# Patient Record
Sex: FEMALE | Race: WHITE | Employment: OTHER | URBAN - METROPOLITAN AREA
[De-identification: names, ages, dates, MRNs, and addresses within clinical notes are randomized per-mention and may not be internally consistent; named-entity substitution may affect disease eponyms.]

---

## 2019-07-12 ENCOUNTER — TELEPHONE (OUTPATIENT)
Dept: FAMILY MEDICINE CLINIC | Age: 64
End: 2019-07-12

## 2019-07-12 ENCOUNTER — OFFICE VISIT (OUTPATIENT)
Dept: FAMILY MEDICINE CLINIC | Age: 64
End: 2019-07-12
Payer: MEDICARE

## 2019-07-12 VITALS
TEMPERATURE: 97.6 F | HEIGHT: 63 IN | RESPIRATION RATE: 15 BRPM | SYSTOLIC BLOOD PRESSURE: 126 MMHG | WEIGHT: 140 LBS | BODY MASS INDEX: 24.8 KG/M2 | HEART RATE: 80 BPM | DIASTOLIC BLOOD PRESSURE: 72 MMHG

## 2019-07-12 DIAGNOSIS — F90.0 ATTENTION DEFICIT HYPERACTIVITY DISORDER (ADHD), PREDOMINANTLY INATTENTIVE TYPE: Primary | ICD-10-CM

## 2019-07-12 DIAGNOSIS — G89.4 CHRONIC PAIN SYNDROME: ICD-10-CM

## 2019-07-12 DIAGNOSIS — M79.7 FIBROMYALGIA: ICD-10-CM

## 2019-07-12 DIAGNOSIS — I10 ESSENTIAL HYPERTENSION: ICD-10-CM

## 2019-07-12 DIAGNOSIS — J44.9 CHRONIC OBSTRUCTIVE PULMONARY DISEASE, UNSPECIFIED COPD TYPE (HCC): ICD-10-CM

## 2019-07-12 DIAGNOSIS — F33.1 MODERATE EPISODE OF RECURRENT MAJOR DEPRESSIVE DISORDER (HCC): ICD-10-CM

## 2019-07-12 DIAGNOSIS — F41.9 ANXIETY: ICD-10-CM

## 2019-07-12 PROCEDURE — 99204 OFFICE O/P NEW MOD 45 MIN: CPT | Performed by: NURSE PRACTITIONER

## 2019-07-12 RX ORDER — TRAMADOL HYDROCHLORIDE 50 MG/1
50 TABLET ORAL EVERY 6 HOURS PRN
COMMUNITY
End: 2019-07-12

## 2019-07-12 RX ORDER — CYCLOBENZAPRINE HCL 10 MG
10 TABLET ORAL 2 TIMES DAILY PRN
COMMUNITY
End: 2019-07-12 | Stop reason: SDUPTHER

## 2019-07-12 RX ORDER — FUROSEMIDE 20 MG/1
20 TABLET ORAL DAILY
COMMUNITY
End: 2022-02-03 | Stop reason: SDUPTHER

## 2019-07-12 RX ORDER — BUPRENORPHINE 10 UG/H
1 PATCH TRANSDERMAL WEEKLY
Qty: 4 PATCH | Refills: 2 | Status: SHIPPED | OUTPATIENT
Start: 2019-07-12 | End: 2019-08-08 | Stop reason: ALTCHOICE

## 2019-07-12 RX ORDER — GABAPENTIN 400 MG/1
400 CAPSULE ORAL 2 TIMES DAILY
COMMUNITY
End: 2019-08-08 | Stop reason: ALTCHOICE

## 2019-07-12 RX ORDER — ALPRAZOLAM 1 MG/1
1 TABLET ORAL NIGHTLY PRN
COMMUNITY
End: 2019-07-12

## 2019-07-12 RX ORDER — ATENOLOL 25 MG/1
25 TABLET ORAL DAILY
COMMUNITY
End: 2019-08-08 | Stop reason: CLARIF

## 2019-07-12 RX ORDER — DOCUSATE SODIUM 100 MG/1
100 CAPSULE, LIQUID FILLED ORAL 2 TIMES DAILY
COMMUNITY
End: 2019-11-18 | Stop reason: CLARIF

## 2019-07-12 RX ORDER — MIRTAZAPINE 15 MG/1
15 TABLET, FILM COATED ORAL NIGHTLY
COMMUNITY

## 2019-07-12 RX ORDER — CYCLOBENZAPRINE HCL 10 MG
10 TABLET ORAL 3 TIMES DAILY PRN
Qty: 90 TABLET | Refills: 3 | Status: SHIPPED | OUTPATIENT
Start: 2019-07-12 | End: 2020-02-03 | Stop reason: SDUPTHER

## 2019-07-12 RX ORDER — DEXTROAMPHETAMINE SACCHARATE, AMPHETAMINE ASPARTATE, DEXTROAMPHETAMINE SULFATE AND AMPHETAMINE SULFATE 7.5; 7.5; 7.5; 7.5 MG/1; MG/1; MG/1; MG/1
30 TABLET ORAL 2 TIMES DAILY
Qty: 60 TABLET | Refills: 0 | Status: SHIPPED | OUTPATIENT
Start: 2019-07-12 | End: 2019-08-08

## 2019-07-12 RX ORDER — DEXTROAMPHETAMINE SACCHARATE, AMPHETAMINE ASPARTATE, DEXTROAMPHETAMINE SULFATE AND AMPHETAMINE SULFATE 7.5; 7.5; 7.5; 7.5 MG/1; MG/1; MG/1; MG/1
30 TABLET ORAL DAILY
COMMUNITY
End: 2019-07-12

## 2019-07-12 RX ORDER — PROCHLORPERAZINE MALEATE 10 MG
10 TABLET ORAL EVERY 6 HOURS PRN
COMMUNITY
End: 2019-11-18

## 2019-07-12 RX ORDER — BUPROPION HYDROCHLORIDE 150 MG/1
150 TABLET ORAL EVERY MORNING
COMMUNITY
End: 2019-11-18 | Stop reason: CLARIF

## 2019-07-12 RX ORDER — ALPRAZOLAM 1 MG/1
1 TABLET, EXTENDED RELEASE ORAL EVERY MORNING
Qty: 30 TABLET | Refills: 0 | Status: SHIPPED | OUTPATIENT
Start: 2019-07-12 | End: 2019-08-08

## 2019-07-12 SDOH — HEALTH STABILITY: MENTAL HEALTH: HOW OFTEN DO YOU HAVE A DRINK CONTAINING ALCOHOL?: NEVER

## 2019-07-12 ASSESSMENT — PATIENT HEALTH QUESTIONNAIRE - PHQ9
SUM OF ALL RESPONSES TO PHQ9 QUESTIONS 1 & 2: 0
2. FEELING DOWN, DEPRESSED OR HOPELESS: 0
SUM OF ALL RESPONSES TO PHQ QUESTIONS 1-9: 0
SUM OF ALL RESPONSES TO PHQ QUESTIONS 1-9: 0
1. LITTLE INTEREST OR PLEASURE IN DOING THINGS: 0

## 2019-07-15 ENCOUNTER — TELEPHONE (OUTPATIENT)
Dept: FAMILY MEDICINE CLINIC | Age: 64
End: 2019-07-15

## 2019-07-22 ENCOUNTER — TELEPHONE (OUTPATIENT)
Dept: FAMILY MEDICINE CLINIC | Age: 64
End: 2019-07-22

## 2019-07-23 ENCOUNTER — TELEPHONE (OUTPATIENT)
Dept: FAMILY MEDICINE CLINIC | Age: 64
End: 2019-07-23

## 2019-07-29 NOTE — PROGRESS NOTES
family history on file. No Known Allergies  Current Outpatient Medications   Medication Sig Dispense Refill    docusate sodium (COLACE) 100 MG capsule Take 100 mg by mouth 2 times daily      atenolol (TENORMIN) 25 MG tablet Take 25 mg by mouth daily      prednisoLONE 5 MG TABS Take 5 mg by mouth Take 1 tab every other day      furosemide (LASIX) 20 MG tablet Take 20 mg by mouth daily      mirtazapine (REMERON) 15 MG tablet Take 15 mg by mouth nightly      buPROPion (WELLBUTRIN XL) 150 MG extended release tablet Take 150 mg by mouth every morning      Cholecalciferol (VITAMIN D3) 58561 units TABS Take by mouth      prochlorperazine (COMPAZINE) 10 MG tablet Take 10 mg by mouth every 6 hours as needed      gabapentin (NEURONTIN) 400 MG capsule Take 400 mg by mouth 2 times daily.  buprenorphine (BUPRENEX) 10 MCG/HR PTWK Place 1 patch onto the skin once a week for 30 days. 4 patch 2    ALPRAZolam (XANAX XR) 1 MG extended release tablet Take 1 tablet by mouth every morning for 30 days. 30 tablet 0    amphetamine-dextroamphetamine (ADDERALL, 30MG,) 30 MG tablet Take 1 tablet by mouth 2 times daily for 30 days. 60 tablet 0    cyclobenzaprine (FLEXERIL) 10 MG tablet Take 1 tablet by mouth 3 times daily as needed (myalgias anf fibro) 90 tablet 3    mometasone-formoterol (DULERA) 200-5 MCG/ACT inhaler Inhale 1 puff into the lungs every 12 hours 1 Inhaler 5     No current facility-administered medications for this visit. PMH, Surgical Hx, Family Hx, and Social Hx reviewed and updated. Health Maintenance reviewed. Objective    Vitals:    07/12/19 1037   BP: 126/72   Pulse: 80   Resp: 15   Temp: 97.6 °F (36.4 °C)   TempSrc: Oral   Weight: 140 lb (63.5 kg)   Height: 5' 2.5\" (1.588 m)       Physical Exam   Constitutional: She is oriented to person, place, and time. She appears well-developed and well-nourished. No distress. HENT:   Head: Normocephalic and atraumatic.    Right Ear: External ear normal.

## 2019-08-08 ENCOUNTER — OFFICE VISIT (OUTPATIENT)
Dept: FAMILY MEDICINE CLINIC | Age: 64
End: 2019-08-08
Payer: MEDICARE

## 2019-08-08 VITALS
HEIGHT: 63 IN | HEART RATE: 60 BPM | SYSTOLIC BLOOD PRESSURE: 120 MMHG | BODY MASS INDEX: 23.92 KG/M2 | RESPIRATION RATE: 15 BRPM | DIASTOLIC BLOOD PRESSURE: 70 MMHG | WEIGHT: 135 LBS

## 2019-08-08 DIAGNOSIS — F90.0 ATTENTION DEFICIT HYPERACTIVITY DISORDER (ADHD), PREDOMINANTLY INATTENTIVE TYPE: ICD-10-CM

## 2019-08-08 DIAGNOSIS — M79.7 FIBROMYALGIA: ICD-10-CM

## 2019-08-08 DIAGNOSIS — F41.9 ANXIETY: ICD-10-CM

## 2019-08-08 DIAGNOSIS — I10 ESSENTIAL HYPERTENSION: Primary | ICD-10-CM

## 2019-08-08 DIAGNOSIS — G89.4 CHRONIC PAIN SYNDROME: ICD-10-CM

## 2019-08-08 PROCEDURE — 99214 OFFICE O/P EST MOD 30 MIN: CPT | Performed by: NURSE PRACTITIONER

## 2019-08-08 RX ORDER — BUPRENORPHINE 5 UG/H
1 PATCH TRANSDERMAL WEEKLY
Qty: 4 PATCH | Refills: 0 | Status: SHIPPED | OUTPATIENT
Start: 2019-08-08 | End: 2019-09-07

## 2019-08-08 RX ORDER — DEXTROAMPHETAMINE SACCHARATE, AMPHETAMINE ASPARTATE, DEXTROAMPHETAMINE SULFATE AND AMPHETAMINE SULFATE 5; 5; 5; 5 MG/1; MG/1; MG/1; MG/1
20 TABLET ORAL 2 TIMES DAILY
Qty: 60 TABLET | Refills: 0 | Status: SHIPPED | OUTPATIENT
Start: 2019-08-08 | End: 2019-09-19

## 2019-08-08 RX ORDER — GABAPENTIN 600 MG/1
600 TABLET ORAL NIGHTLY
Qty: 30 TABLET | Refills: 1 | Status: SHIPPED | OUTPATIENT
Start: 2019-08-08 | End: 2019-11-18 | Stop reason: CLARIF

## 2019-08-08 RX ORDER — ALPRAZOLAM 1 MG/1
1 TABLET ORAL 2 TIMES DAILY
Qty: 60 TABLET | Refills: 1 | Status: SHIPPED | OUTPATIENT
Start: 2019-08-08 | End: 2019-09-07

## 2019-08-08 RX ORDER — ASPIRIN 81 MG/1
TABLET, CHEWABLE ORAL
COMMUNITY

## 2019-08-08 ASSESSMENT — ENCOUNTER SYMPTOMS
CONSTIPATION: 0
ABDOMINAL PAIN: 0
RESPIRATORY NEGATIVE: 1
VOICE CHANGE: 0
TROUBLE SWALLOWING: 0
EYES NEGATIVE: 1
ANAL BLEEDING: 0
COLOR CHANGE: 0
DIARRHEA: 0
RECTAL PAIN: 0
ALLERGIC/IMMUNOLOGIC NEGATIVE: 1
GASTROINTESTINAL NEGATIVE: 1
BLOOD IN STOOL: 0
SHORTNESS OF BREATH: 0

## 2019-08-08 NOTE — PROGRESS NOTES
Subjective  Priya Filter, 58 y.o. female presents today with:  Chief Complaint   Patient presents with    Follow-Up from Hospital        Here for hospital follow up from [facility]- went to [facility] 07/26/2019-07/27/2019. Only med change a baby ASA was added and lipitor. Working on weaning medications-  Stopped tramadol and started [employer]-  patient tolerating-  States first time she has eran been pain free-  Is willing to try a decreased dosage. Will also decrease Gabapentin is currenty taking 800mg qhs will decrease to 600mg qhs. ADHD-  Was on 30 mg TiD went to 30 mg BID will go to 20mg BID this month. Anxiety-  Tried to 1 MG TID PRN-  She does not like the ER,  Did not feel it helped her anxiety. -  She is willing transition her to back to her XANAX 1mg that is not extended release but BID PRN not TID PRN. Review of Systems   Constitutional: Negative. Negative for activity change, appetite change, fatigue and unexpected weight change. HENT: Negative. Negative for dental problem, nosebleeds, trouble swallowing and voice change. Eyes: Negative. Negative for visual disturbance. Respiratory: Negative. Negative for shortness of breath. Cardiovascular: Negative. Negative for chest pain, palpitations and leg swelling. Gastrointestinal: Negative. Negative for abdominal pain, anal bleeding, blood in stool, constipation, diarrhea and rectal pain. Endocrine: Negative. Negative for cold intolerance, heat intolerance, polydipsia, polyphagia and polyuria. Genitourinary: Negative. Musculoskeletal: Negative. Skin: Negative. Negative for color change and rash. Allergic/Immunologic: Negative. Neurological: Negative. Negative for dizziness, syncope, weakness and headaches. Hematological: Negative. Negative for adenopathy. Does not bruise/bleed easily. Psychiatric/Behavioral: Negative. Negative for dysphoric mood and sleep disturbance. The patient is not nervous/anxious.

## 2019-08-13 ENCOUNTER — PATIENT MESSAGE (OUTPATIENT)
Dept: FAMILY MEDICINE CLINIC | Age: 64
End: 2019-08-13

## 2019-08-13 DIAGNOSIS — F90.0 ATTENTION DEFICIT HYPERACTIVITY DISORDER (ADHD), PREDOMINANTLY INATTENTIVE TYPE: ICD-10-CM

## 2019-08-13 DIAGNOSIS — G89.4 CHRONIC PAIN SYNDROME: Primary | ICD-10-CM

## 2019-08-15 RX ORDER — DEXTROAMPHETAMINE SACCHARATE, AMPHETAMINE ASPARTATE, DEXTROAMPHETAMINE SULFATE AND AMPHETAMINE SULFATE 5; 5; 5; 5 MG/1; MG/1; MG/1; MG/1
20 TABLET ORAL 2 TIMES DAILY
Qty: 60 TABLET | Refills: 0 | OUTPATIENT
Start: 2019-08-15 | End: 2019-09-14

## 2019-08-15 RX ORDER — BUPRENORPHINE 10 UG/H
1 PATCH TRANSDERMAL WEEKLY
Qty: 4 PATCH | Refills: 2 | Status: SHIPPED | OUTPATIENT
Start: 2019-08-15 | End: 2019-09-14

## 2019-09-19 ENCOUNTER — OFFICE VISIT (OUTPATIENT)
Dept: FAMILY MEDICINE CLINIC | Age: 64
End: 2019-09-19
Payer: MEDICARE

## 2019-09-19 ENCOUNTER — TELEPHONE (OUTPATIENT)
Dept: FAMILY MEDICINE CLINIC | Age: 64
End: 2019-09-19

## 2019-09-19 VITALS
BODY MASS INDEX: 23.74 KG/M2 | RESPIRATION RATE: 16 BRPM | HEART RATE: 82 BPM | SYSTOLIC BLOOD PRESSURE: 144 MMHG | WEIGHT: 134 LBS | DIASTOLIC BLOOD PRESSURE: 72 MMHG | HEIGHT: 63 IN

## 2019-09-19 DIAGNOSIS — F90.0 ATTENTION DEFICIT HYPERACTIVITY DISORDER (ADHD), PREDOMINANTLY INATTENTIVE TYPE: Primary | ICD-10-CM

## 2019-09-19 DIAGNOSIS — M79.7 FIBROMYALGIA: ICD-10-CM

## 2019-09-19 DIAGNOSIS — F33.1 MODERATE EPISODE OF RECURRENT MAJOR DEPRESSIVE DISORDER (HCC): ICD-10-CM

## 2019-09-19 DIAGNOSIS — Z12.31 ENCOUNTER FOR SCREENING MAMMOGRAM FOR MALIGNANT NEOPLASM OF BREAST: ICD-10-CM

## 2019-09-19 DIAGNOSIS — F90.0 ATTENTION DEFICIT HYPERACTIVITY DISORDER (ADHD), PREDOMINANTLY INATTENTIVE TYPE: ICD-10-CM

## 2019-09-19 DIAGNOSIS — G89.4 CHRONIC PAIN SYNDROME: ICD-10-CM

## 2019-09-19 PROCEDURE — 99214 OFFICE O/P EST MOD 30 MIN: CPT | Performed by: NURSE PRACTITIONER

## 2019-09-19 RX ORDER — ALPRAZOLAM 1 MG/1
1 TABLET ORAL NIGHTLY PRN
Qty: 30 TABLET | Refills: 2 | Status: SHIPPED | OUTPATIENT
Start: 2019-09-19 | End: 2019-09-26 | Stop reason: SDUPTHER

## 2019-09-19 RX ORDER — DEXTROAMPHETAMINE SACCHARATE, AMPHETAMINE ASPARTATE, DEXTROAMPHETAMINE SULFATE AND AMPHETAMINE SULFATE 7.5; 7.5; 7.5; 7.5 MG/1; MG/1; MG/1; MG/1
30 TABLET ORAL DAILY
Qty: 60 TABLET | Refills: 0 | Status: SHIPPED | OUTPATIENT
Start: 2019-09-19 | End: 2019-09-26 | Stop reason: SDUPTHER

## 2019-09-19 RX ORDER — GABAPENTIN 400 MG/1
400 CAPSULE ORAL NIGHTLY
Qty: 30 CAPSULE | Refills: 2 | Status: SHIPPED | OUTPATIENT
Start: 2019-09-19 | End: 2019-11-18 | Stop reason: SDUPTHER

## 2019-09-19 RX ORDER — BUPRENORPHINE 10 UG/H
1 PATCH TRANSDERMAL WEEKLY
Qty: 4 PATCH | Refills: 2 | Status: SHIPPED | OUTPATIENT
Start: 2019-09-19 | End: 2019-10-17

## 2019-09-19 NOTE — TELEPHONE ENCOUNTER
The patient has normally taken the Adderall twice a day, they want to make sure whether or not the new script should stay once a day or be changed to twice a day. Please advise, thank you.

## 2019-09-26 RX ORDER — DEXTROAMPHETAMINE SACCHARATE, AMPHETAMINE ASPARTATE, DEXTROAMPHETAMINE SULFATE AND AMPHETAMINE SULFATE 7.5; 7.5; 7.5; 7.5 MG/1; MG/1; MG/1; MG/1
30 TABLET ORAL 2 TIMES DAILY
Qty: 60 TABLET | Refills: 0 | Status: SHIPPED | OUTPATIENT
Start: 2019-09-26 | End: 2020-04-03

## 2019-09-26 RX ORDER — ALPRAZOLAM 1 MG/1
1 TABLET ORAL 2 TIMES DAILY PRN
Qty: 60 TABLET | Refills: 2 | Status: SHIPPED | OUTPATIENT
Start: 2019-09-26 | End: 2019-10-26

## 2019-09-30 ASSESSMENT — ENCOUNTER SYMPTOMS
ABDOMINAL PAIN: 0
BLOOD IN STOOL: 0
CONSTIPATION: 0
SHORTNESS OF BREATH: 0
EYES NEGATIVE: 1
ANAL BLEEDING: 0
ALLERGIC/IMMUNOLOGIC NEGATIVE: 1
RECTAL PAIN: 0
TROUBLE SWALLOWING: 0
RESPIRATORY NEGATIVE: 1
DIARRHEA: 0
VOICE CHANGE: 0
COLOR CHANGE: 0
GASTROINTESTINAL NEGATIVE: 1

## 2019-09-30 NOTE — PROGRESS NOTES
Diagnosis Date    ADHD (attention deficit hyperactivity disorder)     Anxiety     Depression     Fibromyalgia     GERD (gastroesophageal reflux disease)     Hypertension     Irritable bowel syndrome      Past Surgical History:   Procedure Laterality Date     SECTION      COLONOSCOPY      [facility]  Gladis Ty MD     Social History     Socioeconomic History    Marital status:      Spouse name: Not on file    Number of children: Not on file    Years of education: Not on file    Highest education level: Not on file   Occupational History    Not on file   Social Needs    Financial resource strain: Not on file    Food insecurity:     Worry: Not on file     Inability: Not on file    Transportation needs:     Medical: Not on file     Non-medical: Not on file   Tobacco Use    Smoking status: Former Smoker     Types: Cigarettes     Last attempt to quit: 2019     Years since quittin.1    Smokeless tobacco: Never Used   Substance and Sexual Activity    Alcohol use: Never     Frequency: Never    Drug use: Not on file    Sexual activity: Not on file   Lifestyle    Physical activity:     Days per week: Not on file     Minutes per session: Not on file    Stress: Not on file   Relationships    Social connections:     Talks on phone: Not on file     Gets together: Not on file     Attends Religion service: Not on file     Active member of club or organization: Not on file     Attends meetings of clubs or organizations: Not on file     Relationship status: Not on file    Intimate partner violence:     Fear of current or ex partner: Not on file     Emotionally abused: Not on file     Physically abused: Not on file     Forced sexual activity: Not on file   Other Topics Concern    Not on file   Social History Narrative    Not on file     No family history on file.   No Known Allergies  Current Outpatient Medications   Medication Sig Dispense Refill    buprenorphine (BUTRANS) 10

## 2019-11-18 ENCOUNTER — OFFICE VISIT (OUTPATIENT)
Dept: FAMILY MEDICINE CLINIC | Age: 64
End: 2019-11-18
Payer: MEDICARE

## 2019-11-18 VITALS
DIASTOLIC BLOOD PRESSURE: 60 MMHG | HEART RATE: 70 BPM | BODY MASS INDEX: 23.74 KG/M2 | WEIGHT: 134 LBS | TEMPERATURE: 98.3 F | SYSTOLIC BLOOD PRESSURE: 130 MMHG | HEIGHT: 63 IN | RESPIRATION RATE: 12 BRPM

## 2019-11-18 VITALS
HEIGHT: 63 IN | DIASTOLIC BLOOD PRESSURE: 60 MMHG | BODY MASS INDEX: 24.1 KG/M2 | WEIGHT: 136 LBS | TEMPERATURE: 98.3 F | SYSTOLIC BLOOD PRESSURE: 130 MMHG | HEART RATE: 70 BPM | RESPIRATION RATE: 12 BRPM

## 2019-11-18 DIAGNOSIS — Z00.00 ROUTINE GENERAL MEDICAL EXAMINATION AT A HEALTH CARE FACILITY: ICD-10-CM

## 2019-11-18 DIAGNOSIS — I10 ESSENTIAL HYPERTENSION: ICD-10-CM

## 2019-11-18 DIAGNOSIS — F33.1 MODERATE EPISODE OF RECURRENT MAJOR DEPRESSIVE DISORDER (HCC): ICD-10-CM

## 2019-11-18 DIAGNOSIS — F41.9 ANXIETY: ICD-10-CM

## 2019-11-18 DIAGNOSIS — F90.0 ATTENTION DEFICIT HYPERACTIVITY DISORDER (ADHD), PREDOMINANTLY INATTENTIVE TYPE: Primary | ICD-10-CM

## 2019-11-18 DIAGNOSIS — M79.7 FIBROMYALGIA: ICD-10-CM

## 2019-11-18 DIAGNOSIS — Z12.31 ENCOUNTER FOR SCREENING MAMMOGRAM FOR BREAST CANCER: ICD-10-CM

## 2019-11-18 DIAGNOSIS — G89.4 CHRONIC PAIN SYNDROME: ICD-10-CM

## 2019-11-18 DIAGNOSIS — J44.9 CHRONIC OBSTRUCTIVE PULMONARY DISEASE, UNSPECIFIED COPD TYPE (HCC): ICD-10-CM

## 2019-11-18 PROCEDURE — 99214 OFFICE O/P EST MOD 30 MIN: CPT | Performed by: NURSE PRACTITIONER

## 2019-11-18 PROCEDURE — G0439 PPPS, SUBSEQ VISIT: HCPCS | Performed by: NURSE PRACTITIONER

## 2019-11-18 RX ORDER — ALPRAZOLAM 1 MG/1
TABLET ORAL
Refills: 2 | COMMUNITY
Start: 2019-11-06 | End: 2020-07-31 | Stop reason: SDUPTHER

## 2019-11-18 RX ORDER — GABAPENTIN 300 MG/1
300 CAPSULE ORAL NIGHTLY
Qty: 30 CAPSULE | Refills: 2 | Status: SHIPPED | OUTPATIENT
Start: 2019-11-18 | End: 2020-04-03

## 2019-11-18 RX ORDER — ATENOLOL 50 MG/1
50 TABLET ORAL DAILY
Qty: 30 TABLET | Refills: 3 | Status: SHIPPED | OUTPATIENT
Start: 2019-11-18 | End: 2020-02-03 | Stop reason: SDUPTHER

## 2019-11-18 RX ORDER — BUPRENORPHINE 10 UG/H
1 PATCH TRANSDERMAL WEEKLY
Qty: 4 PATCH | Refills: 2 | Status: SHIPPED | OUTPATIENT
Start: 2019-11-18 | End: 2020-02-03 | Stop reason: SDUPTHER

## 2019-11-18 RX ORDER — VENLAFAXINE HYDROCHLORIDE 75 MG/1
CAPSULE, EXTENDED RELEASE ORAL
Refills: 1 | COMMUNITY
Start: 2019-11-01

## 2019-11-18 RX ORDER — ATORVASTATIN CALCIUM 80 MG/1
TABLET, FILM COATED ORAL
Refills: 1 | COMMUNITY
Start: 2019-10-02 | End: 2020-05-08

## 2019-11-18 ASSESSMENT — ENCOUNTER SYMPTOMS
HEARTBURN: 0
BLOOD IN STOOL: 0
DIARRHEA: 0
RHINORRHEA: 0
CONSTIPATION: 0
ABDOMINAL PAIN: 0
TROUBLE SWALLOWING: 0
COLOR CHANGE: 0
RESPIRATORY NEGATIVE: 1
EYES NEGATIVE: 1
ANAL BLEEDING: 0
SHORTNESS OF BREATH: 0
SORE THROAT: 0
VOICE CHANGE: 0
RECTAL PAIN: 0
GASTROINTESTINAL NEGATIVE: 1
ALLERGIC/IMMUNOLOGIC NEGATIVE: 1

## 2019-11-18 ASSESSMENT — PATIENT HEALTH QUESTIONNAIRE - PHQ9: SUM OF ALL RESPONSES TO PHQ QUESTIONS 1-9: 4

## 2019-11-18 ASSESSMENT — COPD QUESTIONNAIRES: COPD: 1

## 2019-12-18 ENCOUNTER — TELEPHONE (OUTPATIENT)
Dept: WOMENS IMAGING | Age: 64
End: 2019-12-18

## 2020-02-03 ENCOUNTER — OFFICE VISIT (OUTPATIENT)
Dept: FAMILY MEDICINE CLINIC | Age: 65
End: 2020-02-03
Payer: MEDICARE

## 2020-02-03 VITALS
WEIGHT: 141 LBS | RESPIRATION RATE: 15 BRPM | DIASTOLIC BLOOD PRESSURE: 78 MMHG | HEART RATE: 88 BPM | SYSTOLIC BLOOD PRESSURE: 132 MMHG | HEIGHT: 63 IN | BODY MASS INDEX: 24.98 KG/M2

## 2020-02-03 DIAGNOSIS — I10 ESSENTIAL HYPERTENSION: ICD-10-CM

## 2020-02-03 DIAGNOSIS — J44.9 CHRONIC OBSTRUCTIVE PULMONARY DISEASE, UNSPECIFIED COPD TYPE (HCC): ICD-10-CM

## 2020-02-03 DIAGNOSIS — D50.8 IRON DEFICIENCY ANEMIA SECONDARY TO INADEQUATE DIETARY IRON INTAKE: ICD-10-CM

## 2020-02-03 LAB
ALBUMIN SERPL-MCNC: 4.1 G/DL (ref 3.5–4.6)
ALP BLD-CCNC: 99 U/L (ref 40–130)
ALT SERPL-CCNC: 9 U/L (ref 0–33)
ANION GAP SERPL CALCULATED.3IONS-SCNC: 14 MEQ/L (ref 9–15)
AST SERPL-CCNC: 15 U/L (ref 0–35)
BASOPHILS ABSOLUTE: 0 K/UL (ref 0–0.2)
BASOPHILS RELATIVE PERCENT: 0.6 %
BILIRUB SERPL-MCNC: <0.2 MG/DL (ref 0.2–0.7)
BUN BLDV-MCNC: 13 MG/DL (ref 8–23)
CALCIUM SERPL-MCNC: 9.3 MG/DL (ref 8.5–9.9)
CHLORIDE BLD-SCNC: 102 MEQ/L (ref 95–107)
CHOLESTEROL, TOTAL: 96 MG/DL (ref 0–199)
CO2: 21 MEQ/L (ref 20–31)
CREAT SERPL-MCNC: 0.76 MG/DL (ref 0.5–0.9)
EOSINOPHILS ABSOLUTE: 0 K/UL (ref 0–0.7)
EOSINOPHILS RELATIVE PERCENT: 0.1 %
FERRITIN: 90.2 NG/ML (ref 13–150)
GFR AFRICAN AMERICAN: >60
GFR NON-AFRICAN AMERICAN: >60
GLOBULIN: 3.2 G/DL (ref 2.3–3.5)
GLUCOSE BLD-MCNC: 99 MG/DL (ref 70–99)
HCT VFR BLD CALC: 41.9 % (ref 37–47)
HDLC SERPL-MCNC: 62 MG/DL (ref 40–59)
HEMOGLOBIN: 14.2 G/DL (ref 12–16)
IRON SATURATION: 19 % (ref 11–46)
IRON: 57 UG/DL (ref 37–145)
LDL CHOLESTEROL CALCULATED: 22 MG/DL (ref 0–129)
LYMPHOCYTES ABSOLUTE: 1.4 K/UL (ref 1–4.8)
LYMPHOCYTES RELATIVE PERCENT: 25.4 %
MCH RBC QN AUTO: 32.7 PG (ref 27–31.3)
MCHC RBC AUTO-ENTMCNC: 34 % (ref 33–37)
MCV RBC AUTO: 96.4 FL (ref 82–100)
MONOCYTES ABSOLUTE: 0.1 K/UL (ref 0.2–0.8)
MONOCYTES RELATIVE PERCENT: 2.5 %
NEUTROPHILS ABSOLUTE: 3.8 K/UL (ref 1.4–6.5)
NEUTROPHILS RELATIVE PERCENT: 71.4 %
PDW BLD-RTO: 15.2 % (ref 11.5–14.5)
PLATELET # BLD: 347 K/UL (ref 130–400)
POTASSIUM SERPL-SCNC: 4.4 MEQ/L (ref 3.4–4.9)
RBC # BLD: 4.35 M/UL (ref 4.2–5.4)
SODIUM BLD-SCNC: 137 MEQ/L (ref 135–144)
TOTAL IRON BINDING CAPACITY: 298 UG/DL (ref 178–450)
TOTAL PROTEIN: 7.3 G/DL (ref 6.3–8)
TRIGL SERPL-MCNC: 62 MG/DL (ref 0–150)
WBC # BLD: 5.4 K/UL (ref 4.8–10.8)

## 2020-02-03 PROCEDURE — 99214 OFFICE O/P EST MOD 30 MIN: CPT | Performed by: NURSE PRACTITIONER

## 2020-02-03 PROCEDURE — 96372 THER/PROPH/DIAG INJ SC/IM: CPT | Performed by: NURSE PRACTITIONER

## 2020-02-03 RX ORDER — ATENOLOL 50 MG/1
50 TABLET ORAL DAILY
Qty: 30 TABLET | Refills: 11 | Status: ON HOLD | OUTPATIENT
Start: 2020-02-03 | End: 2020-05-28

## 2020-02-03 RX ORDER — CYANOCOBALAMIN 1000 UG/ML
1000 INJECTION INTRAMUSCULAR; SUBCUTANEOUS ONCE
Status: COMPLETED | OUTPATIENT
Start: 2020-02-03 | End: 2020-02-03

## 2020-02-03 RX ORDER — BUPRENORPHINE 10 UG/H
1 PATCH TRANSDERMAL WEEKLY
Qty: 12 PATCH | Refills: 0 | Status: SHIPPED | OUTPATIENT
Start: 2020-02-03 | End: 2020-03-06 | Stop reason: SDUPTHER

## 2020-02-03 RX ORDER — GABAPENTIN 400 MG/1
CAPSULE ORAL
COMMUNITY
Start: 2020-01-16 | End: 2020-07-28 | Stop reason: CLARIF

## 2020-02-03 RX ORDER — CYCLOBENZAPRINE HCL 10 MG
10 TABLET ORAL 2 TIMES DAILY PRN
Qty: 60 TABLET | Refills: 11 | Status: SHIPPED | OUTPATIENT
Start: 2020-02-03 | End: 2021-02-15

## 2020-02-03 RX ADMIN — CYANOCOBALAMIN 1000 MCG: 1000 INJECTION INTRAMUSCULAR; SUBCUTANEOUS at 15:25

## 2020-02-03 ASSESSMENT — ENCOUNTER SYMPTOMS
ABDOMINAL PAIN: 0
HEARTBURN: 0
VOICE CHANGE: 0
DIARRHEA: 0
SHORTNESS OF BREATH: 0
GASTROINTESTINAL NEGATIVE: 1
RESPIRATORY NEGATIVE: 1
CONSTIPATION: 0
EYES NEGATIVE: 1
COLOR CHANGE: 0
RHINORRHEA: 0
RECTAL PAIN: 0
ANAL BLEEDING: 0
TROUBLE SWALLOWING: 0
ALLERGIC/IMMUNOLOGIC NEGATIVE: 1
SORE THROAT: 0
BLOOD IN STOOL: 0

## 2020-02-03 ASSESSMENT — COPD QUESTIONNAIRES: COPD: 1

## 2020-02-03 NOTE — PROGRESS NOTES
Subjective  Quang [place], 71 y.o. female presents today with:  Chief Complaint   Patient presents with    Anxiety    ADHD    Check-Up    Discuss Medications     rx for iron pills    Mole     3 on inner left thigh pt would like looked at has grown in size sx x 2 yrs              Working on weaning medications-  Stopped tramadol and started [employer]-  patient tolerating-  States first time she has eran been pain free-  Is willing to try a decreased dosage. Will also decrease Gabapentin is currenty taking 600mg qhs will decrease to 400mg qhs. ADHD-  Was on 30 mg TiD went to 30 mg BID went to go to 20mg BID last month patient did not feel this was effective enough for her. She does she [facility] for psych. Anxiety-  Tried to 1 MG TID PRN-  She does not like the ER,  Did not feel it helped her anxiety. - She does she [facility] for psych. COPD   There is no shortness of breath. This is a chronic problem. Pertinent negatives include no appetite change, chest pain, dyspnea on exertion, ear congestion, ear pain, fever, headaches, heartburn, malaise/fatigue, myalgias, nasal congestion, orthopnea, PND, postnasal drip, rhinorrhea, sneezing, sore throat, sweats, trouble swallowing or weight loss. Muscle Pain   This is a chronic problem. The problem occurs daily. Pertinent negatives include no abdominal pain, chest pain, constipation, diarrhea, fatigue, fever, headaches, rash, shortness of breath or weakness. Treatments tried: BUTRANS patches. There is no swelling present. She has been behaving normally. Her past medical history is significant for chronic back pain. There is no history of rheumatic disease or sickle cell disease. Review of Systems   Constitutional: Negative. Negative for activity change, appetite change, fatigue, fever, malaise/fatigue, unexpected weight change and weight loss. HENT: Negative.   Negative for dental problem, ear pain, nosebleeds, postnasal drip, rhinorrhea, sneezing, sore throat, trouble swallowing and voice change. Eyes: Negative. Negative for visual disturbance. Respiratory: Negative. Negative for shortness of breath. Cardiovascular: Negative. Negative for chest pain, dyspnea on exertion, palpitations, leg swelling and PND. Gastrointestinal: Negative. Negative for abdominal pain, anal bleeding, blood in stool, constipation, diarrhea, heartburn and rectal pain. Endocrine: Negative. Negative for cold intolerance, heat intolerance, polydipsia, polyphagia and polyuria. Genitourinary: Negative. Musculoskeletal: Negative. Negative for myalgias. Skin: Negative. Negative for color change and rash. Allergic/Immunologic: Negative. Neurological: Negative. Negative for dizziness, syncope, weakness and headaches. Hematological: Negative. Negative for adenopathy. Does not bruise/bleed easily. Psychiatric/Behavioral: Negative. Negative for dysphoric mood and sleep disturbance. The patient is not nervous/anxious. Past Medical History:   Diagnosis Date    ADHD (attention deficit hyperactivity disorder)     Anxiety     Depression     Fibromyalgia     GERD (gastroesophageal reflux disease)     Hypertension     Irritable bowel syndrome      Past Surgical History:   Procedure Laterality Date     SECTION      COLONOSCOPY      [facility]  Ijeoma Sorto MD     Social History     Socioeconomic History    Marital status:       Spouse name: Not on file    Number of children: Not on file    Years of education: Not on file    Highest education level: Not on file   Occupational History    Not on file   Social Needs    Financial resource strain: Not on file    Food insecurity:     Worry: Not on file     Inability: Not on file    Transportation needs:     Medical: Not on file     Non-medical: Not on file   Tobacco Use    Smoking status: Former Smoker     Types: Cigarettes     Last attempt to quit: 2019 Years since quittin.5    Smokeless tobacco: Never Used   Substance and Sexual Activity    Alcohol use: Never     Frequency: Never    Drug use: Not on file    Sexual activity: Not on file   Lifestyle    Physical activity:     Days per week: Not on file     Minutes per session: Not on file    Stress: Not on file   Relationships    Social connections:     Talks on phone: Not on file     Gets together: Not on file     Attends [employer] service: Not on file     Active member of club or organization: Not on file     Attends meetings of clubs or organizations: Not on file     Relationship status: Not on file    Intimate partner violence:     Fear of current or ex partner: Not on file     Emotionally abused: Not on file     Physically abused: Not on file     Forced sexual activity: Not on file   Other Topics Concern    Not on file   Social History Narrative    Not on file     No family history on file. No Known Allergies  Current Outpatient Medications   Medication Sig Dispense Refill    buprenorphine (BUPRENEX) 10 MCG/HR PTWK Place 1 patch onto the skin once a week for 84 days. 12 patch 0    cyclobenzaprine (FLEXERIL) 10 MG tablet Take 1 tablet by mouth 2 times daily as needed (myalgias anf fibro) 60 tablet 11    mometasone-formoterol (DULERA) 200-5 MCG/ACT inhaler Inhale 1 puff into the lungs every 12 hours 1 Inhaler 11    atenolol (TENORMIN) 50 MG tablet Take 1 tablet by mouth daily 30 tablet 11    gabapentin (NEURONTIN) 400 MG capsule take 2 capsules at bedtime      ALPRAZolam (XANAX) 1 MG tablet TAKE 1 TABLET TWICE DAILY AS NEEDED  2    atorvastatin (LIPITOR) 80 MG tablet TAKE 1 TABLET BY MOUTH DAILY at night  1    venlafaxine (EFFEXOR XR) 75 MG extended release capsule TAKE 1 CAPSULE BY MOUTH TWICE DAILY  1    gabapentin (NEURONTIN) 300 MG capsule Take 1 capsule by mouth nightly for 30 days.  30 capsule 2    amphetamine-dextroamphetamine (ADDERALL) 30 MG tablet Take 1 tablet by mouth 2 times daily for 30 days. 60 tablet 0    aspirin (ASPIRIN 81) 81 MG chewable tablet       prednisoLONE 5 MG TABS Take 5 mg by mouth Take 1 tab every other day      furosemide (LASIX) 20 MG tablet Take 20 mg by mouth daily      mirtazapine (REMERON) 15 MG tablet Take 15 mg by mouth nightly      Cholecalciferol (VITAMIN D3) 80298 units TABS Take by mouth       No current facility-administered medications for this visit. PMH, Surgical Hx, Family Hx, and Social Hx reviewed and updated. Health Maintenance reviewed. Objective    Vitals:    02/03/20 1433   BP: 132/78   Pulse: 88   Resp: 15   Weight: 141 lb (64 kg)   Height: 5' 2.5\" (1.588 m)       Physical Exam  Constitutional:       General: She is not in acute distress. Appearance: She is well-developed. HENT:      Head: Normocephalic and atraumatic. Right Ear: External ear normal.      Left Ear: External ear normal.      Nose: Nose normal.   Eyes:      Conjunctiva/sclera: Conjunctivae normal.      Pupils: Pupils are equal, round, and reactive to light. Neck:      Musculoskeletal: Neck supple. Vascular: No JVD. Cardiovascular:      Rate and Rhythm: Normal rate and regular rhythm. Heart sounds: Normal heart sounds. No murmur. Pulmonary:      Effort: Pulmonary effort is normal. No respiratory distress. Breath sounds: Normal breath sounds. No wheezing or rales. Abdominal:      General: Bowel sounds are normal. There is no distension. Palpations: Abdomen is soft. There is no mass. Tenderness: There is no abdominal tenderness. Skin:     General: Skin is warm and dry. Neurological:      Mental Status: She is alert and oriented to person, place, and time. Assessment & Plan   Nunu Taveras was seen today for anxiety, adhd, check-up, discuss medications and mole.     Diagnoses and all orders for this visit:    Encounter for screening mammogram for malignant neoplasm of breast  -     CARMELITA DIGITAL SCREEN W CAD BILATERAL; Future    Chronic pain syndrome  -     buprenorphine (BUPRENEX) 10 MCG/HR PTWK; Place 1 patch onto the skin once a week for 84 days. Fibromyalgia  -     buprenorphine (BUPRENEX) 10 MCG/HR PTWK; Place 1 patch onto the skin once a week for 84 days. -     cyclobenzaprine (FLEXERIL) 10 MG tablet; Take 1 tablet by mouth 2 times daily as needed (myalgias anf fibro)    Essential hypertension  -     atenolol (TENORMIN) 50 MG tablet; Take 1 tablet by mouth daily  -     CBC Auto Differential; Future  -     Lipid Panel; Future  -     Comprehensive Metabolic Panel; Future    Anxiety  -     atenolol (TENORMIN) 50 MG tablet; Take 1 tablet by mouth daily    Chronic obstructive pulmonary disease, unspecified COPD type ([facility] [address]  -     mometasone-formoterol (DULERA) 200-5 MCG/ACT inhaler; Inhale 1 puff into the lungs every 12 hours  -     CBC Auto Differential; Future  -     Lipid Panel; Future  -     Comprehensive Metabolic Panel; Future    B12 deficiency  -     cyanocobalamin injection 1,000 mcg    Iron deficiency anemia secondary to inadequate dietary iron intake  -     Iron And Tibc; Future  -     Ferritin;  Future    Atypical nevus of groin  Comments:  will need excised and pathology-  9mmg irregular shaped      Orders Placed This Encounter   Procedures    CARMELITA DIGITAL SCREEN W CAD BILATERAL     Standing Status:   Future     Standing Expiration Date:   2/2/2021     Order Specific Question:   Reason for exam:     Answer:   v76.12    CBC Auto Differential     Standing Status:   Future     Number of Occurrences:   1     Standing Expiration Date:   8/3/2020    Lipid Panel     Standing Status:   Future     Number of Occurrences:   1     Standing Expiration Date:   2/3/2021     Order Specific Question:   Is Patient Fasting?/# of Hours     Answer:   9    Comprehensive Metabolic Panel     Standing Status:   Future     Number of Occurrences:   1     Standing Expiration Date:   2/3/2021    Iron And Tibc     Standing Status:   Future Number of Occurrences:   1     Standing Expiration Date:   2/3/2021     Order Specific Question:   Is Patient Fasting? Answer:   y     Order Specific Question:   No of Hours? Answer:   5    Ferritin     Standing Status:   Future     Number of Occurrences:   1     Standing Expiration Date:   2/3/2021     Orders Placed This Encounter   Medications    buprenorphine (BUPRENEX) 10 MCG/HR PTWK     Sig: Place 1 patch onto the skin once a week for 84 days. Dispense:  12 patch     Refill:  0    cyclobenzaprine (FLEXERIL) 10 MG tablet     Sig: Take 1 tablet by mouth 2 times daily as needed (myalgias anf fibro)     Dispense:  60 tablet     Refill:  11    cyanocobalamin injection 1,000 mcg    mometasone-formoterol (DULERA) 200-5 MCG/ACT inhaler     Sig: Inhale 1 puff into the lungs every 12 hours     Dispense:  1 Inhaler     Refill:  11    atenolol (TENORMIN) 50 MG tablet     Sig: Take 1 tablet by mouth daily     Dispense:  30 tablet     Refill:  11     Medications Discontinued During This Encounter   Medication Reason    buprenorphine (BUPRENEX) 10 MCG/HR PTWK REORDER    cyclobenzaprine (FLEXERIL) 10 MG tablet REORDER    mometasone-formoterol (DULERA) 200-5 MCG/ACT inhaler REORDER    atenolol (TENORMIN) 50 MG tablet REORDER     Return for appt for Lesion removal.      Reviewed with the patient: current clinical status, medications, activities and diet. Side effects, adverse effects of the medication prescribed today, as well as treatment plan/ rationale and result expectations have been discussed with the patient who expresses understanding and desires to proceed. Close follow up to evaluate treatment results and for coordination of care. I have reviewed the patient's medical history in detail and updated the computerized patient record.     Carol Saldivar, APRN - CNP

## 2020-02-17 ENCOUNTER — PROCEDURE VISIT (OUTPATIENT)
Dept: FAMILY MEDICINE CLINIC | Age: 65
End: 2020-02-17
Payer: MEDICARE

## 2020-02-17 VITALS
HEART RATE: 84 BPM | HEIGHT: 63 IN | SYSTOLIC BLOOD PRESSURE: 122 MMHG | BODY MASS INDEX: 25.69 KG/M2 | RESPIRATION RATE: 15 BRPM | WEIGHT: 145 LBS | DIASTOLIC BLOOD PRESSURE: 80 MMHG

## 2020-02-17 DIAGNOSIS — D22.5: ICD-10-CM

## 2020-02-17 DIAGNOSIS — L30.9 DERMATITIS: ICD-10-CM

## 2020-02-17 PROCEDURE — 11401 EXC TR-EXT B9+MARG 0.6-1 CM: CPT | Performed by: NURSE PRACTITIONER

## 2020-02-17 RX ORDER — LIDOCAINE HYDROCHLORIDE AND EPINEPHRINE 10; 10 MG/ML; UG/ML
20 INJECTION, SOLUTION INFILTRATION; PERINEURAL ONCE
Status: COMPLETED | OUTPATIENT
Start: 2020-02-17 | End: 2020-02-17

## 2020-02-17 RX ADMIN — LIDOCAINE HYDROCHLORIDE AND EPINEPHRINE 20 ML: 10; 10 INJECTION, SOLUTION INFILTRATION; PERINEURAL at 16:35

## 2020-02-17 ASSESSMENT — PATIENT HEALTH QUESTIONNAIRE - PHQ9
1. LITTLE INTEREST OR PLEASURE IN DOING THINGS: 0
SUM OF ALL RESPONSES TO PHQ9 QUESTIONS 1 & 2: 0
SUM OF ALL RESPONSES TO PHQ QUESTIONS 1-9: 0
SUM OF ALL RESPONSES TO PHQ QUESTIONS 1-9: 0
2. FEELING DOWN, DEPRESSED OR HOPELESS: 0

## 2020-02-19 ENCOUNTER — TELEPHONE (OUTPATIENT)
Dept: FAMILY MEDICINE CLINIC | Age: 65
End: 2020-02-19

## 2020-02-21 NOTE — PROGRESS NOTES
Shave Biopsy Procedure Note    Pre-operative Diagnosis: Suspicious lesion    Post-operative Diagnosis: same    Locations:left groin/upper thigh  Anesthesia: Lidocaine 1% with epinephrine without added sodium bicarbonate    Procedure Details   History of allergy to iodine: no    Patient informed of the risks (including bleeding and infection) and benefits of the   procedure and Written informed consent obtained. The lesion and surrounding area were given a sterile prep using betadyne and draped in the usual sterile fashion. A scalpel was used to shave an area of skin approximately 8mm by 5mm. Hemostasis achieved with silver nitrate. Antibiotic ointment and a sterile dressing applied. The specimen was sent for pathologic examination. The patient tolerated the procedure well. Condition:  Stable    Complications:  none. Plan:  1. Instructed to keep the wound dry and covered for 24-48h and clean thereafter. 2. Warning signs of infection were reviewed. 3. Recommended that the patient use OTC acetaminophen, OTC ibuprofen as needed for pain. 4. Return in 1 week.     Electronically signed by:  CANDICE Colbert, FNP-C 2/21/20 2:23 PM

## 2020-03-06 RX ORDER — BUPRENORPHINE 10 UG/H
1 PATCH TRANSDERMAL WEEKLY
Qty: 12 PATCH | Refills: 0 | Status: SHIPPED | OUTPATIENT
Start: 2020-03-06 | End: 2020-04-03 | Stop reason: SDUPTHER

## 2020-03-06 NOTE — TELEPHONE ENCOUNTER
Amrit Abreu calls. Pharmacy will only dispense 4 patches at a time. She has used those and went to get a refill and the price went from $ 100 then next month the price is   $ 300. Her son called around and found the price to be more affordable [facility].  Can you send a new order there for her?

## 2020-04-03 ENCOUNTER — VIRTUAL VISIT (OUTPATIENT)
Dept: FAMILY MEDICINE CLINIC | Age: 65
End: 2020-04-03
Payer: MEDICARE

## 2020-04-03 PROCEDURE — 99215 OFFICE O/P EST HI 40 MIN: CPT | Performed by: NURSE PRACTITIONER

## 2020-04-03 RX ORDER — ERGOCALCIFEROL 1.25 MG/1
CAPSULE ORAL
COMMUNITY
Start: 2020-03-15 | End: 2020-09-22 | Stop reason: SDUPTHER

## 2020-04-03 RX ORDER — DEXTROAMPHETAMINE SACCHARATE, AMPHETAMINE ASPARTATE MONOHYDRATE, DEXTROAMPHETAMINE SULFATE AND AMPHETAMINE SULFATE 7.5; 7.5; 7.5; 7.5 MG/1; MG/1; MG/1; MG/1
30 CAPSULE, EXTENDED RELEASE ORAL EVERY MORNING
Qty: 30 CAPSULE | Refills: 0 | Status: ON HOLD | OUTPATIENT
Start: 2020-06-16 | End: 2020-05-28

## 2020-04-03 RX ORDER — BUPRENORPHINE 10 UG/H
1 PATCH TRANSDERMAL WEEKLY
Qty: 4 PATCH | Refills: 2 | Status: SHIPPED | OUTPATIENT
Start: 2020-04-03 | End: 2020-05-03

## 2020-04-03 RX ORDER — DEXTROAMPHETAMINE SACCHARATE, AMPHETAMINE ASPARTATE MONOHYDRATE, DEXTROAMPHETAMINE SULFATE AND AMPHETAMINE SULFATE 7.5; 7.5; 7.5; 7.5 MG/1; MG/1; MG/1; MG/1
CAPSULE, EXTENDED RELEASE ORAL
COMMUNITY
Start: 2020-03-16 | End: 2020-04-03 | Stop reason: SDUPTHER

## 2020-04-03 RX ORDER — SUCRALFATE 1 G/1
TABLET ORAL
Status: ON HOLD | COMMUNITY
End: 2020-05-28

## 2020-04-03 RX ORDER — DOXYCYCLINE HYCLATE 100 MG
100 TABLET ORAL 2 TIMES DAILY
Qty: 28 TABLET | Refills: 0 | Status: SHIPPED | OUTPATIENT
Start: 2020-04-03 | End: 2020-04-17

## 2020-04-03 RX ORDER — PREDNISONE 1 MG/1
TABLET ORAL
COMMUNITY
Start: 2020-03-15 | End: 2020-05-08

## 2020-04-03 RX ORDER — ONDANSETRON HYDROCHLORIDE 8 MG/1
8 TABLET, FILM COATED ORAL EVERY 12 HOURS PRN
Qty: 20 TABLET | Refills: 1 | Status: SHIPPED | OUTPATIENT
Start: 2020-04-03 | End: 2020-05-08

## 2020-04-03 RX ORDER — DEXTROAMPHETAMINE SACCHARATE, AMPHETAMINE ASPARTATE MONOHYDRATE, DEXTROAMPHETAMINE SULFATE AND AMPHETAMINE SULFATE 7.5; 7.5; 7.5; 7.5 MG/1; MG/1; MG/1; MG/1
30 CAPSULE, EXTENDED RELEASE ORAL EVERY MORNING
Qty: 30 CAPSULE | Refills: 0 | Status: SHIPPED | OUTPATIENT
Start: 2020-05-17 | End: 2020-07-28 | Stop reason: CLARIF

## 2020-04-03 RX ORDER — DEXTROAMPHETAMINE SACCHARATE, AMPHETAMINE ASPARTATE MONOHYDRATE, DEXTROAMPHETAMINE SULFATE AND AMPHETAMINE SULFATE 7.5; 7.5; 7.5; 7.5 MG/1; MG/1; MG/1; MG/1
CAPSULE, EXTENDED RELEASE ORAL
Qty: 30 CAPSULE | Refills: 0 | Status: SHIPPED | OUTPATIENT
Start: 2020-04-16 | End: 2020-07-28 | Stop reason: CLARIF

## 2020-04-03 ASSESSMENT — ENCOUNTER SYMPTOMS
ABDOMINAL PAIN: 0
HEARTBURN: 0
SHORTNESS OF BREATH: 0
RHINORRHEA: 0
CONSTIPATION: 0
DIARRHEA: 0
SORE THROAT: 0
TROUBLE SWALLOWING: 0

## 2020-04-03 ASSESSMENT — COPD QUESTIONNAIRES: COPD: 1

## 2020-04-03 NOTE — PROGRESS NOTES
CAPSULE BY MOUTH EVERY DAY  -     amphetamine-dextroamphetamine (ADDERALL XR) 30 MG extended release capsule; Take 1 capsule by mouth every morning for 30 days. -     amphetamine-dextroamphetamine (ADDERALL XR) 30 MG extended release capsule; Take 1 capsule by mouth every morning for 30 days. Moderate episode of recurrent major depressive disorder (HCC)    COPD with acute exacerbation (HCC)    Essential hypertension    Iron deficiency anemia secondary to inadequate dietary iron intake    Anxiety    B12 deficiency    Chronic pain syndrome  -     buprenorphine (BUPRENEX) 10 MCG/HR PTWK; Place 1 patch onto the skin once a week for 30 days. Acute cystitis without hematuria    Fibromyalgia  -     buprenorphine (BUPRENEX) 10 MCG/HR PTWK; Place 1 patch onto the skin once a week for 30 days. Other orders  -     doxycycline hyclate (VIBRA-TABS) 100 MG tablet; Take 1 tablet by mouth 2 times daily for 14 days  -     ondansetron (ZOFRAN) 8 MG tablet; Take 1 tablet by mouth every 12 hours as needed for Nausea or Vomiting      No orders of the defined types were placed in this encounter. Orders Placed This Encounter   Medications    buprenorphine (BUPRENEX) 10 MCG/HR PTWK     Sig: Place 1 patch onto the skin once a week for 30 days. Dispense:  4 patch     Refill:  2    doxycycline hyclate (VIBRA-TABS) 100 MG tablet     Sig: Take 1 tablet by mouth 2 times daily for 14 days     Dispense:  28 tablet     Refill:  0    ondansetron (ZOFRAN) 8 MG tablet     Sig: Take 1 tablet by mouth every 12 hours as needed for Nausea or Vomiting     Dispense:  20 tablet     Refill:  1    amphetamine-dextroamphetamine (ADDERALL XR) 30 MG extended release capsule     Sig: TAKE ONE CAPSULE BY MOUTH EVERY DAY     Dispense:  30 capsule     Refill:  0    amphetamine-dextroamphetamine (ADDERALL XR) 30 MG extended release capsule     Sig: Take 1 capsule by mouth every morning for 30 days.      Dispense:  30 capsule     Refill:  0    amphetamine-dextroamphetamine (ADDERALL XR) 30 MG extended release capsule     Sig: Take 1 capsule by mouth every morning for 30 days. Dispense:  30 capsule     Refill:  0     Medications Discontinued During This Encounter   Medication Reason    Cholecalciferol (VITAMIN D3) 04652 units TABS LIST CLEANUP    gabapentin (NEURONTIN) 300 MG capsule LIST CLEANUP    buprenorphine (BUPRENEX) 10 MCG/HR PTWK REORDER    amphetamine-dextroamphetamine (ADDERALL) 30 MG tablet LIST CLEANUP    amphetamine-dextroamphetamine (ADDERALL XR) 30 MG extended release capsule REORDER     Return in about 3 months (around 7/3/2020). Reviewed with the patient: current clinical status, medications, activities and diet. Side effects, adverse effects of the medication prescribed today, as well as treatment plan/ rationale and result expectations have been discussed with the patient who expresses understanding and desires to proceed. Close follow up to evaluate treatment results and for coordination of care. I have reviewed the patient's medical history in detail and updated the computerized patient record.     Marisa Brown, APRN - CNP

## 2020-05-01 ENCOUNTER — VIRTUAL VISIT (OUTPATIENT)
Dept: FAMILY MEDICINE CLINIC | Age: 65
End: 2020-05-01
Payer: MEDICARE

## 2020-05-01 VITALS
HEIGHT: 63 IN | SYSTOLIC BLOOD PRESSURE: 132 MMHG | DIASTOLIC BLOOD PRESSURE: 70 MMHG | BODY MASS INDEX: 26.1 KG/M2 | TEMPERATURE: 97.3 F | HEART RATE: 91 BPM

## 2020-05-01 PROCEDURE — 99443 PR PHYS/QHP TELEPHONE EVALUATION 21-30 MIN: CPT | Performed by: NURSE PRACTITIONER

## 2020-05-01 RX ORDER — AZITHROMYCIN 250 MG/1
250 TABLET, FILM COATED ORAL SEE ADMIN INSTRUCTIONS
Qty: 6 TABLET | Refills: 0 | Status: SHIPPED | OUTPATIENT
Start: 2020-05-01 | End: 2020-12-14 | Stop reason: SDUPTHER

## 2020-05-01 RX ORDER — PREDNISONE 10 MG/1
TABLET ORAL
Qty: 30 TABLET | Refills: 0 | Status: SHIPPED | OUTPATIENT
Start: 2020-05-01 | End: 2020-05-08

## 2020-05-01 RX ORDER — GUAIFENESIN 600 MG/1
600 TABLET, EXTENDED RELEASE ORAL 2 TIMES DAILY
Qty: 30 TABLET | Refills: 0 | Status: SHIPPED | OUTPATIENT
Start: 2020-05-01 | End: 2020-05-16

## 2020-05-01 RX ORDER — IPRATROPIUM BROMIDE AND ALBUTEROL SULFATE 2.5; .5 MG/3ML; MG/3ML
1 SOLUTION RESPIRATORY (INHALATION) EVERY 4 HOURS PRN
Qty: 360 ML | Refills: 0 | Status: SHIPPED | OUTPATIENT
Start: 2020-05-01

## 2020-05-01 ASSESSMENT — ENCOUNTER SYMPTOMS
COUGH: 1
SORE THROAT: 1
WHEEZING: 1
SHORTNESS OF BREATH: 1
RHINORRHEA: 1
SINUS PRESSURE: 1

## 2020-05-01 NOTE — PROGRESS NOTES
mouth 2 times daily as needed (myalgias anf fibro) 60 tablet 11    mometasone-formoterol (DULERA) 200-5 MCG/ACT inhaler Inhale 1 puff into the lungs every 12 hours 1 Inhaler 11    atenolol (TENORMIN) 50 MG tablet Take 1 tablet by mouth daily 30 tablet 11    ALPRAZolam (XANAX) 1 MG tablet TAKE 1 TABLET TWICE DAILY AS NEEDED  2    atorvastatin (LIPITOR) 80 MG tablet TAKE 1 TABLET BY MOUTH DAILY at night  1    venlafaxine (EFFEXOR XR) 75 MG extended release capsule TAKE 1 CAPSULE BY MOUTH TWICE DAILY  1    aspirin (ASPIRIN 81) 81 MG chewable tablet       prednisoLONE 5 MG TABS Take 5 mg by mouth Take 1 tab every other day      furosemide (LASIX) 20 MG tablet Take 20 mg by mouth daily      mirtazapine (REMERON) 15 MG tablet Take 15 mg by mouth nightly       No current facility-administered medications on file prior to visit. Allergies:  Patient has no known allergies. Review of Systems   Constitutional: Positive for chills and fatigue. Negative for fever. HENT: Positive for congestion, ear pain (intermittent left), postnasal drip, rhinorrhea, sinus pressure and sore throat. Respiratory: Positive for cough, shortness of breath and wheezing. Neurological: Positive for headaches. Objective:   /70   Pulse 91   Temp 97.3 °F (36.3 °C)   Ht 5' 2.5\" (1.588 m)   BMI 26.10 kg/m²     Physical Exam  Constitutional:       General: She is awake. She is not in acute distress. Neurological:      Mental Status: She is alert and oriented to person, place, and time. Psychiatric:         Mood and Affect: Mood normal.         Speech: Speech normal.         Behavior: Behavior normal. Behavior is cooperative. Pt's breathing appeared unlabored during conversation. 2 coughing episodes while on phone, but were not uncontrollable or prolonged     Assessment:          Diagnosis Orders   1.  COPD exacerbation (HCC)  guaiFENesin (MUCINEX) 600 MG extended release tablet    predniSONE (DELTASONE) 10

## 2020-05-02 ENCOUNTER — TELEPHONE (OUTPATIENT)
Dept: FAMILY MEDICINE CLINIC | Age: 65
End: 2020-05-02

## 2020-05-04 ENCOUNTER — HOSPITAL ENCOUNTER (OUTPATIENT)
Dept: GENERAL RADIOLOGY | Age: 65
Discharge: HOME | End: 2020-05-06
Payer: MEDICARE

## 2020-05-04 PROCEDURE — 71046 X-RAY EXAM CHEST 2 VIEWS: CPT

## 2020-05-04 RX ORDER — ALPRAZOLAM 1 MG/1
TABLET ORAL
Refills: 2 | Status: CANCELLED | OUTPATIENT
Start: 2020-05-04

## 2020-05-08 ENCOUNTER — VIRTUAL VISIT (OUTPATIENT)
Dept: FAMILY MEDICINE CLINIC | Age: 65
End: 2020-05-08
Payer: MEDICARE

## 2020-05-08 PROCEDURE — 99214 OFFICE O/P EST MOD 30 MIN: CPT | Performed by: NURSE PRACTITIONER

## 2020-05-08 RX ORDER — ATORVASTATIN CALCIUM 80 MG/1
80 TABLET, FILM COATED ORAL DAILY
Qty: 90 TABLET | Refills: 1 | Status: SHIPPED | OUTPATIENT
Start: 2020-05-08 | End: 2020-08-17

## 2020-05-08 RX ORDER — PROCHLORPERAZINE MALEATE 10 MG
10 TABLET ORAL EVERY 8 HOURS PRN
Qty: 120 TABLET | Refills: 3 | Status: SHIPPED | OUTPATIENT
Start: 2020-05-08 | End: 2021-08-13

## 2020-05-14 ASSESSMENT — COPD QUESTIONNAIRES: COPD: 1

## 2020-05-14 ASSESSMENT — ENCOUNTER SYMPTOMS
WHEEZING: 1
SHORTNESS OF BREATH: 1
RHINORRHEA: 1
SORE THROAT: 1
COUGH: 1

## 2020-05-22 ENCOUNTER — VIRTUAL VISIT (OUTPATIENT)
Dept: FAMILY MEDICINE CLINIC | Age: 65
End: 2020-05-22
Payer: MEDICARE

## 2020-05-22 PROCEDURE — 99214 OFFICE O/P EST MOD 30 MIN: CPT | Performed by: NURSE PRACTITIONER

## 2020-05-22 RX ORDER — LEVOFLOXACIN 500 MG/1
500 TABLET, FILM COATED ORAL DAILY
Qty: 10 TABLET | Refills: 0 | Status: SHIPPED | OUTPATIENT
Start: 2020-05-22 | End: 2020-06-01

## 2020-05-27 ENCOUNTER — OFFICE VISIT (OUTPATIENT)
Dept: OBGYN CLINIC | Age: 65
End: 2020-05-27
Payer: MEDICARE

## 2020-05-27 VITALS
BODY MASS INDEX: 25.69 KG/M2 | DIASTOLIC BLOOD PRESSURE: 64 MMHG | WEIGHT: 145 LBS | HEIGHT: 63 IN | SYSTOLIC BLOOD PRESSURE: 108 MMHG | HEART RATE: 84 BPM

## 2020-05-27 PROCEDURE — 99203 OFFICE O/P NEW LOW 30 MIN: CPT | Performed by: OBSTETRICS & GYNECOLOGY

## 2020-05-27 NOTE — PROGRESS NOTES
Smokeless tobacco: Never Used   Substance and Sexual Activity    Alcohol use: Never     Frequency: Never    Drug use: Not on file    Sexual activity: Not on file   Lifestyle    Physical activity     Days per week: Not on file     Minutes per session: Not on file    Stress: Not on file   Relationships    Social connections     Talks on phone: Not on file     Gets together: Not on file     Attends Yazidism service: Not on file     Active member of club or organization: Not on file     Attends meetings of clubs or organizations: Not on file     Relationship status: Not on file    Intimate partner violence     Fear of current or ex partner: Not on file     Emotionally abused: Not on file     Physically abused: Not on file     Forced sexual activity: Not on file   Other Topics Concern    Not on file   Social History Narrative    Not on file       Contraceptive method:  none    Patient's medications, allergies, past medical, surgical, social and family histories were reviewed and updated as appropriate. Review of Systems  As per chief complaint   All other systems reviewed and are negative. Pelvic pain, abnormal US . Physical Exam:  Vitals:  /64 (Site: Right Upper Arm, Position: Sitting, Cuff Size: Medium Adult)   Pulse 84   Ht 5' 2.5\" (1.588 m)   Wt 145 lb (65.8 kg)   BMI 26.10 kg/m²   Lungs: CTAB   Heart : Regular S1/S2, no M/R/G  Abdomen: Soft , NT, ND , + BS   Pelvic exam : deferred    Assessment:      Diagnosis Orders   1. Abnormal pelvic ultrasound     2. Pelvic pain in female         Plan:     Schedule for diagnostic laparoscopy, hysteroscopy with myosure, cystoscopy. Counseling: The patient was counseled on all options both medical and surgical, conservative as well as definitive. She has elected to proceed with the procedure as stated above. The patient was counseled on the procedure. Risks and complications were reviewed in detail.  The patients orders, labs, consents have been completed. The history and physical as well as all supporting surgical documentation will be forwarded to the pre-operative holding area. The patient is aware that this procedure may not alleviate her symptoms. That there may be a necessity for a second surgery and that there may be an incomplete removal of abnormal tissue. Discussed all risks and benefits of procedure in detail including risks of anesthesia, blood loss, need for transfusion, infection;  also potential for complication, injury, need for repair and/or removal of uterus, tubes, ovaries, bowel, bladder, ureters, blood vessels and nerves. Also discussed pre-operative and post-operative expectations. Patient verbalizes understanding. No orders of the defined types were placed in this encounter. No orders of the defined types were placed in this encounter. Follow Up:  Return for scheduled for surgery. The patient was counseled about the risks of alex Covid-19 during their perioperative period and any recovery window from their procedure. The patient was made aware that alex Covid-19 may worsen their prognosis for recovering from their procedure and lend to a higher morbidity and/or mortality risk. All material risks, benefits, and reasonable alternatives including postponing the procedure were discussed. The patient DOES wish to proceed with their procedure at this time.       Melanie Roberts MD

## 2020-05-28 ENCOUNTER — ANESTHESIA EVENT (OUTPATIENT)
Dept: OPERATING ROOM | Age: 65
End: 2020-05-28
Payer: MEDICARE

## 2020-05-28 ENCOUNTER — ANESTHESIA (OUTPATIENT)
Dept: OPERATING ROOM | Age: 65
End: 2020-05-28
Payer: MEDICARE

## 2020-05-28 ENCOUNTER — HOSPITAL ENCOUNTER (OUTPATIENT)
Age: 65
Setting detail: OUTPATIENT SURGERY
Discharge: HOME | End: 2020-05-28
Attending: OBSTETRICS & GYNECOLOGY | Admitting: OBSTETRICS & GYNECOLOGY
Payer: MEDICARE

## 2020-05-28 VITALS
HEIGHT: 63 IN | DIASTOLIC BLOOD PRESSURE: 62 MMHG | TEMPERATURE: 98.4 F | SYSTOLIC BLOOD PRESSURE: 122 MMHG | OXYGEN SATURATION: 97 % | BODY MASS INDEX: 25.69 KG/M2 | RESPIRATION RATE: 20 BRPM | WEIGHT: 145 LBS | HEART RATE: 72 BPM

## 2020-05-28 VITALS — OXYGEN SATURATION: 81 % | DIASTOLIC BLOOD PRESSURE: 161 MMHG | SYSTOLIC BLOOD PRESSURE: 213 MMHG | TEMPERATURE: 96.8 F

## 2020-05-28 LAB
ANION GAP SERPL CALCULATED.3IONS-SCNC: 9 MEQ/L (ref 9–15)
BASOPHILS ABSOLUTE: 0.1 K/UL (ref 0–0.2)
BASOPHILS RELATIVE PERCENT: 0.9 %
BUN BLDV-MCNC: 13 MG/DL (ref 8–23)
CALCIUM SERPL-MCNC: 9.7 MG/DL (ref 8.5–9.9)
CHLORIDE BLD-SCNC: 106 MEQ/L (ref 95–107)
CO2: 26 MEQ/L (ref 20–31)
CREAT SERPL-MCNC: 0.63 MG/DL (ref 0.5–0.9)
EKG ATRIAL RATE: 77 BPM
EKG P AXIS: 60 DEGREES
EKG P-R INTERVAL: 140 MS
EKG Q-T INTERVAL: 398 MS
EKG QRS DURATION: 72 MS
EKG QTC CALCULATION (BAZETT): 450 MS
EKG R AXIS: 43 DEGREES
EKG T AXIS: 74 DEGREES
EKG VENTRICULAR RATE: 77 BPM
EOSINOPHILS ABSOLUTE: 0.1 K/UL (ref 0–0.7)
EOSINOPHILS RELATIVE PERCENT: 2.1 %
GFR AFRICAN AMERICAN: >60
GFR NON-AFRICAN AMERICAN: >60
GLUCOSE BLD-MCNC: 94 MG/DL (ref 70–99)
HCT VFR BLD CALC: 40.5 % (ref 37–47)
HEMOGLOBIN: 13.6 G/DL (ref 12–16)
LYMPHOCYTES ABSOLUTE: 2.6 K/UL (ref 1–4.8)
LYMPHOCYTES RELATIVE PERCENT: 44.1 %
MCH RBC QN AUTO: 31.2 PG (ref 27–31.3)
MCHC RBC AUTO-ENTMCNC: 33.7 % (ref 33–37)
MCV RBC AUTO: 92.7 FL (ref 82–100)
MONOCYTES ABSOLUTE: 0.4 K/UL (ref 0.2–0.8)
MONOCYTES RELATIVE PERCENT: 7.2 %
NEUTROPHILS ABSOLUTE: 2.7 K/UL (ref 1.4–6.5)
NEUTROPHILS RELATIVE PERCENT: 45.7 %
PDW BLD-RTO: 14.4 % (ref 11.5–14.5)
PLATELET # BLD: 335 K/UL (ref 130–400)
POTASSIUM SERPL-SCNC: 4.3 MEQ/L (ref 3.4–4.9)
RBC # BLD: 4.37 M/UL (ref 4.2–5.4)
SODIUM BLD-SCNC: 141 MEQ/L (ref 135–144)
WBC # BLD: 5.8 K/UL (ref 4.8–10.8)

## 2020-05-28 PROCEDURE — 3600000004 HC SURGERY LEVEL 4 BASE: Performed by: OBSTETRICS & GYNECOLOGY

## 2020-05-28 PROCEDURE — 6370000000 HC RX 637 (ALT 250 FOR IP): Performed by: OBSTETRICS & GYNECOLOGY

## 2020-05-28 PROCEDURE — 7100000000 HC PACU RECOVERY - FIRST 15 MIN: Performed by: OBSTETRICS & GYNECOLOGY

## 2020-05-28 PROCEDURE — 7100000011 HC PHASE II RECOVERY - ADDTL 15 MIN: Performed by: OBSTETRICS & GYNECOLOGY

## 2020-05-28 PROCEDURE — 58660 LAPAROSCOPY LYSIS: CPT | Performed by: OBSTETRICS & GYNECOLOGY

## 2020-05-28 PROCEDURE — 3600000014 HC SURGERY LEVEL 4 ADDTL 15MIN: Performed by: OBSTETRICS & GYNECOLOGY

## 2020-05-28 PROCEDURE — 80048 BASIC METABOLIC PNL TOTAL CA: CPT

## 2020-05-28 PROCEDURE — 58558 HYSTEROSCOPY BIOPSY: CPT | Performed by: OBSTETRICS & GYNECOLOGY

## 2020-05-28 PROCEDURE — 6360000002 HC RX W HCPCS: Performed by: OBSTETRICS & GYNECOLOGY

## 2020-05-28 PROCEDURE — 2580000003 HC RX 258: Performed by: STUDENT IN AN ORGANIZED HEALTH CARE EDUCATION/TRAINING PROGRAM

## 2020-05-28 PROCEDURE — 2720000010 HC SURG SUPPLY STERILE: Performed by: OBSTETRICS & GYNECOLOGY

## 2020-05-28 PROCEDURE — C1760 CLOSURE DEV, VASC: HCPCS | Performed by: OBSTETRICS & GYNECOLOGY

## 2020-05-28 PROCEDURE — 52000 CYSTOURETHROSCOPY: CPT | Performed by: OBSTETRICS & GYNECOLOGY

## 2020-05-28 PROCEDURE — 2500000003 HC RX 250 WO HCPCS

## 2020-05-28 PROCEDURE — 6360000002 HC RX W HCPCS

## 2020-05-28 PROCEDURE — 85025 COMPLETE CBC W/AUTO DIFF WBC: CPT

## 2020-05-28 PROCEDURE — 93005 ELECTROCARDIOGRAM TRACING: CPT | Performed by: OBSTETRICS & GYNECOLOGY

## 2020-05-28 PROCEDURE — 2500000003 HC RX 250 WO HCPCS: Performed by: OBSTETRICS & GYNECOLOGY

## 2020-05-28 PROCEDURE — 2580000003 HC RX 258: Performed by: OBSTETRICS & GYNECOLOGY

## 2020-05-28 PROCEDURE — 6370000000 HC RX 637 (ALT 250 FOR IP): Performed by: STUDENT IN AN ORGANIZED HEALTH CARE EDUCATION/TRAINING PROGRAM

## 2020-05-28 PROCEDURE — 7100000010 HC PHASE II RECOVERY - FIRST 15 MIN: Performed by: OBSTETRICS & GYNECOLOGY

## 2020-05-28 PROCEDURE — 3700000000 HC ANESTHESIA ATTENDED CARE: Performed by: OBSTETRICS & GYNECOLOGY

## 2020-05-28 PROCEDURE — 3700000001 HC ADD 15 MINUTES (ANESTHESIA): Performed by: OBSTETRICS & GYNECOLOGY

## 2020-05-28 PROCEDURE — 7100000001 HC PACU RECOVERY - ADDTL 15 MIN: Performed by: OBSTETRICS & GYNECOLOGY

## 2020-05-28 PROCEDURE — 2709999900 HC NON-CHARGEABLE SUPPLY: Performed by: OBSTETRICS & GYNECOLOGY

## 2020-05-28 RX ORDER — OXYCODONE HYDROCHLORIDE AND ACETAMINOPHEN 5; 325 MG/1; MG/1
1 TABLET ORAL EVERY 4 HOURS PRN
Status: DISCONTINUED | OUTPATIENT
Start: 2020-05-28 | End: 2020-05-28 | Stop reason: HOSPADM

## 2020-05-28 RX ORDER — OXYCODONE HYDROCHLORIDE AND ACETAMINOPHEN 5; 325 MG/1; MG/1
2 TABLET ORAL NIGHTLY PRN
Qty: 10 TABLET | Refills: 0 | Status: SHIPPED | OUTPATIENT
Start: 2020-05-28 | End: 2020-06-02

## 2020-05-28 RX ORDER — ROCURONIUM BROMIDE 10 MG/ML
INJECTION, SOLUTION INTRAVENOUS PRN
Status: DISCONTINUED | OUTPATIENT
Start: 2020-05-28 | End: 2020-05-28 | Stop reason: SDUPTHER

## 2020-05-28 RX ORDER — PROPOFOL 10 MG/ML
INJECTION, EMULSION INTRAVENOUS PRN
Status: DISCONTINUED | OUTPATIENT
Start: 2020-05-28 | End: 2020-05-28 | Stop reason: SDUPTHER

## 2020-05-28 RX ORDER — SODIUM CHLORIDE, SODIUM LACTATE, POTASSIUM CHLORIDE, CALCIUM CHLORIDE 600; 310; 30; 20 MG/100ML; MG/100ML; MG/100ML; MG/100ML
INJECTION, SOLUTION INTRAVENOUS CONTINUOUS
Status: DISCONTINUED | OUTPATIENT
Start: 2020-05-28 | End: 2020-05-28 | Stop reason: HOSPADM

## 2020-05-28 RX ORDER — LIDOCAINE HYDROCHLORIDE 20 MG/ML
JELLY TOPICAL PRN
Status: DISCONTINUED | OUTPATIENT
Start: 2020-05-28 | End: 2020-05-28 | Stop reason: ALTCHOICE

## 2020-05-28 RX ORDER — MAGNESIUM HYDROXIDE 1200 MG/15ML
LIQUID ORAL CONTINUOUS PRN
Status: COMPLETED | OUTPATIENT
Start: 2020-05-28 | End: 2020-05-28

## 2020-05-28 RX ORDER — HYDROCODONE BITARTRATE AND ACETAMINOPHEN 5; 325 MG/1; MG/1
1 TABLET ORAL PRN
Status: COMPLETED | OUTPATIENT
Start: 2020-05-28 | End: 2020-05-28

## 2020-05-28 RX ORDER — DIPHENHYDRAMINE HYDROCHLORIDE 50 MG/ML
12.5 INJECTION INTRAMUSCULAR; INTRAVENOUS
Status: DISCONTINUED | OUTPATIENT
Start: 2020-05-28 | End: 2020-05-28 | Stop reason: HOSPADM

## 2020-05-28 RX ORDER — PROMETHAZINE HYDROCHLORIDE 12.5 MG/1
12.5 TABLET ORAL EVERY 6 HOURS PRN
Status: DISCONTINUED | OUTPATIENT
Start: 2020-05-28 | End: 2020-05-28 | Stop reason: HOSPADM

## 2020-05-28 RX ORDER — DEXAMETHASONE SODIUM PHOSPHATE 4 MG/ML
INJECTION, SOLUTION INTRA-ARTICULAR; INTRALESIONAL; INTRAMUSCULAR; INTRAVENOUS; SOFT TISSUE PRN
Status: DISCONTINUED | OUTPATIENT
Start: 2020-05-28 | End: 2020-05-28 | Stop reason: SDUPTHER

## 2020-05-28 RX ORDER — LIDOCAINE HYDROCHLORIDE 10 MG/ML
1 INJECTION, SOLUTION EPIDURAL; INFILTRATION; INTRACAUDAL; PERINEURAL
Status: DISCONTINUED | OUTPATIENT
Start: 2020-05-28 | End: 2020-05-28 | Stop reason: HOSPADM

## 2020-05-28 RX ORDER — SODIUM CHLORIDE 0.9 % (FLUSH) 0.9 %
10 SYRINGE (ML) INJECTION EVERY 12 HOURS SCHEDULED
Status: DISCONTINUED | OUTPATIENT
Start: 2020-05-28 | End: 2020-05-28 | Stop reason: HOSPADM

## 2020-05-28 RX ORDER — SODIUM CHLORIDE 0.9 % (FLUSH) 0.9 %
10 SYRINGE (ML) INJECTION PRN
Status: DISCONTINUED | OUTPATIENT
Start: 2020-05-28 | End: 2020-05-28 | Stop reason: HOSPADM

## 2020-05-28 RX ORDER — FENTANYL CITRATE 50 UG/ML
INJECTION, SOLUTION INTRAMUSCULAR; INTRAVENOUS PRN
Status: DISCONTINUED | OUTPATIENT
Start: 2020-05-28 | End: 2020-05-28 | Stop reason: SDUPTHER

## 2020-05-28 RX ORDER — MIDAZOLAM HYDROCHLORIDE 1 MG/ML
INJECTION INTRAMUSCULAR; INTRAVENOUS PRN
Status: DISCONTINUED | OUTPATIENT
Start: 2020-05-28 | End: 2020-05-28 | Stop reason: SDUPTHER

## 2020-05-28 RX ORDER — POLYETHYLENE GLYCOL 3350 17 G/17G
17 POWDER, FOR SOLUTION ORAL PRN
COMMUNITY

## 2020-05-28 RX ORDER — HYDROCODONE BITARTRATE AND ACETAMINOPHEN 5; 325 MG/1; MG/1
2 TABLET ORAL PRN
Status: COMPLETED | OUTPATIENT
Start: 2020-05-28 | End: 2020-05-28

## 2020-05-28 RX ORDER — DOCUSATE SODIUM 100 MG/1
100 CAPSULE, LIQUID FILLED ORAL 2 TIMES DAILY PRN
Qty: 60 CAPSULE | Refills: 2 | Status: SHIPPED | OUTPATIENT
Start: 2020-05-28 | End: 2021-03-10

## 2020-05-28 RX ORDER — ONDANSETRON 2 MG/ML
4 INJECTION INTRAMUSCULAR; INTRAVENOUS EVERY 6 HOURS PRN
Status: DISCONTINUED | OUTPATIENT
Start: 2020-05-28 | End: 2020-05-28 | Stop reason: HOSPADM

## 2020-05-28 RX ORDER — BUPIVACAINE HYDROCHLORIDE AND EPINEPHRINE 5; 5 MG/ML; UG/ML
INJECTION, SOLUTION EPIDURAL; INTRACAUDAL; PERINEURAL PRN
Status: DISCONTINUED | OUTPATIENT
Start: 2020-05-28 | End: 2020-05-28 | Stop reason: ALTCHOICE

## 2020-05-28 RX ORDER — ACETAMINOPHEN 325 MG/1
650 TABLET ORAL EVERY 4 HOURS PRN
Status: DISCONTINUED | OUTPATIENT
Start: 2020-05-28 | End: 2020-05-28 | Stop reason: HOSPADM

## 2020-05-28 RX ORDER — OMEPRAZOLE 40 MG/1
40 CAPSULE, DELAYED RELEASE ORAL
COMMUNITY
Start: 2020-04-01

## 2020-05-28 RX ORDER — ACETAMINOPHEN 500 MG
1000 TABLET ORAL EVERY 6 HOURS PRN
Qty: 60 TABLET | Refills: 1 | Status: SHIPPED | OUTPATIENT
Start: 2020-05-28 | End: 2022-09-13 | Stop reason: ALTCHOICE

## 2020-05-28 RX ORDER — METOCLOPRAMIDE HYDROCHLORIDE 5 MG/ML
10 INJECTION INTRAMUSCULAR; INTRAVENOUS
Status: DISCONTINUED | OUTPATIENT
Start: 2020-05-28 | End: 2020-05-28 | Stop reason: HOSPADM

## 2020-05-28 RX ORDER — CEFAZOLIN SODIUM 2 G/50ML
2 SOLUTION INTRAVENOUS
Status: COMPLETED | OUTPATIENT
Start: 2020-05-28 | End: 2020-05-28

## 2020-05-28 RX ORDER — MEPERIDINE HYDROCHLORIDE 25 MG/ML
12.5 INJECTION INTRAMUSCULAR; INTRAVENOUS; SUBCUTANEOUS EVERY 5 MIN PRN
Status: DISCONTINUED | OUTPATIENT
Start: 2020-05-28 | End: 2020-05-28 | Stop reason: HOSPADM

## 2020-05-28 RX ORDER — ONDANSETRON 2 MG/ML
INJECTION INTRAMUSCULAR; INTRAVENOUS PRN
Status: DISCONTINUED | OUTPATIENT
Start: 2020-05-28 | End: 2020-05-28 | Stop reason: SDUPTHER

## 2020-05-28 RX ORDER — KETOROLAC TROMETHAMINE 30 MG/ML
INJECTION, SOLUTION INTRAMUSCULAR; INTRAVENOUS PRN
Status: DISCONTINUED | OUTPATIENT
Start: 2020-05-28 | End: 2020-05-28 | Stop reason: SDUPTHER

## 2020-05-28 RX ORDER — FENTANYL CITRATE 50 UG/ML
50 INJECTION, SOLUTION INTRAMUSCULAR; INTRAVENOUS EVERY 10 MIN PRN
Status: DISCONTINUED | OUTPATIENT
Start: 2020-05-28 | End: 2020-05-28 | Stop reason: HOSPADM

## 2020-05-28 RX ORDER — ONDANSETRON 2 MG/ML
4 INJECTION INTRAMUSCULAR; INTRAVENOUS
Status: DISCONTINUED | OUTPATIENT
Start: 2020-05-28 | End: 2020-05-28 | Stop reason: HOSPADM

## 2020-05-28 RX ORDER — OXYCODONE HYDROCHLORIDE AND ACETAMINOPHEN 5; 325 MG/1; MG/1
2 TABLET ORAL EVERY 4 HOURS PRN
Status: DISCONTINUED | OUTPATIENT
Start: 2020-05-28 | End: 2020-05-28 | Stop reason: HOSPADM

## 2020-05-28 RX ORDER — NITROFURANTOIN 25; 75 MG/1; MG/1
100 CAPSULE ORAL 2 TIMES DAILY
Qty: 20 CAPSULE | Refills: 0 | Status: SHIPPED | OUTPATIENT
Start: 2020-05-28 | End: 2020-06-07

## 2020-05-28 RX ADMIN — HYDROCODONE BITARTRATE AND ACETAMINOPHEN 2 TABLET: 5; 325 TABLET ORAL at 14:31

## 2020-05-28 RX ADMIN — SODIUM CHLORIDE, POTASSIUM CHLORIDE, SODIUM LACTATE AND CALCIUM CHLORIDE: 600; 310; 30; 20 INJECTION, SOLUTION INTRAVENOUS at 12:03

## 2020-05-28 RX ADMIN — PROPOFOL 200 MG: 10 INJECTION, EMULSION INTRAVENOUS at 12:07

## 2020-05-28 RX ADMIN — ONDANSETRON 4 MG: 2 INJECTION INTRAMUSCULAR; INTRAVENOUS at 13:09

## 2020-05-28 RX ADMIN — MIDAZOLAM HYDROCHLORIDE 2 MG: 2 INJECTION, SOLUTION INTRAMUSCULAR; INTRAVENOUS at 12:03

## 2020-05-28 RX ADMIN — FENTANYL CITRATE 25 MCG: 50 INJECTION, SOLUTION INTRAMUSCULAR; INTRAVENOUS at 13:35

## 2020-05-28 RX ADMIN — SUGAMMADEX 200 MG: 100 INJECTION, SOLUTION INTRAVENOUS at 13:25

## 2020-05-28 RX ADMIN — SODIUM CHLORIDE, POTASSIUM CHLORIDE, SODIUM LACTATE AND CALCIUM CHLORIDE: 600; 310; 30; 20 INJECTION, SOLUTION INTRAVENOUS at 10:35

## 2020-05-28 RX ADMIN — CEFAZOLIN SODIUM 2 G: 2 SOLUTION INTRAVENOUS at 12:12

## 2020-05-28 RX ADMIN — FENTANYL CITRATE 50 MCG: 50 INJECTION, SOLUTION INTRAMUSCULAR; INTRAVENOUS at 12:07

## 2020-05-28 RX ADMIN — ROCURONIUM BROMIDE 50 MG: 10 INJECTION INTRAVENOUS at 12:07

## 2020-05-28 RX ADMIN — KETOROLAC TROMETHAMINE 15 MG: 30 INJECTION, SOLUTION INTRAMUSCULAR; INTRAVENOUS at 13:18

## 2020-05-28 RX ADMIN — FENTANYL CITRATE 25 MCG: 50 INJECTION, SOLUTION INTRAMUSCULAR; INTRAVENOUS at 13:41

## 2020-05-28 RX ADMIN — FENTANYL CITRATE 25 MCG: 50 INJECTION, SOLUTION INTRAMUSCULAR; INTRAVENOUS at 13:38

## 2020-05-28 RX ADMIN — DEXAMETHASONE SODIUM PHOSPHATE 4 MG: 4 INJECTION, SOLUTION INTRA-ARTICULAR; INTRALESIONAL; INTRAMUSCULAR; INTRAVENOUS; SOFT TISSUE at 12:26

## 2020-05-28 RX ADMIN — FENTANYL CITRATE 25 MCG: 50 INJECTION, SOLUTION INTRAMUSCULAR; INTRAVENOUS at 13:31

## 2020-05-28 RX ADMIN — FENTANYL CITRATE 50 MCG: 50 INJECTION, SOLUTION INTRAMUSCULAR; INTRAVENOUS at 12:41

## 2020-05-28 ASSESSMENT — PULMONARY FUNCTION TESTS
PIF_VALUE: 29
PIF_VALUE: 2
PIF_VALUE: 16
PIF_VALUE: 19
PIF_VALUE: 29
PIF_VALUE: 16
PIF_VALUE: 24
PIF_VALUE: 2
PIF_VALUE: 4
PIF_VALUE: 17
PIF_VALUE: 24
PIF_VALUE: 15
PIF_VALUE: 24
PIF_VALUE: 17
PIF_VALUE: 23
PIF_VALUE: 3
PIF_VALUE: 18
PIF_VALUE: 16
PIF_VALUE: 24
PIF_VALUE: 24
PIF_VALUE: 28
PIF_VALUE: 1
PIF_VALUE: 2
PIF_VALUE: 19
PIF_VALUE: 16
PIF_VALUE: 19
PIF_VALUE: 17
PIF_VALUE: 16
PIF_VALUE: 17
PIF_VALUE: 16
PIF_VALUE: 20
PIF_VALUE: 29
PIF_VALUE: 7
PIF_VALUE: 17
PIF_VALUE: 17
PIF_VALUE: 16
PIF_VALUE: 29
PIF_VALUE: 17
PIF_VALUE: 17
PIF_VALUE: 21
PIF_VALUE: 18
PIF_VALUE: 17
PIF_VALUE: 16
PIF_VALUE: 17
PIF_VALUE: 18
PIF_VALUE: 19
PIF_VALUE: 1
PIF_VALUE: 16
PIF_VALUE: 17
PIF_VALUE: 18
PIF_VALUE: 18
PIF_VALUE: 20
PIF_VALUE: 17
PIF_VALUE: 18
PIF_VALUE: 16
PIF_VALUE: 18
PIF_VALUE: 16
PIF_VALUE: 20
PIF_VALUE: 24
PIF_VALUE: 16
PIF_VALUE: 28
PIF_VALUE: 17
PIF_VALUE: 16
PIF_VALUE: 6
PIF_VALUE: 1
PIF_VALUE: 27
PIF_VALUE: 18
PIF_VALUE: 5
PIF_VALUE: 22
PIF_VALUE: 16
PIF_VALUE: 18
PIF_VALUE: 0
PIF_VALUE: 7
PIF_VALUE: 17
PIF_VALUE: 3
PIF_VALUE: 17
PIF_VALUE: 16
PIF_VALUE: 20
PIF_VALUE: 18
PIF_VALUE: 20
PIF_VALUE: 16
PIF_VALUE: 21
PIF_VALUE: 22
PIF_VALUE: 18
PIF_VALUE: 19
PIF_VALUE: 17

## 2020-05-28 ASSESSMENT — PAIN SCALES - GENERAL
PAINLEVEL_OUTOF10: 6
PAINLEVEL_OUTOF10: 4
PAINLEVEL_OUTOF10: 6
PAINLEVEL_OUTOF10: 6
PAINLEVEL_OUTOF10: 4
PAINLEVEL_OUTOF10: 5

## 2020-05-28 ASSESSMENT — PAIN DESCRIPTION - PROGRESSION: CLINICAL_PROGRESSION: GRADUALLY IMPROVING

## 2020-05-28 ASSESSMENT — PAIN - FUNCTIONAL ASSESSMENT: PAIN_FUNCTIONAL_ASSESSMENT: 0-10

## 2020-05-28 NOTE — H&P
Smoking status: Former Smoker       Types: Cigarettes       Last attempt to quit: 2019       Years since quittin.8    Smokeless tobacco: Never Used   Substance and Sexual Activity    Alcohol use: Never       Frequency: Never    Drug use: Not on file    Sexual activity: Not on file   Lifestyle    Physical activity       Days per week: Not on file       Minutes per session: Not on file    Stress: Not on file   Relationships    Social connections       Talks on phone: Not on file       Gets together: Not on file       Attends Buddhism service: Not on file       Active member of club or organization: Not on file       Attends meetings of clubs or organizations: Not on file       Relationship status: Not on file    Intimate partner violence       Fear of current or ex partner: Not on file       Emotionally abused: Not on file       Physically abused: Not on file       Forced sexual activity: Not on file   Other Topics Concern    Not on file   Social History Narrative    Not on file            Contraceptive method:  none     Patient's medications, allergies, past medical, surgical, social and family histories were reviewed and updated as appropriate.     Review of Systems  As per chief complaint   All other systems reviewed and are negative. Pelvic pain, abnormal US .      Physical Exam:  Vitals:  /64 (Site: Right Upper Arm, Position: Sitting, Cuff Size: Medium Adult)   Pulse 84   Ht 5' 2.5\" (1.588 m)   Wt 145 lb (65.8 kg)   BMI 26.10 kg/m²   Lungs: CTAB   Heart : Regular S1/S2, no M/R/G  Abdomen: Soft , NT, ND , + BS   Pelvic exam : deferred     Assessment:        Diagnosis Orders   1. Abnormal pelvic ultrasound      2. Pelvic pain in female            Plan:      Schedule for diagnostic laparoscopy, hysteroscopy with myosure, cystoscopy. Counseling: The patient was counseled on all options both medical and surgical, conservative as well as definitive.  She has elected to proceed with the

## 2020-05-28 NOTE — ANESTHESIA PRE PROCEDURE
Department of Anesthesiology  Preprocedure Note       Name:  Michelle Hebert   Age:  71 y.o.  :  1955                                          MRN:  [id number]         Date:  2020      Surgeon: Goyo Farris):  Melanie Roberts MD    Procedure: Procedure(s):  DIAGNOSTIC LAPAROSCOPY, HYSTEROSCOPY WITH MYOSURE, CYSTOSCOPY, PAT ON ADMIT    Medications prior to admission:   Prior to Admission medications    Medication Sig Start Date End Date Taking? Authorizing Provider   polyethylene glycol (GLYCOLAX) 17 GM/SCOOP powder Take 17 g by mouth as needed   Yes Historical Provider, MD   levoFLOXacin (LEVAQUIN) 500 MG tablet Take 1 tablet by mouth daily for 10 days 20 Yes AMANDA Nogueira CNP   prochlorperazine (COMPAZINE) 10 MG tablet Take 1 tablet by mouth every 8 hours as needed (NAUSEA) 20  Yes AMANDA Nogueira CNP   atorvastatin (LIPITOR) 80 MG tablet Take 1 tablet by mouth daily 20  Yes AMANDA Trivedi CNP   ipratropium-albuterol (DUONEB) 0.5-2.5 (3) MG/3ML SOLN nebulizer solution Inhale 3 mLs into the lungs every 4 hours as needed for Shortness of Breath 20  Yes AMANDA Guajardo CNP   vitamin D (ERGOCALCIFEROL) 1.25 MG (81155 UT) CAPS capsule TAKE 1 CAPSULE BY MOUTH once weekly 3/15/20  Yes Historical Provider, MD   amphetamine-dextroamphetamine (ADDERALL XR) 30 MG extended release capsule Take 1 capsule by mouth every morning for 30 days.  20 Yes AMANDA Trivedi CNP   gabapentin (NEURONTIN) 400 MG capsule take 2 capsules at bedtime 20  Yes Historical Provider, MD   cyclobenzaprine (FLEXERIL) 10 MG tablet Take 1 tablet by mouth 2 times daily as needed (myalgias anf fibro) 2/3/20  Yes AMANDA Nogueira CNP   mometasone-formoterol (DULERA) 200-5 MCG/ACT inhaler Inhale 1 puff into the lungs every 12 hours 2/3/20  Yes Yamini Stewart-Castaneda, APRN - CNP   ALPRAZolam (XANAX) 1 MG tablet TAKE 1 TABLET TWICE DAILY AS NEEDED 19  Yes  Smoking status: Former Smoker     Types: Cigarettes     Last attempt to quit: 2019     Years since quittin.8    Smokeless tobacco: Never Used   Substance Use Topics    Alcohol use: Never     Frequency: Never                                Counseling given: Not Answered      Vital Signs (Current):   Vitals:    20 1018   BP: 126/60   Pulse: 82   Resp: 18   Temp: 97.6 °F (36.4 °C)   TempSrc: Temporal   SpO2: 96%   Weight: 145 lb (65.8 kg)   Height: 5' 2.5\" (1.588 m)                                              BP Readings from Last 3 Encounters:   20 126/60   20 108/64   20 132/70       NPO Status: Time of last liquid consumption: 1800                        Time of last solid consumption: 1800                        Date of last liquid consumption: 20                        Date of last solid food consumption: 20    BMI:   Wt Readings from Last 3 Encounters:   20 145 lb (65.8 kg)   20 145 lb (65.8 kg)   20 145 lb (65.8 kg)     Body mass index is 26.1 kg/m². CBC:   Lab Results   Component Value Date    WBC 5.4 2020    RBC 4.35 2020    HGB 14.2 2020    HCT 41.9 2020    MCV 96.4 2020    RDW 15.2 2020     2020       CMP:   Lab Results   Component Value Date     2020    K 4.4 2020     2020    CO2 21 2020    BUN 13 2020    CREATININE 0.76 2020    GFRAA >60.0 2020    LABGLOM >60.0 2020    GLUCOSE 99 2020    PROT 7.3 2020    CALCIUM 9.3 2020    BILITOT <0.2 2020    ALKPHOS 99 2020    AST 15 2020    ALT 9 2020       POC Tests: No results for input(s): POCGLU, POCNA, POCK, POCCL, POCBUN, POCHEMO, POCHCT in the last 72 hours.     Coags: No results found for: PROTIME, INR, APTT    HCG (If Applicable): No results found for: PREGTESTUR, PREGSERUM, HCG, HCGQUANT     ABGs: No results found for: PHART, PO2ART, IOJ0FID, JNI1TXT, BEART, M3AANAGC     Type & Screen (If Applicable):  No results found for: LABABO, LABRH    Drug/Infectious Status (If Applicable):  No results found for: HIV, HEPCAB    COVID-19 Screening (If Applicable): No results found for: COVID19      Anesthesia Evaluation  Patient summary reviewed and Nursing notes reviewed no history of anesthetic complications:   Airway: Mallampati: II  TM distance: >3 FB   Neck ROM: full  Mouth opening: > = 3 FB Dental: normal exam         Pulmonary:Negative Pulmonary ROS and normal exam  breath sounds clear to auscultation                             Cardiovascular:  Exercise tolerance: good (>4 METS),   (+) hypertension:, hyperlipidemia      ECG reviewed  Rhythm: regular  Rate: normal           Beta Blocker:  Not on Beta Blocker         Neuro/Psych:   (+) neuromuscular disease:, psychiatric history:            GI/Hepatic/Renal:   (+) GERD:,           Endo/Other: Negative Endo/Other ROS             Pt had PAT visit. Abdominal:           Vascular: negative vascular ROS. Anesthesia Plan      general     ASA 2     (ETT  )  Induction: intravenous. MIPS: Postoperative opioids intended and Prophylactic antiemetics administered. Anesthetic plan and risks discussed with patient. Plan discussed with CRNA.     Attending anesthesiologist reviewed and agrees with Kath S Steven Rowland DO   5/28/2020

## 2020-05-28 NOTE — ANESTHESIA POSTPROCEDURE EVALUATION
Department of Anesthesiology  Postprocedure Note    Patient: Benjie Myers  MRN: [id number]  YOB: 1955  Date of evaluation: 5/28/2020  Time:  1:44 PM     Procedure Summary     Date:  05/28/20 Room / Location:  [facility] OR [address] [place] [address]    Anesthesia Start:  3521 Anesthesia Stop:  5477    Procedure:  DIAGNOSTIC LAPAROSCOPY, LYSIS OF ADHESIONS, HYSTEROSCOPY WITH MYOSURE, CYSTOSCOPY (N/A Abdomen) Diagnosis:  (CHRONIC PELVIC PAIN, ABNORMAL UTERINE US)    Surgeon:  Catina Espinosa MD Responsible Provider:  AMANAD Brambila CRNA    Anesthesia Type:  general ASA Status:  2          Anesthesia Type: general    Quinton Phase I: Quinton Score: 9    Quinton Phase II:      Last vitals: Reviewed and per EMR flowsheets.        Anesthesia Post Evaluation    Patient location during evaluation: bedside  Patient participation: complete - patient participated  Level of consciousness: awake and awake and alert  Pain score: 0  Airway patency: patent  Nausea & Vomiting: no nausea and no vomiting  Complications: no  Cardiovascular status: blood pressure returned to baseline and hemodynamically stable  Respiratory status: acceptable  Hydration status: euvolemic

## 2020-05-29 PROCEDURE — 93010 ELECTROCARDIOGRAM REPORT: CPT | Performed by: INTERNAL MEDICINE

## 2020-05-29 NOTE — OP NOTE
[address]                      [address], [address] [facility]                                OPERATIVE REPORT    PATIENT NAME: Lisbeth Bullock                         :        1955  MED REC NO:   [id number]                            ROOM:  ACCOUNT NO:   [de-identified]                           ADMIT DATE: 2020  PROVIDER:     Wilmer Flaherty MD    DATE OF PROCEDURE:  2020    PREOPERATIVE DIAGNOSES:  Chronic pelvic pain, chronic abdominal pain,  and abnormal findings on ultrasound with intrauterine fluid detected. POSTOPERATIVE DIAGNOSES:  Chronic pelvic pain, chronic abdominal pain,  abnormal findings on ultrasound with intra uterine fluid detected,  normal uterus and endometrial cavity, normal ovaries, findings of dense  intra-abdominal adhesions, omental to periumbilical and anterior  abdominal wall. PROCEDURE:  1. Laparoscopic adhesiolysis. 2.  Hysteroscopy with MyoSure. 3.  Cystoscopy. SURGEON:  Doni Flood MD    ASSISTANT:  Surgical staff. ANESTHESIA:  General.    ESTIMATED BLOOD LOSS:  None. COMPLICATIONS:  None. FINDINGS:  Intra-abdominal adhesions released, normal endometrial  cavity, normal uterus, normal ovaries, normal urinary bladder on  cystoscopy. OPERATIVE TECHNIQUE:  The patient was taken to the operating room and  after adequate general anesthesia has been established, she was scrubbed  and draped in usual manner for laparoscopic and vaginal procedures. Attention first was towards the vagina. A weighted vaginal retractor  was placed. Anterior lip of the cervix was grasped with a single-tooth  tenaculum and a ZUMI manipulator was inserted and secured in place for  manipulation of the uterus during the laparoscopy procedure. Attention was then towards the abdomen. A 5 mm supraumbilical incision  was made through which a Veress needle was inserted.   Abdomen was  inflated to 50 mmHg of CO2 gas after which a 5-mm

## 2020-06-08 NOTE — PROGRESS NOTES
Date    ADHD (attention deficit hyperactivity disorder)     Anxiety     Depression     Fibromyalgia     GERD (gastroesophageal reflux disease)     Hypertension     Irritable bowel syndrome      Past Surgical History:   Procedure Laterality Date     SECTION      COLONOSCOPY      [facility]  Prudence Dima PELLETIER    DILATION AND CURETTAGE OF UTERUS N/A 2020    DIAGNOSTIC LAPAROSCOPY, LYSIS OF ADHESIONS, HYSTEROSCOPY WITH Deshawn Hamming performed by Manpreet Hall MD at [address] [facility] History     Socioeconomic History    Marital status:       Spouse name: Not on file    Number of children: Not on file    Years of education: Not on file    Highest education level: Not on file   Occupational History    Not on file   Social Needs    Financial resource strain: Not on file    Food insecurity     Worry: Not on file     Inability: Not on file    Transportation needs     Medical: Not on file     Non-medical: Not on file   Tobacco Use    Smoking status: Former Smoker     Types: Cigarettes     Last attempt to quit: 2019     Years since quittin.8    Smokeless tobacco: Never Used   Substance and Sexual Activity    Alcohol use: Never     Frequency: Never    Drug use: Not on file    Sexual activity: Not on file   Lifestyle    Physical activity     Days per week: Not on file     Minutes per session: Not on file    Stress: Not on file   Relationships    Social connections     Talks on phone: Not on file     Gets together: Not on file     Attends Sikh service: Not on file     Active member of club or organization: Not on file     Attends meetings of clubs or organizations: Not on file     Relationship status: Not on file    Intimate partner violence     Fear of current or ex partner: Not on file     Emotionally abused: Not on file     Physically abused: Not on file     Forced sexual activity: Not on file   Other Topics Concern    Not on file   Social History Narrative    Not on file     No family history on file. No Known Allergies    PMH, Surgical Hx, Family Hx, and Social Hx reviewed and updated. Health Maintenance reviewed. PHYSICAL EXAMINATION:  \"[x]\" Indicates a positive item  \"[]\" Indicates a negative item    Vital Signs: (As obtained by patient/caregiver or practitioner observation)    Blood pressure-  Heart rate-    Respiratory rate-    Temperature-  Pulse oximetry-     Constitutional: [x] Appears well-developed and well-nourished [x] No apparent distress      [] Abnormal-   Mental status  [x] Alert and awake  [x] Oriented to person/place/time [x]Able to follow commands      Eyes:  EOM    []  Normal  [] Abnormal-  Sclera  [x]  Normal  [] Abnormal -         Discharge [x]  None visible  [] Abnormal -    HENT:   [x] Normocephalic, atraumatic.   [] Abnormal   [x] Mouth/Throat: Mucous membranes are moist.     External Ears [x] Normal  [] Abnormal-     Neck: [x] No visualized mass     Pulmonary/Chest: [x] Respiratory effort normal.  [x] No visualized signs of difficulty breathing or respiratory distress        [] Abnormal-      Musculoskeletal:   [x] Normal gait with no signs of ataxia         [x] Normal range of motion of neck        [] Abnormal-       Neurological:       [x] No Facial Asymmetry (Cranial nerve 7 motor function) (limited exam to video visit)          [x] No gaze palsy        [] Abnormal-         Skin:        [x] No significant exanthematous lesions or discoloration noted on facial skin         [] Abnormal-            Psychiatric:       [x] Normal Affect [x] No Hallucinations        [] Abnormal-     Other pertinent observable physical exam findings-   Results for orders placed or performed in visit on 02/03/20   CBC Auto Differential   Result Value Ref Range    WBC 5.4 4.8 - 10.8 K/uL    RBC 4.35 4.20 - 5.40 M/uL    Hemoglobin 14.2 12.0 - 16.0 g/dL    Hematocrit 41.9 37.0 - 47.0 %    MCV 96.4 82.0 - 100.0 fL    MCH 32.7 (H) 27.0 - 31.3 pg    MCHC 34.0 33.0 - 37.0 %    RDW 15.2 (H) 11.5 - 14.5 %    Platelets 790 477 - 084 K/uL    Neutrophils % 71.4 %    Lymphocytes % 25.4 %    Monocytes % 2.5 %    Eosinophils % 0.1 %    Basophils % 0.6 %    Neutrophils Absolute 3.8 1.4 - 6.5 K/uL    Lymphocytes Absolute 1.4 1.0 - 4.8 K/uL    Monocytes Absolute 0.1 (L) 0.2 - 0.8 K/uL    Eosinophils Absolute 0.0 0.0 - 0.7 K/uL    Basophils Absolute 0.0 0.0 - 0.2 K/uL   Lipid Panel   Result Value Ref Range    Cholesterol, Total 96 0 - 199 mg/dL    Triglycerides 62 0 - 150 mg/dL    HDL 62 (H) 40 - 59 mg/dL    LDL Calculated 22 0 - 129 mg/dL   Comprehensive Metabolic Panel   Result Value Ref Range    Sodium 137 135 - 144 mEq/L    Potassium 4.4 3.4 - 4.9 mEq/L    Chloride 102 95 - 107 mEq/L    CO2 21 20 - 31 mEq/L    Anion Gap 14 9 - 15 mEq/L    Glucose 99 70 - 99 mg/dL    BUN 13 8 - 23 mg/dL    CREATININE 0.76 0.50 - 0.90 mg/dL    GFR Non-African American >60.0 >60    GFR  >60.0 >60    Calcium 9.3 8.5 - 9.9 mg/dL    Total Protein 7.3 6.3 - 8.0 g/dL    Alb 4.1 3.5 - 4.6 g/dL    Total Bilirubin <0.2 0.2 - 0.7 mg/dL    Alkaline Phosphatase 99 40 - 130 U/L    ALT 9 0 - 33 U/L    AST 15 0 - 35 U/L    Globulin 3.2 2.3 - 3.5 g/dL   Iron And Tibc   Result Value Ref Range    Iron 57 37 - 145 ug/dL    TIBC 298 178 - 450 ug/dL    Iron Saturation 19 11 - 46 %   Ferritin   Result Value Ref Range    Ferritin 90.2 13.0 - 150.0 ng/mL       ASSESSMENT/PLAN:  Assessment & Plan   [facility] was seen today for hyperlipidemia, hypertension, anxiety and depression. Diagnoses and all orders for this visit:    COPD with acute exacerbation ([facility].)  -     levoFLOXacin (LEVAQUIN) 500 MG tablet; Take 1 tablet by mouth daily for 10 days      No orders of the defined types were placed in this encounter.     Orders Placed This Encounter   Medications    levoFLOXacin (LEVAQUIN) 500 MG tablet     Sig: Take 1 tablet by mouth daily for 10 days     Dispense:  10 tablet     Refill:  0     There are no discontinued medications. Return in about 8 weeks (around 7/17/2020) for CSM. Reviewed with the patient: current clinical status, medications, activities and diet. Side effects, adverse effects of the medication prescribed today, as well as treatment plan/ rationale and result expectations have been discussed with the patient who expresses understanding and desires to proceed. Close follow up to evaluate treatment results and for coordination of care. I have reviewed the patient's medical history in detail and updated the computerized patient record. Patient identification was verified at the start of the visit: Yes    Total time spent on this encounter: Not billed by time      --AMANDA Ferraro - CNP on 6/8/2020 at 4:38 PM    An electronic signature was used to authenticate this note.

## 2020-06-09 ENCOUNTER — OFFICE VISIT (OUTPATIENT)
Dept: FAMILY MEDICINE CLINIC | Age: 65
End: 2020-06-09
Payer: MEDICARE

## 2020-06-09 VITALS — BODY MASS INDEX: 25.52 KG/M2 | WEIGHT: 144 LBS | HEIGHT: 63 IN | RESPIRATION RATE: 15 BRPM

## 2020-06-09 DIAGNOSIS — J44.1 COPD WITH ACUTE EXACERBATION (HCC): ICD-10-CM

## 2020-06-09 DIAGNOSIS — Z20.822 EXPOSURE TO COVID-19 VIRUS: ICD-10-CM

## 2020-06-09 DIAGNOSIS — I10 ESSENTIAL HYPERTENSION: ICD-10-CM

## 2020-06-09 LAB
ALBUMIN SERPL-MCNC: 3.8 G/DL (ref 3.5–4.6)
ALP BLD-CCNC: 111 U/L (ref 40–130)
ALT SERPL-CCNC: 9 U/L (ref 0–33)
ANION GAP SERPL CALCULATED.3IONS-SCNC: 13 MEQ/L (ref 9–15)
AST SERPL-CCNC: 17 U/L (ref 0–35)
BASOPHILS ABSOLUTE: 0.1 K/UL (ref 0–0.2)
BASOPHILS RELATIVE PERCENT: 0.7 %
BILIRUB SERPL-MCNC: <0.2 MG/DL (ref 0.2–0.7)
BUN BLDV-MCNC: 10 MG/DL (ref 8–23)
CALCIUM SERPL-MCNC: 9.2 MG/DL (ref 8.5–9.9)
CHLORIDE BLD-SCNC: 101 MEQ/L (ref 95–107)
CHOLESTEROL, TOTAL: 88 MG/DL (ref 0–199)
CO2: 22 MEQ/L (ref 20–31)
CREAT SERPL-MCNC: 0.66 MG/DL (ref 0.5–0.9)
EOSINOPHILS ABSOLUTE: 0.1 K/UL (ref 0–0.7)
EOSINOPHILS RELATIVE PERCENT: 1 %
GFR AFRICAN AMERICAN: >60
GFR NON-AFRICAN AMERICAN: >60
GLOBULIN: 3.1 G/DL (ref 2.3–3.5)
GLUCOSE BLD-MCNC: 92 MG/DL (ref 70–99)
HCT VFR BLD CALC: 40.3 % (ref 37–47)
HDLC SERPL-MCNC: 47 MG/DL (ref 40–59)
HEMOGLOBIN: 13.4 G/DL (ref 12–16)
LDL CHOLESTEROL CALCULATED: 11 MG/DL (ref 0–129)
LYMPHOCYTES ABSOLUTE: 1.7 K/UL (ref 1–4.8)
LYMPHOCYTES RELATIVE PERCENT: 19.6 %
MCH RBC QN AUTO: 31.3 PG (ref 27–31.3)
MCHC RBC AUTO-ENTMCNC: 33.1 % (ref 33–37)
MCV RBC AUTO: 94.4 FL (ref 82–100)
MONOCYTES ABSOLUTE: 0.5 K/UL (ref 0.2–0.8)
MONOCYTES RELATIVE PERCENT: 5.2 %
NEUTROPHILS ABSOLUTE: 6.5 K/UL (ref 1.4–6.5)
NEUTROPHILS RELATIVE PERCENT: 73.5 %
PDW BLD-RTO: 15 % (ref 11.5–14.5)
PLATELET # BLD: 410 K/UL (ref 130–400)
POTASSIUM SERPL-SCNC: 4.1 MEQ/L (ref 3.4–4.9)
RBC # BLD: 4.27 M/UL (ref 4.2–5.4)
SODIUM BLD-SCNC: 136 MEQ/L (ref 135–144)
TOTAL PROTEIN: 6.9 G/DL (ref 6.3–8)
TRIGL SERPL-MCNC: 152 MG/DL (ref 0–150)
TSH REFLEX: 0.47 UIU/ML (ref 0.44–3.86)
WBC # BLD: 8.9 K/UL (ref 4.8–10.8)

## 2020-06-09 PROCEDURE — 99215 OFFICE O/P EST HI 40 MIN: CPT | Performed by: NURSE PRACTITIONER

## 2020-06-09 RX ORDER — BUPRENORPHINE 15 UG/H
1 PATCH TRANSDERMAL WEEKLY
Qty: 4 PATCH | Refills: 2 | Status: SHIPPED | OUTPATIENT
Start: 2020-06-09 | End: 2020-07-09

## 2020-06-09 RX ORDER — DEXTROAMPHETAMINE SACCHARATE, AMPHETAMINE ASPARTATE MONOHYDRATE, DEXTROAMPHETAMINE SULFATE AND AMPHETAMINE SULFATE 7.5; 7.5; 7.5; 7.5 MG/1; MG/1; MG/1; MG/1
30 CAPSULE, EXTENDED RELEASE ORAL EVERY MORNING
Qty: 30 CAPSULE | Refills: 0 | Status: SHIPPED | OUTPATIENT
Start: 2020-08-12 | End: 2020-07-28 | Stop reason: CLARIF

## 2020-06-09 RX ORDER — DEXTROAMPHETAMINE SACCHARATE, AMPHETAMINE ASPARTATE MONOHYDRATE, DEXTROAMPHETAMINE SULFATE AND AMPHETAMINE SULFATE 7.5; 7.5; 7.5; 7.5 MG/1; MG/1; MG/1; MG/1
30 CAPSULE, EXTENDED RELEASE ORAL EVERY MORNING
Qty: 30 CAPSULE | Refills: 0 | Status: SHIPPED | OUTPATIENT
Start: 2020-06-14 | End: 2020-07-28 | Stop reason: CLARIF

## 2020-06-09 RX ORDER — DEXTROAMPHETAMINE SACCHARATE, AMPHETAMINE ASPARTATE MONOHYDRATE, DEXTROAMPHETAMINE SULFATE AND AMPHETAMINE SULFATE 7.5; 7.5; 7.5; 7.5 MG/1; MG/1; MG/1; MG/1
30 CAPSULE, EXTENDED RELEASE ORAL EVERY MORNING
Qty: 30 CAPSULE | Refills: 0 | Status: SHIPPED | OUTPATIENT
Start: 2020-07-12 | End: 2020-10-13 | Stop reason: SDUPTHER

## 2020-06-10 ENCOUNTER — OFFICE VISIT (OUTPATIENT)
Dept: OBGYN CLINIC | Age: 65
End: 2020-06-10

## 2020-06-10 VITALS
SYSTOLIC BLOOD PRESSURE: 112 MMHG | HEART RATE: 76 BPM | WEIGHT: 144 LBS | DIASTOLIC BLOOD PRESSURE: 60 MMHG | HEIGHT: 63 IN | BODY MASS INDEX: 25.52 KG/M2

## 2020-06-10 PROCEDURE — 99024 POSTOP FOLLOW-UP VISIT: CPT | Performed by: OBSTETRICS & GYNECOLOGY

## 2020-06-10 NOTE — PROGRESS NOTES
Jose E postoperatively. Operative findings again reviewed. Diagnosis Orders   1. Postop check          Pathology reportdiscussed. None,      Plan:     Discussed with patient normal findings and the adhesiolysis performed. Gynecologic workup negative as explained from the operative findings compared to abnormal US findings mentioned preop . Patient id following with GI for possible GI etiology. No orders of the defined types were placed in this encounter. No orders of the defined types were placed in this encounter.       Maci Galindo MD

## 2020-06-11 LAB — SARS-COV-2, IGG: NEGATIVE

## 2020-06-22 NOTE — PROGRESS NOTES
Subjective  Mark Montiel, 71 y.o. female presents today with:  Chief Complaint   Patient presents with    3 Month Follow-Up    ADHD             Working on weaning medications-  Stopped tramadol and started [employer]-  patient tolerating-  States first time she has eran been pain free-  Is willing to try a decreased dosage. Will also decrease Gabapentin is currenty taking 600mg qhs will decrease to 400mg qhs. ADHD-  Was on 30 mg TiD went to 30 mg BID went to go to 20mg BID last month patient did not feel this was effective enough for her. She does she [facility] for psych. Anxiety-  Tried to 1 MG TID PRN-  She does not like the ER,  Did not feel it helped her anxiety. - She does she [facility] for psych. COPD   There is no shortness of breath. This is a chronic problem. Pertinent negatives include no appetite change, chest pain, dyspnea on exertion, ear congestion, ear pain, fever, headaches, heartburn, malaise/fatigue, myalgias, nasal congestion, orthopnea, PND, postnasal drip, rhinorrhea, sneezing, sore throat, sweats, trouble swallowing or weight loss. Muscle Pain   This is a chronic problem. The problem occurs daily. Pertinent negatives include no abdominal pain, chest pain, constipation, diarrhea, fatigue, fever, headaches, rash, shortness of breath or weakness. Treatments tried: BUTRANS patches. There is no swelling present. She has been behaving normally. Her past medical history is significant for chronic back pain. There is no history of rheumatic disease or sickle cell disease. Review of Systems   Constitutional: Negative. Negative for activity change, appetite change, fatigue, fever, malaise/fatigue, unexpected weight change and weight loss. HENT: Negative. Negative for dental problem, ear pain, nosebleeds, postnasal drip, rhinorrhea, sneezing, sore throat, trouble swallowing and voice change. Eyes: Negative. Negative for visual disturbance.    Respiratory: Negative. Negative for shortness of breath. Cardiovascular: Negative. Negative for chest pain, dyspnea on exertion, palpitations, leg swelling and PND. Gastrointestinal: Negative. Negative for abdominal pain, anal bleeding, blood in stool, constipation, diarrhea, heartburn and rectal pain. Endocrine: Negative. Negative for cold intolerance, heat intolerance, polydipsia, polyphagia and polyuria. Genitourinary: Negative. Musculoskeletal: Negative. Negative for myalgias. Skin: Negative. Negative for color change and rash. Allergic/Immunologic: Negative. Neurological: Negative. Negative for dizziness, syncope, weakness and headaches. Hematological: Negative. Negative for adenopathy. Does not bruise/bleed easily. Psychiatric/Behavioral: Negative. Negative for dysphoric mood and sleep disturbance. The patient is not nervous/anxious. Past Medical History:   Diagnosis Date    ADHD (attention deficit hyperactivity disorder)     Anxiety     Depression     Fibromyalgia     GERD (gastroesophageal reflux disease)     Hypertension     Irritable bowel syndrome      Past Surgical History:   Procedure Laterality Date     SECTION      COLONOSCOPY      [facility]  Peggy Peter MD    DILATION AND CURETTAGE OF UTERUS N/A 2020    DIAGNOSTIC LAPAROSCOPY, LYSIS OF ADHESIONS, HYSTEROSCOPY WITH Abiola Brock performed by Harshad Smith MD at [address] [place] [address] History     Socioeconomic History    Marital status:       Spouse name: Not on file    Number of children: Not on file    Years of education: Not on file    Highest education level: Not on file   Occupational History    Not on file   Social Needs    Financial resource strain: Not on file    Food insecurity     Worry: Not on file     Inability: Not on file    Transportation needs     Medical: Not on file     Non-medical: Not on file   Tobacco Use    Smoking status: Former Smoker     Types: Cigarettes Last attempt to quit: 2019     Years since quittin.9    Smokeless tobacco: Never Used   Substance and Sexual Activity    Alcohol use: Never     Frequency: Never    Drug use: Not on file    Sexual activity: Not on file   Lifestyle    Physical activity     Days per week: Not on file     Minutes per session: Not on file    Stress: Not on file   Relationships    Social connections     Talks on phone: Not on file     Gets together: Not on file     Attends Jain service: Not on file     Active member of club or organization: Not on file     Attends meetings of clubs or organizations: Not on file     Relationship status: Not on file    Intimate partner violence     Fear of current or ex partner: Not on file     Emotionally abused: Not on file     Physically abused: Not on file     Forced sexual activity: Not on file   Other Topics Concern    Not on file   Social History Narrative    Not on file     No family history on file. No Known Allergies  Current Outpatient Medications   Medication Sig Dispense Refill    [START ON 2020] amphetamine-dextroamphetamine (ADDERALL XR) 30 MG extended release capsule Take 1 capsule by mouth every morning for 30 days. 30 capsule 0    [START ON 2020] amphetamine-dextroamphetamine (ADDERALL XR) 30 MG extended release capsule Take 1 capsule by mouth every morning for 30 days. 30 capsule 0    amphetamine-dextroamphetamine (ADDERALL XR) 30 MG extended release capsule Take 1 capsule by mouth every morning for 30 days. 30 capsule 0    buprenorphine (BUPRENEX) 15 MCG/HR PTWK Place 1 patch onto the skin once a week for 30 days.  4 patch 2    omeprazole (PRILOSEC) 40 MG delayed release capsule Take 40 mg by mouth      polyethylene glycol (GLYCOLAX) 17 GM/SCOOP powder Take 17 g by mouth as needed      acetaminophen (APAP EXTRA STRENGTH) 500 MG tablet Take 2 tablets by mouth every 6 hours as needed for Pain 60 tablet 1    docusate sodium (COLACE) 100 MG capsule

## 2020-07-28 ENCOUNTER — OFFICE VISIT (OUTPATIENT)
Dept: FAMILY MEDICINE CLINIC | Age: 65
End: 2020-07-28
Payer: MEDICARE

## 2020-07-28 VITALS
HEART RATE: 78 BPM | WEIGHT: 136 LBS | BODY MASS INDEX: 24.1 KG/M2 | DIASTOLIC BLOOD PRESSURE: 70 MMHG | SYSTOLIC BLOOD PRESSURE: 120 MMHG | HEIGHT: 63 IN | RESPIRATION RATE: 15 BRPM

## 2020-07-28 PROCEDURE — 96372 THER/PROPH/DIAG INJ SC/IM: CPT | Performed by: NURSE PRACTITIONER

## 2020-07-28 PROCEDURE — 99214 OFFICE O/P EST MOD 30 MIN: CPT | Performed by: NURSE PRACTITIONER

## 2020-07-28 RX ORDER — CEFTRIAXONE 1 G/1
1 INJECTION, POWDER, FOR SOLUTION INTRAMUSCULAR; INTRAVENOUS ONCE
Status: COMPLETED | OUTPATIENT
Start: 2020-07-28 | End: 2020-07-28

## 2020-07-28 RX ORDER — CIPROFLOXACIN 500 MG/1
500 TABLET, FILM COATED ORAL 2 TIMES DAILY
Qty: 20 TABLET | Refills: 0 | Status: SHIPPED | OUTPATIENT
Start: 2020-07-28 | End: 2020-08-07

## 2020-07-28 RX ORDER — GABAPENTIN 300 MG/1
300 CAPSULE ORAL NIGHTLY
COMMUNITY
End: 2020-08-28

## 2020-07-28 RX ORDER — LUBIPROSTONE 24 UG/1
24 CAPSULE, GELATIN COATED ORAL 2 TIMES DAILY WITH MEALS
Qty: 60 CAPSULE | Refills: 3 | Status: SHIPPED | OUTPATIENT
Start: 2020-07-28 | End: 2020-08-13

## 2020-07-28 RX ADMIN — CEFTRIAXONE 1 G: 1 INJECTION, POWDER, FOR SOLUTION INTRAMUSCULAR; INTRAVENOUS at 11:04

## 2020-07-28 NOTE — PROGRESS NOTES
Subjective  Rogelio Stark, 71 y.o. female presents today with:  Chief Complaint   Patient presents with   [facility] ED Follow-up     bloated and abdominal pain [facility] pt had CT scan        HPI  Er follow up flank pain was seen at [facility]    Review of Systems   Constitutional: Positive for fatigue. Negative for fever. Genitourinary: Positive for dysuria, frequency and urgency. Negative for enuresis and hematuria. Musculoskeletal: Positive for back pain. Skin: Negative for color change and pallor. Past Medical History:   Diagnosis Date    ADHD (attention deficit hyperactivity disorder)     Anxiety     Depression     Fibromyalgia     GERD (gastroesophageal reflux disease)     Hypertension     Irritable bowel syndrome      Past Surgical History:   Procedure Laterality Date     SECTION      COLONOSCOPY      [facility]  Marek Craven MD    DILATION AND CURETTAGE OF UTERUS N/A 2020    DIAGNOSTIC LAPAROSCOPY, LYSIS OF ADHESIONS, HYSTEROSCOPY WITH Rosemarie Sarkar performed by Diogo Roy MD at [address] [place] [address] History     Socioeconomic History    Marital status:       Spouse name: Not on file    Number of children: Not on file    Years of education: Not on file    Highest education level: Not on file   Occupational History    Not on file   Social Needs    Financial resource strain: Not on file    Food insecurity     Worry: Not on file     Inability: Not on file    Transportation needs     Medical: Not on file     Non-medical: Not on file   Tobacco Use    Smoking status: Former Smoker     Types: Cigarettes     Last attempt to quit: 2019     Years since quittin.0    Smokeless tobacco: Never Used   Substance and Sexual Activity    Alcohol use: Never     Frequency: Never    Drug use: Not on file    Sexual activity: Not on file   Lifestyle    Physical activity     Days per week: Not on file     Minutes per session: Not on file    Stress: Not on file   Relationships    Social connections     Talks on phone: Not on file     Gets together: Not on file     Attends Congregation service: Not on file     Active member of club or organization: Not on file     Attends meetings of clubs or organizations: Not on file     Relationship status: Not on file    Intimate partner violence     Fear of current or ex partner: Not on file     Emotionally abused: Not on file     Physically abused: Not on file     Forced sexual activity: Not on file   Other Topics Concern    Not on file   Social History Narrative    Not on file     No family history on file. No Known Allergies  Current Outpatient Medications   Medication Sig Dispense Refill    gabapentin (NEURONTIN) 300 MG capsule Take 300 mg by mouth nightly.  ciprofloxacin (CIPRO) 500 MG tablet Take 1 tablet by mouth 2 times daily for 10 days 20 tablet 0    lubiprostone (AMITIZA) 24 MCG capsule Take 1 capsule by mouth 2 times daily (with meals) 60 capsule 3    amphetamine-dextroamphetamine (ADDERALL XR) 30 MG extended release capsule Take 1 capsule by mouth every morning for 30 days.  30 capsule 0    omeprazole (PRILOSEC) 40 MG delayed release capsule Take 40 mg by mouth      acetaminophen (APAP EXTRA STRENGTH) 500 MG tablet Take 2 tablets by mouth every 6 hours as needed for Pain 60 tablet 1    docusate sodium (COLACE) 100 MG capsule Take 1 capsule by mouth 2 times daily as needed for Constipation 60 capsule 2    prochlorperazine (COMPAZINE) 10 MG tablet Take 1 tablet by mouth every 8 hours as needed (NAUSEA) 120 tablet 3    atorvastatin (LIPITOR) 80 MG tablet Take 1 tablet by mouth daily 90 tablet 1    ipratropium-albuterol (DUONEB) 0.5-2.5 (3) MG/3ML SOLN nebulizer solution Inhale 3 mLs into the lungs every 4 hours as needed for Shortness of Breath 360 mL 0    vitamin D (ERGOCALCIFEROL) 1.25 MG (78490 UT) CAPS capsule TAKE 1 CAPSULE BY MOUTH once weekly      cyclobenzaprine (FLEXERIL) 10 MG tablet Take 1 tablet by mouth 2 times daily as needed (myalgias anf fibro) 60 tablet 11    mometasone-formoterol (DULERA) 200-5 MCG/ACT inhaler Inhale 1 puff into the lungs every 12 hours 1 Inhaler 11    ALPRAZolam (XANAX) 1 MG tablet TAKE 1 TABLET TWICE DAILY AS NEEDED  2    venlafaxine (EFFEXOR XR) 75 MG extended release capsule TAKE 1 CAPSULE BY MOUTH TWICE DAILY  1    aspirin (ASPIRIN 81) 81 MG chewable tablet       prednisoLONE 5 MG TABS Take 5 mg by mouth Take 1 tab every other day      furosemide (LASIX) 20 MG tablet Take 20 mg by mouth daily      mirtazapine (REMERON) 15 MG tablet Take 15 mg by mouth nightly      polyethylene glycol (GLYCOLAX) 17 GM/SCOOP powder Take 17 g by mouth as needed       No current facility-administered medications for this visit. PMH, Surgical Hx, Family Hx, and Social Hx reviewed and updated. Health Maintenance reviewed. Objective    Vitals:    07/28/20 0953   BP: 120/70   Pulse: 78   Resp: 15   Weight: 136 lb (61.7 kg)   Height: 5' 2.5\" (1.588 m)       Physical Exam  Constitutional:       General: She is not in acute distress. Appearance: She is well-developed. HENT:      Head: Normocephalic and atraumatic. Right Ear: External ear normal.      Left Ear: External ear normal.      Nose: Nose normal.   Eyes:      Conjunctiva/sclera: Conjunctivae normal.      Pupils: Pupils are equal, round, and reactive to light. Neck:      Musculoskeletal: Neck supple. Vascular: No JVD. Cardiovascular:      Rate and Rhythm: Normal rate and regular rhythm. Heart sounds: Normal heart sounds. No murmur. Pulmonary:      Effort: Pulmonary effort is normal. No respiratory distress. Breath sounds: Normal breath sounds. No wheezing or rales. Abdominal:      General: Bowel sounds are normal. There is distension. Palpations: Abdomen is soft. There is no mass. Tenderness: There is no abdominal tenderness. Comments: Right flank pain-  CVA tenderness   Skin:     General: Skin is warm and dry. Capillary Refill: Capillary refill takes less than 2 seconds. Neurological:      Mental Status: She is alert and oriented to person, place, and time. *Final Report* * *    DATE OF EXAM: Jul 18 2020 10:58PM      [facility]   0530  -  CT ABD/PEL Sabrina Torres  / ACCESSION #  [id number]    PROCEDURE REASON: SBO intermittent or low-grade suspected         * * * * Physician Interpretation * * * *     EXAMINATION:  CT ABDOMEN AND PELVIS WITH IV CONTRAST    CLINICAL HISTORY: SBO intermittent or low-grade suspected  Abdomen pain radiating to back    TECHNIQUE: CT of the abdomen and pelvis was performed using standard   technique, scanning from just above the dome of the diaphragm to the   symphysis pubis.  Coronal and sagittal reconstructed images generated. MQ:  CTAP_3    Contrast:  IV:  130 ml of Omnipaque 300  Oral:  None    CT Radiation dose: Integrated Dose-length product (DLP) for this visit =    429 mGy*cm. CT Dose Reduction Employed: Automated exposure control(AEC) and iterative   recon    COMPARISON: None. RESULT:    Liver: No mass. Biliary: No bile duct dilation.  Gallbladder is unremarkable. Spleen: No mass. No splenomegaly. Pancreas: No mass or duct dilation. Adrenals: No mass. Kidneys: Enhance symmetrically.  Mild right hydronephrosis and mild   hydroureter to the mid pelvis at the level of the right external iliac   artery (image 71 and 72) with some mild wall thickening proximally.  No   no calcified ureteral stone.  No left hydronephrosis. GI tract: No dilation or wall thickening. The appendix is not visualized. Moderate stool in the ascending and transverse colon.  A few sigmoid   colon diverticula with no findings of acute diverticulitis. Lymph nodes: No lymphadenopathy identified. Mesentery/Peritoneum: No free air.  No ascites. Vasculature:  The celiac axis and SMA are patent.  The portal vein and   branches, splenic vein, SMV, and hepatic veins are patent.  The aorta is normal caliber with mild atherosclerotic calcification. Pelvis: There is some mild anterior bladder wall thickening, correlate   for cystitis. Bones: No acute abnormality. Lower thorax: Mild atelectasis lung bases right greater than left.  No   pleural effusion.  No focal consolidation. Other Result Information   Radiology, [facility] - 07/18/2020 11:35 PM EDT  * * *Final Report* * *    DATE OF EXAM: Jul 18 2020 10:58PM      [facility]   0530  -  CT ABD/PEL Dario Bermudez  / ACCESSION #  [id number]    PROCEDURE REASON: SBO intermittent or low-grade suspected    * * * * Physician Interpretation * * * *     EXAMINATION:  CT ABDOMEN AND PELVIS WITH IV CONTRAST    CLINICAL HISTORY: SBO intermittent or low-grade suspected  Abdomen pain radiating to back    TECHNIQUE: CT of the abdomen and pelvis was performed using standard   technique, scanning from just above the dome of the diaphragm to the   symphysis pubis. Coronal and sagittal reconstructed images generated. MQ:  CTAP_3    Contrast:  IV:  130 ml of Omnipaque 300  Oral:  None    CT Radiation dose: Integrated Dose-length product (DLP) for this visit =    429 mGy*cm. CT Dose Reduction Employed: Automated exposure control(AEC) and iterative   recon    COMPARISON: None. RESULT:    Liver: No mass. Biliary: No bile duct dilation. Gallbladder is unremarkable. Spleen: No mass. No splenomegaly. Pancreas: No mass or duct dilation. Adrenals: No mass. Kidneys: Enhance symmetrically. Mild right hydronephrosis and mild   hydroureter to the mid pelvis at the level of the right external iliac   artery (image 71 and 72) with some mild wall thickening proximally. No   no calcified ureteral stone. No left hydronephrosis. GI tract: No dilation or wall thickening. The appendix is not visualized. Moderate stool in the ascending and transverse colon. A few sigmoid   colon diverticula with no findings of acute diverticulitis.     Lymph nodes: No lymphadenopathy identified. Mesentery/Peritoneum: No free air. No ascites. Vasculature: The celiac axis and SMA are patent. The portal vein and   branches, splenic vein, SMV, and hepatic veins are patent. The aorta is   normal caliber with mild atherosclerotic calcification. Pelvis: There is some mild anterior bladder wall thickening, correlate   for cystitis. Bones: No acute abnormality. Lower thorax: Mild atelectasis lung bases right greater than left. No   pleural effusion. No focal consolidation. IMPRESSION  IMPRESSION:    Mild right hydronephrosis and mild hydroureter to the mid pelvis at the   level of the right external iliac artery (image 71 and 72) with some mild   wall thickening proximally could be secondary to infectious or   inflammatory etiology. No calcified ureteral stone. Mild anterior bladder wall thickening, correlate for cystitis. Moderate stool ascending and transverse colon with no bowel obstruction. Details and incidental findings as above. Follow-up as indicated. : LYNNETTE    Transcribe Date/Time: Jul 18 2020 11:21P    Dictated by : Josue Cloud MD    This examination was interpreted and the report reviewed and   electronically signed by:   Josue Cloud MD on Jul 18 2020 11:33PM  EST       Assessment & Plan   Nikolas Islas was seen today for ed follow-up. Diagnoses and all orders for this visit:    Slow transit constipation  -     lubiprostone (AMITIZA) 24 MCG capsule; Take 1 capsule by mouth 2 times daily (with meals)    Hydronephrosis, unspecified hydronephrosis type  -     ciprofloxacin (CIPRO) 500 MG tablet; Take 1 tablet by mouth 2 times daily for 10 days    Cystitis  -     ciprofloxacin (CIPRO) 500 MG tablet; Take 1 tablet by mouth 2 times daily for 10 days    [facility] 08/04/2020 Dr. Sam Hu  No orders of the defined types were placed in this encounter.     Orders Placed This Encounter   Medications    ciprofloxacin (CIPRO) 500 MG tablet     Sig: Take 1 tablet by mouth 2 times daily for 10 days     Dispense:  20 tablet     Refill:  0    lubiprostone (AMITIZA) 24 MCG capsule     Sig: Take 1 capsule by mouth 2 times daily (with meals)     Dispense:  60 capsule     Refill:  3     Medications Discontinued During This Encounter   Medication Reason    amphetamine-dextroamphetamine (ADDERALL XR) 30 MG extended release capsule ERROR    amphetamine-dextroamphetamine (ADDERALL XR) 30 MG extended release capsule ERROR    amphetamine-dextroamphetamine (ADDERALL XR) 30 MG extended release capsule ERROR    amphetamine-dextroamphetamine (ADDERALL XR) 30 MG extended release capsule ERROR    gabapentin (NEURONTIN) 400 MG capsule ERROR     No follow-ups on file. Reviewed with the patient: current clinical status, medications, activities and diet. Side effects, adverse effects of the medication prescribed today, as well as treatment plan/ rationale and result expectations have been discussed with the patient who expresses understanding and desires to proceed. Close follow up to evaluate treatment results and for coordination of care. I have reviewed the patient's medical history in detail and updated the computerized patient record.     Marek Beauchamp, AMANDA - CNP

## 2020-07-31 ENCOUNTER — TELEPHONE (OUTPATIENT)
Dept: FAMILY MEDICINE CLINIC | Age: 65
End: 2020-07-31

## 2020-07-31 NOTE — TELEPHONE ENCOUNTER
Pt called post 7/28/2020 ov stating the cost of lubiprostone [facility]) is too high. Pt requesting an alternative med.

## 2020-07-31 NOTE — TELEPHONE ENCOUNTER
Pt called requesting medication refill. Due to provider out of the office and pt states she is running out of meds are you willing to approve refill request?    Rx requested:  Requested Prescriptions     Pending Prescriptions Disp Refills    ALPRAZolam (XANAX) 1 MG tablet 60 tablet      Sig: Take 1 tablet by mouth 2 times daily for 30 days.     prednisoLONE 5 MG TABS 90 tablet      Sig: Take 1 tablet by mouth every other day Take 1 tab every other day       Last Office Visit:   7/28/2020    Last Tox screen:      Last Medication contract:        Next Visit Date:  Future Appointments   Date Time Provider Shelley Gorman   8/13/2020  1:30 PM AMANDA Allen CNP [facility]   9/10/2020  2:00 PM AMANDA Allen CNP [facility]   6/11/2021 10:30 AM Ariana Tobias MD [address]

## 2020-08-03 RX ORDER — LACTULOSE 10 G/15ML
20 SOLUTION ORAL NIGHTLY
Qty: 900 ML | Refills: 0 | Status: SHIPPED | OUTPATIENT
Start: 2020-08-03 | End: 2020-09-02

## 2020-08-03 RX ORDER — ALPRAZOLAM 1 MG/1
1 TABLET ORAL 2 TIMES DAILY
Qty: 60 TABLET | Refills: 0 | Status: SHIPPED | OUTPATIENT
Start: 2020-08-03 | End: 2020-09-03

## 2020-08-03 ASSESSMENT — ENCOUNTER SYMPTOMS
BACK PAIN: 1
COLOR CHANGE: 0

## 2020-08-13 ENCOUNTER — VIRTUAL VISIT (OUTPATIENT)
Dept: FAMILY MEDICINE CLINIC | Age: 65
End: 2020-08-13
Payer: MEDICARE

## 2020-08-13 PROCEDURE — 99214 OFFICE O/P EST MOD 30 MIN: CPT | Performed by: NURSE PRACTITIONER

## 2020-08-13 RX ORDER — BUPRENORPHINE 15 UG/H
PATCH TRANSDERMAL
COMMUNITY
Start: 2020-08-08 | End: 2020-08-13 | Stop reason: SDUPTHER

## 2020-08-13 RX ORDER — BUPRENORPHINE 15 UG/H
1 PATCH TRANSDERMAL WEEKLY
Qty: 4 PATCH | Refills: 2 | Status: SHIPPED | OUTPATIENT
Start: 2020-08-13 | End: 2020-09-10

## 2020-08-14 ENCOUNTER — TELEPHONE (OUTPATIENT)
Dept: FAMILY MEDICINE CLINIC | Age: 65
End: 2020-08-14

## 2020-08-14 NOTE — TELEPHONE ENCOUNTER
I don't follow patient on a regular basis. That is a controlled medication; I wouldn't advise starting opioid pain medication without evaluation/assessment. I would advise a follow up with PCP to discuss or with an [facility] Provider next week for evaluation. Thank you.

## 2020-08-17 RX ORDER — ATORVASTATIN CALCIUM 80 MG/1
80 TABLET, FILM COATED ORAL DAILY
Qty: 90 TABLET | Refills: 1 | Status: SHIPPED | OUTPATIENT
Start: 2020-08-17 | End: 2021-05-21 | Stop reason: SDUPTHER

## 2020-08-24 NOTE — PROGRESS NOTES
TELEHEALTH EVALUATION -- Audio/Visual (During JBOSF-09 public health emergency)    -   Bryna Epley is a 71 y.o. female being evaluated by a Virtual Visit (video visit) encounter to address concerns as mentioned above. A caregiver was present when appropriate. Due to this being a TeleHealth encounter (During GPMEL-63 public health emergency), evaluation of the following organ systems was limited: Vitals/Constitutional/EENT/Resp/CV/GI//MS/Neuro/Skin/Heme-Lymph-Imm. Pursuant to the emergency declaration under the [facility], [address] [place] [address] authority and the [employer] Act, this Virtual Visit was conducted with patient's (and/or legal guardian's) consent, to reduce the patient's risk of exposure to COVID-19 and provide necessary medical care. The patient (and/or legal guardian) has also been advised to contact this office for worsening conditions or problems, and seek emergency medical treatment and/or call 911 if deemed necessary. Patient was contacted and agreed to proceed with a virtual visit via Doxy. me  The risks and benefits of converting to a virtual visit were discussed in light of the current infectious disease epidemic. Patient also understood that insurance coverage and co-pays are up to their individual insurance plans. Patient was located at their home. Provider was located at their office. 2020  Bryna Epley (:  1955) has requested an audio/video evaluation for the following concern(s):    HPI      Subjective  Bryna Epley, 71 y.o. female presents today with:  Chief Complaint   Patient presents with    Other             Working on weaning medications-  Stopped tramadol and started [employer]-  patient tolerating-  States first time she has eran been pain free-  Is willing to try a decreased dosage.   Will also decrease Gabapentin is currenty taking 600mg qhs will decrease to 400mg qhs.    ADHD-  Was on 30 mg TiD went to 30 mg BID went to go to 20mg BID last month patient did not feel this was effective enough for her. She does she [facility] for psych. Anxiety-  Tried to 1 MG TID PRN-  She does not like the ER,  Did not feel it helped her anxiety. - She does she [facility] for psych. COPD   There is no shortness of breath. This is a chronic problem. Pertinent negatives include no appetite change, chest pain, dyspnea on exertion, ear congestion, ear pain, fever, headaches, heartburn, malaise/fatigue, myalgias, nasal congestion, orthopnea, PND, postnasal drip, rhinorrhea, sneezing, sore throat, sweats, trouble swallowing or weight loss. Muscle Pain   This is a chronic problem. The problem occurs daily. Pertinent negatives include no abdominal pain, chest pain, constipation, diarrhea, fatigue, fever, headaches, rash, shortness of breath or weakness. Treatments tried: BUTRANS patches. There is no swelling present. She has been behaving normally. Her past medical history is significant for chronic back pain. There is no history of rheumatic disease or sickle cell disease. Review of Systems   Constitutional: Negative. Negative for activity change, appetite change, fatigue, fever, malaise/fatigue, unexpected weight change and weight loss. HENT: Negative. Negative for dental problem, ear pain, nosebleeds, postnasal drip, rhinorrhea, sneezing, sore throat, trouble swallowing and voice change. Eyes: Negative. Negative for visual disturbance. Respiratory: Negative. Negative for shortness of breath. Cardiovascular: Negative. Negative for chest pain, dyspnea on exertion, palpitations, leg swelling and PND. Gastrointestinal: Negative. Negative for abdominal pain, anal bleeding, blood in stool, constipation, diarrhea, heartburn and rectal pain. Endocrine: Negative. Negative for cold intolerance, heat intolerance, polydipsia, polyphagia and polyuria. Genitourinary: Negative. Musculoskeletal: Negative. Negative for myalgias. Skin: Negative. Negative for color change and rash. Allergic/Immunologic: Negative. Neurological: Negative. Negative for dizziness, syncope, weakness and headaches. Hematological: Negative. Negative for adenopathy. Does not bruise/bleed easily. Psychiatric/Behavioral: Negative. Negative for dysphoric mood and sleep disturbance. The patient is not nervous/anxious. Past Medical History:   Diagnosis Date    ADHD (attention deficit hyperactivity disorder)     Anxiety     Depression     Fibromyalgia     GERD (gastroesophageal reflux disease)     Hypertension     Irritable bowel syndrome      Past Surgical History:   Procedure Laterality Date     SECTION      COLONOSCOPY      [facility] [place]    DILATION AND CURETTAGE OF UTERUS N/A 2020    DIAGNOSTIC LAPAROSCOPY, LYSIS OF ADHESIONS, HYSTEROSCOPY WITH Janann Mount performed by Ramesh Rubalcava MD at [address] [place] [address] History     Socioeconomic History    Marital status:       Spouse name: Not on file    Number of children: Not on file    Years of education: Not on file    Highest education level: Not on file   Occupational History    Not on file   Social Needs    Financial resource strain: Not on file    Food insecurity     Worry: Not on file     Inability: Not on file    Transportation needs     Medical: Not on file     Non-medical: Not on file   Tobacco Use    Smoking status: Former Smoker     Types: Cigarettes     Last attempt to quit: 2019     Years since quittin.0    Smokeless tobacco: Never Used   Substance and Sexual Activity    Alcohol use: Never     Frequency: Never    Drug use: Not on file    Sexual activity: Not on file   Lifestyle    Physical activity     Days per week: Not on file     Minutes per session: Not on file    Stress: Not on file   Relationships    Social connections     Talks on phone: Not on file     Gets together: Not on file     Attends Jehovah's witness service: Not on file     Active member of club or organization: Not on file     Attends meetings of clubs or organizations: Not on file     Relationship status: Not on file    Intimate partner violence     Fear of current or ex partner: Not on file     Emotionally abused: Not on file     Physically abused: Not on file     Forced sexual activity: Not on file   Other Topics Concern    Not on file   Social History Narrative    Not on file     No family history on file. No Known Allergies  Current Outpatient Medications   Medication Sig Dispense Refill    buprenorphine (BUPRENEX) 15 MCG/HR PTWK Place 1 patch onto the skin once a week for 28 days. 4 patch 2    atorvastatin (LIPITOR) 80 MG tablet Take 1 tablet by mouth daily 90 tablet 1    ALPRAZolam (XANAX) 1 MG tablet Take 1 tablet by mouth 2 times daily for 30 days. 60 tablet 0    prednisoLONE 5 MG TABS Take 1 tablet by mouth every other day Take 1 tab every other day 30 tablet 0    lactulose (CHRONULAC) 10 GM/15ML solution Take 30 mLs by mouth nightly 900 mL 0    gabapentin (NEURONTIN) 300 MG capsule Take 300 mg by mouth nightly.  amphetamine-dextroamphetamine (ADDERALL XR) 30 MG extended release capsule Take 1 capsule by mouth every morning for 30 days.  30 capsule 0    omeprazole (PRILOSEC) 40 MG delayed release capsule Take 40 mg by mouth      polyethylene glycol (GLYCOLAX) 17 GM/SCOOP powder Take 17 g by mouth as needed      acetaminophen (APAP EXTRA STRENGTH) 500 MG tablet Take 2 tablets by mouth every 6 hours as needed for Pain 60 tablet 1    docusate sodium (COLACE) 100 MG capsule Take 1 capsule by mouth 2 times daily as needed for Constipation 60 capsule 2    prochlorperazine (COMPAZINE) 10 MG tablet Take 1 tablet by mouth every 8 hours as needed (NAUSEA) 120 tablet 3    ipratropium-albuterol (DUONEB) 0.5-2.5 (3) MG/3ML SOLN nebulizer solution Inhale 3 mLs into the lungs every 4 hours as needed for Shortness of Breath 360 mL 0    vitamin D (ERGOCALCIFEROL) 1.25 MG (79804 UT) CAPS capsule TAKE 1 CAPSULE BY MOUTH once weekly      cyclobenzaprine (FLEXERIL) 10 MG tablet Take 1 tablet by mouth 2 times daily as needed (myalgias anf fibro) 60 tablet 11    mometasone-formoterol (DULERA) 200-5 MCG/ACT inhaler Inhale 1 puff into the lungs every 12 hours 1 Inhaler 11    venlafaxine (EFFEXOR XR) 75 MG extended release capsule TAKE 1 CAPSULE BY MOUTH TWICE DAILY  1    aspirin (ASPIRIN 81) 81 MG chewable tablet       furosemide (LASIX) 20 MG tablet Take 20 mg by mouth daily      mirtazapine (REMERON) 15 MG tablet Take 15 mg by mouth nightly       No current facility-administered medications for this visit. PMH, Surgical Hx, Family Hx, and Social Hx reviewed and updated. Health Maintenance reviewed. Objective    There were no vitals filed for this visit. Physical Exam  Constitutional:       General: She is not in acute distress. Appearance: She is well-developed. HENT:      Head: Normocephalic and atraumatic. Right Ear: External ear normal.      Left Ear: External ear normal.      Nose: Nose normal.   Eyes:      Conjunctiva/sclera: Conjunctivae normal.      Pupils: Pupils are equal, round, and reactive to light. Neck:      Musculoskeletal: Neck supple. Vascular: No JVD. Cardiovascular:      Rate and Rhythm: Normal rate and regular rhythm. Heart sounds: Normal heart sounds. No murmur. Pulmonary:      Effort: Pulmonary effort is normal. No respiratory distress. Breath sounds: Normal breath sounds. No wheezing or rales. Abdominal:      General: Bowel sounds are normal. There is no distension. Palpations: Abdomen is soft. There is no mass. Tenderness: There is no abdominal tenderness. Skin:     General: Skin is warm and dry. Neurological:      Mental Status: She is alert and oriented to person, place, and time.        Assessment & Plan   Vitor Aden was seen today for other. Diagnoses and all orders for this visit:    Fibromyalgia  -     buprenorphine (BUPRENEX) 15 MCG/HR PTWK; Place 1 patch onto the skin once a week for 28 days. Chronic pain syndrome  -     buprenorphine (BUPRENEX) 15 MCG/HR PTWK; Place 1 patch onto the skin once a week for 28 days. No orders of the defined types were placed in this encounter. Orders Placed This Encounter   Medications    buprenorphine (BUPRENEX) 15 MCG/HR PTWK     Sig: Place 1 patch onto the skin once a week for 28 days. Dispense:  4 patch     Refill:  2     Medications Discontinued During This Encounter   Medication Reason    buprenorphine (BUPRENEX) 15 MCG/HR PTWK REORDER    lubiprostone (AMITIZA) 24 MCG capsule LIST CLEANUP     No follow-ups on file. Reviewed with the patient: current clinical status, medications, activities and diet. Side effects, adverse effects of the medication prescribed today, as well as treatment plan/ rationale and result expectations have been discussed with the patient who expresses understanding and desires to proceed. Close follow up to evaluate treatment results and for coordination of care. I have reviewed the patient's medical history in detail and updated the computerized patient record. AMANDA Nuñez CNP    Review of Systems    Prior to Visit Medications    Medication Sig Taking? Authorizing Provider   buprenorphine (BUPRENEX) 15 MCG/HR PTWK Place 1 patch onto the skin once a week for 28 days. Yes AMANDA Nogueira CNP   atorvastatin (LIPITOR) 80 MG tablet Take 1 tablet by mouth daily  AMANDA Nogueira CNP   ALPRAZolam (XANAX) 1 MG tablet Take 1 tablet by mouth 2 times daily for 30 days.   AMANDA Nogueira CNP   prednisoLONE 5 MG TABS Take 1 tablet by mouth every other day Take 1 tab every other day  AMANDA Nogueira CNP   lactulose (CHRONULAC) 10 GM/15ML solution Take 30 mLs by mouth nightly  AMANDA Nogueira CNP   gabapentin (NEURONTIN) 300 MG capsule Take 300 mg by mouth nightly. Historical Provider, MD   amphetamine-dextroamphetamine (ADDERALL XR) 30 MG extended release capsule Take 1 capsule by mouth every morning for 30 days.   AMANDA Fagan CNP   omeprazole (PRILOSEC) 40 MG delayed release capsule Take 40 mg by mouth  Historical Provider, MD   polyethylene glycol (GLYCOLAX) 17 GM/SCOOP powder Take 17 g by mouth as needed  Historical Provider, MD   acetaminophen (APAP EXTRA STRENGTH) 500 MG tablet Take 2 tablets by mouth every 6 hours as needed for Pain  Dev Mcdermott MD   docusate sodium (COLACE) 100 MG capsule Take 1 capsule by mouth 2 times daily as needed for Constipation  Dev Mcdermott MD   prochlorperazine (COMPAZINE) 10 MG tablet Take 1 tablet by mouth every 8 hours as needed (NAUSEA)  AMANDA Nogueira CNP   ipratropium-albuterol (DUONEB) 0.5-2.5 (3) MG/3ML SOLN nebulizer solution Inhale 3 mLs into the lungs every 4 hours as needed for Shortness of Breath  AMANDA Costa CNP   vitamin D (ERGOCALCIFEROL) 1.25 MG (87241 UT) CAPS capsule TAKE 1 CAPSULE BY MOUTH once weekly  Historical Provider, MD   cyclobenzaprine (FLEXERIL) 10 MG tablet Take 1 tablet by mouth 2 times daily as needed (myalgias anf fibro)  AMANDA Nogueira CNP   mometasone-formoterol (DULERA) 200-5 MCG/ACT inhaler Inhale 1 puff into the lungs every 12 hours  AMANDA Nogueira CNP   venlafaxine (EFFEXOR XR) 75 MG extended release capsule TAKE 1 CAPSULE BY MOUTH TWICE DAILY  Historical Provider, MD   aspirin (ASPIRIN 81) 81 MG chewable tablet   Historical Provider, MD   furosemide (LASIX) 20 MG tablet Take 20 mg by mouth daily  Historical Provider, MD   mirtazapine (REMERON) 15 MG tablet Take 15 mg by mouth nightly  Historical Provider, MD       Past Medical History:   Diagnosis Date    ADHD (attention deficit hyperactivity disorder)     Anxiety     Depression  Fibromyalgia     GERD (gastroesophageal reflux disease)     Hypertension     Irritable bowel syndrome      Past Surgical History:   Procedure Laterality Date     SECTION      COLONOSCOPY      [facility]  Nubia Price MD    DILATION AND CURETTAGE OF UTERUS N/A 2020    DIAGNOSTIC LAPAROSCOPY, LYSIS OF ADHESIONS, HYSTEROSCOPY WITH Luke Carolina performed by Charlee Lion MD at [address] [facility] History     Socioeconomic History    Marital status:      Spouse name: Not on file    Number of children: Not on file    Years of education: Not on file    Highest education level: Not on file   Occupational History    Not on file   Social Needs    Financial resource strain: Not on file    Food insecurity     Worry: Not on file     Inability: Not on file    Transportation needs     Medical: Not on file     Non-medical: Not on file   Tobacco Use    Smoking status: Former Smoker     Types: Cigarettes     Last attempt to quit: 2019     Years since quittin.0    Smokeless tobacco: Never Used   Substance and Sexual Activity    Alcohol use: Never     Frequency: Never    Drug use: Not on file    Sexual activity: Not on file   Lifestyle    Physical activity     Days per week: Not on file     Minutes per session: Not on file    Stress: Not on file   Relationships    Social connections     Talks on phone: Not on file     Gets together: Not on file     Attends [employer] service: Not on file     Active member of club or organization: Not on file     Attends meetings of clubs or organizations: Not on file     Relationship status: Not on file    Intimate partner violence     Fear of current or ex partner: Not on file     Emotionally abused: Not on file     Physically abused: Not on file     Forced sexual activity: Not on file   Other Topics Concern    Not on file   Social History Narrative    Not on file     No family history on file.   No Known Allergies    PMH, Surgical Hx, Family Hx, and Social Hx reviewed and updated. Health Maintenance reviewed. PHYSICAL EXAMINATION:  \"[x]\" Indicates a positive item  \"[]\" Indicates a negative item    Vital Signs: (As obtained by patient/caregiver or practitioner observation)    Blood pressure-  Heart rate-    Respiratory rate-    Temperature-  Pulse oximetry-     Constitutional: [x] Appears well-developed and well-nourished [x] No apparent distress      [] Abnormal-   Mental status  [x] Alert and awake  [x] Oriented to person/place/time [x]Able to follow commands      Eyes:  EOM    []  Normal  [] Abnormal-  Sclera  [x]  Normal  [] Abnormal -         Discharge [x]  None visible  [] Abnormal -    HENT:   [x] Normocephalic, atraumatic.   [] Abnormal   [x] Mouth/Throat: Mucous membranes are moist.     External Ears [x] Normal  [] Abnormal-     Neck: [x] No visualized mass     Pulmonary/Chest: [x] Respiratory effort normal.  [x] No visualized signs of difficulty breathing or respiratory distress        [] Abnormal-      Musculoskeletal:   [x] Normal gait with no signs of ataxia         [x] Normal range of motion of neck        [] Abnormal-       Neurological:       [x] No Facial Asymmetry (Cranial nerve 7 motor function) (limited exam to video visit)          [x] No gaze palsy        [] Abnormal-         Skin:        [x] No significant exanthematous lesions or discoloration noted on facial skin         [] Abnormal-            Psychiatric:       [x] Normal Affect [x] No Hallucinations        [] Abnormal-     Other pertinent observable physical exam findings-   Results for orders placed or performed in visit on 06/09/20   Covid-19, Antibody   Result Value Ref Range    SARS-CoV-2, IgG Negative Negative   TSH with Reflex   Result Value Ref Range    TSH 0.475 0.440 - 3.860 uIU/mL   Lipid Panel   Result Value Ref Range    Cholesterol, Total 88 0 - 199 mg/dL    Triglycerides 152 (H) 0 - 150 mg/dL    HDL 47 40 - 59 mg/dL    LDL Calculated 11 0 - 129 mg/dL CBC Auto Differential   Result Value Ref Range    WBC 8.9 4.8 - 10.8 K/uL    RBC 4.27 4.20 - 5.40 M/uL    Hemoglobin 13.4 12.0 - 16.0 g/dL    Hematocrit 40.3 37.0 - 47.0 %    MCV 94.4 82.0 - 100.0 fL    MCH 31.3 27.0 - 31.3 pg    MCHC 33.1 33.0 - 37.0 %    RDW 15.0 (H) 11.5 - 14.5 %    Platelets 785 (H) 971 - 400 K/uL    Neutrophils % 73.5 %    Lymphocytes % 19.6 %    Monocytes % 5.2 %    Eosinophils % 1.0 %    Basophils % 0.7 %    Neutrophils Absolute 6.5 1.4 - 6.5 K/uL    Lymphocytes Absolute 1.7 1.0 - 4.8 K/uL    Monocytes Absolute 0.5 0.2 - 0.8 K/uL    Eosinophils Absolute 0.1 0.0 - 0.7 K/uL    Basophils Absolute 0.1 0.0 - 0.2 K/uL   Comprehensive Metabolic Panel   Result Value Ref Range    Sodium 136 135 - 144 mEq/L    Potassium 4.1 3.4 - 4.9 mEq/L    Chloride 101 95 - 107 mEq/L    CO2 22 20 - 31 mEq/L    Anion Gap 13 9 - 15 mEq/L    Glucose 92 70 - 99 mg/dL    BUN 10 8 - 23 mg/dL    CREATININE 0.66 0.50 - 0.90 mg/dL    GFR Non-African American >60.0 >60    GFR  >60.0 >60    Calcium 9.2 8.5 - 9.9 mg/dL    Total Protein 6.9 6.3 - 8.0 g/dL    Alb 3.8 3.5 - 4.6 g/dL    Total Bilirubin <0.2 0.2 - 0.7 mg/dL    Alkaline Phosphatase 111 40 - 130 U/L    ALT 9 0 - 33 U/L    AST 17 0 - 35 U/L    Globulin 3.1 2.3 - 3.5 g/dL       ASSESSMENT/PLAN:  Assessment & Plan   Delvin Fox was seen today for other. Diagnoses and all orders for this visit:    Fibromyalgia  -     buprenorphine (BUPRENEX) 15 MCG/HR PTWK; Place 1 patch onto the skin once a week for 28 days. Chronic pain syndrome  -     buprenorphine (BUPRENEX) 15 MCG/HR PTWK; Place 1 patch onto the skin once a week for 28 days. No orders of the defined types were placed in this encounter. Orders Placed This Encounter   Medications    buprenorphine (BUPRENEX) 15 MCG/HR PTWK     Sig: Place 1 patch onto the skin once a week for 28 days.      Dispense:  4 patch     Refill:  2     Medications Discontinued During This Encounter   Medication Reason  buprenorphine (BUPRENEX) 15 MCG/HR PTWK REORDER    lubiprostone (AMITIZA) 24 MCG capsule LIST CLEANUP     No follow-ups on file. Reviewed with the patient: current clinical status, medications, activities and diet. Side effects, adverse effects of the medication prescribed today, as well as treatment plan/ rationale and result expectations have been discussed with the patient who expresses understanding and desires to proceed. Close follow up to evaluate treatment results and for coordination of care. I have reviewed the patient's medical history in detail and updated the computerized patient record. Patient identification was verified at the start of the visit: Yes    Total time spent on this encounter: Not billed by time      --AMANDA Cordova CNP on 8/23/2020 at 9:55 PM    An electronic signature was used to authenticate this note.

## 2020-08-28 RX ORDER — GABAPENTIN 300 MG/1
CAPSULE ORAL
Qty: 30 CAPSULE | Refills: 0 | Status: SHIPPED | OUTPATIENT
Start: 2020-08-28 | End: 2020-10-13

## 2020-09-16 ENCOUNTER — TELEPHONE (OUTPATIENT)
Dept: FAMILY MEDICINE CLINIC | Age: 65
End: 2020-09-16

## 2020-09-16 ENCOUNTER — NURSE ONLY (OUTPATIENT)
Dept: FAMILY MEDICINE CLINIC | Age: 65
End: 2020-09-16

## 2020-09-16 DIAGNOSIS — Z20.822 SUSPECTED COVID-19 VIRUS INFECTION: ICD-10-CM

## 2020-09-16 NOTE — TELEPHONE ENCOUNTER
Patient's best friend tested positive for Covid, she got the results today. Patient has been spending a lot of time with her. Patient has a sore throat, a cough and feels dehydrated. She is very concerned. Please advise, thank you.

## 2020-09-16 NOTE — TELEPHONE ENCOUNTER
Orders put in for covid testing. Please give patient the information for drive up testing. Advise ER for extreme SOB or unable to keep fluids down.   Increase oral hydration

## 2020-09-18 LAB
SARS-COV-2: NOT DETECTED
SOURCE: NORMAL

## 2020-09-22 ENCOUNTER — VIRTUAL VISIT (OUTPATIENT)
Dept: FAMILY MEDICINE CLINIC | Age: 65
End: 2020-09-22
Payer: MEDICARE

## 2020-09-22 PROCEDURE — 99214 OFFICE O/P EST MOD 30 MIN: CPT | Performed by: NURSE PRACTITIONER

## 2020-09-22 RX ORDER — ERGOCALCIFEROL 1.25 MG/1
CAPSULE ORAL
Qty: 4 CAPSULE | Refills: 11 | Status: SHIPPED | OUTPATIENT
Start: 2020-09-22 | End: 2021-09-28

## 2020-09-22 RX ORDER — PROMETHAZINE HYDROCHLORIDE AND CODEINE PHOSPHATE 6.25; 1 MG/5ML; MG/5ML
5 SYRUP ORAL 4 TIMES DAILY PRN
Qty: 180 ML | Refills: 0 | Status: SHIPPED | OUTPATIENT
Start: 2020-09-22 | End: 2020-12-14 | Stop reason: SDUPTHER

## 2020-10-02 NOTE — TELEPHONE ENCOUNTER
Patient thought this was sent in when she did the vv on 9/22. She only has a few left. Please approve or deny this request.    Rx requested:  Requested Prescriptions     Pending Prescriptions Disp Refills    amphetamine-dextroamphetamine (ADDERALL XR) 30 MG extended release capsule 30 capsule 0     Sig: Take 1 capsule by mouth every morning for 30 days.          Last Office Visit:   9/22/2020      Next Visit Date:  Future Appointments   Date Time Provider Shelley Gorman   6/11/2021 10:30 AM Suresh Batista MD [address]

## 2020-10-05 ASSESSMENT — ENCOUNTER SYMPTOMS
SINUS PAIN: 0
ABDOMINAL PAIN: 0
VOMITING: 0
SWOLLEN GLANDS: 0
NAUSEA: 0
DIARRHEA: 0
WHEEZING: 0
SORE THROAT: 0
COUGH: 1
RHINORRHEA: 0

## 2020-10-05 NOTE — PROGRESS NOTES
TELEHEALTH EVALUATION -- Audio/Visual (During DMOSP-84 public health emergency)    -   Jaime Strickland is a 71 y.o. female being evaluated by a Virtual Visit (video visit) encounter to address concerns as mentioned above. A caregiver was present when appropriate. Due to this being a TeleHealth encounter (During FMJIE- public health emergency), evaluation of the following organ systems was limited: Vitals/Constitutional/EENT/Resp/CV/GI//MS/Neuro/Skin/Heme-Lymph-Imm. Pursuant to the emergency declaration under the [facility], [address] [place] [address] authority and the [employer] Act, this Virtual Visit was conducted with patient's (and/or legal guardian's) consent, to reduce the patient's risk of exposure to COVID-19 and provide necessary medical care. The patient (and/or legal guardian) has also been advised to contact this office for worsening conditions or problems, and seek emergency medical treatment and/or call 911 if deemed necessary. Patient was contacted and agreed to proceed with a virtual visit via Doxy. me  The risks and benefits of converting to a virtual visit were discussed in light of the current infectious disease epidemic. Patient also understood that insurance coverage and co-pays are up to their individual insurance plans. Patient was located at their home. Provider was located at their office. 2020  Jaime Strickland (:  1955) has requested an audio/video evaluation for the following concern(s):    URI    This is a new problem. The current episode started in the past 7 days. The problem has been unchanged. The maximum temperature recorded prior to her arrival was 100.4 - 100.9 F. Associated symptoms include congestion, coughing and headaches.  Pertinent negatives include no abdominal pain, chest pain, diarrhea, dysuria, ear pain, joint pain, joint swelling, nausea, neck pain, plugged ear sensation, rash, rhinorrhea, sinus pain, sneezing, sore throat, swollen glands, vomiting or wheezing. She has tried nothing for the symptoms. The treatment provided no relief. Review of Systems   HENT: Positive for congestion. Negative for ear pain, rhinorrhea, sinus pain, sneezing and sore throat. Respiratory: Positive for cough. Negative for wheezing. Cardiovascular: Negative for chest pain. Gastrointestinal: Negative for abdominal pain, diarrhea, nausea and vomiting. Genitourinary: Negative for dysuria. Musculoskeletal: Negative for joint pain and neck pain. Skin: Negative for rash. Neurological: Positive for headaches. Prior to Visit Medications    Medication Sig Taking? Authorizing Provider   prednisoLONE 5 MG TABS Take 1 tablet by mouth every other day Take 1 tab every other day Yes AMANDA Nogueira CNP   vitamin D (ERGOCALCIFEROL) 1.25 MG (61901 UT) CAPS capsule TAKE 1 CAPSULE BY MOUTH once weekly Yes AMANDA Nogueira CNP   gabapentin (NEURONTIN) 300 MG capsule Take 1 capsule by mouth nightly  AMANDA Nogueira CNP   atorvastatin (LIPITOR) 80 MG tablet Take 1 tablet by mouth daily  AMANDA Nogueira CNP   amphetamine-dextroamphetamine (ADDERALL XR) 30 MG extended release capsule Take 1 capsule by mouth every morning for 30 days.   AMANDA Puga CNP   omeprazole (PRILOSEC) 40 MG delayed release capsule Take 40 mg by mouth  Historical Provider, MD   polyethylene glycol (GLYCOLAX) 17 GM/SCOOP powder Take 17 g by mouth as needed  Historical Provider, MD   acetaminophen (APAP EXTRA STRENGTH) 500 MG tablet Take 2 tablets by mouth every 6 hours as needed for Pain  Dev Mcdermott MD   docusate sodium (COLACE) 100 MG capsule Take 1 capsule by mouth 2 times daily as needed for Constipation  Dev Mcdermott MD   prochlorperazine (COMPAZINE) 10 MG tablet Take 1 tablet by mouth every 8 hours as needed (NAUSEA)  AMANDA Puga CNP ipratropium-albuterol (DUONEB) 0.5-2.5 (3) MG/3ML SOLN nebulizer solution Inhale 3 mLs into the lungs every 4 hours as needed for Shortness of Breath  AMANDA Jimenez - CNP   cyclobenzaprine (FLEXERIL) 10 MG tablet Take 1 tablet by mouth 2 times daily as needed (myalgias anf fibro)  AMANDA Nogueira - CNP   mometasone-formoterol (DULERA) 200-5 MCG/ACT inhaler Inhale 1 puff into the lungs every 12 hours  AMANDA Nogueira - CNP   venlafaxine (EFFEXOR XR) 75 MG extended release capsule TAKE 1 CAPSULE BY MOUTH TWICE DAILY  Historical Provider, MD   aspirin (ASPIRIN 81) 81 MG chewable tablet   Historical Provider, MD   furosemide (LASIX) 20 MG tablet Take 20 mg by mouth daily  Historical Provider, MD   mirtazapine (REMERON) 15 MG tablet Take 15 mg by mouth nightly  Historical Provider, MD       Past Medical History:   Diagnosis Date    ADHD (attention deficit hyperactivity disorder)     Anxiety     Depression     Fibromyalgia     GERD (gastroesophageal reflux disease)     Hypertension     Irritable bowel syndrome      Past Surgical History:   Procedure Laterality Date     SECTION      COLONOSCOPY      MARGARET Young MD    DILATION AND CURETTAGE OF UTERUS N/A 2020    DIAGNOSTIC LAPAROSCOPY, LYSIS OF ADHESIONS, HYSTEROSCOPY WITH Thana Peeling performed by Benitez Nevarez MD at [address] [place] [address] History     Socioeconomic History    Marital status:       Spouse name: Not on file    Number of children: Not on file    Years of education: Not on file    Highest education level: Not on file   Occupational History    Not on file   Social Needs    Financial resource strain: Not on file    Food insecurity     Worry: Not on file     Inability: Not on file    Transportation needs     Medical: Not on file     Non-medical: Not on file   Tobacco Use    Smoking status: Former Smoker     Types: Cigarettes     Last attempt to quit: 2019     Years since Musculoskeletal:   [x] Normal gait with no signs of ataxia         [x] Normal range of motion of neck        [] Abnormal-       Neurological:       [x] No Facial Asymmetry (Cranial nerve 7 motor function) (limited exam to video visit)          [x] No gaze palsy        [] Abnormal-         Skin:        [x] No significant exanthematous lesions or discoloration noted on facial skin         [] Abnormal-            Psychiatric:       [x] Normal Affect [x] No Hallucinations        [] Abnormal-     Other pertinent observable physical exam findings-   Results for orders placed or performed in visit on 09/16/20   COVID-19 Ambulatory    Specimen: Nasopharyngeal Swab   Result Value Ref Range    SARS-CoV-2 Not Detected Not Detected    Source Anterior nares        ASSESSMENT/PLAN:  Assessment & Plan   Aydee Hopedmar was seen today for other. Diagnoses and all orders for this visit:    Viral URI  -     promethazine-codeine (PHENERGAN WITH CODEINE) 6.25-10 MG/5ML syrup; Take 5 mLs by mouth 4 times daily as needed for Cough for up to 7 days. Cough  -     promethazine-codeine (PHENERGAN WITH CODEINE) 6.25-10 MG/5ML syrup; Take 5 mLs by mouth 4 times daily as needed for Cough for up to 7 days. Other orders  -     prednisoLONE 5 MG TABS; Take 1 tablet by mouth every other day Take 1 tab every other day  -     vitamin D (ERGOCALCIFEROL) 1.25 MG (45258 UT) CAPS capsule; TAKE 1 CAPSULE BY MOUTH once weekly      No orders of the defined types were placed in this encounter.     Orders Placed This Encounter   Medications    prednisoLONE 5 MG TABS     Sig: Take 1 tablet by mouth every other day Take 1 tab every other day     Dispense:  30 tablet     Refill:  11    vitamin D (ERGOCALCIFEROL) 1.25 MG (90364 UT) CAPS capsule     Sig: TAKE 1 CAPSULE BY MOUTH once weekly     Dispense:  4 capsule     Refill:  11    promethazine-codeine (PHENERGAN WITH CODEINE) 6.25-10 MG/5ML syrup     Sig: Take 5 mLs by mouth 4 times daily as needed for Cough

## 2020-10-08 RX ORDER — DEXTROAMPHETAMINE SACCHARATE, AMPHETAMINE ASPARTATE MONOHYDRATE, DEXTROAMPHETAMINE SULFATE AND AMPHETAMINE SULFATE 7.5; 7.5; 7.5; 7.5 MG/1; MG/1; MG/1; MG/1
30 CAPSULE, EXTENDED RELEASE ORAL EVERY MORNING
Qty: 30 CAPSULE | Refills: 0 | OUTPATIENT
Start: 2020-10-08 | End: 2020-11-07

## 2020-10-08 NOTE — TELEPHONE ENCOUNTER
Elvin Velazquez called office requesting medication refill. Rx requested:  Requested Prescriptions     Pending Prescriptions Disp Refills    amphetamine-dextroamphetamine (ADDERALL XR) 30 MG extended release capsule 30 capsule 0     Sig: Take 1 capsule by mouth every morning for 30 days. Last Office Visit:   9/22/2020    Last Tox screen:  ?   Last Medication contract:  ?  Next Visit Date:  Future Appointments   Date Time Provider Shelley Gorman   6/11/2021 10:30 AM Ortiz Macedo MD [address] [place] [address]

## 2020-10-13 ENCOUNTER — TELEPHONE (OUTPATIENT)
Dept: FAMILY MEDICINE CLINIC | Age: 65
End: 2020-10-13

## 2020-10-13 RX ORDER — DEXTROAMPHETAMINE SACCHARATE, AMPHETAMINE ASPARTATE MONOHYDRATE, DEXTROAMPHETAMINE SULFATE AND AMPHETAMINE SULFATE 7.5; 7.5; 7.5; 7.5 MG/1; MG/1; MG/1; MG/1
30 CAPSULE, EXTENDED RELEASE ORAL EVERY MORNING
Qty: 30 CAPSULE | Refills: 0 | Status: SHIPPED | OUTPATIENT
Start: 2020-10-13 | End: 2020-11-10 | Stop reason: SDUPTHER

## 2020-10-13 RX ORDER — GABAPENTIN 300 MG/1
CAPSULE ORAL
Qty: 30 CAPSULE | Refills: 0 | Status: SHIPPED | OUTPATIENT
Start: 2020-10-13 | End: 2021-03-01 | Stop reason: SDUPTHER

## 2020-10-16 ENCOUNTER — VIRTUAL VISIT (OUTPATIENT)
Dept: FAMILY MEDICINE CLINIC | Age: 65
End: 2020-10-16
Payer: MEDICARE

## 2020-10-16 PROCEDURE — 99215 OFFICE O/P EST HI 40 MIN: CPT | Performed by: NURSE PRACTITIONER

## 2020-10-16 RX ORDER — BUPRENORPHINE 15 UG/H
1 PATCH TRANSDERMAL WEEKLY
Qty: 4 PATCH | Refills: 3 | Status: SHIPPED | OUTPATIENT
Start: 2020-10-16 | End: 2020-11-15

## 2020-10-16 RX ORDER — DEXTROAMPHETAMINE SACCHARATE, AMPHETAMINE ASPARTATE MONOHYDRATE, DEXTROAMPHETAMINE SULFATE AND AMPHETAMINE SULFATE 7.5; 7.5; 7.5; 7.5 MG/1; MG/1; MG/1; MG/1
30 CAPSULE, EXTENDED RELEASE ORAL EVERY MORNING
Qty: 30 CAPSULE | Refills: 0 | Status: SHIPPED | OUTPATIENT
Start: 2021-01-16 | End: 2021-03-01 | Stop reason: CLARIF

## 2020-10-16 RX ORDER — DEXTROAMPHETAMINE SACCHARATE, AMPHETAMINE ASPARTATE MONOHYDRATE, DEXTROAMPHETAMINE SULFATE AND AMPHETAMINE SULFATE 7.5; 7.5; 7.5; 7.5 MG/1; MG/1; MG/1; MG/1
30 CAPSULE, EXTENDED RELEASE ORAL EVERY MORNING
Qty: 30 CAPSULE | Refills: 0 | Status: SHIPPED | OUTPATIENT
Start: 2020-12-16 | End: 2021-03-01 | Stop reason: CLARIF

## 2020-10-16 RX ORDER — DEXTROAMPHETAMINE SACCHARATE, AMPHETAMINE ASPARTATE MONOHYDRATE, DEXTROAMPHETAMINE SULFATE AND AMPHETAMINE SULFATE 7.5; 7.5; 7.5; 7.5 MG/1; MG/1; MG/1; MG/1
30 CAPSULE, EXTENDED RELEASE ORAL EVERY MORNING
Qty: 30 CAPSULE | Refills: 0 | Status: SHIPPED | OUTPATIENT
Start: 2020-11-16 | End: 2020-11-10 | Stop reason: SDUPTHER

## 2020-10-16 RX ORDER — ALPRAZOLAM 1 MG/1
1 TABLET ORAL 3 TIMES DAILY PRN
Qty: 60 TABLET | Refills: 3 | Status: SHIPPED | OUTPATIENT
Start: 2020-10-16 | End: 2020-11-15

## 2020-10-16 RX ORDER — PREDNISONE 1 MG/1
5 TABLET ORAL DAILY
Qty: 30 TABLET | Refills: 11 | Status: SHIPPED | OUTPATIENT
Start: 2020-10-16 | End: 2020-11-15

## 2020-10-26 NOTE — PROGRESS NOTES
TELEHEALTH EVALUATION -- Audio/Visual (During N-50 public health emergency)    -   Saintclair Darner is a 71 y.o. female being evaluated by a Virtual Visit (video visit) encounter to address concerns as mentioned above. A caregiver was present when appropriate. Due to this being a TeleHealth encounter (During - public health emergency), evaluation of the following organ systems was limited: Vitals/Constitutional/EENT/Resp/CV/GI//MS/Neuro/Skin/Heme-Lymph-Imm. Pursuant to the emergency declaration under the [facility], [address] [place] [address] authority and the [employer] Act, this Virtual Visit was conducted with patient's (and/or legal guardian's) consent, to reduce the patient's risk of exposure to COVID-19 and provide necessary medical care. The patient (and/or legal guardian) has also been advised to contact this office for worsening conditions or problems, and seek emergency medical treatment and/or call 911 if deemed necessary. Patient was contacted and agreed to proceed with a virtual visit via Doxy. me  The risks and benefits of converting to a virtual visit were discussed in light of the current infectious disease epidemic. Patient also understood that insurance coverage and co-pays are up to their individual insurance plans. Patient was located at their home. Provider was located at their office. 10/16/2020  Saintclair Darner (:  1955) has requested an audio/video evaluation for the following concern(s):    HPI      Subjective  Saintclair Darner, 71 y.o. female presents today with:  No chief complaint on file. Working on weaning medications-  Stopped tramadol and started [employer]-  patient tolerating-  States first time she has eran been pain free-  Is willing to try a decreased dosage. Will also decrease Gabapentin is currenty taking 600mg qhs will decrease to 400mg qhs.     ADHD-  Was on 30 mg together: Not on file     Attends Rastafarian service: Not on file     Active member of club or organization: Not on file     Attends meetings of clubs or organizations: Not on file     Relationship status: Not on file    Intimate partner violence     Fear of current or ex partner: Not on file     Emotionally abused: Not on file     Physically abused: Not on file     Forced sexual activity: Not on file   Other Topics Concern    Not on file   Social History Narrative    Not on file     No family history on file. No Known Allergies  Current Outpatient Medications   Medication Sig Dispense Refill    [START ON 11/16/2020] amphetamine-dextroamphetamine (ADDERALL XR) 30 MG extended release capsule Take 1 capsule by mouth every morning for 30 days. 30 capsule 0    [START ON 12/16/2020] amphetamine-dextroamphetamine (ADDERALL XR) 30 MG extended release capsule Take 1 capsule by mouth every morning for 30 days. 30 capsule 0    [START ON 1/16/2021] amphetamine-dextroamphetamine (ADDERALL XR) 30 MG extended release capsule Take 1 capsule by mouth every morning for 30 days. 30 capsule 0    ALPRAZolam (XANAX) 1 MG tablet Take 1 tablet by mouth 3 times daily as needed for Sleep or Anxiety for up to 30 days. 60 tablet 3    buprenorphine (BUPRENEX) 15 MCG/HR PTWK Place 1 patch onto the skin once a week for 30 days. 4 patch 3    predniSONE (DELTASONE) 5 MG tablet Take 1 tablet by mouth daily 30 tablet 11    gabapentin (NEURONTIN) 300 MG capsule Take 1 capsule by mouth nightly 30 capsule 0    amphetamine-dextroamphetamine (ADDERALL XR) 30 MG extended release capsule Take 1 capsule by mouth every morning for 30 days.  30 capsule 0    vitamin D (ERGOCALCIFEROL) 1.25 MG (66825 UT) CAPS capsule TAKE 1 CAPSULE BY MOUTH once weekly 4 capsule 11    atorvastatin (LIPITOR) 80 MG tablet Take 1 tablet by mouth daily 90 tablet 1    omeprazole (PRILOSEC) 40 MG delayed release capsule Take 40 mg by mouth      polyethylene glycol (GLYCOLAX) 17 GM/SCOOP powder Take 17 g by mouth as needed      acetaminophen (APAP EXTRA STRENGTH) 500 MG tablet Take 2 tablets by mouth every 6 hours as needed for Pain 60 tablet 1    docusate sodium (COLACE) 100 MG capsule Take 1 capsule by mouth 2 times daily as needed for Constipation 60 capsule 2    prochlorperazine (COMPAZINE) 10 MG tablet Take 1 tablet by mouth every 8 hours as needed (NAUSEA) 120 tablet 3    ipratropium-albuterol (DUONEB) 0.5-2.5 (3) MG/3ML SOLN nebulizer solution Inhale 3 mLs into the lungs every 4 hours as needed for Shortness of Breath 360 mL 0    cyclobenzaprine (FLEXERIL) 10 MG tablet Take 1 tablet by mouth 2 times daily as needed (myalgias anf fibro) 60 tablet 11    mometasone-formoterol (DULERA) 200-5 MCG/ACT inhaler Inhale 1 puff into the lungs every 12 hours 1 Inhaler 11    venlafaxine (EFFEXOR XR) 75 MG extended release capsule TAKE 1 CAPSULE BY MOUTH TWICE DAILY  1    aspirin (ASPIRIN 81) 81 MG chewable tablet       furosemide (LASIX) 20 MG tablet Take 20 mg by mouth daily      mirtazapine (REMERON) 15 MG tablet Take 15 mg by mouth nightly       No current facility-administered medications for this visit. PMH, Surgical Hx, Family Hx, and Social Hx reviewed and updated. Health Maintenance reviewed. Objective    There were no vitals filed for this visit. Physical Exam  Constitutional:       General: She is not in acute distress. Appearance: She is well-developed. HENT:      Head: Normocephalic and atraumatic. Right Ear: External ear normal.      Left Ear: External ear normal.      Nose: Nose normal.   Eyes:      Conjunctiva/sclera: Conjunctivae normal.      Pupils: Pupils are equal, round, and reactive to light. Neck:      Musculoskeletal: Neck supple. Vascular: No JVD. Cardiovascular:      Rate and Rhythm: Normal rate and regular rhythm. Heart sounds: Normal heart sounds. No murmur.    Pulmonary:      Effort: Pulmonary effort is Refill:  0    ALPRAZolam (XANAX) 1 MG tablet     Sig: Take 1 tablet by mouth 3 times daily as needed for Sleep or Anxiety for up to 30 days. Dispense:  60 tablet     Refill:  3    buprenorphine (BUPRENEX) 15 MCG/HR PTWK     Sig: Place 1 patch onto the skin once a week for 30 days. Dispense:  4 patch     Refill:  3    predniSONE (DELTASONE) 5 MG tablet     Sig: Take 1 tablet by mouth daily     Dispense:  30 tablet     Refill:  11     Medications Discontinued During This Encounter   Medication Reason    prednisoLONE 5 MG TABS LIST CLEANUP     Return in about 3 months (around 1/16/2021) for In person visit. Reviewed with the patient: current clinical status, medications, activities and diet. Side effects, adverse effects of the medication prescribed today, as well as treatment plan/ rationale and result expectations have been discussed with the patient who expresses understanding and desires to proceed. Close follow up to evaluate treatment results and for coordination of care. I have reviewed the patient's medical history in detail and updated the computerized patient record.     Timothy Chaves, APRN - CNP

## 2020-10-29 NOTE — TELEPHONE ENCOUNTER
Patient has the eczema back on her knees and elbows. Please approve or deny this request.    Rx requested:  Requested Prescriptions     Pending Prescriptions Disp Refills    mupirocin (BACTROBAN) 2 % ointment 1 Tube 0     Sig: Apply topically 3 times daily.          Last Office Visit:   10/16/2020      Next Visit Date:  Future Appointments   Date Time Provider Shelley Gorman   6/11/2021 10:30 AM Qamar Gtz MD [address] [place] [address]

## 2020-11-10 ENCOUNTER — TELEPHONE (OUTPATIENT)
Dept: FAMILY MEDICINE CLINIC | Age: 65
End: 2020-11-10

## 2020-11-10 RX ORDER — DEXTROAMPHETAMINE SACCHARATE, AMPHETAMINE ASPARTATE MONOHYDRATE, DEXTROAMPHETAMINE SULFATE AND AMPHETAMINE SULFATE 7.5; 7.5; 7.5; 7.5 MG/1; MG/1; MG/1; MG/1
30 CAPSULE, EXTENDED RELEASE ORAL EVERY MORNING
Qty: 30 CAPSULE | Refills: 0 | Status: SHIPPED | OUTPATIENT
Start: 2020-12-11 | End: 2021-03-01 | Stop reason: CLARIF

## 2020-11-10 RX ORDER — DEXTROAMPHETAMINE SACCHARATE, AMPHETAMINE ASPARTATE MONOHYDRATE, DEXTROAMPHETAMINE SULFATE AND AMPHETAMINE SULFATE 7.5; 7.5; 7.5; 7.5 MG/1; MG/1; MG/1; MG/1
30 CAPSULE, EXTENDED RELEASE ORAL EVERY MORNING
Qty: 30 CAPSULE | Refills: 0 | Status: SHIPPED | OUTPATIENT
Start: 2020-11-10 | End: 2021-03-01 | Stop reason: CLARIF

## 2020-11-10 NOTE — TELEPHONE ENCOUNTER
Michelle Franco called office regarding Adderall Rx dated 11/16/2020    Pt states she has one to take tomorrow 11/11 and this will be her last pill.    (pt also has future Rx dated 12/16/2020)

## 2020-12-04 ENCOUNTER — TELEPHONE (OUTPATIENT)
Dept: FAMILY MEDICINE CLINIC | Age: 65
End: 2020-12-04

## 2020-12-14 ENCOUNTER — VIRTUAL VISIT (OUTPATIENT)
Dept: FAMILY MEDICINE CLINIC | Age: 65
End: 2020-12-14
Payer: MEDICARE

## 2020-12-14 PROCEDURE — 99214 OFFICE O/P EST MOD 30 MIN: CPT | Performed by: NURSE PRACTITIONER

## 2020-12-14 RX ORDER — AZITHROMYCIN 250 MG/1
250 TABLET, FILM COATED ORAL SEE ADMIN INSTRUCTIONS
Qty: 6 TABLET | Refills: 0 | Status: SHIPPED | OUTPATIENT
Start: 2020-12-14 | End: 2020-12-24 | Stop reason: SDUPTHER

## 2020-12-14 RX ORDER — PREDNISONE 10 MG/1
TABLET ORAL
Qty: 30 TABLET | Refills: 0 | Status: SHIPPED | OUTPATIENT
Start: 2020-12-14 | End: 2021-03-10

## 2020-12-14 RX ORDER — PROMETHAZINE HYDROCHLORIDE AND CODEINE PHOSPHATE 6.25; 1 MG/5ML; MG/5ML
5 SYRUP ORAL 4 TIMES DAILY PRN
Qty: 140 ML | Refills: 0 | Status: SHIPPED | OUTPATIENT
Start: 2020-12-14 | End: 2020-12-21

## 2020-12-14 ASSESSMENT — ENCOUNTER SYMPTOMS
HOARSE VOICE: 1
SORE THROAT: 0
HEARTBURN: 0
DIFFICULTY BREATHING: 0
SPUTUM PRODUCTION: 1
WHEEZING: 1
RHINORRHEA: 1
HEMOPTYSIS: 0
FREQUENT THROAT CLEARING: 0
GASTROINTESTINAL NEGATIVE: 1
TROUBLE SWALLOWING: 0
COUGH: 1
SINUS PRESSURE: 0
SHORTNESS OF BREATH: 1
EYES NEGATIVE: 1

## 2020-12-14 ASSESSMENT — COPD QUESTIONNAIRES: COPD: 1

## 2020-12-14 NOTE — PROGRESS NOTES
Subjective:     Patient ID: Rich Wade is a 76 y.o. female who presentstoday for:  Chief Complaint   Patient presents with    Cough         TELEHEALTH EVALUATION -- Audio/Visual (During ZAIZO-07 public health emergency)    -   Rich Wade is a 76 y.o. female being evaluated by a Virtual Visit (video visit) encounter to address concerns as mentioned above. A caregiver was present when appropriate. Due to this being a TeleHealth encounter (During TVZRP-19 public health emergency), evaluation of the following organ systems was limited: Vitals/Constitutional/EENT/Resp/CV/GI//MS/Neuro/Skin/Heme-Lymph-Imm. Pursuant to the emergency declaration under the [address] [place] [address] authority and the [employer] Act, this Virtual Visit was conducted with patient's (and/or legal guardian's) consent, to reduce the patient's risk of exposure to COVID-19 and provide necessary medical care. The patient (and/or legal guardian) has also been advised to contact this office for worsening conditions or problems, and seek emergency medical treatment and/or call 911 if deemed necessary. Services were provided through a video synchronous discussion virtually to substitute for in-person clinic visit. Type of encounter was x__ Doxy __ MyChart ___Facetime    Patient was located at their home. Provider was located at their _x__ home or        ____ office. --AMANDA Mtz - CNP on 12/14/2020 at 2:02 PM    An electronic signature was used to authenticate this note.       COPD She complains of cough, hoarse voice, shortness of breath, sputum production and wheezing. There is no difficulty breathing, frequent throat clearing or hemoptysis. This is a chronic problem. The current episode started in the past 7 days. The problem occurs daily. The problem has been gradually worsening. The cough is productive of sputum. Associated symptoms include dyspnea on exertion, myalgias, nasal congestion, postnasal drip and rhinorrhea. Pertinent negatives include no appetite change, chest pain, ear congestion, ear pain, fever, headaches, heartburn, orthopnea, PND, sneezing, sore throat, sweats, trouble swallowing or weight loss. Her symptoms are aggravated by URI. Her symptoms are alleviated by beta-agonist. She reports moderate improvement on treatment. Risk factors for lung disease include smoking/tobacco exposure. Her past medical history is significant for COPD. Past Medical History:   Diagnosis Date    ADHD (attention deficit hyperactivity disorder)     Anxiety     Depression     Fibromyalgia     GERD (gastroesophageal reflux disease)     Hypertension     Irritable bowel syndrome      Current Outpatient Medications on File Prior to Visit   Medication Sig Dispense Refill    amphetamine-dextroamphetamine (ADDERALL XR) 30 MG extended release capsule Take 1 capsule by mouth every morning for 30 days. 30 capsule 0    amphetamine-dextroamphetamine (ADDERALL XR) 30 MG extended release capsule Take 1 capsule by mouth every morning for 30 days. 30 capsule 0    [START ON 12/16/2020] amphetamine-dextroamphetamine (ADDERALL XR) 30 MG extended release capsule Take 1 capsule by mouth every morning for 30 days. 30 capsule 0    [START ON 1/16/2021] amphetamine-dextroamphetamine (ADDERALL XR) 30 MG extended release capsule Take 1 capsule by mouth every morning for 30 days.  30 capsule 0    gabapentin (NEURONTIN) 300 MG capsule Take 1 capsule by mouth nightly 30 capsule 0  vitamin D (ERGOCALCIFEROL) 1.25 MG (77257 UT) CAPS capsule TAKE 1 CAPSULE BY MOUTH once weekly 4 capsule 11    atorvastatin (LIPITOR) 80 MG tablet Take 1 tablet by mouth daily 90 tablet 1    omeprazole (PRILOSEC) 40 MG delayed release capsule Take 40 mg by mouth      polyethylene glycol (GLYCOLAX) 17 GM/SCOOP powder Take 17 g by mouth as needed      acetaminophen (APAP EXTRA STRENGTH) 500 MG tablet Take 2 tablets by mouth every 6 hours as needed for Pain 60 tablet 1    docusate sodium (COLACE) 100 MG capsule Take 1 capsule by mouth 2 times daily as needed for Constipation 60 capsule 2    prochlorperazine (COMPAZINE) 10 MG tablet Take 1 tablet by mouth every 8 hours as needed (NAUSEA) 120 tablet 3    ipratropium-albuterol (DUONEB) 0.5-2.5 (3) MG/3ML SOLN nebulizer solution Inhale 3 mLs into the lungs every 4 hours as needed for Shortness of Breath 360 mL 0    cyclobenzaprine (FLEXERIL) 10 MG tablet Take 1 tablet by mouth 2 times daily as needed (myalgias anf fibro) 60 tablet 11    mometasone-formoterol (DULERA) 200-5 MCG/ACT inhaler Inhale 1 puff into the lungs every 12 hours 1 Inhaler 11    venlafaxine (EFFEXOR XR) 75 MG extended release capsule TAKE 1 CAPSULE BY MOUTH TWICE DAILY  1    aspirin (ASPIRIN 81) 81 MG chewable tablet       furosemide (LASIX) 20 MG tablet Take 20 mg by mouth daily      mirtazapine (REMERON) 15 MG tablet Take 15 mg by mouth nightly       No current facility-administered medications on file prior to visit. Past Surgical History:   Procedure Laterality Date     SECTION      COLONOSCOPY      N GAUDENCIO Morales MD    DILATION AND CURETTAGE OF UTERUS N/A 2020    DIAGNOSTIC LAPAROSCOPY, LYSIS OF ADHESIONS, HYSTEROSCOPY WITH Laverne Bowers performed by Kristen Mayberry MD at [facility]      No family history on file. Social History     Socioeconomic History    Marital status:       Spouse name: Not on file    Number of children: Not on file Genitourinary: Negative. Musculoskeletal: Positive for myalgias. Neurological: Negative for dizziness, syncope, weakness, light-headedness and headaches. Psychiatric/Behavioral: Negative. Objective: There were no vitals taken for this visit. Physical Exam  Pulmonary:      Effort: No respiratory distress. Neurological:      Mental Status: She is alert and oriented to person, place, and time. Assessment & Plan:       Diagnosis Orders   1. COPD (chronic obstructive pulmonary disease) with acute bronchitis (HCC)  COVID-19 Ambulatory    azithromycin (ZITHROMAX) 250 MG tablet    predniSONE (DELTASONE) 10 MG tablet    promethazine-codeine (PHENERGAN WITH CODEINE) 6.25-10 MG/5ML syrup     Orders Placed This Encounter   Procedures    COVID-19 Ambulatory     Standing Status:   Future     Standing Expiration Date:   12/14/2021     Scheduling Instructions:      Saline media preferred given current shortage of viral transport media but both acceptable     Order Specific Question:   Is this test for diagnosis or screening? Answer:   Diagnosis of ill patient     Order Specific Question:   Symptomatic for COVID-19 as defined by CDC? Answer:   Yes     Order Specific Question:   Date of Symptom Onset     Answer:   12/7/2020     Order Specific Question:   Hospitalized for COVID-19? Answer:   No     Order Specific Question:   Admitted to ICU for COVID-19? Answer:   No     Order Specific Question:   Employed in healthcare setting? Answer:   No     Order Specific Question:   Resident in a congregate (group) care setting? Answer:   No     Order Specific Question:   Pregnant? Answer:   No     Order Specific Question:   Previously tested for COVID-19?      Answer:   Yes     Orders Placed This Encounter   Medications    azithromycin (ZITHROMAX) 250 MG tablet     Sig: Take 1 tablet by mouth See Admin Instructions for 5 days 500mg on day 1 followed by 250mg on days 2 - 5 Dispense:  6 tablet     Refill:  0    predniSONE (DELTASONE) 10 MG tablet     Sig: Take 4 tab po daily x3 days, take 3 tab po daily x3 days, take 2 tab po daily x3 days, Take 1 tab po daily x3 days     Dispense:  30 tablet     Refill:  0    promethazine-codeine (PHENERGAN WITH CODEINE) 6.25-10 MG/5ML syrup     Sig: Take 5 mLs by mouth 4 times daily as needed for Cough for up to 7 days. Dispense:  140 mL     Refill:  0     Reduce doses taken as pain becomes manageable     Medications Discontinued During This Encounter   Medication Reason    azithromycin (ZITHROMAX) 250 MG tablet REORDER    promethazine-codeine (PHENERGAN WITH CODEINE) 6.25-10 MG/5ML syrup REORDER     Return if symptoms worsen or fail to improve, for PCP follow-up. Reviewed with the patient: currentclinical status, medications, activities and diet. Side effects, adverse effects of the medicationprescribed today, as well as treatment plan/ rationale and result expectations havebeen discussed with the patient who expresses understanding and desires to proceed. Pt instructions reviewed and given to patient.     Close follow up to evaluate treatment resultsand for coordination of care. I have reviewed the patient's medical historyin detail and updated the computerized patient record.     AMANDA Cordova - CNP

## 2020-12-18 ENCOUNTER — TELEPHONE (OUTPATIENT)
Dept: FAMILY MEDICINE CLINIC | Age: 65
End: 2020-12-18

## 2020-12-18 DIAGNOSIS — J20.9 COPD (CHRONIC OBSTRUCTIVE PULMONARY DISEASE) WITH ACUTE BRONCHITIS (HCC): ICD-10-CM

## 2020-12-18 DIAGNOSIS — J44.0 COPD (CHRONIC OBSTRUCTIVE PULMONARY DISEASE) WITH ACUTE BRONCHITIS (HCC): ICD-10-CM

## 2020-12-24 RX ORDER — AZITHROMYCIN 250 MG/1
250 TABLET, FILM COATED ORAL SEE ADMIN INSTRUCTIONS
Qty: 6 TABLET | Refills: 0 | Status: SHIPPED | OUTPATIENT
Start: 2020-12-24 | End: 2020-12-29

## 2021-01-27 ENCOUNTER — TELEPHONE (OUTPATIENT)
Dept: FAMILY MEDICINE CLINIC | Age: 66
End: 2021-01-27

## 2021-01-27 DIAGNOSIS — G89.29 CHRONIC PELVIC PAIN IN FEMALE: ICD-10-CM

## 2021-01-27 DIAGNOSIS — R10.2 CHRONIC PELVIC PAIN IN FEMALE: ICD-10-CM

## 2021-01-27 DIAGNOSIS — M79.7 FIBROMYALGIA: Primary | ICD-10-CM

## 2021-01-27 NOTE — TELEPHONE ENCOUNTER
Patient calling states the buprenorphine patch is too expensive, $177-300 now, good rx no longer gives discount, back pain is awful, wants to know if there is anything cheaper for a patch or if perhaps you would be willing to prescribe as a pill/tablet? Said percocet worked okay in past. Dario Louis is supposed to be removed today but she is going to leave it on because she would be in too much pain without. Please advise.

## 2021-01-28 RX ORDER — TRAMADOL HYDROCHLORIDE 50 MG/1
50 TABLET ORAL 2 TIMES DAILY PRN
Qty: 60 TABLET | Refills: 1 | Status: SHIPPED | OUTPATIENT
Start: 2021-01-28 | End: 2021-02-27

## 2021-01-28 NOTE — TELEPHONE ENCOUNTER
Called and left a message letting Hua Archibald know that Madeline Dorsey will prescribe either Tylenol 3 or Tramadol, to call back with which she'd like to try.

## 2021-02-12 DIAGNOSIS — M79.7 FIBROMYALGIA: ICD-10-CM

## 2021-02-15 RX ORDER — CYCLOBENZAPRINE HCL 10 MG
10 TABLET ORAL 2 TIMES DAILY PRN
Qty: 60 TABLET | Refills: 11 | Status: SHIPPED | OUTPATIENT
Start: 2021-02-15 | End: 2022-02-18

## 2021-03-01 ENCOUNTER — OFFICE VISIT (OUTPATIENT)
Dept: FAMILY MEDICINE CLINIC | Age: 66
End: 2021-03-01
Payer: MEDICARE

## 2021-03-01 VITALS
OXYGEN SATURATION: 98 % | WEIGHT: 132 LBS | DIASTOLIC BLOOD PRESSURE: 70 MMHG | HEIGHT: 63 IN | HEART RATE: 71 BPM | SYSTOLIC BLOOD PRESSURE: 122 MMHG | RESPIRATION RATE: 16 BRPM | BODY MASS INDEX: 23.39 KG/M2

## 2021-03-01 VITALS
BODY MASS INDEX: 24.45 KG/M2 | SYSTOLIC BLOOD PRESSURE: 122 MMHG | RESPIRATION RATE: 16 BRPM | WEIGHT: 138 LBS | OXYGEN SATURATION: 98 % | HEIGHT: 63 IN | DIASTOLIC BLOOD PRESSURE: 70 MMHG | HEART RATE: 71 BPM

## 2021-03-01 DIAGNOSIS — M54.42 CHRONIC BILATERAL LOW BACK PAIN WITH BILATERAL SCIATICA: ICD-10-CM

## 2021-03-01 DIAGNOSIS — I10 ESSENTIAL HYPERTENSION: ICD-10-CM

## 2021-03-01 DIAGNOSIS — G89.29 CHRONIC BILATERAL LOW BACK PAIN WITH BILATERAL SCIATICA: ICD-10-CM

## 2021-03-01 DIAGNOSIS — J44.0 COPD (CHRONIC OBSTRUCTIVE PULMONARY DISEASE) WITH ACUTE BRONCHITIS (HCC): ICD-10-CM

## 2021-03-01 DIAGNOSIS — E55.9 VITAMIN D DEFICIENCY: ICD-10-CM

## 2021-03-01 DIAGNOSIS — Z12.31 ENCOUNTER FOR SCREENING MAMMOGRAM FOR MALIGNANT NEOPLASM OF BREAST: Primary | ICD-10-CM

## 2021-03-01 DIAGNOSIS — Z00.00 ROUTINE GENERAL MEDICAL EXAMINATION AT A HEALTH CARE FACILITY: ICD-10-CM

## 2021-03-01 DIAGNOSIS — Z20.822 EXPOSURE TO COVID-19 VIRUS: ICD-10-CM

## 2021-03-01 DIAGNOSIS — I63.10 CEREBROVASCULAR ACCIDENT (CVA) DUE TO EMBOLISM OF PRECEREBRAL ARTERY (HCC): ICD-10-CM

## 2021-03-01 DIAGNOSIS — J20.9 COPD (CHRONIC OBSTRUCTIVE PULMONARY DISEASE) WITH ACUTE BRONCHITIS (HCC): ICD-10-CM

## 2021-03-01 DIAGNOSIS — Z78.0 POST-MENOPAUSAL: ICD-10-CM

## 2021-03-01 DIAGNOSIS — M54.41 CHRONIC BILATERAL LOW BACK PAIN WITH BILATERAL SCIATICA: ICD-10-CM

## 2021-03-01 DIAGNOSIS — Z71.89 ACP (ADVANCE CARE PLANNING): ICD-10-CM

## 2021-03-01 DIAGNOSIS — F90.0 ATTENTION DEFICIT HYPERACTIVITY DISORDER (ADHD), PREDOMINANTLY INATTENTIVE TYPE: ICD-10-CM

## 2021-03-01 DIAGNOSIS — F41.9 ANXIETY: ICD-10-CM

## 2021-03-01 LAB
ALBUMIN SERPL-MCNC: 4.2 G/DL (ref 3.5–4.6)
ALP BLD-CCNC: 100 U/L (ref 40–130)
ALT SERPL-CCNC: 14 U/L (ref 0–33)
ANION GAP SERPL CALCULATED.3IONS-SCNC: 11 MEQ/L (ref 9–15)
AST SERPL-CCNC: 21 U/L (ref 0–35)
BASOPHILS ABSOLUTE: 0 K/UL (ref 0–0.2)
BASOPHILS RELATIVE PERCENT: 0.6 %
BILIRUB SERPL-MCNC: <0.2 MG/DL (ref 0.2–0.7)
BUN BLDV-MCNC: 11 MG/DL (ref 8–23)
CALCIUM SERPL-MCNC: 9.4 MG/DL (ref 8.5–9.9)
CHLORIDE BLD-SCNC: 110 MEQ/L (ref 95–107)
CHOLESTEROL, TOTAL: 104 MG/DL (ref 0–199)
CO2: 22 MEQ/L (ref 20–31)
CREAT SERPL-MCNC: 0.81 MG/DL (ref 0.5–0.9)
EOSINOPHILS ABSOLUTE: 0 K/UL (ref 0–0.7)
EOSINOPHILS RELATIVE PERCENT: 0.4 %
GFR AFRICAN AMERICAN: >60
GFR NON-AFRICAN AMERICAN: >60
GLOBULIN: 2.9 G/DL (ref 2.3–3.5)
GLUCOSE BLD-MCNC: 97 MG/DL (ref 70–99)
HCT VFR BLD CALC: 39.8 % (ref 37–47)
HDLC SERPL-MCNC: 68 MG/DL (ref 40–59)
HEMOGLOBIN: 13.4 G/DL (ref 12–16)
LDL CHOLESTEROL CALCULATED: 15 MG/DL (ref 0–129)
LYMPHOCYTES ABSOLUTE: 2.7 K/UL (ref 1–4.8)
LYMPHOCYTES RELATIVE PERCENT: 31.7 %
MCH RBC QN AUTO: 32 PG (ref 27–31.3)
MCHC RBC AUTO-ENTMCNC: 33.6 % (ref 33–37)
MCV RBC AUTO: 95.2 FL (ref 82–100)
MONOCYTES ABSOLUTE: 0.3 K/UL (ref 0.2–0.8)
MONOCYTES RELATIVE PERCENT: 4.1 %
NEUTROPHILS ABSOLUTE: 5.4 K/UL (ref 1.4–6.5)
NEUTROPHILS RELATIVE PERCENT: 63.2 %
PDW BLD-RTO: 13.2 % (ref 11.5–14.5)
PLATELET # BLD: 312 K/UL (ref 130–400)
POTASSIUM SERPL-SCNC: 4.8 MEQ/L (ref 3.4–4.9)
RBC # BLD: 4.18 M/UL (ref 4.2–5.4)
SODIUM BLD-SCNC: 143 MEQ/L (ref 135–144)
TOTAL PROTEIN: 7.1 G/DL (ref 6.3–8)
TRIGL SERPL-MCNC: 103 MG/DL (ref 0–150)
VITAMIN D 25-HYDROXY: 58 NG/ML (ref 30–100)
WBC # BLD: 8.5 K/UL (ref 4.8–10.8)

## 2021-03-01 PROCEDURE — 99497 ADVNCD CARE PLAN 30 MIN: CPT | Performed by: NURSE PRACTITIONER

## 2021-03-01 PROCEDURE — 99214 OFFICE O/P EST MOD 30 MIN: CPT | Performed by: NURSE PRACTITIONER

## 2021-03-01 PROCEDURE — G0439 PPPS, SUBSEQ VISIT: HCPCS | Performed by: NURSE PRACTITIONER

## 2021-03-01 RX ORDER — ALBUTEROL SULFATE 90 UG/1
2 AEROSOL, METERED RESPIRATORY (INHALATION) EVERY 6 HOURS PRN
Qty: 1 INHALER | Refills: 3 | Status: ON HOLD | OUTPATIENT
Start: 2021-03-01

## 2021-03-01 RX ORDER — BUPRENORPHINE 15 UG/H
PATCH TRANSDERMAL
COMMUNITY
Start: 2021-02-15 | End: 2021-04-23

## 2021-03-01 RX ORDER — DEXTROAMPHETAMINE SACCHARATE, AMPHETAMINE ASPARTATE MONOHYDRATE, DEXTROAMPHETAMINE SULFATE AND AMPHETAMINE SULFATE 7.5; 7.5; 7.5; 7.5 MG/1; MG/1; MG/1; MG/1
30 CAPSULE, EXTENDED RELEASE ORAL EVERY MORNING
Qty: 30 CAPSULE | Refills: 0 | Status: SHIPPED | OUTPATIENT
Start: 2021-03-01 | End: 2021-04-29

## 2021-03-01 RX ORDER — ALPRAZOLAM 1 MG/1
TABLET ORAL
COMMUNITY
Start: 2021-02-22 | End: 2021-03-01 | Stop reason: SDUPTHER

## 2021-03-01 RX ORDER — GABAPENTIN 300 MG/1
CAPSULE ORAL
Qty: 30 CAPSULE | Refills: 2 | Status: SHIPPED | OUTPATIENT
Start: 2021-03-01 | End: 2021-06-01 | Stop reason: SDUPTHER

## 2021-03-01 RX ORDER — ALPRAZOLAM 1 MG/1
TABLET ORAL
Qty: 60 TABLET | Refills: 2 | Status: SHIPPED | OUTPATIENT
Start: 2021-03-01 | End: 2021-04-01

## 2021-03-01 SDOH — ECONOMIC STABILITY: TRANSPORTATION INSECURITY
IN THE PAST 12 MONTHS, HAS THE LACK OF TRANSPORTATION KEPT YOU FROM MEDICAL APPOINTMENTS OR FROM GETTING MEDICATIONS?: NO

## 2021-03-01 SDOH — ECONOMIC STABILITY: TRANSPORTATION INSECURITY
IN THE PAST 12 MONTHS, HAS LACK OF TRANSPORTATION KEPT YOU FROM MEETINGS, WORK, OR FROM GETTING THINGS NEEDED FOR DAILY LIVING?: NO

## 2021-03-01 ASSESSMENT — ENCOUNTER SYMPTOMS
RECTAL PAIN: 0
CONSTIPATION: 0
GASTROINTESTINAL NEGATIVE: 1
RESPIRATORY NEGATIVE: 1
ANAL BLEEDING: 0
DIARRHEA: 0
SHORTNESS OF BREATH: 0
EYES NEGATIVE: 1
ALLERGIC/IMMUNOLOGIC NEGATIVE: 1
TROUBLE SWALLOWING: 0
BLOOD IN STOOL: 0
VOICE CHANGE: 0
COLOR CHANGE: 0
ABDOMINAL PAIN: 0

## 2021-03-01 ASSESSMENT — COPD QUESTIONNAIRES: COPD: 1

## 2021-03-01 ASSESSMENT — LIFESTYLE VARIABLES: HOW OFTEN DO YOU HAVE A DRINK CONTAINING ALCOHOL: 0

## 2021-03-01 ASSESSMENT — PATIENT HEALTH QUESTIONNAIRE - PHQ9
SUM OF ALL RESPONSES TO PHQ QUESTIONS 1-9: 0
2. FEELING DOWN, DEPRESSED OR HOPELESS: 0
SUM OF ALL RESPONSES TO PHQ9 QUESTIONS 1 & 2: 0

## 2021-03-01 NOTE — PROGRESS NOTES
prochlorperazine (COMPAZINE) 10 MG tablet Take 1 tablet by mouth every 8 hours as needed (NAUSEA)  AMANDA Nogueira CNP   ipratropium-albuterol (DUONEB) 0.5-2.5 (3) MG/3ML SOLN nebulizer solution Inhale 3 mLs into the lungs every 4 hours as needed for Shortness of Breath  AMANDA Kim CNP   mometasone-formoterol (DULERA) 200-5 MCG/ACT inhaler Inhale 1 puff into the lungs every 12 hours  AMANDA Nogueira CNP   venlafaxine (EFFEXOR XR) 75 MG extended release capsule TAKE 1 CAPSULE BY MOUTH TWICE DAILY  Historical Provider, MD   aspirin (ASPIRIN 81) 81 MG chewable tablet   Historical Provider, MD   furosemide (LASIX) 20 MG tablet Take 20 mg by mouth daily  Historical Provider, MD   mirtazapine (REMERON) 15 MG tablet Take 15 mg by mouth nightly  Historical Provider, MD       Past Medical History:   Diagnosis Date    ADHD (attention deficit hyperactivity disorder)     Anxiety     Depression     Fibromyalgia     GERD (gastroesophageal reflux disease)     Hypertension     Irritable bowel syndrome        Past Surgical History:   Procedure Laterality Date     SECTION      COLONOSCOPY      N [facility] GASTRO  Melissa Chin MD    DILATION AND CURETTAGE OF UTERUS N/A 2020    DIAGNOSTIC LAPAROSCOPY, LYSIS OF ADHESIONS, HYSTEROSCOPY WITH Herbert Bristle performed by Nancy Ledbetter MD at [facility]       No family history on file. CareTeam (Including outside providers/suppliers regularly involved in providing care):   Patient Care Team:  AMANDA Lazcano CNP as PCP - General (Nurse Practitioner Family)  AMANDA Lazcano CNP as PCP - [facility] Empaneled Provider    Wt Readings from Last 3 Encounters:   21 132 lb (59.9 kg)   21 138 lb (62.6 kg)   20 136 lb (61.7 kg)     Vitals:    21 1100   BP: 122/70   Pulse: 71   Resp: 16   SpO2: 98%   Weight: 132 lb (59.9 kg)   Height: 5' 2.5\" (1.588 m)     Body mass index is 23.76 kg/m².     Based upon direct observation of the patient, evaluation of cognition reveals recent and remote memory intact. General Appearance: alert and oriented to person, place and time, well developed and well- nourished, in no acute distress  Skin: warm and dry, no rash or erythema  Head: normocephalic and atraumatic  Eyes: pupils equal, round, and reactive to light, extraocular eye movements intact, conjunctivae normal  ENT: tympanic membrane, external ear and ear canal normal bilaterally, nose without deformity, nasal mucosa and turbinates normal without polyps  Neck: supple and non-tender without mass, no thyromegaly or thyroid nodules, no cervical lymphadenopathy  Pulmonary/Chest: clear to auscultation bilaterally- no wheezes, rales or rhonchi, normal air movement, no respiratory distress  Cardiovascular: normal rate, regular rhythm, normal S1 and S2, no murmurs, rubs, clicks, or gallops, distal pulses intact, no carotid bruits  Abdomen: soft, non-tender, non-distended, normal bowel sounds, no masses or organomegaly  Extremities: no cyanosis, clubbing or edema  Musculoskeletal: normal range of motion, no joint swelling, deformity or tenderness  Neurologic: reflexes normal and symmetric, no cranial nerve deficit, gait, coordination and speech normal    Patient's complete Health Risk Assessment and screening values have been reviewed and are found in Flowsheets. The following problems were reviewed today and where indicated follow up appointments were made and/or referrals ordered. Positive Risk Factor Screenings with Interventions:          General Health and ACP:  General  In general, how would you say your health is?: (!) Poor  In the past 7 days, have you experienced any of the following?  New or Increased Pain, New or Increased Fatigue, Loneliness, Social Isolation, Stress or Anger?: (!) New or Increased Pain  Do you get the social and emotional support that you need?: Yes  Do you have a Living Will?: (!) No  Advance Directives Power of [employer] Living Will ACP-Advance Directive ACP-Power of     Not on File Not on File Not on File Not on File      General Health Risk Interventions:  · Poor self-assessment of health status: patient declines any further evaluation/treatment for this issue  · No Living Will: Advance Care Planning addressed with patient today    Health Habits/Nutrition:  Health Habits/Nutrition  Do you exercise for at least 20 minutes 2-3 times per week?: (!) No  Have you lost any weight without trying in the past 3 months?: No  Do you eat only one meal per day?: No  Have you seen the dentist within the past year?: (!) No  Body mass index: 23.76  Health Habits/Nutrition Interventions:  · Dental exam overdue:  patient encouraged to make appointment with his/her dentist    Hearing/Vision:  No exam data present  Hearing/Vision  Do you or your family notice any trouble with your hearing that hasn't been managed with hearing aids?: (!) Yes  Do you have difficulty driving, watching TV, or doing any of your daily activities because of your eyesight?: (!) Yes  Have you had an eye exam within the past year?: (!) No  Hearing/Vision Interventions:  · Hearing concerns:  patient declines any further evaluation/treatment for hearing issues  · Vision concerns:  patient encouraged to make appointment with his/her eye specialist     ADL:  ADLs  In the past 7 days, did you need help from others to perform any of the following everyday activities? Eating, dressing, grooming, bathing, toileting, or walking/balance?: None  In the past 7 days, did you need help from others to take care of any of the following?  Laundry, housekeeping, banking/finances, shopping, telephone use, food preparation, transportation, or taking medications?: [employer], Laundry, Food Preparation  ADL Interventions:  · Patient declines any further evaluation/treatment for this issue    Personalized Preventive Plan   Current Health Maintenance Status  Immunization History   Administered Date(s) Administered    Influenza Virus Vaccine 10/06/2018    Influenza, High-dose, Quadv, 65 yrs +, IM (Fluzone) 11/29/2020    Influenza, Quadv, IM, PF (6 mo and older Fluzone, Flulaval, Fluarix, and 3 yrs and older Afluria) 10/06/2018    Pneumococcal Polysaccharide ([id number]) 09/18/2012        Health Maintenance   Topic Date Due    Hepatitis C screen  1955    COVID-19 Vaccine (1 of 2) 11/06/1970    DTaP/Tdap/Td vaccine (1 - Tdap) 11/06/1973    Breast cancer screen  11/06/2004    Shingles Vaccine (1 of 2) 11/06/2004    DEXA (modify frequency per FRAX score)  11/06/2009    Annual Wellness Visit (AWV)  07/12/2019    Pneumococcal 65+ years Vaccine (1 of 1 - PPSV23) 11/06/2019    Lipid screen  06/09/2021    Potassium monitoring  06/09/2021    Creatinine monitoring  06/09/2021    Colon cancer screen colonoscopy  06/28/2029    Flu vaccine  Completed    Hepatitis A vaccine  Aged Out    Hepatitis B vaccine  Aged Out    Hib vaccine  Aged Out    Meningococcal (ACWY) vaccine  Aged Out     Recommendations for [employer] Due: see orders and patient instructions/AVS.  . Recommended screening schedule for the next 5-10 years is provided to the patient in written form: see Patient Instructions/AVS.    Simona Gerber was seen today for medicare awv. Diagnoses and all orders for this visit:    ACP (advance care planning)  -     [address], 1ST 30MIN [[id number]]    Routine general medical examination at a health care facility                   Advance Care Planning   Advanced Care Planning: Discussed the patients choices for care and treatment in case of a health event that adversely affects decision-making abilities. Also discussed the patients long-term treatment options.  Reviewed with the patient the [address] [place] [address] of [address] [place] [address] Will Declaration forms  Reviewed the process of designating a competent adult as an Agent (or -in-fact) that could take make health care decisions for the patient if incompetent. Patient was asked to complete the declaration forms, either acknowledge the forms by a public notary or an eligible witness and provide a signed copy to the practice office.   Time spent (minutes): 5    Electronically signed by:  CANDICE De La Torre, FNP-C 3/1/21 11:23 AM

## 2021-03-01 NOTE — PATIENT INSTRUCTIONS
(Maybe you're afraid of having pain, losing your independence, or being kept alive by machines.)  · Where would you prefer to die? (Your home? A hospital? A nursing home?)  · Do you want to donate your organs when you die? · Do you want certain Caodaism practices performed before you die? When should you call for help? Be sure to contact your doctor if you have any questions. Where can you learn more? Go to [email or web address] and sign in to your [employer] account. Enter R264 in the 4moms box to learn more about \"Advance Directives: Care Instructions. \"     If you do not have an account, please click on the \"Sign Up Now\" link. Current as of: December 9, 2019               Content Version: 12.6  © 6654-9708 [employer]. Care instructions adapted under license by [facility] ([facility]). If you have questions about a medical condition or this instruction, always ask your healthcare professional. [place] [address] any warranty or liability for your use of this information. Personalized Preventive Plan for [facility] - 3/1/2021  Medicare offers a range of preventive health benefits. Some of the tests and screenings are paid in full while other may be subject to a deductible, co-insurance, and/or copay. Some of these benefits include a comprehensive review of your medical history including lifestyle, illnesses that may run in your family, and various assessments and screenings as appropriate. After reviewing your medical record and screening and assessments performed today your provider may have ordered immunizations, labs, imaging, and/or referrals for you. A list of these orders (if applicable) as well as your Preventive Care list are included within your After Visit Summary for your review.     Other Preventive Recommendations:    · A preventive eye exam performed by an eye specialist is recommended every 1-2 years to screen for glaucoma; cataracts, macular degeneration, and other eye disorders. · A preventive dental visit is recommended every 6 months. · Try to get at least 150 minutes of exercise per week or 10,000 steps per day on a pedometer . · Order or download the FREE \"Exercise & Physical Activity: Your Everyday Guide\" from [employer]. Call 4-840.827.5139 or search [employer] online. · You need 5821-0349 mg of calcium and 7881-9975 IU of vitamin D per day. It is possible to meet your calcium requirement with diet alone, but a vitamin D supplement is usually necessary to meet this goal.  · When exposed to the sun, use a sunscreen that protects against both UVA and UVB radiation with an SPF of 30 or greater. Reapply every 2 to 3 hours or after sweating, drying off with a towel, or swimming. · Always wear a seat belt when traveling in a car. Always wear a helmet when riding a bicycle or motorcycle.

## 2021-03-01 NOTE — PROGRESS NOTES
[address], 76 y.o. female presents today with:  Chief Complaint   Patient presents with    Check-Up    ADHD    Anxiety    COPD         Working on weaning medications-  Stopped tramadol and started [employer]-  patient tolerating-  States first time she has eran been pain free-  Is willing to try a decreased dosage. Will also chronic pain-we will we will her on Butrans patches and stop tramadol but Butrans patches have become extremely expensive we will resume the tramadol    ADHD-  30mg xr daily with effectiveness  Anxiety-  Tried to 1 MG TID PRN-  She does not like the ER,  Did not feel it helped her anxiety. -  She is willing transition her to back to her XANAX 1mg that is not extended release but BID PRN not TID PRN. COPD  There is no shortness of breath. Pertinent negatives include no appetite change, chest pain, headaches or trouble swallowing. Review of Systems   Constitutional: Negative. Negative for activity change, appetite change, fatigue and unexpected weight change. HENT: Negative. Negative for dental problem, nosebleeds, trouble swallowing and voice change. Eyes: Negative. Negative for visual disturbance. Respiratory: Negative. Negative for shortness of breath. Cardiovascular: Negative. Negative for chest pain, palpitations and leg swelling. Gastrointestinal: Negative. Negative for abdominal pain, anal bleeding, blood in stool, constipation, diarrhea and rectal pain. Endocrine: Negative. Negative for cold intolerance, heat intolerance, polydipsia, polyphagia and polyuria. Genitourinary: Negative. Musculoskeletal: Negative. Skin: Negative. Negative for color change and rash. Allergic/Immunologic: Negative. Neurological: Negative. Negative for dizziness, syncope, weakness and headaches. Hematological: Negative. Negative for adenopathy. Does not bruise/bleed easily. Psychiatric/Behavioral: Negative.   Negative for dysphoric mood and sleep disturbance. The patient is not nervous/anxious. Past Medical History:   Diagnosis Date    ADHD (attention deficit hyperactivity disorder)     Anxiety     Depression     Fibromyalgia     GERD (gastroesophageal reflux disease)     Hypertension     Irritable bowel syndrome      Past Surgical History:   Procedure Laterality Date     SECTION      COLONOSCOPY      MARGARET Ward MD    DILATION AND CURETTAGE OF UTERUS N/A 2020    DIAGNOSTIC LAPAROSCOPY, LYSIS OF ADHESIONS, HYSTEROSCOPY WITH Devon Kapoor performed [facility] History     Socioeconomic History    Marital status:       Spouse name: Not on file    Number of children: Not on file    Years of education: Not on file    Highest education level: Not on file   Occupational History    Not on file   Social Needs    Financial resource strain: Not hard at all    Food insecurity     Worry: Never true     Inability: Never true   [employer] needs     Medical: No     Non-medical: No   Tobacco Use    Smoking status: Former Smoker     Types: Cigarettes     Quit date: 2019     Years since quittin.6    Smokeless tobacco: Never Used   Substance and Sexual Activity    Alcohol use: Never     Frequency: Never    Drug use: Not on file    Sexual activity: Not on file   Lifestyle    Physical activity     Days per week: Not on file     Minutes per session: Not on file    Stress: Not on file   Relationships    Social connections     Talks on phone: Not on file     Gets together: Not on file     Attends Jewish service: Not on file     Active member of club or organization: Not on file     Attends meetings of clubs or organizations: Not on file     Relationship status: Not on file    Intimate partner violence     Fear of current or ex partner: Not on file     Emotionally abused: Not on file     Physically abused: Not on file     Forced sexual activity: Not on file   Other Topics Concern    Not on file   Social History Narrative    Not on file     No family history on file. No Known Allergies  Current Outpatient Medications   Medication Sig Dispense Refill    gabapentin (NEURONTIN) 300 MG capsule Take 1 capsule by mouth nightly 30 capsule 2    ALPRAZolam (XANAX) 1 MG tablet Take 1 tablet by mouth 2 times daily as needed for Sleep or Anxiety for up to 30 days. 60 tablet 2    amphetamine-dextroamphetamine (ADDERALL XR) 30 MG extended release capsule Take 1 capsule by mouth every morning for 30 days.  30 capsule 0    albuterol sulfate  (90 Base) MCG/ACT inhaler Inhale 2 puffs into the lungs every 6 hours as needed for Wheezing 1 Inhaler 3    buprenorphine (BUPRENEX) 15 MCG/HR PTWK APPLY TO SKIN ONCE WEEKLY      cyclobenzaprine (FLEXERIL) 10 MG tablet Take 1 tablet by mouth 2 times daily as needed (myalgias anf fibro) 60 tablet 11    predniSONE (DELTASONE) 10 MG tablet Take 4 tab po daily x3 days, take 3 tab po daily x3 days, take 2 tab po daily x3 days, Take 1 tab po daily x3 days 30 tablet 0    vitamin D (ERGOCALCIFEROL) 1.25 MG (40708 UT) CAPS capsule TAKE 1 CAPSULE BY MOUTH once weekly 4 capsule 11    atorvastatin (LIPITOR) 80 MG tablet Take 1 tablet by mouth daily 90 tablet 1    omeprazole (PRILOSEC) 40 MG delayed release capsule Take 40 mg by mouth      polyethylene glycol (GLYCOLAX) 17 GM/SCOOP powder Take 17 g by mouth as needed      acetaminophen (APAP EXTRA STRENGTH) 500 MG tablet Take 2 tablets by mouth every 6 hours as needed for Pain 60 tablet 1    docusate sodium (COLACE) 100 MG capsule Take 1 capsule by mouth 2 times daily as needed for Constipation 60 capsule 2    prochlorperazine (COMPAZINE) 10 MG tablet Take 1 tablet by mouth every 8 hours as needed (NAUSEA) 120 tablet 3    ipratropium-albuterol (DUONEB) 0.5-2.5 (3) MG/3ML SOLN nebulizer solution Inhale 3 mLs into the lungs every 4 hours as needed for Shortness of Breath 360 mL 0    Answer:   9    Comprehensive Metabolic Panel     Standing Status:   Future     Number of Occurrences:   1     Standing Expiration Date:   3/1/2022    CBC With Auto Differential     Standing Status:   Future     Number of Occurrences:   1     Standing Expiration Date:   3/1/2022    Vitamin D 25 Hydroxy     Standing Status:   Future     Number of Occurrences:   1     Standing Expiration Date:   3/1/2022   [facility] - Derrell Devlin MD, Neurology, [place]     Referral Priority:   Routine     Referral Type:   Eval and Treat     Referral Reason:   Specialty Services Required     Referred to Provider:   Joss Blake MD     Requested Specialty:   Neurology     Number of Visits Requested:   1    [facility] - [facility] Quinteros) - Lucrecia Segal MD, Neurosurgery, [place]     Referral Priority:   Routine     Referral Type:   Eval and Treat     Referral Reason:   Specialty Services Required     Referred to Provider:   Juvenal Rivas MD     Requested Specialty:   Neurosurgery     Number of Visits Requested:   1     Orders Placed This Encounter   Medications    gabapentin (NEURONTIN) 300 MG capsule     Sig: Take 1 capsule by mouth nightly     Dispense:  30 capsule     Refill:  2    ALPRAZolam (XANAX) 1 MG tablet     Sig: Take 1 tablet by mouth 2 times daily as needed for Sleep or Anxiety for up to 30 days. Dispense:  60 tablet     Refill:  2    amphetamine-dextroamphetamine (ADDERALL XR) 30 MG extended release capsule     Sig: Take 1 capsule by mouth every morning for 30 days.      Dispense:  30 capsule     Refill:  0    albuterol sulfate  (90 Base) MCG/ACT inhaler     Sig: Inhale 2 puffs into the lungs every 6 hours as needed for Wheezing     Dispense:  1 Inhaler     Refill:  3     Medications Discontinued During This Encounter   Medication Reason    amphetamine-dextroamphetamine (ADDERALL XR) 30 MG extended release capsule ERROR    amphetamine-dextroamphetamine (ADDERALL XR) 30 MG extended release capsule ERROR    amphetamine-dextroamphetamine (ADDERALL XR) 30 MG extended release capsule ERROR    amphetamine-dextroamphetamine (ADDERALL XR) 30 MG extended release capsule ERROR    gabapentin (NEURONTIN) 300 MG capsule REORDER    ALPRAZolam (XANAX) 1 MG tablet REORDER     No follow-ups on file. Reviewed with the patient: current clinical status, medications, activities and diet. Side effects, adverse effects of the medication prescribed today, as well as treatment plan/ rationale and result expectations have been discussed with the patient who expresses understanding and desires to proceed. Close follow up to evaluate treatment results and for coordination of care. I have reviewed the patient's medical history in detail and updated the computerized patient record.     Schuyler Valero, APRN - CNP

## 2021-03-03 LAB — SARS-COV-2 ANTIBODY, TOTAL: NEGATIVE

## 2021-03-10 ENCOUNTER — OFFICE VISIT (OUTPATIENT)
Dept: NEUROLOGY | Age: 66
End: 2021-03-10
Payer: MEDICARE

## 2021-03-10 VITALS
SYSTOLIC BLOOD PRESSURE: 122 MMHG | BODY MASS INDEX: 24.86 KG/M2 | HEART RATE: 78 BPM | DIASTOLIC BLOOD PRESSURE: 64 MMHG | WEIGHT: 138.1 LBS

## 2021-03-10 DIAGNOSIS — I67.841 REVERSIBLE CEREBROVASCULAR VASOCONSTRICTION SYNDROME: ICD-10-CM

## 2021-03-10 DIAGNOSIS — M48.062 SPINAL STENOSIS OF LUMBAR REGION WITH NEUROGENIC CLAUDICATION: Primary | ICD-10-CM

## 2021-03-10 DIAGNOSIS — S09.90XS CLOSED HEAD INJURY, SEQUELA: ICD-10-CM

## 2021-03-10 DIAGNOSIS — I69.30 LATE EFFECT OF ISCHEMIC CEREBRAL STROKE: ICD-10-CM

## 2021-03-10 PROBLEM — S09.90XA CLOSED HEAD INJURY: Status: ACTIVE | Noted: 2021-03-10

## 2021-03-10 PROCEDURE — 99204 OFFICE O/P NEW MOD 45 MIN: CPT | Performed by: PSYCHIATRY & NEUROLOGY

## 2021-03-10 RX ORDER — PREDNISONE 1 MG/1
TABLET ORAL
COMMUNITY
Start: 2021-02-28 | End: 2021-10-25

## 2021-03-10 ASSESSMENT — ENCOUNTER SYMPTOMS
SHORTNESS OF BREATH: 0
VOMITING: 0
COLOR CHANGE: 0
TROUBLE SWALLOWING: 0
BACK PAIN: 0
PHOTOPHOBIA: 0
CHOKING: 0
NAUSEA: 0
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
## 2021-03-19 ENCOUNTER — HOSPITAL ENCOUNTER (OUTPATIENT)
Dept: ULTRASOUND IMAGING | Age: 66
Discharge: HOME | End: 2021-03-21
Payer: MEDICARE

## 2021-03-19 DIAGNOSIS — I67.841 REVERSIBLE CEREBROVASCULAR VASOCONSTRICTION SYNDROME: ICD-10-CM

## 2021-03-19 DIAGNOSIS — I69.30 LATE EFFECT OF ISCHEMIC CEREBRAL STROKE: ICD-10-CM

## 2021-03-19 PROCEDURE — 93880 EXTRACRANIAL BILAT STUDY: CPT

## 2021-03-23 ENCOUNTER — HOSPITAL ENCOUNTER (OUTPATIENT)
Dept: WOMENS IMAGING | Age: 66
Discharge: HOME | End: 2021-03-25
Payer: MEDICARE

## 2021-03-23 VITALS — BODY MASS INDEX: 24.86 KG/M2 | HEIGHT: 63 IN

## 2021-03-23 DIAGNOSIS — Z78.0 POST-MENOPAUSAL: ICD-10-CM

## 2021-03-23 DIAGNOSIS — Z12.31 ENCOUNTER FOR SCREENING MAMMOGRAM FOR MALIGNANT NEOPLASM OF BREAST: ICD-10-CM

## 2021-03-23 PROCEDURE — 77080 DXA BONE DENSITY AXIAL: CPT

## 2021-03-23 PROCEDURE — 77067 SCR MAMMO BI INCL CAD: CPT

## 2021-04-01 ENCOUNTER — TELEPHONE (OUTPATIENT)
Dept: FAMILY MEDICINE CLINIC | Age: 66
End: 2021-04-01

## 2021-04-01 NOTE — TELEPHONE ENCOUNTER
Patient called because the tubing and mouthpiece on her nebulizer needs replaced. Her nebulizer is over 11years old. Can we give her a new one from our office? Please advise, thank you.

## 2021-04-01 NOTE — TELEPHONE ENCOUNTER
Yes please give her the new nebulizer and make sure the paperwork is filled outside of it for her insurance.   Call her to pick it up it and it comes with new tubing and mouthpiece  Thank you

## 2021-04-08 ENCOUNTER — HOSPITAL ENCOUNTER (OUTPATIENT)
Dept: MRI IMAGING | Age: 66
Discharge: HOME | End: 2021-04-10
Payer: MEDICARE

## 2021-04-08 DIAGNOSIS — M48.062 SPINAL STENOSIS OF LUMBAR REGION WITH NEUROGENIC CLAUDICATION: ICD-10-CM

## 2021-04-08 PROCEDURE — 72148 MRI LUMBAR SPINE W/O DYE: CPT

## 2021-04-21 DIAGNOSIS — M54.42 CHRONIC BILATERAL LOW BACK PAIN WITH BILATERAL SCIATICA: Primary | ICD-10-CM

## 2021-04-21 DIAGNOSIS — M48.062 SPINAL STENOSIS OF LUMBAR REGION WITH NEUROGENIC CLAUDICATION: ICD-10-CM

## 2021-04-21 DIAGNOSIS — M54.41 CHRONIC BILATERAL LOW BACK PAIN WITH BILATERAL SCIATICA: Primary | ICD-10-CM

## 2021-04-21 DIAGNOSIS — G89.29 CHRONIC BILATERAL LOW BACK PAIN WITH BILATERAL SCIATICA: Primary | ICD-10-CM

## 2021-04-22 NOTE — TELEPHONE ENCOUNTER
Amado Knott is calling in requesting a refill on medication(s):    Requested Prescriptions     Pending Prescriptions Disp Refills    buprenorphine ([address]) 15 MCG/HR [facility] [Pharmacy Med Name: BUPRENORPHINE 15 MCG/HR PATCH] 4 patch      Sig: APPLY TO SKIN ONCE WEEKLY          Patient's Last Office Visit:  3/1/2021     Patient's Next Visit:  Future Appointments   Date Time Provider Shelley Gorman   5/10/2021  1:00 PM Brissa Campbell, PT [facility] None   6/1/2021 11:00 AM Josue Garcia APRN - CNP [facility]   6/11/2021 10:30 AM Merritt Delcid MD [facility] [address]   7/14/2021 10:00 AM Mandy Stephenson MD [facility]   7/14/2021  3:30 PM MD Cira Oh 24:  Please send the medication to the pharmacy listed. Other Comments:  Please advise when this has been received and completed so I can close the encounter. Thank you. 24

## 2021-04-23 RX ORDER — BUPRENORPHINE 15 UG/H
PATCH TRANSDERMAL
Qty: 4 PATCH | Refills: 1 | Status: SHIPPED | OUTPATIENT
Start: 2021-04-23 | End: 2021-06-18 | Stop reason: SDUPTHER

## 2021-04-28 DIAGNOSIS — F90.0 ATTENTION DEFICIT HYPERACTIVITY DISORDER (ADHD), PREDOMINANTLY INATTENTIVE TYPE: ICD-10-CM

## 2021-04-28 NOTE — TELEPHONE ENCOUNTER
Ade Bear is calling in requesting a refill on medication(s):    Requested Prescriptions     Pending Prescriptions Disp Refills    amphetamine-dextroamphetamine (ADDERALL XR) 30 MG extended release capsule [Pharmacy Med Name: dextroamphetamine-amphetamine ER 30 mg 24hr capsule,extend release] 30 capsule 0     Sig: Take 1 capsule by mouth every morning for 30 days. Patient's Last Office Visit:  3/1/2021     Patient's Next Visit:  Future Appointments   Date Time Provider Shelley Gorman   5/10/2021  1:00 PM Shanelle Herrmann PT [facility] None   6/1/2021 11:00 AM Shannan Hudson APRN - CNP [facility]   6/11/2021 10:30 AM [facility]    7/14/2021 10:00 AM Cassandra Arias MD [facility]   7/14/2021  3:30 PM Wilfredo Bradley MD [facility]        Patient's Medication Contract:  Medication Contract and Consent for Opioid Use Documents Filed      No documents found                 Tox Screen:  Urine Drug Screenings (1 yr)     No resulted procedures found. Pharmacy:  Please send the medication to the pharmacy listed. Other Comments:  Please advise when this has been received and completed so I can close the encounter. Thank you.

## 2021-04-29 RX ORDER — DEXTROAMPHETAMINE SACCHARATE, AMPHETAMINE ASPARTATE MONOHYDRATE, DEXTROAMPHETAMINE SULFATE AND AMPHETAMINE SULFATE 7.5; 7.5; 7.5; 7.5 MG/1; MG/1; MG/1; MG/1
30 CAPSULE, EXTENDED RELEASE ORAL EVERY MORNING
Qty: 30 CAPSULE | Refills: 0 | Status: SHIPPED | OUTPATIENT
Start: 2021-04-29 | End: 2021-05-21 | Stop reason: SDUPTHER

## 2021-05-21 DIAGNOSIS — F90.0 ATTENTION DEFICIT HYPERACTIVITY DISORDER (ADHD), PREDOMINANTLY INATTENTIVE TYPE: ICD-10-CM

## 2021-05-21 RX ORDER — ATORVASTATIN CALCIUM 80 MG/1
80 TABLET, FILM COATED ORAL DAILY
Qty: 90 TABLET | Refills: 1 | Status: SHIPPED | OUTPATIENT
Start: 2021-05-21 | End: 2021-11-02 | Stop reason: SDUPTHER

## 2021-05-21 RX ORDER — DEXTROAMPHETAMINE SACCHARATE, AMPHETAMINE ASPARTATE MONOHYDRATE, DEXTROAMPHETAMINE SULFATE AND AMPHETAMINE SULFATE 7.5; 7.5; 7.5; 7.5 MG/1; MG/1; MG/1; MG/1
30 CAPSULE, EXTENDED RELEASE ORAL EVERY MORNING
Qty: 30 CAPSULE | Refills: 0 | Status: SHIPPED | OUTPATIENT
Start: 2021-05-21 | End: 2021-06-24 | Stop reason: SDUPTHER

## 2021-05-25 ENCOUNTER — TELEPHONE (OUTPATIENT)
Dept: NEUROLOGY | Age: 66
End: 2021-05-25

## 2021-05-25 DIAGNOSIS — M48.062 SPINAL STENOSIS OF LUMBAR REGION WITH NEUROGENIC CLAUDICATION: Primary | ICD-10-CM
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
## 2021-06-01 ENCOUNTER — OFFICE VISIT (OUTPATIENT)
Dept: FAMILY MEDICINE CLINIC | Age: 66
End: 2021-06-01
Payer: MEDICARE

## 2021-06-01 VITALS
SYSTOLIC BLOOD PRESSURE: 132 MMHG | BODY MASS INDEX: 25.52 KG/M2 | RESPIRATION RATE: 16 BRPM | DIASTOLIC BLOOD PRESSURE: 60 MMHG | HEART RATE: 90 BPM | HEIGHT: 63 IN | WEIGHT: 144 LBS | OXYGEN SATURATION: 97 %

## 2021-06-01 DIAGNOSIS — M79.7 FIBROMYALGIA: ICD-10-CM

## 2021-06-01 DIAGNOSIS — M54.42 CHRONIC BILATERAL LOW BACK PAIN WITH BILATERAL SCIATICA: ICD-10-CM

## 2021-06-01 DIAGNOSIS — I10 ESSENTIAL HYPERTENSION: ICD-10-CM

## 2021-06-01 DIAGNOSIS — M54.41 CHRONIC BILATERAL LOW BACK PAIN WITH BILATERAL SCIATICA: ICD-10-CM

## 2021-06-01 DIAGNOSIS — G89.29 CHRONIC BILATERAL LOW BACK PAIN WITH BILATERAL SCIATICA: ICD-10-CM

## 2021-06-01 DIAGNOSIS — J44.0 COPD (CHRONIC OBSTRUCTIVE PULMONARY DISEASE) WITH ACUTE BRONCHITIS (HCC): Primary | ICD-10-CM

## 2021-06-01 DIAGNOSIS — E55.9 VITAMIN D DEFICIENCY: ICD-10-CM

## 2021-06-01 DIAGNOSIS — M48.062 SPINAL STENOSIS OF LUMBAR REGION WITH NEUROGENIC CLAUDICATION: ICD-10-CM

## 2021-06-01 DIAGNOSIS — J20.9 COPD (CHRONIC OBSTRUCTIVE PULMONARY DISEASE) WITH ACUTE BRONCHITIS (HCC): Primary | ICD-10-CM

## 2021-06-01 PROCEDURE — 99214 OFFICE O/P EST MOD 30 MIN: CPT | Performed by: NURSE PRACTITIONER

## 2021-06-01 RX ORDER — GUAIFENESIN AND CODEINE PHOSPHATE 100; 10 MG/5ML; MG/5ML
5 SOLUTION ORAL 4 TIMES DAILY PRN
Qty: 180 ML | Refills: 0 | Status: SHIPPED | OUTPATIENT
Start: 2021-06-01 | End: 2021-06-15

## 2021-06-01 RX ORDER — GABAPENTIN 300 MG/1
CAPSULE ORAL
Qty: 30 CAPSULE | Refills: 2 | Status: SHIPPED | OUTPATIENT
Start: 2021-06-01 | End: 2021-08-17

## 2021-06-01 RX ORDER — ALPRAZOLAM 1 MG/1
1 TABLET ORAL 2 TIMES DAILY PRN
COMMUNITY
Start: 2021-05-26 | End: 2021-06-18 | Stop reason: SDUPTHER

## 2021-06-01 ASSESSMENT — ENCOUNTER SYMPTOMS
DIARRHEA: 0
ANAL BLEEDING: 0
CONSTIPATION: 0
RHINORRHEA: 0
SORE THROAT: 0
RECTAL PAIN: 0
SHORTNESS OF BREATH: 0
BLOOD IN STOOL: 0
COUGH: 1
GASTROINTESTINAL NEGATIVE: 1
ALLERGIC/IMMUNOLOGIC NEGATIVE: 1
ABDOMINAL PAIN: 0
HEARTBURN: 0
VOICE CHANGE: 0
EYES NEGATIVE: 1
TROUBLE SWALLOWING: 0
COLOR CHANGE: 0

## 2021-06-01 ASSESSMENT — COPD QUESTIONNAIRES: COPD: 1

## 2021-06-01 NOTE — PROGRESS NOTES
[address], 76 y.o. female presents today with:  Chief Complaint   Patient presents with    3 Month Follow-Up    Anxiety    ADHD    Cough    Pharyngitis        Chronic pain, Fibromyalgia, back problems-  Butrans patches-  patient tolerating-  States first time she has eran been pain free-  Is willing to try a decreased dosage. Will also chronic pain-we will we will her on Butrans patches and stop tramadol but Butrans patches have become extremely expensive we will resume the tramadol    ADHD-  30mg xr daily with effectiveness  Anxiety-  Tried to 1 MG TID PRN-  She does not like the ER,  Did not feel it helped her anxiety. -  She is willing transition her to back to her XANAX 1mg that is not extended release but BID PRN not TID PRN. COPD  She complains of cough. There is no shortness of breath. This is a chronic problem. The problem occurs daily. The cough is non-productive. Pertinent negatives include no appetite change, chest pain, dyspnea on exertion, ear congestion, ear pain, fever, headaches, heartburn, malaise/fatigue, myalgias, nasal congestion, orthopnea, PND, postnasal drip, rhinorrhea, sneezing, sore throat, sweats, trouble swallowing or weight loss. Her symptoms are aggravated by change in weather. Risk factors for lung disease include smoking/tobacco exposure. Her past medical history is significant for COPD. Review of Systems   Constitutional: Negative. Negative for activity change, appetite change, fatigue, fever, malaise/fatigue, unexpected weight change and weight loss. HENT: Negative. Negative for dental problem, ear pain, nosebleeds, postnasal drip, rhinorrhea, sneezing, sore throat, trouble swallowing and voice change. Eyes: Negative. Negative for visual disturbance. Respiratory: Positive for cough. Negative for shortness of breath. Cardiovascular: Negative. Negative for chest pain, dyspnea on exertion, palpitations, leg swelling and PND. Gastrointestinal: Negative. Negative for abdominal pain, anal bleeding, blood in stool, constipation, diarrhea, heartburn and rectal pain. Endocrine: Negative. Negative for cold intolerance, heat intolerance, polydipsia, polyphagia and polyuria. Genitourinary: Negative. Musculoskeletal: Negative. Negative for myalgias. Skin: Negative. Negative for color change and rash. Allergic/Immunologic: Negative. Neurological: Negative. Negative for dizziness, syncope, weakness and headaches. Hematological: Negative. Negative for adenopathy. Does not bruise/bleed easily. Psychiatric/Behavioral: Negative. Negative for dysphoric mood and sleep disturbance. The patient is not nervous/anxious. Past Medical History:   Diagnosis Date    ADHD (attention deficit hyperactivity disorder)     Anxiety     Depression     Fibromyalgia     GERD (gastroesophageal reflux disease)     Hypertension     Irritable bowel syndrome      Past Surgical History:   Procedure Laterality Date     SECTION      COLONOSCOPY      [facility]  Baylee Busby MD    DILATION AND CURETTAGE OF UTERUS N/A 2020    DIAGNOSTIC LAPAROSCOPY, LYSIS OF ADHESIONS, HYSTEROSCOPY WITH Casco Buchanan performed by Jorge Morillo MD at [address] [place] [address] History     Socioeconomic History    Marital status:       Spouse name: Not on file    Number of children: Not on file    Years of education: Not on file    Highest education level: Not on file   Occupational History    Not on file   Tobacco Use    Smoking status: Current Every Day Smoker     Types: Cigarettes     Last attempt to quit: 2019     Years since quittin.8    Smokeless tobacco: Never Used   Vaping Use    Vaping Use: Never used   Substance and Sexual Activity    Alcohol use: Never    Drug use: Never    Sexual activity: Not on file   Other Topics Concern    Not on file   Social History Narrative    Not on file     Social Determinants of inhaler Inhale 2 puffs into the lungs every 6 hours as needed for Wheezing 1 Inhaler 3    cyclobenzaprine (FLEXERIL) 10 MG tablet Take 1 tablet by mouth 2 times daily as needed (myalgias anf fibro) 60 tablet 11    vitamin D (ERGOCALCIFEROL) 1.25 MG (36706 UT) CAPS capsule TAKE 1 CAPSULE BY MOUTH once weekly 4 capsule 11    omeprazole (PRILOSEC) 40 MG delayed release capsule Take 40 mg by mouth      polyethylene glycol (GLYCOLAX) 17 GM/SCOOP powder Take 17 g by mouth as needed      acetaminophen (APAP EXTRA STRENGTH) 500 MG tablet Take 2 tablets by mouth every 6 hours as needed for Pain 60 tablet 1    prochlorperazine (COMPAZINE) 10 MG tablet Take 1 tablet by mouth every 8 hours as needed (NAUSEA) 120 tablet 3    ipratropium-albuterol (DUONEB) 0.5-2.5 (3) MG/3ML SOLN nebulizer solution Inhale 3 mLs into the lungs every 4 hours as needed for Shortness of Breath 360 mL 0    venlafaxine (EFFEXOR XR) 75 MG extended release capsule TAKE 1 CAPSULE BY MOUTH TWICE DAILY  1    aspirin (ASPIRIN 81) 81 MG chewable tablet       furosemide (LASIX) 20 MG tablet Take 20 mg by mouth daily PRN      mirtazapine (REMERON) 15 MG tablet Take 15 mg by mouth nightly       No current facility-administered medications for this visit. PMH, Surgical Hx, Family Hx, and Social Hx reviewed and updated. Health Maintenance reviewed. Objective    Vitals:    06/01/21 1049   BP: 132/60   Pulse: 90   Resp: 16   SpO2: 97%   Weight: 144 lb (65.3 kg)   Height: 5' 2.5\" (1.588 m)       Physical Exam  Constitutional:       General: She is not in acute distress. Appearance: She is well-developed. HENT:      Head: Normocephalic and atraumatic. Right Ear: External ear normal.      Left Ear: External ear normal.      Nose: Nose normal.   Eyes:      Conjunctiva/sclera: Conjunctivae normal.      Pupils: Pupils are equal, round, and reactive to light. Neck:      Vascular: No JVD.    Cardiovascular:      Rate and Rhythm: Normal rate and regular rhythm. Heart sounds: Normal heart sounds. No murmur heard. Pulmonary:      Effort: Pulmonary effort is normal. No respiratory distress. Breath sounds: Normal breath sounds. No wheezing or rales. Abdominal:      General: Bowel sounds are normal. There is no distension. Palpations: Abdomen is soft. There is no mass. Tenderness: There is no abdominal tenderness. Musculoskeletal:      Cervical back: Neck supple. Skin:     General: Skin is warm and dry. Neurological:      Mental Status: She is alert and oriented to person, place, and time. Assessment & Plan   Jackson Tamez was seen today for 3 month follow-up, anxiety, adhd, cough and pharyngitis. Diagnoses and all orders for this visit:    COPD (chronic obstructive pulmonary disease) with acute bronchitis (HCC)  -     Handicap Placard MISC; by Does not apply route Duration 5 years  -     Misc. Devices (PEDAL EXERCISER) MISC; 1 each by Does not apply route daily  -     guaiFENesin-codeine (CHERATUSSIN AC) 100-10 MG/5ML syrup; Take 5 mLs by mouth 4 times daily as needed for Cough or Congestion for up to 14 days. Spinal stenosis of lumbar region with neurogenic claudication  -     gabapentin (NEURONTIN) 300 MG capsule; Take 1 capsule by mouth nightly  -     Handicap Placard MISC; by Does not apply route Duration 5 years  -     Misc. Devices (PEDAL EXERCISER) MISC; 1 each by Does not apply route daily    Fibromyalgia  -     gabapentin (NEURONTIN) 300 MG capsule; Take 1 capsule by mouth nightly  -     Handicap Placard MISC; by Does not apply route Duration 5 years  -     Misc. Devices (PEDAL EXERCISER) MISC; 1 each by Does not apply route daily    Vitamin D deficiency    Essential hypertension    Chronic bilateral low back pain with bilateral sciatica L>R      No orders of the defined types were placed in this encounter.     Orders Placed This Encounter   Medications    gabapentin (NEURONTIN) 300 MG capsule     Sig: Take 1

## 2021-06-11 NOTE — TELEPHONE ENCOUNTER
Anita's sore throat and cough is no better. She has had this for over a month. She is beginning to get concerned that there is an underlying problem. Please advise.

## 2021-06-18 DIAGNOSIS — M54.41 CHRONIC BILATERAL LOW BACK PAIN WITH BILATERAL SCIATICA: ICD-10-CM

## 2021-06-18 DIAGNOSIS — M54.42 CHRONIC BILATERAL LOW BACK PAIN WITH BILATERAL SCIATICA: ICD-10-CM

## 2021-06-18 DIAGNOSIS — G89.29 CHRONIC BILATERAL LOW BACK PAIN WITH BILATERAL SCIATICA: ICD-10-CM

## 2021-06-18 DIAGNOSIS — M48.062 SPINAL STENOSIS OF LUMBAR REGION WITH NEUROGENIC CLAUDICATION: ICD-10-CM

## 2021-06-18 DIAGNOSIS — F41.9 ANXIETY: Primary | ICD-10-CM

## 2021-06-18 NOTE — TELEPHONE ENCOUNTER
Patient called requesting a refill for Xanax 1mg and buprenorphine 15mcg per hr, states that she uses one per week and that she uses [facility] for this one so it needs to be sent to the [facility] in [facility] on [address].

## 2021-06-21 RX ORDER — ALPRAZOLAM 1 MG/1
1 TABLET ORAL 2 TIMES DAILY PRN
Qty: 60 TABLET | Refills: 2 | Status: SHIPPED | OUTPATIENT
Start: 2021-06-21 | End: 2021-09-09 | Stop reason: SDUPTHER

## 2021-06-21 RX ORDER — BUPRENORPHINE 15 UG/H
1 PATCH TRANSDERMAL WEEKLY
Qty: 4 PATCH | Refills: 2 | Status: SHIPPED | OUTPATIENT
Start: 2021-06-21 | End: 2021-06-22 | Stop reason: SDUPTHER

## 2021-06-22 DIAGNOSIS — M48.062 SPINAL STENOSIS OF LUMBAR REGION WITH NEUROGENIC CLAUDICATION: ICD-10-CM

## 2021-06-22 DIAGNOSIS — G89.29 CHRONIC BILATERAL LOW BACK PAIN WITH BILATERAL SCIATICA: ICD-10-CM

## 2021-06-22 DIAGNOSIS — M54.42 CHRONIC BILATERAL LOW BACK PAIN WITH BILATERAL SCIATICA: ICD-10-CM

## 2021-06-22 DIAGNOSIS — M54.41 CHRONIC BILATERAL LOW BACK PAIN WITH BILATERAL SCIATICA: ICD-10-CM

## 2021-06-22 RX ORDER — BUPRENORPHINE 15 UG/H
1 PATCH TRANSDERMAL WEEKLY
Qty: 4 PATCH | Refills: 2 | Status: SHIPPED | OUTPATIENT
Start: 2021-06-22 | End: 2021-11-02 | Stop reason: SDUPTHER

## 2021-06-24 DIAGNOSIS — F90.0 ATTENTION DEFICIT HYPERACTIVITY DISORDER (ADHD), PREDOMINANTLY INATTENTIVE TYPE: ICD-10-CM

## 2021-06-24 RX ORDER — DEXTROAMPHETAMINE SACCHARATE, AMPHETAMINE ASPARTATE MONOHYDRATE, DEXTROAMPHETAMINE SULFATE AND AMPHETAMINE SULFATE 7.5; 7.5; 7.5; 7.5 MG/1; MG/1; MG/1; MG/1
30 CAPSULE, EXTENDED RELEASE ORAL EVERY MORNING
Qty: 30 CAPSULE | Refills: 0 | Status: SHIPPED | OUTPATIENT
Start: 2021-06-24 | End: 2021-07-20

## 2021-06-24 NOTE — TELEPHONE ENCOUNTER
Patient requesting medication refill. Please approve or deny this request.    Rx requested:  Requested Prescriptions     Pending Prescriptions Disp Refills    amphetamine-dextroamphetamine (ADDERALL XR) 30 MG extended release capsule 30 capsule 0     Sig: Take 1 capsule by mouth every morning for 30 days.          Last Office Visit:   6/1/2021      Next Visit Date:  Future Appointments   Date Time Provider Shelley Gorman   7/14/2021 10:00 AM Chary Lockhart MD [facility]   7/14/2021  3:30 PM Vesta Marlow MD [facility]   9/2/2021  2:15 PM Riky Mike, [address]

## 2021-06-25 ENCOUNTER — TELEPHONE (OUTPATIENT)
Dept: FAMILY MEDICINE CLINIC | Age: 66
End: 2021-06-25

## 2021-06-25 NOTE — TELEPHONE ENCOUNTER
Patient called asking about a free CT lung scan for smokers. She said she heard about it from a friend. She wanted me to ask you if you know about a test like this. Please advise, thank you.

## 2021-07-06 PROBLEM — F17.210 NICOTINE DEPENDENCE, CIGARETTES, UNCOMPLICATED: Status: ACTIVE | Noted: 2018-11-07

## 2021-07-06 PROBLEM — Z53.21 PROC/TRTMT NOT CRD OUT D/T PT LV BEF SEEN BY HLTH CARE PROV: Status: ACTIVE | Noted: 2018-10-20

## 2021-07-06 PROBLEM — I49.9 CARDIAC ARRHYTHMIA: Status: ACTIVE | Noted: 2018-10-24

## 2021-07-12 NOTE — PROGRESS NOTES
Order is over One yr old-
Yes    Total time spent on this encounter: Not billed by time      --Claudia Song, APRN - CNP on 5/14/2020 at 10:41 PM    An electronic signature was used to authenticate this note.

## 2021-07-14 ENCOUNTER — OFFICE VISIT (OUTPATIENT)
Dept: NEUROSURGERY | Age: 66
End: 2021-07-14

## 2021-07-14 VITALS — HEIGHT: 62 IN | BODY MASS INDEX: 26.31 KG/M2 | WEIGHT: 143 LBS | TEMPERATURE: 97.1 F

## 2021-07-14 DIAGNOSIS — M54.9 BACK PAIN, UNSPECIFIED BACK LOCATION, UNSPECIFIED BACK PAIN LATERALITY, UNSPECIFIED CHRONICITY: Primary | ICD-10-CM

## 2021-07-14 DIAGNOSIS — M51.36 LUMBAR DEGENERATIVE DISC DISEASE: ICD-10-CM

## 2021-07-14 DIAGNOSIS — M54.16 LUMBAR RADICULOPATHY: ICD-10-CM

## 2021-07-14 DIAGNOSIS — M47.816 LUMBAR SPONDYLOSIS: ICD-10-CM

## 2021-07-14 DIAGNOSIS — M41.9 SCOLIOSIS, UNSPECIFIED SCOLIOSIS TYPE, UNSPECIFIED SPINAL REGION: ICD-10-CM

## 2021-07-14 PROCEDURE — 99213 OFFICE O/P EST LOW 20 MIN: CPT | Performed by: NEUROLOGICAL SURGERY

## 2021-07-14 NOTE — PROGRESS NOTES
[facility]  Neurosurgery and Pain [address] [facility]: [phone]  F: [phone]       Sophy Ash  (1955)    2021    Referred by No ref. provider found    Subjective:     Sophy Ash is 76 y.o. female who complains today of:    Chief Complaint   Patient presents with    Back Pain     3 month follow up     HPI    Allergies:  Patient has no known allergies. Past Medical History:   Diagnosis Date    ADHD (attention deficit hyperactivity disorder)     Anxiety     Depression     Fibromyalgia     GERD (gastroesophageal reflux disease)     Hypertension     Irritable bowel syndrome      Past Surgical History:   Procedure Laterality Date     SECTION      COLONOSCOPY      MARGARET Pedraza MD    DILATION AND CURETTAGE OF UTERUS N/A 2020    DIAGNOSTIC LAPAROSCOPY, LYSIS OF ADHESIONS, HYSTEROSCOPY WITH Maudine Lions [place] [facility] [place] [place] [facility]     Family History   Problem Relation Age of Onset    Breast Cancer Maternal Aunt      Social History     Socioeconomic History    Marital status:       Spouse name: Not on file    Number of children: Not on file    Years of education: Not on file    Highest education level: Not on file   Occupational History    Not on file   Tobacco Use    Smoking status: Current Every Day Smoker     Packs/day: 0.25     Years: 10.00     Pack years: 2.50     Types: Cigarettes     Last attempt to quit: 2019     Years since quittin.9    Smokeless tobacco: Never Used   Vaping Use    Vaping Use: Never used   Substance and Sexual Activity    Alcohol use: Never    Drug use: Never    Sexual activity: Not on file   Other Topics Concern    Not on file   Social History Narrative    Not on file     Social Determinants of Health     Financial Resource Strain: Low Risk     Difficulty of Paying Living Expenses: Not hard at all Food Insecurity: No Food Insecurity    Worried About Running Out of Food in the Last Year: Never true    Jerri of Food in the Last Year: Never true   Transportation Needs: No Transportation Needs    Lack of Transportation (Medical): No    Lack of Transportation (Non-Medical): No   Physical Activity:     Days of Exercise per Week:     Minutes of Exercise per Session:    Stress:     Feeling of Stress :    Social Connections:     Frequency of Communication with Friends and Family:     Frequency of Social Gatherings with Friends and Family:     Attends Mandaen Services:     Active Member of Clubs or Organizations:     Attends Club or Organization Meetings:     Marital Status:    Intimate Partner Violence:     Fear of Current or Ex-Partner:     Emotionally Abused:     Physically Abused:     Sexually Abused:        Current Outpatient Medications on File Prior to Visit   Medication Sig Dispense Refill    amphetamine-dextroamphetamine (ADDERALL XR) 30 MG extended release capsule Take 1 capsule by mouth every morning for 30 days. 30 capsule 0    buprenorphine (BUPRENEX) 15 MCG/HR PTWK Place 1 patch onto the skin once a week for 30 days. 4 patch 2    ALPRAZolam (XANAX) 1 MG tablet Take 1 tablet by mouth 2 times daily as needed for Anxiety for up to 30 days. 60 tablet 2    gabapentin (NEURONTIN) 300 MG capsule Take 1 capsule by mouth nightly 30 capsule 2    Handicap Placard MISC by Does not apply route Duration 5 years 1 each 0    Misc.  Devices (PEDAL EXERCISER) MISC 1 each by Does not apply route daily 1 each 0    atorvastatin (LIPITOR) 80 MG tablet Take 1 tablet by mouth daily 90 tablet 1    predniSONE (DELTASONE) 5 MG tablet Take 1 tablet by mouth daily      albuterol sulfate  (90 Base) MCG/ACT inhaler Inhale 2 puffs into the lungs every 6 hours as needed for Wheezing 1 Inhaler 3    cyclobenzaprine (FLEXERIL) 10 MG tablet Take 1 tablet by mouth 2 times daily as needed (myalgias anf fibro) 60 tablet 11    vitamin D (ERGOCALCIFEROL) 1.25 MG (42296 UT) CAPS capsule TAKE 1 CAPSULE BY MOUTH once weekly 4 capsule 11    omeprazole (PRILOSEC) 40 MG delayed release capsule Take 40 mg by mouth      polyethylene glycol (GLYCOLAX) 17 GM/SCOOP powder Take 17 g by mouth as needed      acetaminophen (APAP EXTRA STRENGTH) 500 MG tablet Take 2 tablets by mouth every 6 hours as needed for Pain 60 tablet 1    prochlorperazine (COMPAZINE) 10 MG tablet Take 1 tablet by mouth every 8 hours as needed (NAUSEA) 120 tablet 3    ipratropium-albuterol (DUONEB) 0.5-2.5 (3) MG/3ML SOLN nebulizer solution Inhale 3 mLs into the lungs every 4 hours as needed for Shortness of Breath 360 mL 0    venlafaxine (EFFEXOR XR) 75 MG extended release capsule TAKE 1 CAPSULE BY MOUTH TWICE DAILY  1    aspirin (ASPIRIN 81) 81 MG chewable tablet       furosemide (LASIX) 20 MG tablet Take 20 mg by mouth daily PRN      mirtazapine (REMERON) 15 MG tablet Take 15 mg by mouth nightly       No current facility-administered medications on file prior to visit. Review of Systems      Objective:     Vitals:  Temp 97.1 °F (36.2 °C)   Ht 5' 2\" (1.575 m)   Wt 143 lb (64.9 kg)   BMI 26.16 kg/m² Pain Score:  10 - Worst pain ever      Physical Exam      Assessment:      Diagnosis Orders   1. Back pain, unspecified back location, unspecified back pain laterality, unspecified chronicity     2. Scoliosis, unspecified scoliosis type, unspecified spinal region     3. Lumbar degenerative disc disease     4. Lumbar radiculopathy     5. Lumbar spondylosis         Plan:     Patient returns my office for continued follow-up for her back pain along with radiculopathy. Underwent physical therapy without much benefit. Currently patch along with ibuprofen with limited benefit.     Clinically shows scoliosis noted with tenderness and decreased motion otherwise straight leg raising is negative good strength and tones are noted gait is normal.    Impression: MRI of lumbar spine again was reviewed by me. Multilevel lumbar spondylosis change changes with mild foraminal stenosis and scoliosis. Patient has diagnosis of degenerative scoliosis spondylosis back pain radiculopathy. Given the MRI findings, again I do not recommend any aggressive surgical intervention and continue to follow-up with pain management and come back and see me as needed basis thank you        Follow up:  Return if symptoms worsen or fail to improve.     Ten Shine MD

## 2021-07-19 DIAGNOSIS — F90.0 ATTENTION DEFICIT HYPERACTIVITY DISORDER (ADHD), PREDOMINANTLY INATTENTIVE TYPE: ICD-10-CM

## 2021-07-20 RX ORDER — DEXTROAMPHETAMINE SACCHARATE, AMPHETAMINE ASPARTATE MONOHYDRATE, DEXTROAMPHETAMINE SULFATE AND AMPHETAMINE SULFATE 7.5; 7.5; 7.5; 7.5 MG/1; MG/1; MG/1; MG/1
30 CAPSULE, EXTENDED RELEASE ORAL EVERY MORNING
Qty: 30 CAPSULE | Refills: 0 | Status: SHIPPED | OUTPATIENT
Start: 2021-07-20 | End: 2021-08-17 | Stop reason: SDUPTHER

## 2021-07-29 ENCOUNTER — NURSE TRIAGE (OUTPATIENT)
Dept: OTHER | Facility: CLINIC | Age: 66
End: 2021-07-29

## 2021-07-29 ENCOUNTER — OFFICE VISIT (OUTPATIENT)
Dept: FAMILY MEDICINE CLINIC | Age: 66
End: 2021-07-29
Payer: MEDICARE

## 2021-07-29 VITALS
TEMPERATURE: 97.6 F | WEIGHT: 143 LBS | BODY MASS INDEX: 26.31 KG/M2 | HEIGHT: 62 IN | SYSTOLIC BLOOD PRESSURE: 128 MMHG | OXYGEN SATURATION: 97 % | HEART RATE: 87 BPM | DIASTOLIC BLOOD PRESSURE: 72 MMHG

## 2021-07-29 DIAGNOSIS — M54.42 CHRONIC BILATERAL LOW BACK PAIN WITH BILATERAL SCIATICA: ICD-10-CM

## 2021-07-29 DIAGNOSIS — M79.604 RIGHT LEG PAIN: Primary | ICD-10-CM

## 2021-07-29 DIAGNOSIS — G89.29 CHRONIC BILATERAL LOW BACK PAIN WITH BILATERAL SCIATICA: ICD-10-CM

## 2021-07-29 DIAGNOSIS — M54.41 CHRONIC BILATERAL LOW BACK PAIN WITH BILATERAL SCIATICA: ICD-10-CM

## 2021-07-29 DIAGNOSIS — R30.0 DYSURIA: ICD-10-CM

## 2021-07-29 LAB
BILIRUBIN, POC: NORMAL
BLOOD URINE, POC: NORMAL
CLARITY, POC: CLEAR
COLOR, POC: YELLOW
GLUCOSE URINE, POC: NORMAL
KETONES, POC: NORMAL
LEUKOCYTE EST, POC: NORMAL
NITRITE, POC: NORMAL
PH, POC: 6
PROTEIN, POC: NORMAL
SPECIFIC GRAVITY, POC: 1.01
UROBILINOGEN, POC: 3.5

## 2021-07-29 PROCEDURE — 99213 OFFICE O/P EST LOW 20 MIN: CPT | Performed by: NURSE PRACTITIONER

## 2021-07-29 PROCEDURE — 81002 URINALYSIS NONAUTO W/O SCOPE: CPT | Performed by: NURSE PRACTITIONER

## 2021-07-29 PROCEDURE — 96372 THER/PROPH/DIAG INJ SC/IM: CPT | Performed by: NURSE PRACTITIONER

## 2021-07-29 RX ORDER — METHYLPREDNISOLONE ACETATE 80 MG/ML
80 INJECTION, SUSPENSION INTRA-ARTICULAR; INTRALESIONAL; INTRAMUSCULAR; SOFT TISSUE ONCE
Status: COMPLETED | OUTPATIENT
Start: 2021-07-29 | End: 2021-07-29

## 2021-07-29 RX ORDER — ARIPIPRAZOLE 10 MG/1
TABLET ORAL
Status: ON HOLD | COMMUNITY
Start: 2021-07-07

## 2021-07-29 RX ADMIN — METHYLPREDNISOLONE ACETATE 80 MG: 80 INJECTION, SUSPENSION INTRA-ARTICULAR; INTRALESIONAL; INTRAMUSCULAR; SOFT TISSUE at 18:34

## 2021-07-29 ASSESSMENT — ENCOUNTER SYMPTOMS
BACK PAIN: 1
NAUSEA: 0
VOMITING: 0
DIARRHEA: 0

## 2021-07-29 NOTE — TELEPHONE ENCOUNTER
Reason for Disposition   Age > 48 years   Patient wants to be seen    Answer Assessment - Initial Assessment Questions  1. DESCRIPTION: \"Describe your dizziness. \"      C/o vertigo . Feels like \"walking sideways\". 2. LIGHTHEADED: \"Do you feel lightheaded? \" (e.g., somewhat faint, woozy, weak upon standing)      See above     3. VERTIGO: \"Do you feel like either you or the room is spinning or tilting? \" (i.e. vertigo)      Feels like room is tilting     4. SEVERITY: \"How bad is it? \"  \"Do you feel like you are going to faint? \" \"Can you stand and walk? \"    - MILD - walking normally    - MODERATE - interferes with normal activities (e.g., work, school)     - SEVERE - unable to stand, requires support to walk, feels like passing out now. Moderate - unable to stand to do dishes -she gets around independently but sometimes holds on to things. 5. ONSET:  \"When did the dizziness begin? \"      About a year ago (march 2020), but worsening    6. AGGRAVATING FACTORS: \"Does anything make it worse? \" (e.g., standing, change in head position)      Bending over makes it worse. It comes and goes. 7. HEART RATE: \"Can you tell me your heart rate? \" \"How many beats in 15 seconds? \"  (Note: not all patients can do this)        Not asked    8. CAUSE: \"What do you think is causing the dizziness? \"      Unsure    9. RECURRENT SYMPTOM: \"Have you had dizziness before? \" If so, ask: \"When was the last time? \" \"What happened that time? \"      *No Answer*  10. OTHER SYMPTOMS: \"Do you have any other symptoms? \" (e.g., fever, chest pain, vomiting, diarrhea, bleeding)        Easy bruising noted . Right foot , leg and hip pain - this has been occurring for several months. This pain is worsening and now pain noted in right groin as of last night . She states she wears a pain patch and that helps her back. She is supposed to go to a pain clinic. H/o stroke, fibromyalgia, spinal stenosis, copd, emphysema, depression.  Has difficulty breathing understanding; denies any other questions or concerns; instructed to call back for any new or worsening symptoms. Writer provided warm transfer to [facility] for appointment scheduling. Attention Provider: Thank you for allowing me to participate in the care of your patient. The patient was connected to triage in response to information provided to the [facility]. Please do not respond through this encounter as the response is not directed to a shared pool.

## 2021-07-29 NOTE — PATIENT INSTRUCTIONS
Patient Education        Leg Pain: Care Instructions  Your Care Instructions  Many things can cause leg pain. Too much exercise or overuse can cause a muscle cramp (or charley horse). You can get leg cramps from not eating a balanced diet that has enough potassium, calcium, and other minerals. If you do not drink enough fluids or are taking certain medicines, you may develop leg cramps. Other causes of leg pain include injuries, blood flow problems, nerve damage, and twisted and enlarged veins (varicose veins). You can usually ease pain with self-care. Your doctor may recommend that you rest your leg and keep it elevated. Follow-up care is a key part of your treatment and safety. Be sure to make and go to all appointments, and call your doctor if you are having problems. It's also a good idea to know your test results and keep a list of the medicines you take. How can you care for yourself at home? · Take pain medicines exactly as directed. ? If the doctor gave you a prescription medicine for pain, take it as prescribed. ? If you are not taking a prescription pain medicine, ask your doctor if you can take an over-the-counter medicine. · Take any other medicines exactly as prescribed. Call your doctor if you think you are having a problem with your medicine. · Rest your leg while you have pain, and avoid standing for long periods of time. · Prop up your leg at or above the level of your heart when possible. · Make sure you are eating a balanced diet that is rich in calcium, potassium, and magnesium, especially if you are pregnant. · If directed by your doctor, put ice or a cold pack on the area for 10 to 20 minutes at a time. Put a thin cloth between the ice and your skin. · Your leg may be in a splint, a brace, or an elastic bandage, and you may have crutches to help you walk. Follow your doctor's directions about how long to wear supports and how to use the crutches. When should you call for help? Call 911 anytime you think you may need emergency care. For example, call if:    · You have sudden chest pain and shortness of breath, or you cough up blood.     · Your leg is cool or pale or changes color. Call your doctor now or seek immediate medical care if:    · You have increasing or severe pain.     · Your leg suddenly feels weak and you cannot move it.     · You have signs of a blood clot, such as:  ? Pain in your calf, back of the knee, thigh, or groin. ? Redness and swelling in your leg or groin.     · You have signs of infection, such as:  ? Increased pain, swelling, warmth, or redness. ? Red streaks leading from the sore area. ? Pus draining from a place on your leg. ? A fever.     · You cannot bear weight on your leg. Watch closely for changes in your health, and be sure to contact your doctor if:    · You do not get better as expected. Where can you learn more? Go to https://Sanako.Sencha. org and sign in to your BetterCloud account. Enter T356 in the Tumbie box to learn more about \"Leg Pain: Care Instructions. \"     If you do not have an account, please click on the \"Sign Up Now\" link. Current as of: October 19, 2020               Content Version: 12.9  © 2006-2021 [employer]. Care instructions adapted under license by [facility] ([facility]). If you have questions about a medical condition or this instruction, always ask your healthcare professional. [place] [address] any warranty or liability for your use of this information. Patient Education        Learning About Relief for Back Pain  What is back strain? Back strain is an injury that happens when you overstretch, or pull, a muscle in your back. You may hurt your back in an accident or when you exercise or lift something. Most back pain gets better with rest and time.  You can take care of yourself at home to help your back heal.  What can you do first to relieve back pain?  When you first feel back pain, try these steps:  · Walk. Take a short walk (10 to 20 minutes) on a level surface (no slopes, hills, or stairs) every 2 to 3 hours. Walk only distances you can manage without pain, especially leg pain. · Relax. Find a comfortable position for rest. Some people are comfortable on the floor or a medium-firm bed with a small pillow under their head and another under their knees. Some people prefer to lie on their side with a pillow between their knees. Don't stay in one position for too long. · Try heat or ice. Try using a heating pad on a low or medium setting, or take a warm shower, for 15 to 20 minutes every 2 to 3 hours. Or you can buy single-use heat wraps that last up to 8 hours. You can also try an ice pack for 10 to 15 minutes every 2 to 3 hours. You can use an ice pack or a bag of frozen vegetables wrapped in a thin towel. There is not strong evidence that either heat or ice will help, but you can try them to see if they help. You may also want to try switching between heat and cold. · Take pain medicine exactly as directed. ? If the doctor gave you a prescription medicine for pain, take it as prescribed. ? If you are not taking a prescription pain medicine, ask your doctor if you can take an over-the-counter medicine. What else can you do? · Stretch and exercise. Exercises that increase flexibility may relieve your pain and make it easier for your muscles to keep your spine in a good, neutral position. And don't forget to keep walking. · Do self-massage. You can use self-massage to unwind after work or school or to energize yourself in the morning. You can easily massage your feet, hands, or neck. Self-massage works best if you are in comfortable clothes and are sitting or lying in a comfortable position. Use oil or lotion to massage bare skin. · Reduce stress.  Back pain can lead to a vicious [place]: Distress about the pain tenses the muscles in your back, which in turn causes more pain. Learn how to relax your mind and your muscles to lower your stress. Where can you learn more? Go to https://chpepiceweb.[employer]. org and sign in to your "[employer] account. Enter V650 in the Livestream box to learn more about \"Learning About Relief for Back Pain. \"     If you do not have an account, please click on the \"Sign Up Now\" link. Current as of: November 16, 2020               Content Version: 12.9  © 2006-2021 Healthwise, [email or web address]. Care instructions adapted under license by [facility] [address] If you have questions about a medical condition or this instruction, always ask your healthcare professional. [address] any warranty or liability for your use of this information.

## 2021-07-29 NOTE — PROGRESS NOTES
Subjective:      Patient ID: Jorge Pickard is a 76 y.o. female who presents today for:  Chief Complaint   Patient presents with    Back Pain     Patient here for lower back pain goes down to ankle, couple of months     Dizziness     patient feels unsteady on her own feet     Shortness of Breath     patient if goes up 5 step out of breathe     Other     having groin pain , and bruiing easy        HPI        Her right leg hurts like a tooth ache  From her back down the right leg  On SAt she was crying  She feels like she has bruises easily   She feels the top of her foot is puffy   She went to Dr. Dina Regalado recently for her pain  She saw Dr. Aye Lamb   This has been going on for a long time   She wants someone to tell her whats going on   She went to physical therapy for this pain   She cant take the pain anymore   She has COPD   Shes tired   Shes on prednisone every day for her breathing   shes trying to quit smoking and is wearing a patch   shes been hurting for so long and shes at her wits end   She has butrans patch on but says they are very expensive   No fever   She takes a lot of ibuprofen   She saw pain management back in May   She saw Dr. Dina Regalado about 15 days ago who states there is no surgical intervention needed at this time and recommends pain management   Pt tearful   She says Her family probably thinks she is lazy   She says Maybe she has cancer. She just doesn't know   Headache   Maybe its her kidney-she then mentions maybe she is having some burning with urination.      Past Medical History:   Diagnosis Date    ADHD (attention deficit hyperactivity disorder)     Anxiety     Depression     Fibromyalgia     GERD (gastroesophageal reflux disease)     Hypertension     Irritable bowel syndrome      Past Surgical History:   Procedure Laterality Date     SECTION      COLONOSCOPY      MARGARET Vera MD    DILATION AND CURETTAGE OF UTERUS N/A 2020    DIAGNOSTIC LAPAROSCOPY, LYSIS OF ADHESIONS, HYSTEROSCOPY WITH Thana Peeling performed by Benitez Nevarez MD at [facility] Marital status:      Spouse name: Not on file    Number of children: Not on file    Years of education: Not on file    Highest education level: Not on file   Occupational History    Not on file   Tobacco Use    Smoking status: Current Every Day Smoker     Packs/day: 0.25     Years: 10.00     Pack years: 2.50     Types: Cigarettes     Last attempt to quit: 2019     Years since quittin.0    Smokeless tobacco: Never Used   Vaping Use    Vaping Use: Never used   Substance and Sexual Activity    Alcohol use: Never    Drug use: Never    Sexual activity: Not on file   Other Topics Concern    Not on file   Social History Narrative    Not on file     Social Determinants of Health     Financial Resource Strain: Low Risk     Difficulty of Paying Living Expenses: Not hard at all   Food Insecurity: No Food Insecurity    Worried About Running Out of Food in the Last Year: Never true    Jerri of Food in the Last Year: Never true   Transportation Needs: No Transportation Needs    Lack of Transportation (Medical): No    Lack of Transportation (Non-Medical):  No   Physical Activity:     Days of Exercise per Week:     Minutes of Exercise per Session:    Stress:     Feeling of Stress :    Social Connections:     Frequency of Communication with Friends and Family:     Frequency of Social Gatherings with Friends and Family:     Attends Gnosticist Services:     Active Member of Clubs or Organizations:     Attends Club or Organization Meetings:     Marital Status:    Intimate Partner Violence:     Fear of Current or Ex-Partner:     Emotionally Abused:     Physically Abused:     Sexually Abused:      Family History   Problem Relation Age of Onset    Breast Cancer Maternal Aunt      No Known Allergies  Current Outpatient Medications   Medication Sig Dispense Refill    amphetamine-dextroamphetamine (ADDERALL XR) 30 MG extended release capsule Take 1 capsule by mouth every morning for 30 days. 30 capsule 0    gabapentin (NEURONTIN) 300 MG capsule Take 1 capsule by mouth nightly 30 capsule 2    Handicap Placard MISC by Does not apply route Duration 5 years 1 each 0    Misc. Devices (PEDAL EXERCISER) MISC 1 each by Does not apply route daily 1 each 0    atorvastatin (LIPITOR) 80 MG tablet Take 1 tablet by mouth daily 90 tablet 1    predniSONE (DELTASONE) 5 MG tablet Take 1 tablet by mouth daily      albuterol sulfate  (90 Base) MCG/ACT inhaler Inhale 2 puffs into the lungs every 6 hours as needed for Wheezing 1 Inhaler 3    cyclobenzaprine (FLEXERIL) 10 MG tablet Take 1 tablet by mouth 2 times daily as needed (myalgias anf fibro) 60 tablet 11    vitamin D (ERGOCALCIFEROL) 1.25 MG (55346 UT) CAPS capsule TAKE 1 CAPSULE BY MOUTH once weekly 4 capsule 11    omeprazole (PRILOSEC) 40 MG delayed release capsule Take 40 mg by mouth      polyethylene glycol (GLYCOLAX) 17 GM/SCOOP powder Take 17 g by mouth as needed      acetaminophen (APAP EXTRA STRENGTH) 500 MG tablet Take 2 tablets by mouth every 6 hours as needed for Pain 60 tablet 1    prochlorperazine (COMPAZINE) 10 MG tablet Take 1 tablet by mouth every 8 hours as needed (NAUSEA) 120 tablet 3    ipratropium-albuterol (DUONEB) 0.5-2.5 (3) MG/3ML SOLN nebulizer solution Inhale 3 mLs into the lungs every 4 hours as needed for Shortness of Breath 360 mL 0    venlafaxine (EFFEXOR XR) 75 MG extended release capsule TAKE 1 CAPSULE BY MOUTH TWICE DAILY  1    aspirin (ASPIRIN 81) 81 MG chewable tablet       furosemide (LASIX) 20 MG tablet Take 20 mg by mouth daily PRN      mirtazapine (REMERON) 15 MG tablet Take 15 mg by mouth nightly      ARIPiprazole (ABILIFY) 10 MG tablet TAKE 1 TABLET DAILY IN THE MORNING       No current facility-administered medications for this visit.           Review of Systems Constitutional: Positive for fatigue. Negative for chills and fever. HENT: Negative for congestion, rhinorrhea and sore throat. Respiratory: Positive for shortness of breath. Negative for cough and wheezing. Gastrointestinal: Negative for diarrhea, nausea and vomiting. Genitourinary: Negative for difficulty urinating. Musculoskeletal: Positive for arthralgias, back pain and gait problem. Negative for myalgias. Skin: Negative for rash. Neurological: Positive for dizziness and headaches. Hematological: Bruises/bleeds easily. Psychiatric/Behavioral: Positive for agitation. Negative for confusion and hallucinations. Objective:   /72 (Site: Right Upper Arm, Position: Sitting, Cuff Size: Large Adult)   Pulse 87   Temp 97.6 °F (36.4 °C) (Temporal)   Ht 5' 2\" (1.575 m)   Wt 143 lb (64.9 kg)   SpO2 97%   BMI 26.16 kg/m²     Physical Exam  Vitals reviewed. Constitutional:       General: She is in acute distress. Appearance: Normal appearance. She is normal weight. HENT:      Head: Normocephalic and atraumatic. Nose: Nose normal.      Mouth/Throat:      Lips: Pink. Eyes:      General: Lids are normal.      Conjunctiva/sclera: Conjunctivae normal.   Cardiovascular:      Rate and Rhythm: Normal rate and regular rhythm. Heart sounds: Normal heart sounds, S1 normal and S2 normal.   Pulmonary:      Effort: Pulmonary effort is normal.      Breath sounds: Normal breath sounds. No wheezing or rhonchi. Abdominal:      General: Abdomen is flat. Palpations: Abdomen is soft. Tenderness: There is no right CVA tenderness or left CVA tenderness. Musculoskeletal:      Cervical back: Normal range of motion. Thoracic back: Tenderness present. No swelling, deformity or lacerations. Normal range of motion. Lumbar back: Tenderness present. No swelling or lacerations. Back:       Right lower leg: Tenderness present. No swelling or deformity. L>R      Orders Placed This Encounter   Procedures    POCT Urinalysis no Micro     Orders Placed This Encounter   Medications    methylPREDNISolone acetate (DEPO-MEDROL) injection 80 mg     There are no discontinued medications. Return for Follow up with PCP 2 weeks  and pain managment . Reviewed with the patient/family: current clinical status & medications. Side effects of the medication prescribed today, as well as treatment plan/rationale and result expectations have been discussed with the patient/family who expresses understanding. Patient will be discharged home in stable condition. Follow up with PCP to evaluate treatment results or return if symptoms worsen or fail to improve. Discussed signs and symptoms which require immediate follow-up in ED/call to 911. Understanding verbalized. I have reviewed the patient's medical history in detail and updated the computerized patient record.     Awilda Hanna, APRMARGARET - CNP

## 2021-08-02 ASSESSMENT — ENCOUNTER SYMPTOMS
RHINORRHEA: 0
WHEEZING: 0
SHORTNESS OF BREATH: 1
SORE THROAT: 0
COUGH: 0

## 2021-08-13 RX ORDER — PROCHLORPERAZINE MALEATE 10 MG
10 TABLET ORAL EVERY 8 HOURS PRN
Qty: 120 TABLET | Refills: 3 | Status: ON HOLD | OUTPATIENT
Start: 2021-08-13

## 2021-08-17 ENCOUNTER — VIRTUAL VISIT (OUTPATIENT)
Dept: FAMILY MEDICINE CLINIC | Age: 66
End: 2021-08-17
Payer: MEDICARE

## 2021-08-17 DIAGNOSIS — F90.0 ATTENTION DEFICIT HYPERACTIVITY DISORDER (ADHD), PREDOMINANTLY INATTENTIVE TYPE: ICD-10-CM

## 2021-08-17 DIAGNOSIS — M54.42 CHRONIC BILATERAL LOW BACK PAIN WITH BILATERAL SCIATICA: ICD-10-CM

## 2021-08-17 DIAGNOSIS — M79.7 FIBROMYALGIA: Primary | ICD-10-CM

## 2021-08-17 DIAGNOSIS — M48.062 SPINAL STENOSIS OF LUMBAR REGION WITH NEUROGENIC CLAUDICATION: ICD-10-CM

## 2021-08-17 DIAGNOSIS — M54.41 CHRONIC BILATERAL LOW BACK PAIN WITH BILATERAL SCIATICA: ICD-10-CM

## 2021-08-17 DIAGNOSIS — G89.29 CHRONIC BILATERAL LOW BACK PAIN WITH BILATERAL SCIATICA: ICD-10-CM

## 2021-08-17 PROCEDURE — 99214 OFFICE O/P EST MOD 30 MIN: CPT | Performed by: NURSE PRACTITIONER

## 2021-08-17 RX ORDER — DEXTROAMPHETAMINE SACCHARATE, AMPHETAMINE ASPARTATE MONOHYDRATE, DEXTROAMPHETAMINE SULFATE AND AMPHETAMINE SULFATE 7.5; 7.5; 7.5; 7.5 MG/1; MG/1; MG/1; MG/1
30 CAPSULE, EXTENDED RELEASE ORAL EVERY MORNING
Qty: 30 CAPSULE | Refills: 0 | Status: SHIPPED | OUTPATIENT
Start: 2021-08-17 | End: 2021-09-21 | Stop reason: SDUPTHER

## 2021-08-17 RX ORDER — GABAPENTIN 300 MG/1
300 CAPSULE ORAL 3 TIMES DAILY
Qty: 90 CAPSULE | Refills: 2 | Status: SHIPPED | OUTPATIENT
Start: 2021-08-17 | End: 2021-11-02

## 2021-08-17 RX ORDER — PREDNISONE 10 MG/1
TABLET ORAL
Qty: 30 TABLET | Refills: 0 | Status: SHIPPED | OUTPATIENT
Start: 2021-08-17 | End: 2021-11-09

## 2021-08-17 NOTE — PROGRESS NOTES
Palmira Parra (:  1955) is a 76 y.o. female,Established patient, here for evaluation of the following chief complaint(s): Back Pain         ASSESSMENT/PLAN:  1. Fibromyalgia  -     gabapentin (NEURONTIN) 300 MG capsule; Take 1 capsule by mouth 3 times daily for 180 days. Intended supply: 30 days, Disp-90 capsule, R-2Normal  2. Spinal stenosis of lumbar region with neurogenic claudication  -     gabapentin (NEURONTIN) 300 MG capsule; Take 1 capsule by mouth 3 times daily for 180 days. Intended supply: 30 days, Disp-90 capsule, R-2Normal  3. acute on chronic bilateral low back pain with bilateral sciatica L>R  -     predniSONE (DELTASONE) 10 MG tablet; 4 for 4 days 3 for 3 days 2 for 2 days 1 for 1 day, Disp-30 tablet, R-0Normal  -     gabapentin (NEURONTIN) 300 MG capsule; Take 1 capsule by mouth 3 times daily for 180 days. Intended supply: 30 days, Disp-90 capsule, R-2Normal      No follow-ups on file. SUBJECTIVE/OBJECTIVE:  HPI  Chronic pain, Fibromyalgia, back problems-  Butrans patches-  patient tolerating-  States first time she has eran been pain free-  Is willing to try a decreased dosage. Will also chronic pain-we will we will her on Butrans patches and stop tramadol but Butrans patches have become extremely expensive we will resume the tramadol  Back pain is flaring up going down right leg she was seen in the walk-in clinic and given a shot of Depo-Medrol it worked for 4 days but the pain returned after that and she is having it currently  ADHD-  30mg xr daily with effectiveness  Anxiety-  Tried to 1 MG TID PRN-  She does not like the ER,  Did not feel it helped her anxiety. -  She is willing transition her to back to her XANAX 1mg that is not extended release but BID PRN not TID PRN.     Controlled Substance Monitoring:    Acute and Chronic Pain Monitoring:   RX Monitoring 2021   Acute Pain Prescriptions -   Periodic Controlled Substance Monitoring Possible medication side effects, risk of tolerance/dependence & alternative treatments discussed. ;No signs of potential drug abuse or diversion identified. ;Assessed functional status. Chronic Pain > 50 MEDD -   Chronic Pain > 80 MEDD -         Review of Systems    No flowsheet data found. Physical Exam    [INSTRUCTIONS:  \"[x]\" Indicates a positive item  \"[]\" Indicates a negative item  -- DELETE ALL ITEMS NOT EXAMINED]    Constitutional: [x] Appears well-developed and well-nourished [x] No apparent distress      [] Abnormal -     Mental status: [x] Alert and awake  [x] Oriented to person/place/time [x] Able to follow commands    [] Abnormal -     Eyes:   EOM    [x]  Normal    [] Abnormal -   Sclera  [x]  Normal    [] Abnormal -          Discharge [x]  None visible   [] Abnormal -     HENT: [x] Normocephalic, atraumatic  [] Abnormal -   [x] Mouth/Throat: Mucous membranes are moist    External Ears [x] Normal  [] Abnormal -    Neck: [x] No visualized mass [] Abnormal -     Pulmonary/Chest: [x] Respiratory effort normal   [x] No visualized signs of difficulty breathing or respiratory distress        [] Abnormal -      Musculoskeletal:   [x] Normal gait with no signs of ataxia         [x] Normal range of motion of neck        [] Abnormal -     Neurological:        [x] No Facial Asymmetry (Cranial nerve 7 motor function) (limited exam due to video visit)          [x] No gaze palsy        [] Abnormal -          Skin:        [x] No significant exanthematous lesions or discoloration noted on facial skin         [] Abnormal -            Psychiatric:       [x] Normal Affect [] Abnormal -        [x] No Hallucinations    Other pertinent observable physical exam findings:-          On this date 8/17/2021 I have spent 30 minutes reviewing previous notes, test results and face to face (virtual) with the patient discussing the diagnosis and importance of compliance with the treatment plan as well as documenting on the day of the visit.       Alvaro Burton, was evaluated through a synchronous (real-time) audio-video encounter. The patient (or guardian if applicable) is aware that this is a billable service. Verbal consent to proceed has been obtained within the past 12 months. The visit was conducted pursuant to the emergency declaration under the [address] [place] [address], [address] [place] [address] authority and the [employer]ids and [employer] General Act. Patient identification was verified, and a caregiver was present when appropriate. The patient was located in a state where the provider was credentialed to provide care. An electronic signature was used to authenticate this note.     --AMANDA Colon - CNP

## 2021-09-09 DIAGNOSIS — F41.9 ANXIETY: ICD-10-CM

## 2021-09-09 RX ORDER — ALPRAZOLAM 1 MG/1
1 TABLET ORAL 2 TIMES DAILY PRN
Qty: 60 TABLET | Refills: 2 | Status: SHIPPED | OUTPATIENT
Start: 2021-09-09 | End: 2021-10-09

## 2021-09-09 NOTE — TELEPHONE ENCOUNTER
[facility] Clinical Staff  Subject: Refill Request     QUESTIONS   Name of Medication? ALPRAZolam (XANAX) 1 MG tablet   Patient-reported dosage and instructions? 1.5 mg 2 a day   How many days do you have left? 10   Preferred Pharmacy? [id number]   Pharmacy phone number (if available)? [phone]   ---------------------------------------------------------------------------   --------------   Nimco Meroberto ARANGO   What is the best way for the office to contact you? OK to leave message on   voicemail   Preferred Call Back Phone Number?  [phone]

## 2021-09-17 DIAGNOSIS — F90.0 ATTENTION DEFICIT HYPERACTIVITY DISORDER (ADHD), PREDOMINANTLY INATTENTIVE TYPE: ICD-10-CM

## 2021-09-17 NOTE — TELEPHONE ENCOUNTER
Patient calling for medication refill for Adderall. Her current supply runs out tomorrow. LOV 8/17    Nothing scheduled.     Patient [phone]

## 2021-09-20 ENCOUNTER — TELEPHONE (OUTPATIENT)
Dept: FAMILY MEDICINE CLINIC | Age: 66
End: 2021-09-20

## 2021-09-20 NOTE — TELEPHONE ENCOUNTER
----- Message from [address] sent at 9/20/2021  9:22 AM EDT -----  Subject: Refill Request    QUESTIONS  Name of Medication? amphetamine-dextroamphetamine (ADDERALL XR) 30 MG   extended release capsule  Patient-reported dosage and instructions? 30mg 1x a day  How many days do you have left? 0  Preferred Pharmacy? [id number]  Pharmacy phone number (if available)? [phone]  ---------------------------------------------------------------------------  --------------  Shreya ARANGO  What is the best way for the office to contact you? OK to leave message on   voicemail  Preferred Call Back Phone Number?  [phone]

## 2021-09-21 DIAGNOSIS — F90.0 ATTENTION DEFICIT HYPERACTIVITY DISORDER (ADHD), PREDOMINANTLY INATTENTIVE TYPE: ICD-10-CM

## 2021-09-21 RX ORDER — DEXTROAMPHETAMINE SACCHARATE, AMPHETAMINE ASPARTATE MONOHYDRATE, DEXTROAMPHETAMINE SULFATE AND AMPHETAMINE SULFATE 7.5; 7.5; 7.5; 7.5 MG/1; MG/1; MG/1; MG/1
30 CAPSULE, EXTENDED RELEASE ORAL EVERY MORNING
Qty: 30 CAPSULE | Refills: 0 | OUTPATIENT
Start: 2021-09-21 | End: 2021-10-21

## 2021-09-21 RX ORDER — DEXTROAMPHETAMINE SACCHARATE, AMPHETAMINE ASPARTATE MONOHYDRATE, DEXTROAMPHETAMINE SULFATE AND AMPHETAMINE SULFATE 7.5; 7.5; 7.5; 7.5 MG/1; MG/1; MG/1; MG/1
30 CAPSULE, EXTENDED RELEASE ORAL EVERY MORNING
Qty: 30 CAPSULE | Refills: 0 | Status: SHIPPED | OUTPATIENT
Start: 2021-09-21 | End: 2021-10-14 | Stop reason: SDUPTHER

## 2021-09-28 DIAGNOSIS — M48.062 SPINAL STENOSIS OF LUMBAR REGION WITH NEUROGENIC CLAUDICATION: Primary | ICD-10-CM

## 2021-09-28 DIAGNOSIS — M79.7 FIBROMYALGIA: ICD-10-CM

## 2021-09-28 RX ORDER — BUPRENORPHINE 15 UG/H
1 PATCH TRANSDERMAL WEEKLY
Qty: 4 PATCH | Refills: 0 | Status: SHIPPED | OUTPATIENT
Start: 2021-09-28 | End: 2021-10-04 | Stop reason: SDUPTHER

## 2021-09-28 RX ORDER — ERGOCALCIFEROL 1.25 MG/1
CAPSULE ORAL
Qty: 4 CAPSULE | Refills: 11 | Status: ON HOLD | OUTPATIENT
Start: 2021-09-28

## 2021-10-01 ENCOUNTER — TELEPHONE (OUTPATIENT)
Dept: FAMILY MEDICINE CLINIC | Age: 66
End: 2021-10-01

## 2021-10-01 DIAGNOSIS — M48.062 SPINAL STENOSIS OF LUMBAR REGION WITH NEUROGENIC CLAUDICATION: ICD-10-CM

## 2021-10-01 DIAGNOSIS — M79.7 FIBROMYALGIA: ICD-10-CM

## 2021-10-01 NOTE — TELEPHONE ENCOUNTER
Buprenex went to wrong pharmacy it should have [facility] to [facility] as it's cheaper there on [facility] can we please get sent over ASAP

## 2021-10-03 ENCOUNTER — TELEPHONE (OUTPATIENT)
Dept: FAMILY MEDICINE CLINIC | Age: 66
End: 2021-10-03

## 2021-10-04 RX ORDER — BUPRENORPHINE 15 UG/H
1 PATCH TRANSDERMAL WEEKLY
Qty: 4 PATCH | Refills: 0 | Status: SHIPPED | OUTPATIENT
Start: 2021-10-04 | End: 2021-11-02

## 2021-10-04 NOTE — TELEPHONE ENCOUNTER
----- Message from Dione Self sent at 10/1/2021  3:48 PM EDT -----  Subject: Refill Request    QUESTIONS  Name of Medication? buprenorphine (BUPRENEX) 15 MCG/HR PTWK  Patient-reported dosage and instructions? 15mcg/hr ptwk  How many days do you have left? 0  [id number]  Pharmacy phone number (if available)? [phone]  Additional Information for Provider? Patient states this medication was   [facility]. States she needs this called in to [facility] as it is significantly cheaper. Patient would like a call back when that if called in please. Patient is   in need of this ASAP as she is completely out.   ---------------------------------------------------------------------------  --------------  [address]  What is the best way for the office to contact you? OK to leave message on   voicemail  Preferred Call Back Phone Number?  [phone]

## 2021-10-06 ENCOUNTER — TELEPHONE (OUTPATIENT)
Dept: FAMILY MEDICINE CLINIC | Age: 66
End: 2021-10-06

## 2021-10-13 DIAGNOSIS — F90.0 ATTENTION DEFICIT HYPERACTIVITY DISORDER (ADHD), PREDOMINANTLY INATTENTIVE TYPE: ICD-10-CM

## 2021-10-14 RX ORDER — DEXTROAMPHETAMINE SACCHARATE, AMPHETAMINE ASPARTATE MONOHYDRATE, DEXTROAMPHETAMINE SULFATE AND AMPHETAMINE SULFATE 7.5; 7.5; 7.5; 7.5 MG/1; MG/1; MG/1; MG/1
30 CAPSULE, EXTENDED RELEASE ORAL EVERY MORNING
Qty: 30 CAPSULE | Refills: 0 | Status: SHIPPED | OUTPATIENT
Start: 2021-10-14 | End: 2021-11-02 | Stop reason: SDUPTHER

## 2021-10-25 RX ORDER — PREDNISONE 1 MG/1
TABLET ORAL
Qty: 30 TABLET | Refills: 11 | Status: SHIPPED | OUTPATIENT
Start: 2021-10-25 | End: 2022-11-04 | Stop reason: SDUPTHER

## 2021-10-25 NOTE — TELEPHONE ENCOUNTER
Future Appointments     Provider   11/2/2021 [facility], AMANDA - CNP   Department: [facility]   Appt Notes: Appt Reason: Routine (Patient Request) No Script   3 month follow up - screened green   Booking Code: [id number]   Recent Visits    08/17/2021 [facility] [address], APRN - CNP

## 2021-11-02 ENCOUNTER — OFFICE VISIT (OUTPATIENT)
Dept: FAMILY MEDICINE CLINIC | Age: 66
End: 2021-11-02
Payer: COMMERCIAL

## 2021-11-02 VITALS
WEIGHT: 153 LBS | BODY MASS INDEX: 28.16 KG/M2 | HEART RATE: 82 BPM | HEIGHT: 62 IN | RESPIRATION RATE: 16 BRPM | DIASTOLIC BLOOD PRESSURE: 70 MMHG | SYSTOLIC BLOOD PRESSURE: 138 MMHG

## 2021-11-02 DIAGNOSIS — F90.0 ATTENTION DEFICIT HYPERACTIVITY DISORDER (ADHD), PREDOMINANTLY INATTENTIVE TYPE: Primary | ICD-10-CM

## 2021-11-02 DIAGNOSIS — J20.9 COPD (CHRONIC OBSTRUCTIVE PULMONARY DISEASE) WITH ACUTE BRONCHITIS (HCC): ICD-10-CM

## 2021-11-02 DIAGNOSIS — M48.062 SPINAL STENOSIS OF LUMBAR REGION WITH NEUROGENIC CLAUDICATION: ICD-10-CM

## 2021-11-02 DIAGNOSIS — M54.42 CHRONIC BILATERAL LOW BACK PAIN WITH BILATERAL SCIATICA: ICD-10-CM

## 2021-11-02 DIAGNOSIS — J44.0 COPD (CHRONIC OBSTRUCTIVE PULMONARY DISEASE) WITH ACUTE BRONCHITIS (HCC): ICD-10-CM

## 2021-11-02 DIAGNOSIS — G89.29 CHRONIC BILATERAL LOW BACK PAIN WITH BILATERAL SCIATICA: ICD-10-CM

## 2021-11-02 DIAGNOSIS — I10 ESSENTIAL HYPERTENSION: ICD-10-CM

## 2021-11-02 DIAGNOSIS — M54.41 CHRONIC BILATERAL LOW BACK PAIN WITH BILATERAL SCIATICA: ICD-10-CM

## 2021-11-02 DIAGNOSIS — M79.7 FIBROMYALGIA: ICD-10-CM

## 2021-11-02 PROCEDURE — 99214 OFFICE O/P EST MOD 30 MIN: CPT | Performed by: NURSE PRACTITIONER

## 2021-11-02 RX ORDER — DEXTROAMPHETAMINE SACCHARATE, AMPHETAMINE ASPARTATE MONOHYDRATE, DEXTROAMPHETAMINE SULFATE AND AMPHETAMINE SULFATE 7.5; 7.5; 7.5; 7.5 MG/1; MG/1; MG/1; MG/1
30 CAPSULE, EXTENDED RELEASE ORAL EVERY MORNING
Qty: 30 CAPSULE | Refills: 0 | Status: SHIPPED | OUTPATIENT
Start: 2022-01-01 | End: 2022-01-13 | Stop reason: SDUPTHER

## 2021-11-02 RX ORDER — GABAPENTIN 600 MG/1
600 TABLET ORAL 3 TIMES DAILY
Qty: 90 TABLET | Refills: 2 | Status: SHIPPED | OUTPATIENT
Start: 2021-11-02 | End: 2022-02-03 | Stop reason: SDUPTHER

## 2021-11-02 RX ORDER — ATORVASTATIN CALCIUM 80 MG/1
80 TABLET, FILM COATED ORAL DAILY
Qty: 90 TABLET | Refills: 1 | Status: SHIPPED | OUTPATIENT
Start: 2021-11-02 | End: 2022-08-19 | Stop reason: SDUPTHER

## 2021-11-02 RX ORDER — BUPRENORPHINE 15 UG/H
1 PATCH TRANSDERMAL WEEKLY
Qty: 4 PATCH | Refills: 2 | Status: SHIPPED | OUTPATIENT
Start: 2021-11-02 | End: 2021-11-03 | Stop reason: SDUPTHER

## 2021-11-02 RX ORDER — DEXTROAMPHETAMINE SACCHARATE, AMPHETAMINE ASPARTATE MONOHYDRATE, DEXTROAMPHETAMINE SULFATE AND AMPHETAMINE SULFATE 7.5; 7.5; 7.5; 7.5 MG/1; MG/1; MG/1; MG/1
30 CAPSULE, EXTENDED RELEASE ORAL EVERY MORNING
Qty: 30 CAPSULE | Refills: 0 | Status: SHIPPED | OUTPATIENT
Start: 2021-12-02 | End: 2021-12-02 | Stop reason: SDUPTHER

## 2021-11-02 RX ORDER — DEXTROAMPHETAMINE SACCHARATE, AMPHETAMINE ASPARTATE MONOHYDRATE, DEXTROAMPHETAMINE SULFATE AND AMPHETAMINE SULFATE 7.5; 7.5; 7.5; 7.5 MG/1; MG/1; MG/1; MG/1
30 CAPSULE, EXTENDED RELEASE ORAL EVERY MORNING
Qty: 30 CAPSULE | Refills: 0 | Status: SHIPPED | OUTPATIENT
Start: 2021-11-02 | End: 2022-04-11

## 2021-11-02 ASSESSMENT — ENCOUNTER SYMPTOMS
DIARRHEA: 0
CONSTIPATION: 0
EYES NEGATIVE: 1
COLOR CHANGE: 0
GASTROINTESTINAL NEGATIVE: 1
ABDOMINAL PAIN: 0
VOICE CHANGE: 0
ANAL BLEEDING: 0
SHORTNESS OF BREATH: 0
RHINORRHEA: 0
BLOOD IN STOOL: 0
RECTAL PAIN: 0
COUGH: 1
ALLERGIC/IMMUNOLOGIC NEGATIVE: 1
SORE THROAT: 0
TROUBLE SWALLOWING: 0
HEARTBURN: 0

## 2021-11-02 ASSESSMENT — COPD QUESTIONNAIRES: COPD: 1

## 2021-11-02 NOTE — PROGRESS NOTES
[address], 76 y.o. female presents today with:  Chief Complaint   Patient presents with    3 Month Follow-Up    ADHD    Chronic Pain    COPD     discuss medication         Chronic pain, Fibromyalgia, back problems-  Butrans patches-  patient tolerating-  States first time she has eran been pain free-  Is willing to try a decreased dosage. Will also chronic pain-we will we will her on Butrans patches and stop tramadol but Butrans patches have become extremely expensive we will resume the tramadol    ADHD-  30mg xr daily with effectiveness  Anxiety-  Tried to 1 MG TID PRN-  She does not like the ER,  Did not feel it helped her anxiety. -  She is willing transition her to back to her XANAX 1mg that is not extended release but BID PRN not TID PRN. COPD  She complains of cough. There is no shortness of breath. This is a chronic problem. The problem occurs daily. The cough is non-productive. Pertinent negatives include no appetite change, chest pain, dyspnea on exertion, ear congestion, ear pain, fever, headaches, heartburn, malaise/fatigue, myalgias, nasal congestion, orthopnea, PND, postnasal drip, rhinorrhea, sneezing, sore throat, sweats, trouble swallowing or weight loss. Her symptoms are aggravated by change in weather. Risk factors for lung disease include smoking/tobacco exposure. Her past medical history is significant for COPD. ADD/ADHD:  Current treatment: Adderall XR- 30, which has been effective. Residual symptoms: none. Medication side effects: None. Patient denies anorexia, nausea, vomiting, abdominal pain, involuntary weight loss, palpitations, insomnia, irritability, headache and tremor. Controlled Substance Monitoring:    Acute and Chronic Pain Monitoring:   RX Monitoring 11/2/2021   Acute Pain Prescriptions -   Periodic Controlled Substance Monitoring Possible medication side effects, risk of tolerance/dependence & alternative treatments discussed. ;No signs of potential drug abuse or diversion identified. ;Assessed functional status. Chronic Pain > 50 MEDD Obtained or confirmed \"Consent for Opioid Use\" on file. Chronic Pain > 80 MEDD -           Review of Systems   Constitutional: Negative. Negative for activity change, appetite change, fatigue, fever, malaise/fatigue, unexpected weight change and weight loss. HENT: Negative. Negative for dental problem, ear pain, nosebleeds, postnasal drip, rhinorrhea, sneezing, sore throat, trouble swallowing and voice change. Eyes: Negative. Negative for visual disturbance. Respiratory: Positive for cough. Negative for shortness of breath. Cardiovascular: Negative. Negative for chest pain, dyspnea on exertion, palpitations, leg swelling and PND. Gastrointestinal: Negative. Negative for abdominal pain, anal bleeding, blood in stool, constipation, diarrhea, heartburn and rectal pain. Endocrine: Negative. Negative for cold intolerance, heat intolerance, polydipsia, polyphagia and polyuria. Genitourinary: Negative. Musculoskeletal: Negative. Negative for myalgias. Skin: Negative. Negative for color change and rash. Allergic/Immunologic: Negative. Neurological: Negative. Negative for dizziness, syncope, weakness and headaches. Hematological: Negative. Negative for adenopathy. Does not bruise/bleed easily. Psychiatric/Behavioral: Negative. Negative for dysphoric mood and sleep disturbance. The patient is not nervous/anxious.         Past Medical History:   Diagnosis Date    ADHD (attention deficit hyperactivity disorder)     Anxiety     Depression     Fibromyalgia     GERD (gastroesophageal reflux disease)     Hypertension     Irritable bowel syndrome      Past Surgical History:   Procedure Laterality Date     SECTION      COLONOSCOPY      MARGARET Iniguez MD    DILATION AND CURETTAGE OF UTERUS N/A 2020    DIAGNOSTIC LAPAROSCOPY, LYSIS OF ADHESIONS, HYSTEROSCOPY WITH Marcelo Smith, CYSTOSCOPY performed by Eleanor Yuan MD at [address] [place] [address] Marital status:      Spouse name: Not on file    Number of children: Not on file    Years of education: Not on file    Highest education level: Not on file   Occupational History    Not on file   Tobacco Use    Smoking status: Current Every Day Smoker     Packs/day: 0.25     Years: 10.00     Pack years: 2.50     Types: Cigarettes     Last attempt to quit: 2019     Years since quittin.2    Smokeless tobacco: Never Used   Vaping Use    Vaping Use: Never used   Substance and Sexual Activity    Alcohol use: Never    Drug use: Never    Sexual activity: Not on file   Other Topics Concern    Not on file   Social History Narrative    Not on file     Social Determinants of Health     Financial Resource Strain: Low Risk     Difficulty of Paying Living Expenses: Not hard at all   Food Insecurity: No Food Insecurity    Worried About Running Out of Food in the Last Year: Never true    Jerri of Food in the Last Year: Never true   Transportation Needs: No Transportation Needs    Lack of Transportation (Medical): No    Lack of Transportation (Non-Medical):  No   Physical Activity:     Days of Exercise per Week:     Minutes of Exercise per Session:    Stress:     Feeling of Stress :    Social Connections:     Frequency of Communication with Friends and Family:     Frequency of Social Gatherings with Friends and Family:     Attends [employer] Services:     Active Member of Clubs or Organizations:     Attends Club or Organization Meetings:     Marital Status:    Intimate Partner Violence:     Fear of Current or Ex-Partner:     Emotionally Abused:     Physically Abused:     Sexually Abused:      Family History   Problem Relation Age of Onset    Breast Cancer Maternal Aunt      No Known Allergies  Current Outpatient Medications   Medication Sig Dispense Refill    amphetamine-dextroamphetamine (ADDERALL XR) 30 MG extended release capsule Take 1 capsule by mouth every morning for 30 days. 30 capsule 0    atorvastatin (LIPITOR) 80 MG tablet Take 1 tablet by mouth daily 90 tablet 1    buprenorphine (BUPRENEX) 15 MCG/HR PTWK Place 1 patch onto the skin once a week for 30 days. 4 patch 2    [START ON 12/2/2021] amphetamine-dextroamphetamine (ADDERALL XR) 30 MG extended release capsule Take 1 capsule by mouth every morning for 30 days. 30 capsule 0    [START ON 1/1/2022] amphetamine-dextroamphetamine (ADDERALL XR) 30 MG extended release capsule Take 1 capsule by mouth every morning for 30 days. 30 capsule 0    gabapentin (NEURONTIN) 600 MG tablet Take 1 tablet by mouth 3 times daily for 30 days. 90 tablet 2    predniSONE (DELTASONE) 5 MG tablet Take 1 tablet by mouth daily 30 tablet 11    vitamin D (ERGOCALCIFEROL) 1.25 MG (04681 UT) CAPS capsule TAKE 1 CAPSULE BY MOUTH once per week AS DIRECTED 4 capsule 11    predniSONE (DELTASONE) 10 MG tablet 4 for 4 days 3 for 3 days 2 for 2 days 1 for 1 day 30 tablet 0    prochlorperazine (COMPAZINE) 10 MG tablet Take 1 tablet by mouth every 8 hours as needed (NAUSEA) 120 tablet 3    ARIPiprazole (ABILIFY) 10 MG tablet TAKE 1 TABLET DAILY IN THE MORNING      Handicap Placard MISC by Does not apply route Duration 5 years 1 each 0    Misc.  Devices (PEDAL EXERCISER) MISC 1 each by Does not apply route daily 1 each 0    albuterol sulfate  (90 Base) MCG/ACT inhaler Inhale 2 puffs into the lungs every 6 hours as needed for Wheezing 1 Inhaler 3    cyclobenzaprine (FLEXERIL) 10 MG tablet Take 1 tablet by mouth 2 times daily as needed (myalgias anf fibro) 60 tablet 11    omeprazole (PRILOSEC) 40 MG delayed release capsule Take 40 mg by mouth      polyethylene glycol (GLYCOLAX) 17 GM/SCOOP powder Take 17 g by mouth as needed      acetaminophen (APAP EXTRA STRENGTH) 500 MG tablet Take 2 tablets by mouth every 6 hours as needed for Pain 60 tablet 1    ipratropium-albuterol (DUONEB) 0.5-2.5 (3) MG/3ML SOLN nebulizer solution Inhale 3 mLs into the lungs every 4 hours as needed for Shortness of Breath 360 mL 0    venlafaxine (EFFEXOR XR) 75 MG extended release capsule TAKE 1 CAPSULE BY MOUTH TWICE DAILY  1    aspirin (ASPIRIN 81) 81 MG chewable tablet       furosemide (LASIX) 20 MG tablet Take 20 mg by mouth daily PRN      mirtazapine (REMERON) 15 MG tablet Take 15 mg by mouth nightly       No current facility-administered medications for this visit. PMH, Surgical Hx, Family Hx, and Social Hx reviewed and updated. Health Maintenance reviewed. Objective    Vitals:    11/02/21 1048   BP: 138/70   Pulse: 82   Resp: 16   Weight: 153 lb (69.4 kg)   Height: 5' 2\" (1.575 m)       Physical Exam  Constitutional:       General: She is not in acute distress. Appearance: She is well-developed. HENT:      Head: Normocephalic and atraumatic. Right Ear: External ear normal.      Left Ear: External ear normal.      Nose: Nose normal.   Eyes:      Conjunctiva/sclera: Conjunctivae normal.      Pupils: Pupils are equal, round, and reactive to light. Neck:      Vascular: No JVD. Cardiovascular:      Rate and Rhythm: Normal rate and regular rhythm. Heart sounds: Normal heart sounds. No murmur heard. Pulmonary:      Effort: Pulmonary effort is normal. No respiratory distress. Breath sounds: Normal breath sounds. No wheezing or rales. Abdominal:      General: Bowel sounds are normal. There is no distension. Palpations: Abdomen is soft. There is no mass. Tenderness: There is no abdominal tenderness. Musculoskeletal:      Cervical back: Neck supple. Skin:     General: Skin is warm and dry. Neurological:      Mental Status: She is alert and oriented to person, place, and time. Assessment & Plan   Mariela Solo was seen today for 3 month follow-up, adhd, chronic pain and copd.     Diagnoses and all orders for this visit:    Attention deficit hyperactivity disorder (ADHD), predominantly inattentive type  -     amphetamine-dextroamphetamine (ADDERALL XR) 30 MG extended release capsule; Take 1 capsule by mouth every morning for 30 days. -     amphetamine-dextroamphetamine (ADDERALL XR) 30 MG extended release capsule; Take 1 capsule by mouth every morning for 30 days. -     amphetamine-dextroamphetamine (ADDERALL XR) 30 MG extended release capsule; Take 1 capsule by mouth every morning for 30 days. Chronic bilateral low back pain with bilateral sciatica L>R  -     buprenorphine (BUPRENEX) 15 MCG/HR PTWK; Place 1 patch onto the skin once a week for 30 days.  -     gabapentin (NEURONTIN) 600 MG tablet; Take 1 tablet by mouth 3 times daily for 30 days. Spinal stenosis of lumbar region with neurogenic claudication  -     buprenorphine (BUPRENEX) 15 MCG/HR PTWK; Place 1 patch onto the skin once a week for 30 days.  -     gabapentin (NEURONTIN) 600 MG tablet; Take 1 tablet by mouth 3 times daily for 30 days. Fibromyalgia  -     gabapentin (NEURONTIN) 600 MG tablet; Take 1 tablet by mouth 3 times daily for 30 days. COPD (chronic obstructive pulmonary disease) with acute bronchitis (HCC)    Essential hypertension    acute on chronic bilateral low back pain with bilateral sciatica L>R  -     buprenorphine (BUPRENEX) 15 MCG/HR PTWK; Place 1 patch onto the skin once a week for 30 days.  -     gabapentin (NEURONTIN) 600 MG tablet; Take 1 tablet by mouth 3 times daily for 30 days. Other orders  -     atorvastatin (LIPITOR) 80 MG tablet; Take 1 tablet by mouth daily      No orders of the defined types were placed in this encounter. Orders Placed This Encounter   Medications    amphetamine-dextroamphetamine (ADDERALL XR) 30 MG extended release capsule     Sig: Take 1 capsule by mouth every morning for 30 days.      Dispense:  30 capsule     Refill:  0    atorvastatin (LIPITOR) 80 MG tablet     Sig: Take 1 tablet by

## 2021-11-03 DIAGNOSIS — M54.42 CHRONIC BILATERAL LOW BACK PAIN WITH BILATERAL SCIATICA: ICD-10-CM

## 2021-11-03 DIAGNOSIS — G89.29 CHRONIC BILATERAL LOW BACK PAIN WITH BILATERAL SCIATICA: ICD-10-CM

## 2021-11-03 DIAGNOSIS — M54.41 CHRONIC BILATERAL LOW BACK PAIN WITH BILATERAL SCIATICA: ICD-10-CM

## 2021-11-03 DIAGNOSIS — M48.062 SPINAL STENOSIS OF LUMBAR REGION WITH NEUROGENIC CLAUDICATION: ICD-10-CM

## 2021-11-03 RX ORDER — BUPRENORPHINE 15 UG/H
1 PATCH TRANSDERMAL WEEKLY
Qty: 4 PATCH | Refills: 2 | Status: SHIPPED | OUTPATIENT
Start: 2021-11-03 | End: 2022-02-08

## 2021-11-03 NOTE — TELEPHONE ENCOUNTER
Can you transfer my medication for the pain patches to [facility] on [facility] in [facility] they are cheaper.

## 2021-11-30 ENCOUNTER — TELEPHONE (OUTPATIENT)
Dept: FAMILY MEDICINE CLINIC | Age: 66
End: 2021-11-30

## 2021-11-30 NOTE — TELEPHONE ENCOUNTER
Add her for a video call today or tomorrow-  I am doing so virtual AWV's so I can squeeze her in-  I just need to talk to her to make sure she does not need a covid or flu testing before I give her an antibiotic

## 2021-12-02 ENCOUNTER — VIRTUAL VISIT (OUTPATIENT)
Dept: FAMILY MEDICINE CLINIC | Age: 66
End: 2021-12-02
Payer: COMMERCIAL

## 2021-12-02 ENCOUNTER — NURSE ONLY (OUTPATIENT)
Dept: FAMILY MEDICINE CLINIC | Age: 66
End: 2021-12-02

## 2021-12-02 DIAGNOSIS — J40 BRONCHITIS: ICD-10-CM

## 2021-12-02 DIAGNOSIS — F90.0 ATTENTION DEFICIT HYPERACTIVITY DISORDER (ADHD), PREDOMINANTLY INATTENTIVE TYPE: ICD-10-CM

## 2021-12-02 DIAGNOSIS — R05.9 COUGH: Primary | ICD-10-CM

## 2021-12-02 DIAGNOSIS — J02.9 SORE THROAT: ICD-10-CM

## 2021-12-02 DIAGNOSIS — F41.9 ANXIETY: ICD-10-CM

## 2021-12-02 LAB
INFLUENZA A ANTIBODY: NORMAL
INFLUENZA B ANTIBODY: NORMAL
Lab: NORMAL
PERFORMING INSTRUMENT: NORMAL
QC PASS/FAIL: NORMAL
SARS-COV-2, POC: NORMAL

## 2021-12-02 PROCEDURE — 99213 OFFICE O/P EST LOW 20 MIN: CPT | Performed by: NURSE PRACTITIONER

## 2021-12-02 PROCEDURE — 87426 SARSCOV CORONAVIRUS AG IA: CPT | Performed by: NURSE PRACTITIONER

## 2021-12-02 PROCEDURE — 87804 INFLUENZA ASSAY W/OPTIC: CPT | Performed by: NURSE PRACTITIONER

## 2021-12-02 RX ORDER — ALPRAZOLAM 1 MG/1
TABLET ORAL
COMMUNITY
Start: 2021-11-08 | End: 2021-12-02 | Stop reason: SDUPTHER

## 2021-12-02 RX ORDER — ALPRAZOLAM 1 MG/1
TABLET ORAL
Qty: 60 TABLET | Refills: 2 | Status: SHIPPED | OUTPATIENT
Start: 2021-12-02 | End: 2022-01-02

## 2021-12-02 RX ORDER — DEXTROMETHORPHAN HYDROBROMIDE AND PROMETHAZINE HYDROCHLORIDE 15; 6.25 MG/5ML; MG/5ML
5 SYRUP ORAL 4 TIMES DAILY PRN
Qty: 180 ML | Refills: 0 | Status: SHIPPED | OUTPATIENT
Start: 2021-12-02 | End: 2021-12-09

## 2021-12-02 RX ORDER — AZITHROMYCIN 250 MG/1
250 TABLET, FILM COATED ORAL SEE ADMIN INSTRUCTIONS
Qty: 6 TABLET | Refills: 0 | Status: SHIPPED | OUTPATIENT
Start: 2021-12-02 | End: 2021-12-07

## 2021-12-02 RX ORDER — PREDNISONE 20 MG/1
40 TABLET ORAL DAILY
Qty: 10 TABLET | Refills: 0 | Status: SHIPPED | OUTPATIENT
Start: 2021-12-02 | End: 2021-12-07

## 2021-12-02 RX ORDER — DEXTROAMPHETAMINE SACCHARATE, AMPHETAMINE ASPARTATE MONOHYDRATE, DEXTROAMPHETAMINE SULFATE AND AMPHETAMINE SULFATE 7.5; 7.5; 7.5; 7.5 MG/1; MG/1; MG/1; MG/1
30 CAPSULE, EXTENDED RELEASE ORAL EVERY MORNING
Qty: 30 CAPSULE | Refills: 0 | Status: SHIPPED | OUTPATIENT
Start: 2021-12-16 | End: 2022-04-11

## 2021-12-02 ASSESSMENT — ENCOUNTER SYMPTOMS
HEARTBURN: 0
RHINORRHEA: 0
COUGH: 1
SHORTNESS OF BREATH: 0
WHEEZING: 0
SORE THROAT: 1
HEMOPTYSIS: 0

## 2021-12-02 NOTE — PROGRESS NOTES
Kita Neff (:  1955) is a 78 y.o. female,Established patient, here for evaluation of the following chief complaint(s): Cough and Pharyngitis         ASSESSMENT/PLAN:  1. Cough  -     POCT COVID-19, Antigen  -     POCT Influenza A/B  2. Sore throat  -     POCT COVID-19, Antigen  -     POCT Influenza A/B  3. Bronchitis  -     azithromycin (ZITHROMAX) 250 MG tablet; Take 1 tablet by mouth See Admin Instructions for 5 days 500mg on day 1 followed by 250mg on days 2 - 5, Disp-6 tablet, R-0Normal  4. Attention deficit hyperactivity disorder (ADHD), predominantly inattentive type  -     amphetamine-dextroamphetamine (ADDERALL XR) 30 MG extended release capsule; Take 1 capsule by mouth every morning for 30 days. , Disp-30 capsule, R-0Normal  5. Anxiety  -     ALPRAZolam (XANAX) 1 MG tablet; Take 1 tablet by mouth 2 times daily as needed for Anxiety for up to 30 days. , Disp-60 tablet, R-2Normal      Return if symptoms worsen or fail to improve. SUBJECTIVE/OBJECTIVE:  Cough  This is a new problem. The current episode started in the past 7 days. The problem has been unchanged. The problem occurs constantly. The cough is non-productive. Associated symptoms include a sore throat. Pertinent negatives include no chest pain, chills, ear congestion, ear pain, fever, headaches, heartburn, hemoptysis, myalgias, nasal congestion, postnasal drip, rash, rhinorrhea, shortness of breath, sweats, weight loss or wheezing. Review of Systems   Constitutional: Negative for chills, fever and weight loss. HENT: Positive for sore throat. Negative for ear pain, postnasal drip and rhinorrhea. Respiratory: Positive for cough. Negative for hemoptysis, shortness of breath and wheezing. Cardiovascular: Negative for chest pain. Gastrointestinal: Negative for heartburn. Musculoskeletal: Negative for myalgias. Skin: Negative for rash. Neurological: Negative for headaches. No flowsheet data found.      Physical Exam    [INSTRUCTIONS:  \"[x]\" Indicates a positive item  \"[]\" Indicates a negative item  -- DELETE ALL ITEMS NOT EXAMINED]    Constitutional: [x] Appears well-developed and well-nourished [x] No apparent distress      [] Abnormal -     Mental status: [x] Alert and awake  [x] Oriented to person/place/time [x] Able to follow commands    [] Abnormal -     Eyes:   EOM    [x]  Normal    [] Abnormal -   Sclera  [x]  Normal    [] Abnormal -          Discharge [x]  None visible   [] Abnormal -     HENT: [x] Normocephalic, atraumatic  [] Abnormal -   [x] Mouth/Throat: Mucous membranes are moist    External Ears [x] Normal  [] Abnormal -    Neck: [x] No visualized mass [] Abnormal -     Pulmonary/Chest: [x] Respiratory effort normal   [x] No visualized signs of difficulty breathing or respiratory distress        [] Abnormal -      Musculoskeletal:   [x] Normal gait with no signs of ataxia         [x] Normal range of motion of neck        [] Abnormal -     Neurological:        [x] No Facial Asymmetry (Cranial nerve 7 motor function) (limited exam due to video visit)          [x] No gaze palsy        [] Abnormal -          Skin:        [x] No significant exanthematous lesions or discoloration noted on facial skin         [] Abnormal -            Psychiatric:       [x] Normal Affect [] Abnormal -        [x] No Hallucinations    Other pertinent observable physical exam findings:-          On this date 12/2/2021 I have spent 20 minutes reviewing previous notes, test results and face to face (virtual) with the patient discussing the diagnosis and importance of compliance with the treatment plan as well as documenting on the day of the visitTre Roberts, was evaluated through a synchronous (real-time) audio-video encounter. The patient (or guardian if applicable) is aware that this is a billable service. Verbal consent to proceed has been obtained within the past 12 months.  The visit was conducted pursuant to the emergency declaration under the [facility], [address] [place] [address] waiver authority and the [employer] and [employer] General Act. Patient identification was verified, and a caregiver was present when appropriate. The patient was located in a state where the provider was credentialed to provide care. An electronic signature was used to authenticate this note.     --AMANDA Roldan - CNP

## 2021-12-14 ENCOUNTER — OFFICE VISIT (OUTPATIENT)
Dept: PAIN MANAGEMENT | Age: 66
End: 2021-12-14
Payer: MEDICAID

## 2021-12-14 VITALS
SYSTOLIC BLOOD PRESSURE: 132 MMHG | TEMPERATURE: 98.7 F | HEIGHT: 62 IN | BODY MASS INDEX: 28.16 KG/M2 | DIASTOLIC BLOOD PRESSURE: 70 MMHG | WEIGHT: 153 LBS

## 2021-12-14 DIAGNOSIS — M54.16 LUMBAR RADICULOPATHY: Primary | ICD-10-CM

## 2021-12-14 DIAGNOSIS — M51.26 LUMBAR HERNIATED DISC: ICD-10-CM

## 2021-12-14 PROCEDURE — 99204 OFFICE O/P NEW MOD 45 MIN: CPT | Performed by: PAIN MEDICINE

## 2021-12-14 ASSESSMENT — ENCOUNTER SYMPTOMS
NAUSEA: 0
CONSTIPATION: 0
BACK PAIN: 0
SHORTNESS OF BREATH: 1
DIARRHEA: 0
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
## 2022-01-05 ENCOUNTER — TELEPHONE (OUTPATIENT)
Dept: PAIN MANAGEMENT | Age: 67
End: 2022-01-05

## 2022-01-06 ENCOUNTER — TELEPHONE (OUTPATIENT)
Dept: PAIN MANAGEMENT | Age: 67
End: 2022-01-06

## 2022-01-06 NOTE — TELEPHONE ENCOUNTER
ASHLEY L4-5 TFESI    AUTH 1/6/22-1/20/22    OK to schedule procedure approved as above. Please note sides/levels approved and date range. (If applicable, sides/levels approved may differ from those ordered)    TO BE SCHEDULED WITH DR. DICKEY

## 2022-01-06 NOTE — TELEPHONE ENCOUNTER
SPOKE WITH VICKY AT [facility] [phone] AND STARTED CASE OVER THE PHONE. PROVIDED CLINICAL INFO AND GOT AUTH APPROVAL.

## 2022-01-06 NOTE — TELEPHONE ENCOUNTER
AUTHORIZATION:    INSURANCE: AIM  AUTH RCVD VIA: PHONE    Drew Martinez #: [id number]    DATE RANGE: 1/6/22-1/20/22    TELEPHONE CALL ROUTED TO MA TO SCHEDULE.

## 2022-01-06 NOTE — TELEPHONE ENCOUNTER
ORDER PLACED:    Date: 12/14/21  Description: BILAT L4-5 TFESI  Order Number: [id number]  Ordering Provider: Dione Overton  Performing Provider: NOBLE  CPT Codes: 37601  ICD10 Codes: M54.16

## 2022-01-12 ENCOUNTER — TELEPHONE (OUTPATIENT)
Dept: PAIN MANAGEMENT | Age: 67
End: 2022-01-12

## 2022-01-12 DIAGNOSIS — F90.0 ATTENTION DEFICIT HYPERACTIVITY DISORDER (ADHD), PREDOMINANTLY INATTENTIVE TYPE: ICD-10-CM

## 2022-01-12 NOTE — TELEPHONE ENCOUNTER
BENEFITS: CORRIE L4-5 TFESI    Insurance: [employer]  Phone: [phone]  Contact Name: Kishore San  Effective Date: 1.1.2022     Plan year: YES-CALENDAR  Deductible: N/A      Deductible Met: N/A  Allowed/benefits paid at: 80%  OOP: 7550.00 MET $0.00  Freq Limits: 64483--BASED ON MEDICAL NECESSITY  Prior Auth Requirement: NO AUTH REQUIRED    Notes: NO PRE-EX CLAUSE    Call Reference #: [id number]    Time of call: 1:35PM

## 2022-01-13 RX ORDER — DEXTROAMPHETAMINE SACCHARATE, AMPHETAMINE ASPARTATE MONOHYDRATE, DEXTROAMPHETAMINE SULFATE AND AMPHETAMINE SULFATE 7.5; 7.5; 7.5; 7.5 MG/1; MG/1; MG/1; MG/1
30 CAPSULE, EXTENDED RELEASE ORAL EVERY MORNING
Qty: 30 CAPSULE | Refills: 0 | Status: SHIPPED | OUTPATIENT
Start: 2022-01-13 | End: 2022-04-11

## 2022-01-19 ENCOUNTER — PROCEDURE VISIT (OUTPATIENT)
Dept: PAIN MANAGEMENT | Age: 67
End: 2022-01-19
Payer: MEDICARE

## 2022-01-19 DIAGNOSIS — M51.26 LUMBAR HERNIATED DISC: ICD-10-CM

## 2022-01-19 DIAGNOSIS — M54.16 LUMBAR RADICULOPATHY: ICD-10-CM

## 2022-01-19 PROCEDURE — 64483 NJX AA&/STRD TFRM EPI L/S 1: CPT | Performed by: PAIN MEDICINE

## 2022-01-19 RX ORDER — DEXAMETHASONE SODIUM PHOSPHATE 10 MG/ML
10 INJECTION, EMULSION INTRAMUSCULAR; INTRAVENOUS ONCE
Status: COMPLETED | OUTPATIENT
Start: 2022-01-19 | End: 2022-01-19

## 2022-01-19 RX ORDER — SODIUM CHLORIDE 9 MG/ML
10 INJECTION INTRAVENOUS ONCE
Status: COMPLETED | OUTPATIENT
Start: 2022-01-19 | End: 2022-01-19

## 2022-01-19 RX ADMIN — DEXAMETHASONE SODIUM PHOSPHATE 10 MG: 10 INJECTION, EMULSION INTRAMUSCULAR; INTRAVENOUS at 11:27

## 2022-01-19 RX ADMIN — SODIUM CHLORIDE 10 ML: 9 INJECTION INTRAVENOUS at 11:24

## 2022-01-19 NOTE — PROGRESS NOTES
[facility]) Physicians  Neurosurgery and Pain [facility]  [address], [address] [facility] , [address] [facility]: [phone]  F: [phone]      LUMBAR TRANSFORAMINAL INJECTION      Patient Name: Marito Rogers : 1955  Date: 2022   Physician: Skyler Berkowitz MD      Marito Rogers  is here today for interventional pain management. Preoperatively, the patient presents with radicular pain in the affected area as per history and exam. Patient has failed NSAIDs, PT/HEP, and conservative treatment. Patient has significant psychological and functional impairment due to this condition. Standard ASI guidelines were followed and sterile technique used. Area was cleaned with Betadine x3. Informed consent was obtained. Fluoroscopic guidance was used for this procedure. Discussed the risks including but not limited to bleeding, infection, worsened pain, damage to surrounding structures, side effects, toxicity, allergic reactions to medications used, need for surgery, pneumothorax, abdominal and visceral injury, as well as catastrophic injury such as vision loss, paralysis, spinal cord or plexus injury, stroke, bowel or bladder incontinence, chest tube insertion, ventilator dependence, and death. Discussed the risks, benefits, and alternatives to the procedure including no procedure at all. Discussed that we cannot undo any permanent neurologic or orthopaedic damage or change the course of any underlying disease. After thorough discussion, patient expressed understanding and willingness to proceed. Written consent was obtained and is in the chart. Verbal consent to proceed was obtained. TRANSFORAMINAL EPIDURAL  There was appropriate spread of contrast in the anterior epidural space and around the exiting nerve roots bilaterally. The 6 oclock position of the pedicle was identified.  Multiple views of fluoroscopy including lateral were used to confirm accurate needle placement of depth. Live fluoroscopy was used when injecting contrast to ensure no subdural or vascular spread. In total, approximately 5 mg of Dexamethasone and 1.5 ml of 0.9cc of normal saline was injected slowly without difficulty. Patient tolerated the procedure well, no obvious complications occurred during the procedure. Patient was appropriately monitored and discharged home in stable condition with their usual motor strength. Post Op instructions were given to patient. Patient told to resume blood thinners if stopped prior to procedure. Relevant and recent imaging reviewed with patient today.               [x] Bilateral [] T12-L1 [] L3-4        [] L1-2 [x] L4-5       [] Right [] L2-3 [] L5-S1                [] Left                  Lola Varner MD

## 2022-02-03 ENCOUNTER — TELEMEDICINE (OUTPATIENT)
Dept: FAMILY MEDICINE CLINIC | Age: 67
End: 2022-02-03
Payer: MEDICARE

## 2022-02-03 DIAGNOSIS — M79.7 FIBROMYALGIA: ICD-10-CM

## 2022-02-03 DIAGNOSIS — M48.062 SPINAL STENOSIS OF LUMBAR REGION WITH NEUROGENIC CLAUDICATION: ICD-10-CM

## 2022-02-03 DIAGNOSIS — F41.9 ANXIETY: ICD-10-CM

## 2022-02-03 DIAGNOSIS — G89.29 CHRONIC BILATERAL LOW BACK PAIN WITH BILATERAL SCIATICA: ICD-10-CM

## 2022-02-03 DIAGNOSIS — M54.42 CHRONIC BILATERAL LOW BACK PAIN WITH BILATERAL SCIATICA: ICD-10-CM

## 2022-02-03 DIAGNOSIS — F90.0 ATTENTION DEFICIT HYPERACTIVITY DISORDER (ADHD), PREDOMINANTLY INATTENTIVE TYPE: Primary | ICD-10-CM

## 2022-02-03 DIAGNOSIS — M54.41 CHRONIC BILATERAL LOW BACK PAIN WITH BILATERAL SCIATICA: ICD-10-CM

## 2022-02-03 PROCEDURE — 1123F ACP DISCUSS/DSCN MKR DOCD: CPT | Performed by: NURSE PRACTITIONER

## 2022-02-03 PROCEDURE — G8428 CUR MEDS NOT DOCUMENT: HCPCS | Performed by: NURSE PRACTITIONER

## 2022-02-03 PROCEDURE — 4004F PT TOBACCO SCREEN RCVD TLK: CPT | Performed by: NURSE PRACTITIONER

## 2022-02-03 PROCEDURE — G8399 PT W/DXA RESULTS DOCUMENT: HCPCS | Performed by: NURSE PRACTITIONER

## 2022-02-03 PROCEDURE — G8417 CALC BMI ABV UP PARAM F/U: HCPCS | Performed by: NURSE PRACTITIONER

## 2022-02-03 PROCEDURE — 3017F COLORECTAL CA SCREEN DOC REV: CPT | Performed by: NURSE PRACTITIONER

## 2022-02-03 PROCEDURE — G8484 FLU IMMUNIZE NO ADMIN: HCPCS | Performed by: NURSE PRACTITIONER

## 2022-02-03 PROCEDURE — 99214 OFFICE O/P EST MOD 30 MIN: CPT | Performed by: NURSE PRACTITIONER

## 2022-02-03 PROCEDURE — 1090F PRES/ABSN URINE INCON ASSESS: CPT | Performed by: NURSE PRACTITIONER

## 2022-02-03 PROCEDURE — 4040F PNEUMOC VAC/ADMIN/RCVD: CPT | Performed by: NURSE PRACTITIONER

## 2022-02-03 RX ORDER — ALPRAZOLAM 1 MG/1
1 TABLET ORAL 2 TIMES DAILY PRN
Qty: 60 TABLET | Refills: 2 | Status: SHIPPED | OUTPATIENT
Start: 2022-02-03 | End: 2022-03-05

## 2022-02-03 RX ORDER — FUROSEMIDE 20 MG/1
20 TABLET ORAL DAILY
Qty: 30 TABLET | Refills: 0 | Status: ON HOLD | OUTPATIENT
Start: 2022-02-03

## 2022-02-03 RX ORDER — GABAPENTIN 600 MG/1
600 TABLET ORAL 3 TIMES DAILY
Qty: 90 TABLET | Refills: 2 | Status: SHIPPED | OUTPATIENT
Start: 2022-02-03 | End: 2022-06-10

## 2022-02-03 ASSESSMENT — ENCOUNTER SYMPTOMS
SHORTNESS OF BREATH: 0
RHINORRHEA: 0
BLOOD IN STOOL: 0
ABDOMINAL PAIN: 0
COLOR CHANGE: 0
GASTROINTESTINAL NEGATIVE: 1
COUGH: 1
SORE THROAT: 0
VOICE CHANGE: 0
HEARTBURN: 0
RECTAL PAIN: 0
EYES NEGATIVE: 1
CONSTIPATION: 0
ANAL BLEEDING: 0
DIARRHEA: 0
TROUBLE SWALLOWING: 0
ALLERGIC/IMMUNOLOGIC NEGATIVE: 1

## 2022-02-03 ASSESSMENT — COPD QUESTIONNAIRES: COPD: 1

## 2022-02-03 NOTE — PROGRESS NOTES
Subjective  Isiah Amato, 78 y.o. female presents today with:  No chief complaint on file. Chronic pain, Fibromyalgia, back problems-  Butrans patches-  patient tolerating-  States first time she has eran been pain free-  Is willing to try a decreased dosage. Will also chronic pain-we will we will her on Butrans patches and stop tramadol but Butrans patches have become extremely expensive we will resume the tramadol    ADHD-  30mg xr daily with effectiveness  Anxiety-  Tried to 1 MG TID PRN-  She does not like the ER,  Did not feel it helped her anxiety. -  She is willing transition her to back to her XANAX 1mg that is not extended release but BID PRN not TID PRN. COPD  She complains of cough. There is no shortness of breath. This is a chronic problem. The problem occurs daily. The cough is non-productive. Pertinent negatives include no appetite change, chest pain, dyspnea on exertion, ear congestion, ear pain, fever, headaches, heartburn, malaise/fatigue, myalgias, nasal congestion, orthopnea, PND, postnasal drip, rhinorrhea, sneezing, sore throat, sweats, trouble swallowing or weight loss. Her symptoms are aggravated by change in weather. Risk factors for lung disease include smoking/tobacco exposure. Her past medical history is significant for COPD. ADD/ADHD:  Current treatment: Adderall XR- 30, which has been effective. Residual symptoms: none. Medication side effects: None. Patient denies anorexia, nausea, vomiting, abdominal pain, involuntary weight loss, palpitations, insomnia, irritability, headache and tremor. Controlled Substance Monitoring:    Acute and Chronic Pain Monitoring:   RX Monitoring 2/3/2022   Acute Pain Prescriptions -   Periodic Controlled Substance Monitoring Possible medication side effects, risk of tolerance/dependence & alternative treatments discussed. ;No signs of potential drug abuse or diversion identified. ;Assessed functional status.    Chronic Pain > 50 MEDD - Chronic Pain > 80 MEDD -           Review of Systems   Constitutional: Negative. Negative for activity change, appetite change, fatigue, fever, malaise/fatigue, unexpected weight change and weight loss. HENT: Negative. Negative for dental problem, ear pain, nosebleeds, postnasal drip, rhinorrhea, sneezing, sore throat, trouble swallowing and voice change. Eyes: Negative. Negative for visual disturbance. Respiratory: Positive for cough. Negative for shortness of breath. Cardiovascular: Negative. Negative for chest pain, dyspnea on exertion, palpitations, leg swelling and PND. Gastrointestinal: Negative. Negative for abdominal pain, anal bleeding, blood in stool, constipation, diarrhea, heartburn and rectal pain. Endocrine: Negative. Negative for cold intolerance, heat intolerance, polydipsia, polyphagia and polyuria. Genitourinary: Negative. Musculoskeletal: Negative. Negative for myalgias. Skin: Negative. Negative for color change and rash. Allergic/Immunologic: Negative. Neurological: Negative. Negative for dizziness, syncope, weakness and headaches. Hematological: Negative. Negative for adenopathy. Does not bruise/bleed easily. Psychiatric/Behavioral: Negative. Negative for dysphoric mood and sleep disturbance. The patient is not nervous/anxious. Past Medical History:   Diagnosis Date    ADHD (attention deficit hyperactivity disorder)     Anxiety     Depression     Fibromyalgia     GERD (gastroesophageal reflux disease)     Hypertension     Irritable bowel syndrome      Past Surgical History:   Procedure Laterality Date     SECTION      COLONOSCOPY      MARGARET Ennis MD    DILATION AND CURETTAGE OF UTERUS N/A 2020    DIAGNOSTIC LAPAROSCOPY, LYSIS OF ADHESIONS, HYSTEROSCOPY WITH Deboraha Estimable performed by Emeli Reilly MD at [address] [place] [address] History     Socioeconomic History    Marital status:       Spouse name: Not on file    Number of children: Not on file    Years of education: Not on file    Highest education level: Not on file   Occupational History    Not on file   Tobacco Use    Smoking status: Current Every Day Smoker     Packs/day: 0.25     Years: 10.00     Pack years: 2.50     Types: Cigarettes     Last attempt to quit: 2019     Years since quittin.5    Smokeless tobacco: Never Used   Vaping Use    Vaping Use: Never used   Substance and Sexual Activity    Alcohol use: Never    Drug use: Never    Sexual activity: Not on file   Other Topics Concern    Not on file   Social History Narrative    Not on file     Social Determinants of Health     Financial Resource Strain: Low Risk     Difficulty of Paying Living Expenses: Not hard at all   Food Insecurity: No Food Insecurity    Worried About Running Out of Food in the Last Year: Never true    Jerri of Food in the Last Year: Never true   Transportation Needs: No Transportation Needs    Lack of Transportation (Medical): No    Lack of Transportation (Non-Medical):  No   Physical Activity:     Days of Exercise per Week: Not on file    Minutes of Exercise per Session: Not on file   Stress:     Feeling of Stress : Not on file   Social Connections:     Frequency of Communication with Friends and Family: Not on file    Frequency of Social Gatherings with Friends and Family: Not on file    Attends Restorationist Services: Not on file    Active Member of Clubs or Organizations: Not on file    Attends Club or Organization Meetings: Not on file    Marital Status: Not on file   Intimate Partner Violence:     Fear of Current or Ex-Partner: Not on file    Emotionally Abused: Not on file    Physically Abused: Not on file    Sexually Abused: Not on file   Housing Stability:     Unable to Pay for Housing in the Last Year: Not on file    Number of [place] in the Last Year: Not on file    Unstable Housing in the Last Year: Not on file     Family History   Problem Relation Age of Onset    Breast Cancer Maternal Aunt      No Known Allergies  Current Outpatient Medications   Medication Sig Dispense Refill    gabapentin (NEURONTIN) 600 MG tablet Take 1 tablet by mouth 3 times daily for 30 days. 90 tablet 2    furosemide (LASIX) 20 MG tablet Take 1 tablet by mouth daily PRN 30 tablet 0    ALPRAZolam (XANAX) 1 MG tablet Take 1 tablet by mouth 2 times daily as needed for Sleep or Anxiety for up to 30 days. 60 tablet 2    amphetamine-dextroamphetamine (ADDERALL XR) 30 MG extended release capsule Take 1 capsule by mouth every morning for 30 days. 30 capsule 0    amphetamine-dextroamphetamine (ADDERALL XR) 30 MG extended release capsule Take 1 capsule by mouth every morning for 30 days. 30 capsule 0    fluticasone-umeclidin-vilant (TRELEGY ELLIPTA) 100-62.5-25 MCG/INH AEPB Inhale 1 puff into the lungs daily 28 each 5    amphetamine-dextroamphetamine (ADDERALL XR) 30 MG extended release capsule Take 1 capsule by mouth every morning for 30 days. 30 capsule 0    atorvastatin (LIPITOR) 80 MG tablet Take 1 tablet by mouth daily 90 tablet 1    predniSONE (DELTASONE) 5 MG tablet Take 1 tablet by mouth daily 30 tablet 11    vitamin D (ERGOCALCIFEROL) 1.25 MG (64292 UT) CAPS capsule TAKE 1 CAPSULE BY MOUTH once per week AS DIRECTED 4 capsule 11    prochlorperazine (COMPAZINE) 10 MG tablet Take 1 tablet by mouth every 8 hours as needed (NAUSEA) 120 tablet 3    ARIPiprazole (ABILIFY) 10 MG tablet TAKE 1 TABLET DAILY IN THE MORNING      Handicap Placard MISC by Does not apply route Duration 5 years 1 each 0    Misc.  Devices (PEDAL EXERCISER) MISC 1 each by Does not apply route daily 1 each 0    albuterol sulfate  (90 Base) MCG/ACT inhaler Inhale 2 puffs into the lungs every 6 hours as needed for Wheezing 1 Inhaler 3    cyclobenzaprine (FLEXERIL) 10 MG tablet Take 1 tablet by mouth 2 times daily as needed (myalgias anf fibro) 60 tablet 11    omeprazole (PRILOSEC) 40 MG delayed release capsule Take 40 mg by mouth      polyethylene glycol (GLYCOLAX) 17 GM/SCOOP powder Take 17 g by mouth as needed      acetaminophen (APAP EXTRA STRENGTH) 500 MG tablet Take 2 tablets by mouth every 6 hours as needed for Pain 60 tablet 1    ipratropium-albuterol (DUONEB) 0.5-2.5 (3) MG/3ML SOLN nebulizer solution Inhale 3 mLs into the lungs every 4 hours as needed for Shortness of Breath 360 mL 0    venlafaxine (EFFEXOR XR) 75 MG extended release capsule TAKE 1 CAPSULE BY MOUTH TWICE DAILY  1    aspirin (ASPIRIN 81) 81 MG chewable tablet       mirtazapine (REMERON) 15 MG tablet Take 15 mg by mouth nightly       No current facility-administered medications for this visit. PMH, Surgical Hx, Family Hx, and Social Hx reviewed and updated. Health Maintenance reviewed. Objective    There were no vitals filed for this visit. Physical Exam  Constitutional:       Appearance: She is well-developed. Neck:      Vascular: No JVD. Neurological:      Mental Status: She is alert and oriented to person, place, and time. Psychiatric:         Mood and Affect: Mood normal.       Assessment & Plan   Diagnoses and all orders for this visit:    Attention deficit hyperactivity disorder (ADHD), predominantly inattentive type    Chronic bilateral low back pain with bilateral sciatica L>R  -     gabapentin (NEURONTIN) 600 MG tablet; Take 1 tablet by mouth 3 times daily for 30 days. Spinal stenosis of lumbar region with neurogenic claudication  -     gabapentin (NEURONTIN) 600 MG tablet; Take 1 tablet by mouth 3 times daily for 30 days. Fibromyalgia  -     gabapentin (NEURONTIN) 600 MG tablet; Take 1 tablet by mouth 3 times daily for 30 days. Anxiety  -     ALPRAZolam (XANAX) 1 MG tablet; Take 1 tablet by mouth 2 times daily as needed for Sleep or Anxiety for up to 30 days. Other orders  -     furosemide (LASIX) 20 MG tablet;  Take 1 tablet by mouth daily PRN      No orders of the defined types were placed in this encounter. Orders Placed This Encounter   Medications    gabapentin (NEURONTIN) 600 MG tablet     Sig: Take 1 tablet by mouth 3 times daily for 30 days. Dispense:  90 tablet     Refill:  2    furosemide (LASIX) 20 MG tablet     Sig: Take 1 tablet by mouth daily PRN     Dispense:  30 tablet     Refill:  0    ALPRAZolam (XANAX) 1 MG tablet     Sig: Take 1 tablet by mouth 2 times daily as needed for Sleep or Anxiety for up to 30 days. Dispense:  60 tablet     Refill:  2     Medications Discontinued During This Encounter   Medication Reason    furosemide (LASIX) 20 MG tablet REORDER    gabapentin (NEURONTIN) 600 MG tablet REORDER     No follow-ups on file. Reviewed with the patient: current clinical status, medications, activities and diet. Side effects, adverse effects of the medication prescribed today, as well as treatment plan/ rationale and result expectations have been discussed with the patient who expresses understanding and desires to proceed. Close follow up to evaluate treatment results and for coordination of care. I have reviewed the patient's medical history in detail and updated the computerized patient record.     Gio Redd, AMANDA - CNP

## 2022-02-09 ENCOUNTER — OFFICE VISIT (OUTPATIENT)
Dept: PAIN MANAGEMENT | Age: 67
End: 2022-02-09
Payer: MEDICARE

## 2022-02-09 VITALS
BODY MASS INDEX: 28.16 KG/M2 | TEMPERATURE: 96.9 F | DIASTOLIC BLOOD PRESSURE: 69 MMHG | WEIGHT: 153 LBS | HEIGHT: 62 IN | SYSTOLIC BLOOD PRESSURE: 158 MMHG

## 2022-02-09 DIAGNOSIS — M51.36 DDD (DEGENERATIVE DISC DISEASE), LUMBAR: ICD-10-CM

## 2022-02-09 DIAGNOSIS — M46.1 BILATERAL SACROILIITIS (HCC): Primary | ICD-10-CM

## 2022-02-09 PROCEDURE — 99213 OFFICE O/P EST LOW 20 MIN: CPT | Performed by: PAIN MEDICINE

## 2022-02-09 ASSESSMENT — ENCOUNTER SYMPTOMS
SHORTNESS OF BREATH: 1
NAUSEA: 0
BACK PAIN: 1
CONSTIPATION: 0
DIARRHEA: 0
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
## 2022-02-10 ENCOUNTER — TELEPHONE (OUTPATIENT)
Dept: PAIN MANAGEMENT | Age: 67
End: 2022-02-10

## 2022-02-10 NOTE — TELEPHONE ENCOUNTER
ORDER PLACED:    Date: 2/9/22  Description: [facility] GL[facility] JOINT INJECTION  Order Number: [id number]  Ordering Provider: [facility]  Performing Provider: [facility]  CPT Codes: 71899  ICD10 Codes: M46.1    SENT TO Adam Castillo TO [address] [place] [address]

## 2022-02-14 NOTE — TELEPHONE ENCOUNTER
AUTHORIZATION: CORRIE REDDY JOINT     INSURANCE: MARIA DEL CARMEN Rosario VIA: [facility] PROVIDER PORTAL  Marcia Romero #: [id number]    DATE RANGE: 2/16/2022 TO 3/1/2022    TELEPHONE CALL ROUTED TO MA TO SCHEDULE.

## 2022-02-16 ENCOUNTER — PROCEDURE VISIT (OUTPATIENT)
Dept: PAIN MANAGEMENT | Age: 67
End: 2022-02-16
Payer: MEDICARE

## 2022-02-16 DIAGNOSIS — M46.1 BILATERAL SACROILIITIS (HCC): ICD-10-CM

## 2022-02-16 PROCEDURE — 27096 INJECT SACROILIAC JOINT: CPT | Performed by: PAIN MEDICINE

## 2022-02-16 RX ORDER — BETAMETHASONE SODIUM PHOSPHATE AND BETAMETHASONE ACETATE 3; 3 MG/ML; MG/ML
12 INJECTION, SUSPENSION INTRA-ARTICULAR; INTRALESIONAL; INTRAMUSCULAR; SOFT TISSUE ONCE
Status: COMPLETED | OUTPATIENT
Start: 2022-02-16 | End: 2022-02-16

## 2022-02-16 RX ADMIN — BETAMETHASONE SODIUM PHOSPHATE AND BETAMETHASONE ACETATE 12 MG: 3; 3 INJECTION, SUSPENSION INTRA-ARTICULAR; INTRALESIONAL; INTRAMUSCULAR; SOFT TISSUE at 12:07

## 2022-02-16 NOTE — PROGRESS NOTES
[facility]) Physicians  Neurosurgery and Pain [address] [facility] Sara [address]: [phone]  F: [phone]      Patient Name: Everette Burkitt  : 1955     Date:  2022      Physician: Nuno Heller MD        Everette Burkitt is here today for interventional pain management. Preoperatively, the patient presents with SI joint mediated pain, as per history and exam. Patient has failed NSAIDs, PT, and conservative treatment. Patient has significant psychological and functional impairment due to this condition. Discussed the risks including but not limited to bleeding, infection, worsened pain, damage to surrounding structures, side effects, toxicity, allergic reactions to medications used, need for surgery, abdominal and visceral injury, as well as catastrophic injury such as vision loss, paralysis, spinal cord or plexus injury, stroke, bowel or bladder incontinence, chest tube insertion, ventilator dependence, and death. Discussed the risks, benefits, and alternatives to the procedure including no procedure at all. Discussed that we cannot undo any permanent neurologic or orthopaedic damage or change the course of any underlying disease. After thorough discussion, patient expressed understanding and willingness to proceed. Written consent was obtained and is in the chart. Verbal consent to proceed was obtained. Standard ASIPP guidelines were followed and sterile technique used. Area was cleaned with Betadine x3. Informed consent was obtained. Fluoroscopic guidance was used for this procedure. S.I. JOINT:  Right  Appropriate length spinal needle was advanced to the S.I. Joint. Negative aspiration was achieved. In total, approximately 6mg of Betamethasone and 1ml of 1% preservative free Lidocaine was injected without difficulty. Patient tolerated the procedure well, no obvious complications occurred during the procedure.   Patient was appropriately monitored and discharged home in stable condition with their usual motor strength. Post Op Instructions were given to patient. Relevant and recent imaging reviewed with patient today.             -------------------    S. I. JOINT:  Left  Appropriate length spinal needle was advanced to the S.I. Joint. Negative aspiration was achieved. In total, approximately 6mg of Betamethasone and 1ml of 1% preservative free Lidocaine was injected without difficulty. Patient tolerated the procedure well, no obvious complications occurred during the procedure. Patient was appropriately monitored and discharged home in stable condition with their usual motor strength. Post Op Instructions were given to patient. Relevant and recent imaging reviewed with patient today.                                 Zuly Uribe MD

## 2022-02-18 DIAGNOSIS — M79.7 FIBROMYALGIA: ICD-10-CM

## 2022-02-18 RX ORDER — CYCLOBENZAPRINE HCL 10 MG
TABLET ORAL
Qty: 60 TABLET | Refills: 11 | Status: SHIPPED | OUTPATIENT
Start: 2022-02-18 | End: 2022-05-17 | Stop reason: SDUPTHER

## 2022-03-15 ENCOUNTER — NURSE TRIAGE (OUTPATIENT)
Dept: OTHER | Facility: CLINIC | Age: 67
End: 2022-03-15

## 2022-03-15 NOTE — TELEPHONE ENCOUNTER
Received call from [facility] at [facility] with Red Flag Complaint. Subjective: Caller states \"Abdomen and back pain\"     Current Symptoms: C/O ongoing upper abd and back pain. Seen in ED for constipation recently, stopped taking medications prescribed after she reports she had a normal BM. Symptoms have returned, no vomiting or diarrhea. Reports she feels \"full\" with \"lung pain\" making it difficult to take deep breaths or eat. No fever. Has GI but is requesting to see PCP as she is not wanting an endoscopy or colonoscopy done. Onset: Over a month - recurred 1 week ago; unchanged    Associated Symptoms: reduced activity, reduced appetite    Pain Severity: 7/10; sharp, pressure; constant    Temperature: Denies    What has been tried: Miralex    LMP: NA Pregnant: NA    Recommended disposition: See in Office Today or Tomorrow    Care advice provided, patient verbalizes understanding; denies any other questions or concerns; instructed to call back for any new or worsening symptoms. Patient/Caller agrees with recommended disposition; writer provided warm transfer to [employer] [facility] at [facility] for appointment scheduling     Attention Provider: Thank you for allowing me to participate in the care of your patient. The patient was connected to triage in response to information provided to the ECC/PSC. Please do not respond through this encounter as the response is not directed to a shared pool.         Reason for Disposition   MILD pain (e.g., does not interfere with normal activities) and pain comes and goes (cramps) lasts > 48 hours (Exception: this same abdominal pain is a chronic symptom recurrent or ongoing AND present > 4 weeks)    Protocols used: ABDOMINAL PAIN - [facility]

## 2022-04-08 DIAGNOSIS — F90.0 ATTENTION DEFICIT HYPERACTIVITY DISORDER (ADHD), PREDOMINANTLY INATTENTIVE TYPE: ICD-10-CM

## 2022-04-11 RX ORDER — DEXTROAMPHETAMINE SACCHARATE, AMPHETAMINE ASPARTATE MONOHYDRATE, DEXTROAMPHETAMINE SULFATE AND AMPHETAMINE SULFATE 7.5; 7.5; 7.5; 7.5 MG/1; MG/1; MG/1; MG/1
30 CAPSULE, EXTENDED RELEASE ORAL EVERY MORNING
Qty: 30 CAPSULE | Refills: 0 | Status: SHIPPED | OUTPATIENT
Start: 2022-04-11 | End: 2022-05-09 | Stop reason: SDUPTHER

## 2022-04-16 ENCOUNTER — HOSPITAL ENCOUNTER (EMERGENCY)
Age: 67
Discharge: HOME | End: 2022-04-16
Attending: EMERGENCY MEDICINE
Payer: MEDICARE

## 2022-04-16 ENCOUNTER — APPOINTMENT (OUTPATIENT)
Dept: GENERAL RADIOLOGY | Age: 67
End: 2022-04-16
Payer: MEDICARE

## 2022-04-16 VITALS
SYSTOLIC BLOOD PRESSURE: 112 MMHG | OXYGEN SATURATION: 98 % | WEIGHT: 153 LBS | TEMPERATURE: 98 F | BODY MASS INDEX: 28.16 KG/M2 | HEART RATE: 71 BPM | HEIGHT: 62 IN | DIASTOLIC BLOOD PRESSURE: 47 MMHG | RESPIRATION RATE: 18 BRPM

## 2022-04-16 DIAGNOSIS — J44.1 COPD EXACERBATION (HCC): Primary | ICD-10-CM

## 2022-04-16 LAB
ANION GAP SERPL CALCULATED.3IONS-SCNC: 14 MEQ/L (ref 9–15)
BASOPHILS ABSOLUTE: 0.1 K/UL (ref 0–0.1)
BASOPHILS RELATIVE PERCENT: 0.6 % (ref 0.1–1.2)
BUN BLDV-MCNC: 10 MG/DL (ref 8–23)
CALCIUM SERPL-MCNC: 8.8 MG/DL (ref 8.5–9.9)
CHLORIDE BLD-SCNC: 106 MEQ/L (ref 95–107)
CO2: 22 MEQ/L (ref 20–31)
CREAT SERPL-MCNC: 0.79 MG/DL (ref 0.5–0.9)
EOSINOPHILS ABSOLUTE: 0.1 K/UL (ref 0–0.4)
EOSINOPHILS RELATIVE PERCENT: 1.2 % (ref 0.7–5.8)
GFR AFRICAN AMERICAN: >60
GFR NON-AFRICAN AMERICAN: >60
GLUCOSE BLD-MCNC: 100 MG/DL (ref 70–99)
HCT VFR BLD CALC: 42.3 % (ref 37–47)
HEMOGLOBIN: 13.8 G/DL (ref 11.2–15.7)
IMMATURE GRANULOCYTES #: 0 K/UL
IMMATURE GRANULOCYTES %: 0.3 %
INFLUENZA A BY PCR: NEGATIVE
INFLUENZA B BY PCR: NEGATIVE
LYMPHOCYTES ABSOLUTE: 2 K/UL (ref 1.2–3.7)
LYMPHOCYTES RELATIVE PERCENT: 26.1 %
MCH RBC QN AUTO: 30.9 PG (ref 25.6–32.2)
MCHC RBC AUTO-ENTMCNC: 32.6 % (ref 32.2–35.5)
MCV RBC AUTO: 94.8 FL (ref 79.4–94.8)
MONOCYTES ABSOLUTE: 0.4 K/UL (ref 0.2–0.9)
MONOCYTES RELATIVE PERCENT: 5.6 % (ref 4.7–12.5)
NEUTROPHILS ABSOLUTE: 5.1 K/UL (ref 1.6–6.1)
NEUTROPHILS RELATIVE PERCENT: 66.2 % (ref 34–71.1)
PDW BLD-RTO: 14.4 % (ref 11.7–14.4)
PLATELET # BLD: 305 K/UL (ref 182–369)
POTASSIUM SERPL-SCNC: 4.2 MEQ/L (ref 3.4–4.9)
RBC # BLD: 4.46 M/UL (ref 3.93–5.22)
SARS-COV-2, NAAT: NOT DETECTED
SODIUM BLD-SCNC: 142 MEQ/L (ref 135–144)
STREP GRP A PCR: NEGATIVE
WBC # BLD: 7.7 K/UL (ref 4–10)

## 2022-04-16 PROCEDURE — 85025 COMPLETE CBC W/AUTO DIFF WBC: CPT

## 2022-04-16 PROCEDURE — 87651 STREP A DNA AMP PROBE: CPT

## 2022-04-16 PROCEDURE — 87635 SARS-COV-2 COVID-19 AMP PRB: CPT

## 2022-04-16 PROCEDURE — 99283 EMERGENCY DEPT VISIT LOW MDM: CPT

## 2022-04-16 PROCEDURE — 87502 INFLUENZA DNA AMP PROBE: CPT

## 2022-04-16 PROCEDURE — 96374 THER/PROPH/DIAG INJ IV PUSH: CPT

## 2022-04-16 PROCEDURE — 6370000000 HC RX 637 (ALT 250 FOR IP): Performed by: EMERGENCY MEDICINE

## 2022-04-16 PROCEDURE — 2580000003 HC RX 258: Performed by: EMERGENCY MEDICINE

## 2022-04-16 PROCEDURE — 80048 BASIC METABOLIC PNL TOTAL CA: CPT

## 2022-04-16 PROCEDURE — 93005 ELECTROCARDIOGRAM TRACING: CPT

## 2022-04-16 PROCEDURE — 71046 X-RAY EXAM CHEST 2 VIEWS: CPT

## 2022-04-16 PROCEDURE — 6360000002 HC RX W HCPCS: Performed by: EMERGENCY MEDICINE

## 2022-04-16 PROCEDURE — 36415 COLL VENOUS BLD VENIPUNCTURE: CPT

## 2022-04-16 PROCEDURE — 94640 AIRWAY INHALATION TREATMENT: CPT

## 2022-04-16 RX ORDER — IPRATROPIUM BROMIDE AND ALBUTEROL SULFATE 2.5; .5 MG/3ML; MG/3ML
1 SOLUTION RESPIRATORY (INHALATION)
Status: COMPLETED | OUTPATIENT
Start: 2022-04-16 | End: 2022-04-16

## 2022-04-16 RX ORDER — AZITHROMYCIN 250 MG/1
TABLET, FILM COATED ORAL
Qty: 1 PACKET | Refills: 0 | Status: SHIPPED | OUTPATIENT
Start: 2022-04-16 | End: 2022-04-20

## 2022-04-16 RX ORDER — SODIUM CHLORIDE 0.9 % (FLUSH) 0.9 %
3 SYRINGE (ML) INJECTION EVERY 8 HOURS
Status: DISCONTINUED | OUTPATIENT
Start: 2022-04-16 | End: 2022-04-16 | Stop reason: HOSPADM

## 2022-04-16 RX ORDER — 0.9 % SODIUM CHLORIDE 0.9 %
500 INTRAVENOUS SOLUTION INTRAVENOUS ONCE
Status: COMPLETED | OUTPATIENT
Start: 2022-04-16 | End: 2022-04-16

## 2022-04-16 RX ORDER — PREDNISONE 20 MG/1
60 TABLET ORAL DAILY
Qty: 15 TABLET | Refills: 0 | Status: SHIPPED | OUTPATIENT
Start: 2022-04-16 | End: 2022-04-21

## 2022-04-16 RX ORDER — METHYLPREDNISOLONE SODIUM SUCCINATE 125 MG/2ML
125 INJECTION, POWDER, LYOPHILIZED, FOR SOLUTION INTRAMUSCULAR; INTRAVENOUS ONCE
Status: COMPLETED | OUTPATIENT
Start: 2022-04-16 | End: 2022-04-16

## 2022-04-16 RX ADMIN — SODIUM CHLORIDE 500 ML: 9 INJECTION, SOLUTION INTRAVENOUS at 18:04

## 2022-04-16 RX ADMIN — SODIUM CHLORIDE, PRESERVATIVE FREE 3 ML: 5 INJECTION INTRAVENOUS at 18:08

## 2022-04-16 RX ADMIN — IPRATROPIUM BROMIDE AND ALBUTEROL SULFATE 1 AMPULE: 2.5; .5 SOLUTION RESPIRATORY (INHALATION) at 18:44

## 2022-04-16 RX ADMIN — METHYLPREDNISOLONE SODIUM SUCCINATE 125 MG: 125 INJECTION, POWDER, FOR SOLUTION INTRAMUSCULAR; INTRAVENOUS at 18:03

## 2022-04-16 ASSESSMENT — ENCOUNTER SYMPTOMS
ABDOMINAL PAIN: 0
SHORTNESS OF BREATH: 1
BACK PAIN: 0
SORE THROAT: 0
EYE DISCHARGE: 0
EYE REDNESS: 0
WHEEZING: 1
COUGH: 1
VOMITING: 0
NAUSEA: 0

## 2022-04-16 ASSESSMENT — PAIN DESCRIPTION - FREQUENCY: FREQUENCY: CONTINUOUS

## 2022-04-16 ASSESSMENT — PAIN DESCRIPTION - ORIENTATION: ORIENTATION: MID

## 2022-04-16 ASSESSMENT — PAIN DESCRIPTION - LOCATION: LOCATION: THROAT

## 2022-04-16 ASSESSMENT — PAIN DESCRIPTION - DESCRIPTORS: DESCRIPTORS: SORE

## 2022-04-16 NOTE — ED NOTES
Pt is given d/c instructions and two scripts. Pt voiced understanding of d/c instructions without further questions.       Sarah Velazquez RN  04/16/22 5962

## 2022-04-16 NOTE — ED PROVIDER NOTES
[facility] ED  EMERGENCY DEPARTMENT ENCOUNTER      Pt Name: Fede Arevalo  MRN: [id number]  [place] 1955  Date of evaluation: 4/16/2022  Provider: Michael Patterson DO        HISTORY OF PRESENT ILLNESS    Fede Arevalo is a 78 y.o. female per chart review has ah/o adhd, anxiety, depression, fibromyalgia, GERD, HTN, IBS. She presents with SOB. The history is provided by the patient. Shortness of Breath  Severity:  Moderate  Onset quality:  Sudden  Timing:  Constant  Progression:  Worsening  Chronicity:  New  Context: URI and weather changes    Relieved by:  Nothing  Worsened by:  Nothing  Ineffective treatments:  None tried  Associated symptoms: cough and wheezing    Associated symptoms: no abdominal pain, no chest pain, no ear pain, no fever, no neck pain, no rash, no sore throat and no vomiting    Risk factors: tobacco use             REVIEW OF SYSTEMS       Review of Systems   Constitutional: Negative for chills and fever. HENT: Negative for ear pain and sore throat. Eyes: Negative for discharge and redness. Respiratory: Positive for cough, shortness of breath and wheezing. Cardiovascular: Negative for chest pain and palpitations. Gastrointestinal: Negative for abdominal pain, nausea and vomiting. Genitourinary: Negative for difficulty urinating and dysuria. Musculoskeletal: Negative for back pain and neck pain. Skin: Negative for rash and wound. Neurological: Negative for dizziness and syncope. Psychiatric/Behavioral: Negative for confusion. The patient is not nervous/anxious. All other systems reviewed and are negative. Except as noted above the remainder of the review of systems was reviewed and negative.        PAST MEDICAL HISTORY     Past Medical History:   Diagnosis Date    ADHD (attention deficit hyperactivity disorder)     Anxiety     Depression     Fibromyalgia     GERD (gastroesophageal reflux disease)     Hypertension     Irritable bowel syndrome SURGICAL HISTORY       Past Surgical History:   Procedure Laterality Date     SECTION      COLONOSCOPY      [facility]  [place] Breech MD    DILATION AND CURETTAGE OF UTERUS N/A 2020    DIAGNOSTIC LAPAROSCOPY, LYSIS OF ADHESIONS, HYSTEROSCOPY WITH MYOSURE, CYSTOSCOPY performed by Gabi Espinosa MD at [address]       Previous Medications    ACETAMINOPHEN (APAP EXTRA STRENGTH) 500 MG TABLET    Take 2 tablets by mouth every 6 hours as needed for Pain    ALBUTEROL SULFATE  (90 BASE) MCG/ACT INHALER    Inhale 2 puffs into the lungs every 6 hours as needed for Wheezing    AMPHETAMINE-DEXTROAMPHETAMINE (ADDERALL XR) 30 MG EXTENDED RELEASE CAPSULE    Take 1 capsule by mouth every morning for 30 days. ARIPIPRAZOLE (ABILIFY) 10 MG TABLET    TAKE 1 TABLET DAILY IN THE MORNING    ASPIRIN (ASPIRIN 81) 81 MG CHEWABLE TABLET        ATORVASTATIN (LIPITOR) 80 MG TABLET    Take 1 tablet by mouth daily    CYCLOBENZAPRINE (FLEXERIL) 10 MG TABLET    TAKE 1 TABLET BY MOUTH TWICE DAILY AS NEEDED    FLUTICASONE-UMECLIDIN-VILANT (TRELEGY ELLIPTA) 100-62.5-25 MCG/INH AEPB    Inhale 1 puff into the lungs daily    FUROSEMIDE (LASIX) 20 MG TABLET    Take 1 tablet by mouth daily PRN    GABAPENTIN (NEURONTIN) 600 MG TABLET    Take 1 tablet by mouth 3 times daily for 30 days. HANDICAP PLACARD MISC    by Does not apply route Duration 5 years    IPRATROPIUM-ALBUTEROL (DUONEB) 0.5-2.5 (3) MG/3ML SOLN NEBULIZER SOLUTION    Inhale 3 mLs into the lungs every 4 hours as needed for Shortness of Breath    MIRTAZAPINE (REMERON) 15 MG TABLET    Take 15 mg by mouth nightly    MISC.  DEVICES (PEDAL EXERCISER) MISC    1 each by Does not apply route daily    OMEPRAZOLE (PRILOSEC) 40 MG DELAYED RELEASE CAPSULE    Take 40 mg by mouth    POLYETHYLENE GLYCOL (GLYCOLAX) 17 GM/SCOOP POWDER    Take 17 g by mouth as needed    PREDNISONE (DELTASONE) 5 MG TABLET    Take 1 tablet by mouth daily    PROCHLORPERAZINE (COMPAZINE) 10 MG TABLET    Take 1 tablet by mouth every 8 hours as needed (NAUSEA)    VENLAFAXINE (EFFEXOR XR) 75 MG EXTENDED RELEASE CAPSULE    TAKE 1 CAPSULE BY MOUTH TWICE DAILY    VITAMIN D (ERGOCALCIFEROL) 1.25 MG (03492 UT) CAPS CAPSULE    TAKE 1 CAPSULE BY MOUTH once per week AS DIRECTED       ALLERGIES     Patient has no known allergies. FAMILY HISTORY       Family History   Problem Relation Age of Onset    Breast Cancer Maternal Aunt           SOCIAL HISTORY       Social History     Socioeconomic History    Marital status:      Spouse name: None    Number of children: None    Years of education: None    Highest education level: None   Occupational History    None   Tobacco Use    Smoking status: Current Every Day Smoker     Packs/day: 0.25     Years: 10.00     Pack years: 2.50     Types: Cigarettes     Last attempt to quit: 2019     Years since quittin.7    Smokeless tobacco: Never Used   Vaping Use    Vaping Use: Never used   Substance and Sexual Activity    Alcohol use: Never    Drug use: Never    Sexual activity: None   Other Topics Concern    None   Social History Narrative    None     Social Determinants of Health     Financial Resource Strain:     Difficulty of Paying Living Expenses: Not on file   Food Insecurity:     Worried About Running Out of Food in the Last Year: Not on file    Jerri of Food in the Last Year: Not on file   Transportation Needs:     Lack of Transportation (Medical): Not on file    Lack of Transportation (Non-Medical):  Not on file   Physical Activity:     Days of Exercise per Week: Not on file    Minutes of Exercise per Session: Not on file   Stress:     Feeling of Stress : Not on file   Social Connections:     Frequency of Communication with Friends and Family: Not on file    Frequency of Social Gatherings with Friends and Family: Not on file    Attends Congregational Services: Not on file    Active Member of Clubs or Organizations: Not on file    Attends Club or Organization Meetings: Not on file    Marital Status: Not on file   Intimate Partner Violence:     Fear of Current or Ex-Partner: Not on file    Emotionally Abused: Not on file    Physically Abused: Not on file    Sexually Abused: Not on file   Housing Stability:     Unable to Pay for Housing in the Last Year: Not on file    Number of [place] in the Last Year: Not on file    Unstable Housing in the Last Year: Not on file         PHYSICAL EXAM       ED Triage Vitals [04/16/22 1731]   BP Temp Temp Source Pulse Resp SpO2 Height Weight   130/80 98.1 °F (36.7 °C) Oral 84 18 95 % 5' 2\" (1.575 m) 153 lb (69.4 kg)       Physical Exam  Vitals and nursing note reviewed. Constitutional:       Appearance: Normal appearance. HENT:      Head: Normocephalic and atraumatic. Right Ear: Tympanic membrane normal.      Left Ear: Tympanic membrane normal.      Nose: Nose normal.      Mouth/Throat:      Mouth: Mucous membranes are moist.      Pharynx: Oropharynx is clear. Eyes:      General: Lids are normal.      Extraocular Movements: Extraocular movements intact. Conjunctiva/sclera: Conjunctivae normal.      Pupils: Pupils are equal, round, and reactive to light. Cardiovascular:      Rate and Rhythm: Normal rate and regular rhythm. Pulses: Normal pulses. Heart sounds: Normal heart sounds. Pulmonary:      Effort: Pulmonary effort is normal.      Breath sounds: Examination of the right-lower field reveals wheezing. Examination of the left-lower field reveals wheezing. Wheezing present. Abdominal:      General: Abdomen is flat. Bowel sounds are normal.      Palpations: Abdomen is soft. Musculoskeletal:         General: Normal range of motion. Cervical back: Full passive range of motion without pain, normal range of motion and neck supple. Skin:     General: Skin is warm. Capillary Refill: Capillary refill takes less than 2 seconds.    Neurological: General: No focal deficit present. Mental Status: She is alert and oriented to person, place, and time. Deep Tendon Reflexes: Reflexes are normal and symmetric. Psychiatric:         Attention and Perception: Attention and perception normal.         Mood and Affect: Mood normal.         Behavior: Behavior normal. Behavior is cooperative. LABS:  Labs Reviewed   BASIC METABOLIC PANEL - Abnormal; Notable for the following components:       Result Value    Glucose 100 (*)     All other components within normal limits   COVID-19, RAPID   RAPID INFLUENZA A/B ANTIGENS   RAPID STREP SCREEN   CBC WITH AUTO DIFFERENTIAL         MDM:   Vitals:    Vitals:    04/16/22 1731 04/16/22 1848   BP: 130/80 (!) 112/47   Pulse: 84 71   Resp: 18 18   Temp: 98.1 °F (36.7 °C) 98 °F (36.7 °C)   TempSrc: Oral Oral   SpO2: 95% 98%   Weight: 153 lb (69.4 kg)    Height: 5' 2\" (1.575 m)        MDM  Number of Diagnoses or Management Options  COPD exacerbation ([facility] [address]  Diagnosis management comments: Patient presents with cough and congestion. Rapid covid, rapid flu and rapid strept ordered. Her labs were negative. She will be discharged home. She was given a duo neb. Solumedrol 125mg IV. She feels much better. CXR: COPD  She will be discharged home. She will be given Rx for zpack and predisone. She will follow up in 2 days with her primary care doctor.         Amount and/or Complexity of Data Reviewed  Clinical lab tests: ordered and reviewed  Tests in the radiology section of CPT®: ordered and reviewed         XR CHEST (2 VW)    (Results Pending)         EKG Interpretation    Interpreted by emergency department physician    Rhythm: normal sinus   Rate: normal  Axis: normal  Ectopy: none  Conduction: normal  ST Segments: nonspecific changes  T Waves: non specific changes  Q Waves: none    Clinical Impression: non-specific EKG    KARLENE ACUNA DO     The lab results, radiology and test results were reviewed with the patient and family. The patient will follow up in 2 days with their primary care doctor. If their symptoms change or get worse they will return to the ER. CRITICAL CARE TIME   Total CriticalCare time was 0 minutes, excluding separately reportable procedures. There was a high probability of clinically significant/life threatening deterioration in the patient's condition which required my urgent intervention.         PROCEDURES:  Unlessotherwise noted below, none     Procedures      FINAL IMPRESSION      1. COPD exacerbation [facility])          DISPOSITION/PLAN   DISPOSITION Decision To Discharge 04/16/2022 07:00:52 PM          KARLENE Singer DO (electronically signed)  Attending Emergency Physician          Epifanio Andrade DO  04/16/22 9849

## 2022-04-16 NOTE — ED TRIAGE NOTES
Pt arrives to ED, from home, via personal vehicle. Pt presents with SOB times two weeks and sore throat and cough,  times one week.

## 2022-04-17 LAB
EKG ATRIAL RATE: 79 BPM
EKG P AXIS: 43 DEGREES
EKG P-R INTERVAL: 136 MS
EKG Q-T INTERVAL: 390 MS
EKG QRS DURATION: 74 MS
EKG QTC CALCULATION (BAZETT): 447 MS
EKG R AXIS: 34 DEGREES
EKG T AXIS: 56 DEGREES
EKG VENTRICULAR RATE: 79 BPM

## 2022-05-09 ENCOUNTER — OFFICE VISIT (OUTPATIENT)
Dept: FAMILY MEDICINE CLINIC | Age: 67
End: 2022-05-09
Payer: MEDICARE

## 2022-05-09 VITALS
HEIGHT: 62 IN | HEART RATE: 95 BPM | WEIGHT: 170 LBS | RESPIRATION RATE: 18 BRPM | DIASTOLIC BLOOD PRESSURE: 80 MMHG | OXYGEN SATURATION: 91 % | BODY MASS INDEX: 31.28 KG/M2 | SYSTOLIC BLOOD PRESSURE: 126 MMHG

## 2022-05-09 DIAGNOSIS — M79.7 FIBROMYALGIA: ICD-10-CM

## 2022-05-09 DIAGNOSIS — Z72.0 TOBACCO USE: ICD-10-CM

## 2022-05-09 DIAGNOSIS — F90.0 ATTENTION DEFICIT HYPERACTIVITY DISORDER (ADHD), PREDOMINANTLY INATTENTIVE TYPE: ICD-10-CM

## 2022-05-09 DIAGNOSIS — M54.42 CHRONIC BILATERAL LOW BACK PAIN WITH BILATERAL SCIATICA: ICD-10-CM

## 2022-05-09 DIAGNOSIS — M54.41 CHRONIC BILATERAL LOW BACK PAIN WITH BILATERAL SCIATICA: ICD-10-CM

## 2022-05-09 DIAGNOSIS — I63.10 CEREBROVASCULAR ACCIDENT (CVA) DUE TO EMBOLISM OF PRECEREBRAL ARTERY (HCC): ICD-10-CM

## 2022-05-09 DIAGNOSIS — Z00.00 MEDICARE ANNUAL WELLNESS VISIT, SUBSEQUENT: Primary | ICD-10-CM

## 2022-05-09 DIAGNOSIS — M48.062 SPINAL STENOSIS OF LUMBAR REGION WITH NEUROGENIC CLAUDICATION: ICD-10-CM

## 2022-05-09 DIAGNOSIS — G89.4 CHRONIC PAIN SYNDROME: ICD-10-CM

## 2022-05-09 DIAGNOSIS — G89.29 CHRONIC BILATERAL LOW BACK PAIN WITH BILATERAL SCIATICA: ICD-10-CM

## 2022-05-09 DIAGNOSIS — F41.9 ANXIETY: Primary | ICD-10-CM

## 2022-05-09 DIAGNOSIS — J44.9 CHRONIC OBSTRUCTIVE PULMONARY DISEASE, UNSPECIFIED COPD TYPE (HCC): ICD-10-CM

## 2022-05-09 PROCEDURE — 3023F SPIROM DOC REV: CPT | Performed by: NURSE PRACTITIONER

## 2022-05-09 PROCEDURE — 99214 OFFICE O/P EST MOD 30 MIN: CPT | Performed by: NURSE PRACTITIONER

## 2022-05-09 PROCEDURE — G8399 PT W/DXA RESULTS DOCUMENT: HCPCS | Performed by: NURSE PRACTITIONER

## 2022-05-09 PROCEDURE — 1090F PRES/ABSN URINE INCON ASSESS: CPT | Performed by: NURSE PRACTITIONER

## 2022-05-09 PROCEDURE — G0439 PPPS, SUBSEQ VISIT: HCPCS | Performed by: NURSE PRACTITIONER

## 2022-05-09 PROCEDURE — G8417 CALC BMI ABV UP PARAM F/U: HCPCS | Performed by: NURSE PRACTITIONER

## 2022-05-09 PROCEDURE — 3017F COLORECTAL CA SCREEN DOC REV: CPT | Performed by: NURSE PRACTITIONER

## 2022-05-09 PROCEDURE — 1123F ACP DISCUSS/DSCN MKR DOCD: CPT | Performed by: NURSE PRACTITIONER

## 2022-05-09 PROCEDURE — 4004F PT TOBACCO SCREEN RCVD TLK: CPT | Performed by: NURSE PRACTITIONER

## 2022-05-09 PROCEDURE — G8427 DOCREV CUR MEDS BY ELIG CLIN: HCPCS | Performed by: NURSE PRACTITIONER

## 2022-05-09 PROCEDURE — 4040F PNEUMOC VAC/ADMIN/RCVD: CPT | Performed by: NURSE PRACTITIONER

## 2022-05-09 RX ORDER — ALPRAZOLAM 1 MG/1
TABLET ORAL
Qty: 60 TABLET | Refills: 2 | Status: SHIPPED | OUTPATIENT
Start: 2022-05-09 | End: 2022-05-19 | Stop reason: SDUPTHER

## 2022-05-09 RX ORDER — DEXTROAMPHETAMINE SACCHARATE, AMPHETAMINE ASPARTATE MONOHYDRATE, DEXTROAMPHETAMINE SULFATE AND AMPHETAMINE SULFATE 7.5; 7.5; 7.5; 7.5 MG/1; MG/1; MG/1; MG/1
30 CAPSULE, EXTENDED RELEASE ORAL EVERY MORNING
Qty: 30 CAPSULE | Refills: 0 | Status: SHIPPED | OUTPATIENT
Start: 2022-05-09 | End: 2022-06-09 | Stop reason: SDUPTHER

## 2022-05-09 RX ORDER — ALPRAZOLAM 1 MG/1
TABLET ORAL
COMMUNITY
Start: 2022-04-25 | End: 2022-05-09 | Stop reason: SDUPTHER

## 2022-05-09 RX ORDER — BUPRENORPHINE 15 UG/H
1 PATCH TRANSDERMAL WEEKLY
Qty: 4 PATCH | Refills: 2 | Status: SHIPPED | OUTPATIENT
Start: 2022-05-09 | End: 2022-07-07 | Stop reason: SDUPTHER

## 2022-05-09 SDOH — ECONOMIC STABILITY: FOOD INSECURITY: WITHIN THE PAST 12 MONTHS, YOU WORRIED THAT YOUR FOOD WOULD RUN OUT BEFORE YOU GOT MONEY TO BUY MORE.: NEVER TRUE

## 2022-05-09 SDOH — ECONOMIC STABILITY: FOOD INSECURITY: WITHIN THE PAST 12 MONTHS, THE FOOD YOU BOUGHT JUST DIDN'T LAST AND YOU DIDN'T HAVE MONEY TO GET MORE.: NEVER TRUE

## 2022-05-09 ASSESSMENT — PATIENT HEALTH QUESTIONNAIRE - PHQ9
SUM OF ALL RESPONSES TO PHQ QUESTIONS 1-9: 2
SUM OF ALL RESPONSES TO PHQ9 QUESTIONS 1 & 2: 2
1. LITTLE INTEREST OR PLEASURE IN DOING THINGS: 1
SUM OF ALL RESPONSES TO PHQ QUESTIONS 1-9: 2
2. FEELING DOWN, DEPRESSED OR HOPELESS: 1

## 2022-05-09 ASSESSMENT — ENCOUNTER SYMPTOMS
COLOR CHANGE: 0
DIARRHEA: 0
RECTAL PAIN: 0
SHORTNESS OF BREATH: 0
RHINORRHEA: 0
SORE THROAT: 0
HEARTBURN: 0
COUGH: 1
VOICE CHANGE: 0
BLOOD IN STOOL: 0
TROUBLE SWALLOWING: 0
ALLERGIC/IMMUNOLOGIC NEGATIVE: 1
ABDOMINAL PAIN: 0
GASTROINTESTINAL NEGATIVE: 1
CONSTIPATION: 0
ANAL BLEEDING: 0
EYES NEGATIVE: 1

## 2022-05-09 ASSESSMENT — LIFESTYLE VARIABLES: HOW OFTEN DO YOU HAVE A DRINK CONTAINING ALCOHOL: NEVER

## 2022-05-09 ASSESSMENT — COPD QUESTIONNAIRES: COPD: 1

## 2022-05-09 ASSESSMENT — SOCIAL DETERMINANTS OF HEALTH (SDOH): HOW HARD IS IT FOR YOU TO PAY FOR THE VERY BASICS LIKE FOOD, HOUSING, MEDICAL CARE, AND HEATING?: NOT HARD AT ALL

## 2022-05-09 NOTE — PROGRESS NOTES
Medicare Annual Wellness Visit    Jorge Pickard is here for No chief complaint on file. Assessment & Plan   Medicare annual wellness visit, subsequent      Recommendations for Preventive Services Due: see orders and patient instructions/AVS.  Recommended screening schedule for the next 5-10 years is provided to the patient in written form: see Patient Instructions/AVS.     Return for Medicare Annual Wellness Visit in 1 year. Subjective   The following acute and/or chronic problems were also addressed today:      Patient's complete Health Risk Assessment and screening values have been reviewed and are found in Flowsheets. The following problems were reviewed today and where indicated follow up appointments were made and/or referrals ordered.     Positive Risk Factor Screenings with Interventions:    Fall Risk:  Do you feel unsteady or are you worried about falling? : (!) yes  2 or more falls in past year?: (!) yes  Fall with injury in past year?: no     Fall Risk Interventions:    · Home safety tips provided      Tobacco Use:     Tobacco Use: High Risk    Smoking Tobacco Use: Current Every Day Smoker    Smokeless Tobacco Use: Never Used     E-Cigarettes/Vaping Use     Questions Responses    E-Cigarette/Vaping Use Never User    Start Date     Passive Exposure     Quit Date     Counseling Given     Comments Unknown        Substance Use - Tobacco Interventions:  tobacco cessation tips and resources provided         General Health and ACP:  General  In general, how would you say your health is?: Fair  In the past 7 days, have you experienced any of the following: New or Increased Pain, New or Increased Fatigue, Loneliness, Social Isolation, Stress or Anger?: No  Do you get the social and emotional support that you need?: Yes  Do you have a Living Will?: (!) No    Advance Directives     Power of  Living Will ACP-Advance Directive ACP-Power of     Not on File Not on File Not on File Not on File General Health Risk Interventions:  · No Living Will: Patient referred to [place] Habits/Nutrition:     Physical Activity: Insufficiently Active    Days of Exercise per Week: 2 days    Minutes of Exercise per Session: 10 min     Have you lost any weight without trying in the past 3 months?: No     Have you seen the dentist within the past year?: (!) No    Health Habits/Nutrition Interventions:  · Dental exam overdue:  patient encouraged to make appointment with his/her dentist             Objective   There were no vitals filed for this visit. There is no height or weight on file to calculate BMI. General Appearance: alert and oriented to person, place and time, well developed and well- nourished, in no acute distress  Skin: warm and dry, no rash or erythema  Head: normocephalic and atraumatic  Eyes: pupils equal, round, and reactive to light, extraocular eye movements intact, conjunctivae normal  ENT: tympanic membrane, external ear and ear canal normal bilaterally, nose without deformity, nasal mucosa and turbinates normal without polyps  Neck: supple and non-tender without mass, no thyromegaly or thyroid nodules, no cervical lymphadenopathy  Pulmonary/Chest: clear to auscultation bilaterally- no wheezes, rales or rhonchi, normal air movement, no respiratory distress  Cardiovascular: normal rate, regular rhythm, normal S1 and S2, no murmurs, rubs, clicks, or gallops, distal pulses intact, no carotid bruits  Abdomen: soft, non-tender, non-distended, normal bowel sounds, no masses or organomegaly  Extremities: no cyanosis, clubbing or edema  Musculoskeletal: normal range of motion, no joint swelling, deformity or tenderness  Neurologic: reflexes normal and symmetric, no cranial nerve deficit, gait, coordination and speech normal       No Known Allergies  Prior to Visit Medications    Medication Sig Taking?  Authorizing Provider   ALPRAZolam Guillermo Sam 1 MG tablet Take 1 tablet by mouth 2 times daily as needed for Sleep or Anxiety for up to 30 days. AMANDA Nogueira CNP   buprenorphine (BUPRENEX) 15 MCG/HR PTWK Place 1 patch onto the skin once a week for 30 days. AMANDA Anton CNP   Misc. Devices (ROLLER WALKER) MISC 1 each by Does not apply route daily  AMANDA Nogueira CNP   amphetamine-dextroamphetamine (ADDERALL XR) 30 MG extended release capsule Take 1 capsule by mouth every morning for 30 days. AMANDA Nogueira CNP   cyclobenzaprine (FLEXERIL) 10 MG tablet TAKE 1 TABLET BY MOUTH TWICE DAILY AS NEEDED  AMANDA Nogueira CNP   gabapentin (NEURONTIN) 600 MG tablet Take 1 tablet by mouth 3 times daily for 30 days. AMANDA Nogueira CNP   furosemide (LASIX) 20 MG tablet Take 1 tablet by mouth daily PRN  AMANDA Nogueira CNP   fluticasone-umeclidin-vilant (TRELEGY ELLIPTA) 100-62.5-25 MCG/INH AEPB Inhale 1 puff into the lungs daily  AMANDA Nogueira CNP   atorvastatin (LIPITOR) 80 MG tablet Take 1 tablet by mouth daily  AMANDA Nogueira CNP   predniSONE (DELTASONE) 5 MG tablet Take 1 tablet by mouth daily  AMANDA Nogueira CNP   vitamin D (ERGOCALCIFEROL) 1.25 MG (75002 UT) CAPS capsule TAKE 1 CAPSULE BY MOUTH once per week AS DIRECTED  AMANDA Nogueira CNP   prochlorperazine (COMPAZINE) 10 MG tablet Take 1 tablet by mouth every 8 hours as needed (NAUSEA)  AMANDA Nogueira CNP   ARIPiprazole (ABILIFY) 10 MG tablet TAKE 1 TABLET DAILY IN THE MORNING  Historical Provider, MD   Handicap Placard MISC by Does not apply route Duration 5 years  AMANDA Anton CNP.  Devices (PEDAL EXERCISER) MISC 1 each by Does not apply route daily  AMANDA Nogueira CNP   albuterol sulfate  (90 Base) MCG/ACT inhaler Inhale 2 puffs into the lungs every 6 hours as needed for Wheezing  Yamini Buchanan, APRN - CNP   omeprazole (PRILOSEC) 40 MG delayed release capsule Take 40 mg by mouth  Historical Provider, MD   polyethylene glycol (GLYCOLAX) 17 GM/SCOOP powder Take 17 g by mouth as needed  Historical Provider, MD   acetaminophen (APAP EXTRA STRENGTH) 500 MG tablet Take 2 tablets by mouth every 6 hours as needed for Pain  Dev Mcdermott MD   ipratropium-albuterol (DUONEB) 0.5-2.5 (3) MG/3ML SOLN nebulizer solution Inhale 3 mLs into the lungs every 4 hours as needed for Shortness of Breath  AMANDA Kim CNP   venlafaxine (EFFEXOR XR) 75 MG extended release capsule TAKE 1 CAPSULE BY MOUTH TWICE DAILY  Historical Provider, MD   aspirin (ASPIRIN 81) 81 MG chewable tablet   Historical Provider, MD   mirtazapine (REMERON) 15 MG tablet Take 15 mg by mouth nightly  Historical Provider, MD [place] (Including outside providers/suppliers regularly involved in providing care):   Patient Care Team:  AMANDA Sotelo CNP as PCP - General (Nurse Practitioner Family)  AMANDA Sotelo CNP as PCP - [facility] Empaneled Provider    Reviewed and updated this visit:  Meds           Electronically signed by:  Cam Moreno, MSN, FNP-C 5/9/22 8:33 AM

## 2022-05-09 NOTE — PATIENT INSTRUCTIONS
Personalized Preventive Plan for Donnell Purdy - 5/9/2022  Medicare offers a range of preventive health benefits. Some of the tests and screenings are paid in full while other may be subject to a deductible, co-insurance, and/or copay. Some of these benefits include a comprehensive review of your medical history including lifestyle, illnesses that may run in your family, and various assessments and screenings as appropriate. After reviewing your medical record and screening and assessments performed today your provider may have ordered immunizations, labs, imaging, and/or referrals for you. A list of these orders (if applicable) as well as your Preventive Care list are included within your After Visit Summary for your review. Other Preventive Recommendations:    · A preventive eye exam performed by an eye specialist is recommended every 1-2 years to screen for glaucoma; cataracts, macular degeneration, and other eye disorders. · A preventive dental visit is recommended every 6 months. · Try to get at least 150 minutes of exercise per week or 10,000 steps per day on a pedometer . · Order or download the FREE \"Exercise & Physical Activity: Your Everyday Guide\" from [employer]. Call 0-287.267.5273 or search [employer] online. · You need 4500-8677 mg of calcium and 9399-8708 IU of vitamin D per day. It is possible to meet your calcium requirement with diet alone, but a vitamin D supplement is usually necessary to meet this goal.  · When exposed to the sun, use a sunscreen that protects against both UVA and UVB radiation with an SPF of 30 or greater. Reapply every 2 to 3 hours or after sweating, drying off with a towel, or swimming. · Always wear a seat belt when traveling in a car. Always wear a helmet when riding a bicycle or motorcycle.

## 2022-05-09 NOTE — PROGRESS NOTES
Subjective  Saud Cornell, 78 y.o. female presents today with:  Chief Complaint   Patient presents with    Check-Up    ADHD    COPD        Chronic pain, Fibromyalgia, back problems-  Butrans patches-  patient tolerating-  States first time she has eran been pain free-  Is willing to try a decreased dosage. Will also chronic pain-we will we will her on Butrans patches and stop tramadol but Butrans patches have become extremely expensive we will resume the tramadol    ADHD-  30mg xr daily with effectiveness  Anxiety-  Tried to 1 MG TID PRN-  She does not like the ER,  Did not feel it helped her anxiety. -  She is willing transition her to back to her XANAX 1mg that is not extended release but BID PRN not TID PRN. COPD  She complains of cough. There is no shortness of breath. This is a chronic problem. The problem occurs daily. The cough is non-productive. Pertinent negatives include no appetite change, chest pain, dyspnea on exertion, ear congestion, ear pain, fever, headaches, heartburn, malaise/fatigue, myalgias, nasal congestion, orthopnea, PND, postnasal drip, rhinorrhea, sneezing, sore throat, sweats, trouble swallowing or weight loss. Her symptoms are aggravated by change in weather. Risk factors for lung disease include smoking/tobacco exposure. Her past medical history is significant for COPD. ADD/ADHD:  Current treatment: Adderall XR- 30, which has been effective. Residual symptoms: none. Medication side effects: None. Patient denies anorexia, nausea, vomiting, abdominal pain, involuntary weight loss, palpitations, insomnia, irritability, headache and tremor. Controlled Substance Monitoring:    Acute and Chronic Pain Monitoring:   RX Monitoring 5/9/2022   Acute Pain Prescriptions -   Periodic Controlled Substance Monitoring Possible medication side effects, risk of tolerance/dependence & alternative treatments discussed. ;No signs of potential drug abuse or diversion identified. ;Assessed functional status. Chronic Pain > 50 MEDD -   Chronic Pain > 80 MEDD -           Review of Systems   Constitutional: Negative. Negative for activity change, appetite change, fatigue, fever, malaise/fatigue, unexpected weight change and weight loss. HENT: Negative. Negative for dental problem, ear pain, nosebleeds, postnasal drip, rhinorrhea, sneezing, sore throat, trouble swallowing and voice change. Eyes: Negative. Negative for visual disturbance. Respiratory: Positive for cough. Negative for shortness of breath. Cardiovascular: Negative. Negative for chest pain, dyspnea on exertion, palpitations, leg swelling and PND. Gastrointestinal: Negative. Negative for abdominal pain, anal bleeding, blood in stool, constipation, diarrhea, heartburn and rectal pain. Endocrine: Negative. Negative for cold intolerance, heat intolerance, polydipsia, polyphagia and polyuria. Genitourinary: Negative. Musculoskeletal: Negative. Negative for myalgias. Skin: Negative. Negative for color change and rash. Allergic/Immunologic: Negative. Neurological: Negative. Negative for dizziness, syncope, weakness and headaches. Hematological: Negative. Negative for adenopathy. Does not bruise/bleed easily. Psychiatric/Behavioral: Negative. Negative for dysphoric mood and sleep disturbance. The patient is not nervous/anxious.         Past Medical History:   Diagnosis Date    ADHD (attention deficit hyperactivity disorder)     Anxiety     Depression     Fibromyalgia     GERD (gastroesophageal reflux disease)     Hypertension     Irritable bowel syndrome      Past Surgical History:   Procedure Laterality Date     SECTION      COLONOSCOPY      [facility]  Nilam Cruz MD    DILATION AND CURETTAGE OF UTERUS N/A 2020    DIAGNOSTIC LAPAROSCOPY, LYSIS OF ADHESIONS, HYSTEROSCOPY WITH Bre Plants performed by Sukhwinder Robison MD at [address] [place] [address] Marital status:      Spouse name: Not on file    Number of children: Not on file    Years of education: Not on file    Highest education level: Not on file   Occupational History    Not on file   Tobacco Use    Smoking status: Current Every Day Smoker     Packs/day: 2.00     Years: 50.00     Pack years: 100.00     Types: Cigarettes     Last attempt to quit: 2019     Years since quittin.7    Smokeless tobacco: Never Used   Vaping Use    Vaping Use: Never used   Substance and Sexual Activity    Alcohol use: Never    Drug use: Never    Sexual activity: Not on file   Other Topics Concern    Not on file   Social History Narrative    Not on file     Social Determinants of Health     Financial Resource Strain: Low Risk     Difficulty of Paying Living Expenses: Not hard at all   Food Insecurity: No Food Insecurity    Worried About Running Out of Food in the Last Year: Never true    Jerri of Food in the Last Year: Never true   Transportation Needs:     Lack of Transportation (Medical): Not on file    Lack of Transportation (Non-Medical):  Not on file   Physical Activity: Insufficiently Active    Days of Exercise per Week: 2 days    Minutes of Exercise per Session: 10 min   Stress:     Feeling of Stress : Not on file   Social Connections:     Frequency of Communication with Friends and Family: Not on file    Frequency of Social Gatherings with Friends and Family: Not on file    Attends Buddhism Services: Not on file    Active Member of Clubs or Organizations: Not on file    Attends Club or Organization Meetings: Not on file    Marital Status: Not on file   Intimate Partner Violence:     Fear of Current or Ex-Partner: Not on file    Emotionally Abused: Not on file    Physically Abused: Not on file    Sexually Abused: Not on file   Housing Stability:     Unable to Pay for Housing in the Last Year: Not on file    Number of Places Lived in the Last Year: Not on file    Unstable Housing in the Last Year: Not on file     Family History   Problem Relation Age of Onset    Breast Cancer Maternal Aunt      No Known Allergies  Current Outpatient Medications   Medication Sig Dispense Refill    ALPRAZolam (XANAX) 1 MG tablet Take 1 tablet by mouth 2 times daily as needed for Sleep or Anxiety for up to 30 days. 60 tablet 2    buprenorphine (BUPRENEX) 15 MCG/HR PTWK Place 1 patch onto the skin once a week for 30 days. 4 patch 2    Misc. Devices (ROLLER WALKER) MISC 1 each by Does not apply route daily 1 each 0    amphetamine-dextroamphetamine (ADDERALL XR) 30 MG extended release capsule Take 1 capsule by mouth every morning for 30 days. 30 capsule 0    cyclobenzaprine (FLEXERIL) 10 MG tablet TAKE 1 TABLET BY MOUTH TWICE DAILY AS NEEDED 60 tablet 11    gabapentin (NEURONTIN) 600 MG tablet Take 1 tablet by mouth 3 times daily for 30 days. 90 tablet 2    furosemide (LASIX) 20 MG tablet Take 1 tablet by mouth daily PRN 30 tablet 0    fluticasone-umeclidin-vilant (TRELEGY ELLIPTA) 100-62.5-25 MCG/INH AEPB Inhale 1 puff into the lungs daily 28 each 5    atorvastatin (LIPITOR) 80 MG tablet Take 1 tablet by mouth daily 90 tablet 1    predniSONE (DELTASONE) 5 MG tablet Take 1 tablet by mouth daily 30 tablet 11    vitamin D (ERGOCALCIFEROL) 1.25 MG (61378 UT) CAPS capsule TAKE 1 CAPSULE BY MOUTH once per week AS DIRECTED 4 capsule 11    prochlorperazine (COMPAZINE) 10 MG tablet Take 1 tablet by mouth every 8 hours as needed (NAUSEA) 120 tablet 3    ARIPiprazole (ABILIFY) 10 MG tablet TAKE 1 TABLET DAILY IN THE MORNING      Handicap Placard MISC by Does not apply route Duration 5 years 1 each 0    Misc.  Devices (PEDAL EXERCISER) MISC 1 each by Does not apply route daily 1 each 0    albuterol sulfate  (90 Base) MCG/ACT inhaler Inhale 2 puffs into the lungs every 6 hours as needed for Wheezing 1 Inhaler 3    omeprazole (PRILOSEC) 40 MG delayed release capsule Take 40 mg by mouth      polyethylene glycol (GLYCOLAX) 17 GM/SCOOP powder Take 17 g by mouth as needed      acetaminophen (APAP EXTRA STRENGTH) 500 MG tablet Take 2 tablets by mouth every 6 hours as needed for Pain 60 tablet 1    ipratropium-albuterol (DUONEB) 0.5-2.5 (3) MG/3ML SOLN nebulizer solution Inhale 3 mLs into the lungs every 4 hours as needed for Shortness of Breath 360 mL 0    venlafaxine (EFFEXOR XR) 75 MG extended release capsule TAKE 1 CAPSULE BY MOUTH TWICE DAILY  1    aspirin (ASPIRIN 81) 81 MG chewable tablet       mirtazapine (REMERON) 15 MG tablet Take 15 mg by mouth nightly       No current facility-administered medications for this visit. PMH, Surgical Hx, Family Hx, and Social Hx reviewed and updated. Health Maintenance reviewed. Objective    Vitals:    05/09/22 0806   BP: 126/80   Pulse: 95   Resp: 18   SpO2: 91%   Weight: 170 lb (77.1 kg)   Height: 5' 2\" (1.575 m)       Physical Exam  Constitutional:       General: She is not in acute distress. Appearance: She is well-developed. HENT:      Head: Normocephalic and atraumatic. Right Ear: External ear normal.      Left Ear: External ear normal.      Nose: Nose normal.   Eyes:      Conjunctiva/sclera: Conjunctivae normal.      Pupils: Pupils are equal, round, and reactive to light. Neck:      Vascular: No JVD. Cardiovascular:      Rate and Rhythm: Normal rate and regular rhythm. Heart sounds: Normal heart sounds. No murmur heard. Pulmonary:      Effort: Pulmonary effort is normal. No respiratory distress. Breath sounds: Normal breath sounds. No wheezing or rales. Abdominal:      General: Bowel sounds are normal. There is no distension. Palpations: Abdomen is soft. There is no mass. Tenderness: There is no abdominal tenderness. Musculoskeletal:      Cervical back: Neck supple. Skin:     General: Skin is warm and dry. Neurological:      Mental Status: She is alert and oriented to person, place, and time. Assessment & Plan   Annie Brito was seen today for check-up, adhd and copd. Diagnoses and all orders for this visit:    Anxiety  -     ALPRAZolam (XANAX) 1 MG tablet; Take 1 tablet by mouth 2 times daily as needed for Sleep or Anxiety for up to 30 days. Chronic bilateral low back pain with bilateral sciatica L>R  -     buprenorphine (BUPRENEX) 15 MCG/HR PTWK; Place 1 patch onto the skin once a week for 30 days. Spinal stenosis of lumbar region with neurogenic claudication  -     buprenorphine (BUPRENEX) 15 MCG/HR PTWK; Place 1 patch onto the skin once a week for 30 days. Attention deficit hyperactivity disorder (ADHD), predominantly inattentive type  -     amphetamine-dextroamphetamine (ADDERALL XR) 30 MG extended release capsule; Take 1 capsule by mouth every morning for 30 days. Fibromyalgia    Chronic pain syndrome    Cerebrovascular accident (CVA) due to embolism of precerebral [facility]  -     Lipid Panel; Future  -     Comprehensive Metabolic Panel; Future  -     CBC; Future    Chronic obstructive pulmonary disease, unspecified COPD type ([facility]  -     Roseline Kuhn MD, Pulmonology, [facility]  -     CT lung screen [Initial/Annual]; Future    Tobacco use  -     CT lung screen [Initial/Annual]; Future    Other orders  -     Misc. Devices ([facility]) [facility]; 1 each by Does not apply route daily      Orders Placed This Encounter   Procedures    CT lung screen [Initial/Annual]     Age: 78 y.o.    Smoking History:   Social History    Tobacco Use      Smoking status: Current Every Day Smoker        Packs/day: 0.25        Years: 10.00        Pack years: 2.5        Types: Cigarettes        Quit date: 2019        Years since quittin.7      Smokeless tobacco: Never Used    Vaping Use      Vaping Use: Never used    Alcohol use: Never    Drug use: Never    Pack years: 2.5  No previous lung cancer screening exam     Standing Status:   Future     Standing Expiration Date:   2023     Order Specific Question:   Is there documentation of shared decision making? Answer:   Yes     Order Specific Question:   Does the patient show any signs or symptoms of lung cancer? Answer:   No     Order Specific Question:   Is this the first (baseline) CT or an annual exam?     Answer:   Baseline [1]     Order Specific Question:   Is this a low dose CT or a routine CT? Answer:   Low Dose CT [1]     Order Specific Question:   Smoking Status? Answer:   Current Every Day Smoker [1]     Order Specific Question:   Smoking packs per day? Answer:   2     Order Specific Question:   Years smoking? Answer:   50    Lipid Panel     Standing Status:   Future     Standing Expiration Date:   2023     Order Specific Question:   Is Patient Fasting?/# of Hours     Answer:   9    Comprehensive Metabolic Panel     Standing Status:   Future     Standing Expiration Date:   2023    CBC     Standing Status:   Future     Standing Expiration Date:   2023   Joellen Raymundo MD, Pulmonology, [facility]     Referral Priority:   Routine     Referral Type:   Eval and Treat     Referral Reason:   Specialty Services Required     Referred to Provider:   Carito Santiago MD     Requested Specialty:   Pulmonology     Number of Visits Requested:   1     Orders Placed This Encounter   Medications    ALPRAZolam (XANAX) 1 MG tablet     Sig: Take 1 tablet by mouth 2 times daily as needed for Sleep or Anxiety for up to 30 days. Dispense:  60 tablet     Refill:  2    buprenorphine (BUPRENEX) 15 MCG/HR PTWK     Sig: Place 1 patch onto the skin once a week for 30 days. Dispense:  4 patch     Refill:  2    Misc. Devices (ROLLER WALKER) MISC     Si each by Does not apply route daily     Dispense:  1 each     Refill:  0    amphetamine-dextroamphetamine (ADDERALL XR) 30 MG extended release capsule     Sig: Take 1 capsule by mouth every morning for 30 days.      Dispense:  30 capsule     Refill:  0     Medications Discontinued During This Encounter   Medication Reason    buprenorphine (BUPRENEX) 15 MCG/HR PTWK REORDER    amphetamine-dextroamphetamine (ADDERALL XR) 30 MG extended release capsule REORDER    ALPRAZolam (XANAX) 1 MG tablet REORDER     Return in about 3 months (around 8/9/2022). Reviewed with the patient: current clinical status, medications, activities and diet. Side effects, adverse effects of the medication prescribed today, as well as treatment plan/ rationale and result expectations have been discussed with the patient who expresses understanding and desires to proceed. Close follow up to evaluate treatment results and for coordination of care. I have reviewed the patient's medical history in detail and updated the computerized patient record.     Sang Monsivais, AMANDA - CNP

## 2022-05-17 ENCOUNTER — HOSPITAL ENCOUNTER (OUTPATIENT)
Dept: WOMENS IMAGING | Age: 67
Discharge: HOME | End: 2022-05-19
Payer: MEDICARE

## 2022-05-17 VITALS — BODY MASS INDEX: 30.6 KG/M2 | HEIGHT: 63 IN

## 2022-05-17 DIAGNOSIS — M79.7 FIBROMYALGIA: ICD-10-CM

## 2022-05-17 DIAGNOSIS — F41.9 ANXIETY: ICD-10-CM

## 2022-05-17 DIAGNOSIS — Z12.31 ENCOUNTER FOR SCREENING MAMMOGRAM FOR BREAST CANCER: ICD-10-CM

## 2022-05-17 PROCEDURE — 77067 SCR MAMMO BI INCL CAD: CPT

## 2022-05-17 NOTE — TELEPHONE ENCOUNTER
pt requesting medication refill. Please approve or deny this request.    Rx requested:  Requested Prescriptions     Pending Prescriptions Disp Refills    ALPRAZolam (XANAX) 1 MG tablet 60 tablet 2     Sig: Take 1 tablet by mouth 2 times daily as needed for Sleep or Anxiety for up to 30 days.     cyclobenzaprine (FLEXERIL) 10 MG tablet 60 tablet 11         Last Office Visit:   5/9/2022      Next Visit Date:  Future Appointments   Date Time Provider Shelley Gorman   5/23/2022  2:30 PM Orlando Queen   8/9/2022 10:00 AM Noah Kaye, [address]

## 2022-05-19 ENCOUNTER — TELEPHONE (OUTPATIENT)
Dept: CARDIOTHORACIC SURGERY | Age: 67
End: 2022-05-19

## 2022-05-19 RX ORDER — ALPRAZOLAM 1 MG/1
TABLET ORAL
Qty: 60 TABLET | Refills: 2 | Status: SHIPPED | OUTPATIENT
Start: 2022-05-19 | End: 2022-08-04 | Stop reason: SDUPTHER

## 2022-05-19 RX ORDER — CYCLOBENZAPRINE HCL 10 MG
TABLET ORAL
Qty: 60 TABLET | Refills: 11 | Status: ON HOLD | OUTPATIENT
Start: 2022-05-19

## 2022-05-19 NOTE — TELEPHONE ENCOUNTER
Physician documentation on smoking history and CT Lung Screening reviewed. All required documentation complete. Patient is a current every day smoker with a 100 pack year history ( 2 ppd x 50 years) per physician documentation.

## 2022-05-23 ENCOUNTER — HOSPITAL ENCOUNTER (OUTPATIENT)
Dept: CT IMAGING | Age: 67
Discharge: HOME | End: 2022-05-25
Payer: MEDICARE

## 2022-05-23 DIAGNOSIS — J44.9 CHRONIC OBSTRUCTIVE PULMONARY DISEASE, UNSPECIFIED COPD TYPE (HCC): ICD-10-CM

## 2022-05-23 DIAGNOSIS — Z72.0 TOBACCO USE: ICD-10-CM

## 2022-05-23 PROCEDURE — 71271 CT THORAX LUNG CANCER SCR C-: CPT

## 2022-05-31 DIAGNOSIS — I63.10 CEREBROVASCULAR ACCIDENT (CVA) DUE TO EMBOLISM OF PRECEREBRAL ARTERY (HCC): ICD-10-CM

## 2022-05-31 LAB
ALBUMIN SERPL-MCNC: 4.2 G/DL (ref 3.5–4.6)
ALP BLD-CCNC: 100 U/L (ref 40–130)
ALT SERPL-CCNC: 21 U/L (ref 0–33)
ANION GAP SERPL CALCULATED.3IONS-SCNC: 14 MEQ/L (ref 9–15)
AST SERPL-CCNC: 19 U/L (ref 0–35)
BILIRUB SERPL-MCNC: <0.2 MG/DL (ref 0.2–0.7)
BUN BLDV-MCNC: 13 MG/DL (ref 8–23)
CALCIUM SERPL-MCNC: 9.4 MG/DL (ref 8.5–9.9)
CHLORIDE BLD-SCNC: 106 MEQ/L (ref 95–107)
CHOLESTEROL, TOTAL: 164 MG/DL (ref 0–199)
CO2: 24 MEQ/L (ref 20–31)
CREAT SERPL-MCNC: 0.98 MG/DL (ref 0.5–0.9)
GFR AFRICAN AMERICAN: >60
GFR NON-AFRICAN AMERICAN: 56.5
GLOBULIN: 2.5 G/DL (ref 2.3–3.5)
GLUCOSE BLD-MCNC: 103 MG/DL (ref 70–99)
HCT VFR BLD CALC: 43.2 % (ref 37–47)
HDLC SERPL-MCNC: 57 MG/DL (ref 40–59)
HEMOGLOBIN: 14 G/DL (ref 12–16)
LDL CHOLESTEROL CALCULATED: 67 MG/DL (ref 0–129)
MCH RBC QN AUTO: 31.3 PG (ref 27–31.3)
MCHC RBC AUTO-ENTMCNC: 32.5 % (ref 33–37)
MCV RBC AUTO: 96.2 FL (ref 82–100)
PDW BLD-RTO: 14.9 % (ref 11.5–14.5)
PLATELET # BLD: 355 K/UL (ref 130–400)
POTASSIUM SERPL-SCNC: 4.1 MEQ/L (ref 3.4–4.9)
RBC # BLD: 4.49 M/UL (ref 4.2–5.4)
SODIUM BLD-SCNC: 144 MEQ/L (ref 135–144)
TOTAL PROTEIN: 6.7 G/DL (ref 6.3–8)
TRIGL SERPL-MCNC: 199 MG/DL (ref 0–150)
WBC # BLD: 8.1 K/UL (ref 4.8–10.8)

## 2022-06-08 DIAGNOSIS — F90.0 ATTENTION DEFICIT HYPERACTIVITY DISORDER (ADHD), PREDOMINANTLY INATTENTIVE TYPE: ICD-10-CM

## 2022-06-08 NOTE — TELEPHONE ENCOUNTER
pt requesting medication refill. Please approve or deny this request.    Rx requested:  Requested Prescriptions     Pending Prescriptions Disp Refills    amphetamine-dextroamphetamine (ADDERALL XR) 30 MG extended release capsule 30 capsule 0     Sig: Take 1 capsule by mouth every morning for 30 days.          Last Office Visit:   5/9/2022      Next Visit Date:  Future Appointments   Date Time Provider Shelley Gorman   8/9/2022 10:00 AM Keshawn Song, [address]

## 2022-06-09 RX ORDER — DEXTROAMPHETAMINE SACCHARATE, AMPHETAMINE ASPARTATE MONOHYDRATE, DEXTROAMPHETAMINE SULFATE AND AMPHETAMINE SULFATE 7.5; 7.5; 7.5; 7.5 MG/1; MG/1; MG/1; MG/1
30 CAPSULE, EXTENDED RELEASE ORAL EVERY MORNING
Qty: 30 CAPSULE | Refills: 0 | Status: SHIPPED | OUTPATIENT
Start: 2022-06-09 | End: 2022-07-07 | Stop reason: SDUPTHER

## 2022-06-10 DIAGNOSIS — M54.42 CHRONIC BILATERAL LOW BACK PAIN WITH BILATERAL SCIATICA: ICD-10-CM

## 2022-06-10 DIAGNOSIS — G89.29 CHRONIC BILATERAL LOW BACK PAIN WITH BILATERAL SCIATICA: ICD-10-CM

## 2022-06-10 DIAGNOSIS — M48.062 SPINAL STENOSIS OF LUMBAR REGION WITH NEUROGENIC CLAUDICATION: ICD-10-CM

## 2022-06-10 DIAGNOSIS — M54.41 CHRONIC BILATERAL LOW BACK PAIN WITH BILATERAL SCIATICA: ICD-10-CM

## 2022-06-10 DIAGNOSIS — M79.7 FIBROMYALGIA: ICD-10-CM

## 2022-06-10 RX ORDER — GABAPENTIN 600 MG/1
TABLET ORAL
Qty: 90 TABLET | Refills: 2 | Status: SHIPPED | OUTPATIENT
Start: 2022-06-10 | End: 2022-11-01

## 2022-06-23 ENCOUNTER — TELEPHONE (OUTPATIENT)
Dept: FAMILY MEDICINE CLINIC | Age: 67
End: 2022-06-23

## 2022-06-23 NOTE — TELEPHONE ENCOUNTER
Results for orders placed or performed in visit on 05/31/22   CBC   Result Value Ref Range    WBC 8.1 4.8 - 10.8 K/uL    RBC 4.49 4.20 - 5.40 M/uL    Hemoglobin 14.0 12.0 - 16.0 g/dL    Hematocrit 43.2 37.0 - 47.0 %    MCV 96.2 82.0 - 100.0 fL    MCH 31.3 27.0 - 31.3 pg    MCHC 32.5 (L) 33.0 - 37.0 %    RDW 14.9 (H) 11.5 - 14.5 %    Platelets 427 976 - 007 K/uL   Comprehensive Metabolic Panel   Result Value Ref Range    Sodium 144 135 - 144 mEq/L    Potassium 4.1 3.4 - 4.9 mEq/L    Chloride 106 95 - 107 mEq/L    CO2 24 20 - 31 mEq/L    Anion Gap 14 9 - 15 mEq/L    Glucose 103 (H) 70 - 99 mg/dL    BUN 13 8 - 23 mg/dL    CREATININE 0.98 (H) 0.50 - 0.90 mg/dL    GFR Non-African American 56.5 (L) >60    GFR  >60.0 >60    Calcium 9.4 8.5 - 9.9 mg/dL    Total Protein 6.7 6.3 - 8.0 g/dL    Albumin 4.2 3.5 - 4.6 g/dL    Total Bilirubin <0.2 0.2 - 0.7 mg/dL    Alkaline Phosphatase 100 40 - 130 U/L    ALT 21 0 - 33 U/L    AST 19 0 - 35 U/L    Globulin 2.5 2.3 - 3.5 g/dL   Lipid Panel   Result Value Ref Range    Cholesterol, Total 164 0 - 199 mg/dL    Triglycerides 199 (H) 0 - 150 mg/dL    HDL 57 40 - 59 mg/dL    LDL Calculated 67 0 - 129 mg/dL     Triglycerides which is part of her cholesterol are a little on the high side increase fiber and add an omega 3 fatty acid. Kidney function-  A little decreased but this could be temporary we will recheck it at her next appointment.     Liver is good  Electrolytes are good  No anemia

## 2022-07-06 DIAGNOSIS — F90.0 ATTENTION DEFICIT HYPERACTIVITY DISORDER (ADHD), PREDOMINANTLY INATTENTIVE TYPE: ICD-10-CM

## 2022-07-06 DIAGNOSIS — G89.29 CHRONIC BILATERAL LOW BACK PAIN WITH BILATERAL SCIATICA: ICD-10-CM

## 2022-07-06 DIAGNOSIS — M54.42 CHRONIC BILATERAL LOW BACK PAIN WITH BILATERAL SCIATICA: ICD-10-CM

## 2022-07-06 DIAGNOSIS — M54.41 CHRONIC BILATERAL LOW BACK PAIN WITH BILATERAL SCIATICA: ICD-10-CM

## 2022-07-06 DIAGNOSIS — M48.062 SPINAL STENOSIS OF LUMBAR REGION WITH NEUROGENIC CLAUDICATION: ICD-10-CM

## 2022-07-06 NOTE — TELEPHONE ENCOUNTER
Patient requesting medication refill. Please approve or deny this request.    Rx requested:  Requested Prescriptions     Pending Prescriptions Disp Refills    buprenorphine (BUPRENEX) 15 MCG/HR PTWK 4 patch 2     Sig: Place 1 patch onto the skin once a week for 30 days.  amphetamine-dextroamphetamine (ADDERALL XR) 30 MG extended release capsule 30 capsule 0     Sig: Take 1 capsule by mouth every morning for 30 days.          Last Office Visit:   5/9/2022      Next Visit Date:  Future Appointments   Date Time Provider Shelley Gorman   8/9/2022 10:00 AM Koki Laureano, [address]

## 2022-07-07 RX ORDER — BUPRENORPHINE 15 UG/H
1 PATCH TRANSDERMAL WEEKLY
Qty: 4 PATCH | Refills: 2 | Status: SHIPPED | OUTPATIENT
Start: 2022-07-07 | End: 2022-08-06

## 2022-07-07 RX ORDER — DEXTROAMPHETAMINE SACCHARATE, AMPHETAMINE ASPARTATE MONOHYDRATE, DEXTROAMPHETAMINE SULFATE AND AMPHETAMINE SULFATE 7.5; 7.5; 7.5; 7.5 MG/1; MG/1; MG/1; MG/1
30 CAPSULE, EXTENDED RELEASE ORAL EVERY MORNING
Qty: 30 CAPSULE | Refills: 0 | Status: SHIPPED | OUTPATIENT
Start: 2022-07-07 | End: 2022-08-04 | Stop reason: SDUPTHER

## 2022-07-08 ENCOUNTER — TELEPHONE (OUTPATIENT)
Dept: FAMILY MEDICINE CLINIC | Age: 67
End: 2022-07-08

## 2022-07-20 ENCOUNTER — TELEPHONE (OUTPATIENT)
Dept: FAMILY MEDICINE CLINIC | Age: 67
End: 2022-07-20

## 2022-07-20 DIAGNOSIS — R05.9 COUGH: Primary | ICD-10-CM

## 2022-07-20 NOTE — TELEPHONE ENCOUNTER
----- Message from Mikhail Armstrong sent at 7/19/2022  1:45 PM EDT -----  Subject: Message to Provider    QUESTIONS  Information for Provider? Pt is calling to see if she can prescribed some   antibiotics for what she believes is pneumonia or bronchitis. Pt states   her lungs hurt and has a bad cough. Please contact pt as soon as possible.     ---------------------------------------------------------------------------  --------------  [facility]  [phone]; OK to leave message on voicemail  ---------------------------------------------------------------------------  --------------  SCRIPT ANSWERS  Relationship to Patient?  Self Terbinafine Counseling: Patient counseling regarding adverse effects of terbinafine including but not limited to headache, diarrhea, rash, upset stomach, liver function test abnormalities, itching, taste/smell disturbance, nausea, abdominal pain, and flatulence.  There is a rare possibility of liver failure that can occur when taking terbinafine.  The patient understands that a baseline LFT and kidney function test may be required. The patient verbalized understanding of the proper use and possible adverse effects of terbinafine.  All of the patient's questions and concerns were addressed.

## 2022-07-21 RX ORDER — AZITHROMYCIN 250 MG/1
250 TABLET, FILM COATED ORAL SEE ADMIN INSTRUCTIONS
Qty: 6 TABLET | Refills: 0 | Status: SHIPPED | OUTPATIENT
Start: 2022-07-21 | End: 2022-07-26

## 2022-07-21 NOTE — TELEPHONE ENCOUNTER
Called and spoke to patient. They are aware of script sent and PCP advise/instructions if symptoms persist . Patient states they were informed by pharmacy of medication being ready and will  today.

## 2022-07-21 NOTE — TELEPHONE ENCOUNTER
I will give her a zpak-  if that doesn't help she needs to be seen she can come to walk in or see another provider I am out of the office next week.   Med went to [facility]

## 2022-08-04 ENCOUNTER — OFFICE VISIT (OUTPATIENT)
Dept: FAMILY MEDICINE CLINIC | Age: 67
End: 2022-08-04
Payer: MEDICARE

## 2022-08-04 VITALS
TEMPERATURE: 98.2 F | HEART RATE: 85 BPM | DIASTOLIC BLOOD PRESSURE: 67 MMHG | WEIGHT: 163.6 LBS | OXYGEN SATURATION: 95 % | SYSTOLIC BLOOD PRESSURE: 126 MMHG | HEIGHT: 62 IN | BODY MASS INDEX: 30.11 KG/M2

## 2022-08-04 DIAGNOSIS — F90.0 ATTENTION DEFICIT HYPERACTIVITY DISORDER (ADHD), PREDOMINANTLY INATTENTIVE TYPE: ICD-10-CM

## 2022-08-04 DIAGNOSIS — F41.9 ANXIETY: ICD-10-CM

## 2022-08-04 DIAGNOSIS — M62.08 DIASTASIS RECTI: ICD-10-CM

## 2022-08-04 DIAGNOSIS — Z91.81 AT HIGH RISK FOR FALLS: Primary | ICD-10-CM

## 2022-08-04 PROCEDURE — 1090F PRES/ABSN URINE INCON ASSESS: CPT | Performed by: NURSE PRACTITIONER

## 2022-08-04 PROCEDURE — 99214 OFFICE O/P EST MOD 30 MIN: CPT | Performed by: NURSE PRACTITIONER

## 2022-08-04 PROCEDURE — 3017F COLORECTAL CA SCREEN DOC REV: CPT | Performed by: NURSE PRACTITIONER

## 2022-08-04 PROCEDURE — G8427 DOCREV CUR MEDS BY ELIG CLIN: HCPCS | Performed by: NURSE PRACTITIONER

## 2022-08-04 PROCEDURE — G8417 CALC BMI ABV UP PARAM F/U: HCPCS | Performed by: NURSE PRACTITIONER

## 2022-08-04 PROCEDURE — 1123F ACP DISCUSS/DSCN MKR DOCD: CPT | Performed by: NURSE PRACTITIONER

## 2022-08-04 PROCEDURE — G8399 PT W/DXA RESULTS DOCUMENT: HCPCS | Performed by: NURSE PRACTITIONER

## 2022-08-04 PROCEDURE — 4004F PT TOBACCO SCREEN RCVD TLK: CPT | Performed by: NURSE PRACTITIONER

## 2022-08-04 RX ORDER — DEXTROAMPHETAMINE SACCHARATE, AMPHETAMINE ASPARTATE MONOHYDRATE, DEXTROAMPHETAMINE SULFATE AND AMPHETAMINE SULFATE 7.5; 7.5; 7.5; 7.5 MG/1; MG/1; MG/1; MG/1
30 CAPSULE, EXTENDED RELEASE ORAL EVERY MORNING
Qty: 30 CAPSULE | Refills: 0 | Status: SHIPPED | OUTPATIENT
Start: 2022-08-04 | End: 2022-09-08 | Stop reason: SDUPTHER

## 2022-08-04 RX ORDER — ALPRAZOLAM 1 MG/1
1 TABLET ORAL NIGHTLY PRN
COMMUNITY
End: 2022-08-04 | Stop reason: SDUPTHER

## 2022-08-04 RX ORDER — ALPRAZOLAM 1 MG/1
TABLET ORAL
Qty: 60 TABLET | Refills: 2 | Status: SHIPPED | OUTPATIENT
Start: 2022-08-04 | End: 2022-09-01

## 2022-08-04 RX ORDER — GABAPENTIN 600 MG/1
TABLET ORAL
Qty: 90 TABLET | Refills: 2 | Status: CANCELLED | OUTPATIENT
Start: 2022-08-04 | End: 2022-11-04

## 2022-08-04 ASSESSMENT — ENCOUNTER SYMPTOMS
SHORTNESS OF BREATH: 0
COUGH: 0
WHEEZING: 0

## 2022-08-04 NOTE — PROGRESS NOTES
Subjective:      Patient ID: Beth Ontiveros is a 78 y.o. female who presents today for:     Chief Complaint   Patient presents with    Anxiety     Patient presents today to follow up on anxiety. ADHD       HPI ADD/ADHD:  Current treatment: Adderall XR- 30mg, which has been effective. Residual symptoms: none. Medication side effects: None. Patient denies anorexia, vomiting, involuntary weight loss, palpitations, insomnia, headache, and tremor. Pt also uses xanax to help with anxiety. She uses it twice daily as needed. She reports that it works well and does not cause drowsiness. Past Medical History:   Diagnosis Date    ADHD (attention deficit hyperactivity disorder)     Anxiety     Depression     Fibromyalgia     GERD (gastroesophageal reflux disease)     Hypertension     Irritable bowel syndrome      Past Surgical History:   Procedure Laterality Date     SECTION      COLONOSCOPY      [facility]  Paige Lim MD    DILATION AND CURETTAGE OF UTERUS N/A 2020    DIAGNOSTIC LAPAROSCOPY, LYSIS OF ADHESIONS, HYSTEROSCOPY WITH Bonnie Nurse performed by Kike Ortega MD at [facility]     Family History   Problem Relation Age of Onset    Breast Cancer Maternal Aunt      Social History     Socioeconomic History    Marital status:       Spouse name: Not on file    Number of children: Not on file    Years of education: Not on file    Highest education level: Not on file   Occupational History    Not on file   Tobacco Use    Smoking status: Every Day     Packs/day: 2.00     Years: 50.00     Pack years: 100.00     Types: Cigarettes     Last attempt to quit: 2019     Years since quitting: 3.0    Smokeless tobacco: Never   Vaping Use    Vaping Use: Never used   Substance and Sexual Activity    Alcohol use: Never    Drug use: Never    Sexual activity: Not on file   Other Topics Concern    Not on file   Social History Narrative    Not on file     Social Determinants of Health     Financial Resource Strain: Low Risk     Difficulty of Paying Living Expenses: Not hard at all   Food Insecurity: No Food Insecurity    Worried About Running Out of Food in the Last Year: Never true    Ran Out of Food in the Last Year: Never true   Transportation Needs: Not on file   Physical Activity: Insufficiently Active    Days of Exercise per Week: 2 days    Minutes of Exercise per Session: 10 min   Stress: Not on file   Social Connections: Not on file   Intimate Partner Violence: Not on file   Housing Stability: Not on file     Current Outpatient Medications on File Prior to Visit   Medication Sig Dispense Refill    buprenorphine (BUPRENEX) 15 MCG/HR PTWK Place 1 patch onto the skin once a week for 30 days. 4 patch 2    gabapentin (NEURONTIN) 600 MG tablet TAKE 1 TABLET BY MOUTH THREE TIMES DAILY for 30 days 90 tablet 2    cyclobenzaprine (FLEXERIL) 10 MG tablet TAKE 1 TABLET BY MOUTH TWICE DAILY AS NEEDED 60 tablet 11    Misc. Devices (ROLLER [place]ALK[place]) MISC 1 each by Does not apply route daily 1 each 0    furosemide (LASIX) 20 MG tablet Take 1 tablet by mouth daily PRN 30 tablet 0    fluticasone-umeclidin-vilant (TRELEGY ELLIPTA) 100-62.5-25 MCG/INH AEPB Inhale 1 puff into the lungs daily 28 each 5    atorvastatin (LIPITOR) 80 MG tablet Take 1 tablet by mouth daily 90 tablet 1    predniSONE (DELTASONE) 5 MG tablet Take 1 tablet by mouth daily 30 tablet 11    vitamin D (ERGOCALCIFEROL) 1.25 MG (80365 UT) CAPS capsule TAKE 1 CAPSULE BY MOUTH once per week AS DIRECTED 4 capsule 11    prochlorperazine (COMPAZINE) 10 MG tablet Take 1 tablet by mouth every 8 hours as needed (NAUSEA) 120 tablet 3    ARIPiprazole (ABILIFY) 10 MG tablet TAKE 1 TABLET DAILY IN THE MORNING      Handicap Placard MISC by Does not apply route Duration 5 years 1 each 0    Misc.  Devices (PEDAL EXERCISER) MISC 1 each by Does not apply route daily 1 each 0    albuterol sulfate  (90 Base) MCG/ACT inhaler Inhale 2 puffs into the lungs every 6 hours as needed for Wheezing 1 Inhaler 3    omeprazole (PRILOSEC) 40 MG delayed release capsule Take 40 mg by mouth      polyethylene glycol (GLYCOLAX) 17 GM/SCOOP powder Take 17 g by mouth as needed      ipratropium-albuterol (DUONEB) 0.5-2.5 (3) MG/3ML SOLN nebulizer solution Inhale 3 mLs into the lungs every 4 hours as needed for Shortness of Breath 360 mL 0    venlafaxine (EFFEXOR XR) 75 MG extended release capsule TAKE 1 CAPSULE BY MOUTH TWICE DAILY  1    aspirin 81 MG chewable tablet       mirtazapine (REMERON) 15 MG tablet Take 15 mg by mouth nightly      acetaminophen (APAP EXTRA STRENGTH) 500 MG tablet Take 2 tablets by mouth every 6 hours as needed for Pain (Patient not taking: Reported on 8/4/2022) 60 tablet 1     No current facility-administered medications on file prior to visit. Allergies:  Patient has no known allergies. Review of Systems   Constitutional:  Negative for chills, fatigue and fever. Respiratory:  Negative for cough, shortness of breath and wheezing. Cardiovascular:  Negative for chest pain and palpitations. Psychiatric/Behavioral:  Positive for decreased concentration. Negative for self-injury, sleep disturbance and suicidal ideas. The patient is nervous/anxious. Objective:   /67 (Site: Left Upper Arm, Position: Sitting, Cuff Size: Medium Adult)   Pulse 85   Temp 98.2 °F (36.8 °C) (Temporal)   Ht 5' 2\" (1.575 m)   Wt 163 lb 9.6 oz (74.2 kg)   SpO2 95%   BMI 29.92 kg/m²     Physical Exam  Constitutional:       Appearance: She is well-developed. HENT:      Head: Normocephalic. Right Ear: External ear normal.      Left Ear: External ear normal.      Nose: Nose normal.      Mouth/Throat:      Mouth: Mucous membranes are moist.      Pharynx: Oropharynx is clear. Eyes:      Conjunctiva/sclera: Conjunctivae normal.   Cardiovascular:      Rate and Rhythm: Normal rate and regular rhythm. Heart sounds: Normal heart sounds.    Pulmonary:      Effort: Pulmonary effort is normal.      Breath sounds: Normal breath sounds. Musculoskeletal:         General: Normal range of motion. Cervical back: Normal range of motion. Skin:     General: Skin is warm and dry. Neurological:      Mental Status: She is alert and oriented to person, place, and time. Psychiatric:         Mood and Affect: Mood normal.         Behavior: Behavior normal.       Assessment:          Diagnosis Orders   1. At high risk for falls        2. Attention deficit hyperactivity disorder (ADHD), predominantly inattentive type  amphetamine-dextroamphetamine (ADDERALL XR) 30 MG extended release capsule      3. Anxiety  ALPRAZolam (XANAX) 1 MG tablet      4. Diastasis recti            Plan:      No orders of the defined types were placed in this encounter. Orders Placed This Encounter   Medications    amphetamine-dextroamphetamine (ADDERALL XR) 30 MG extended release capsule     Sig: Take 1 capsule by mouth every morning for 30 days. Dispense:  30 capsule     Refill:  0    ALPRAZolam (XANAX) 1 MG tablet     Sig: Take 1 tablet by mouth 2 times daily as needed for Sleep or Anxiety for up to 30 days. Dispense:  60 tablet     Refill:  2         Return in about 3 months (around 11/2/2022) for evaluation [facility] PCP. 1. Attention deficit hyperactivity disorder (ADHD), predominantly inattentive type  PDMP reviewed. - amphetamine-dextroamphetamine (ADDERALL XR) 30 MG extended release capsule; Take 1 capsule by mouth every morning for 30 days. Dispense: 30 capsule; Refill: 0    5. Anxiety  PDMP reviewed. - ALPRAZolam (XANAX) 1 MG tablet; Take 1 tablet by mouth 2 times daily as needed for Sleep or Anxiety for up to 30 days. Dispense: 60 tablet; Refill: 2    6. At high risk for falls      7. Diastasis recti      Reviewed with the patient: current clinicalstatus, medications, activities and diet.      Side effects, adverse effects of the medication prescribedtoday, as well as treatment plan/ rationale and result expectations have been discussedwith the patient who expresses understanding and desires to proceed. Close follow upto evaluate treatment results and for coordination of care. I have reviewedthe patient's medical history in detail and updated the computerized patient record. AMANDA Dunne CNP          On the basis of positive falls risk screening, assessment and plan is as follows: home safety tips provided.

## 2022-08-16 NOTE — TELEPHONE ENCOUNTER
[facility] CLINICAL PHARMACY: ADHERENCE REVIEW  Identified care gap per [place]: fills at [address] : Statin adherence    Last Visit: 8/4/22      [address] Records claims through 8/8/22 (Prior Year [place] = PASSED; YTD [place] = Filled only once; Potential Fail Date: 8/21/22 ):   Atorvastatin due to refill on 6/26/22. Last filled 90 day supply. Lab Results   Component Value Date    CHOL 164 05/31/2022    TRIG 199 (H) 05/31/2022    HDL 57 05/31/2022    LDLCALC 67 05/31/2022     ALT   Date Value Ref Range Status   05/31/2022 21 0 - 33 U/L Final     AST   Date Value Ref Range Status   05/31/2022 19 0 - 35 U/L Final     The ASCVD Risk score (Regla Gonzalez et al., 2013) failed to calculate for the following reasons: The patient has a prior MI or stroke diagnosis     PLAN  The following are interventions that have been identified:   - Patient overdue refilling Atorvastatin and active on home medication list.     Attempting to reach patient to review. Left message asking for return call. Will pend refill request for pcp      Future Appointments   Date Time Provider Shelley Gorman   11/4/2022 12:00  [facility]  Luba Bang, PharmD, [address] [facility], toll free: [phone]     For Pharmacy [address] [place] [address] in place:  No  Recommendation Provided To: Provider: 1 via Note to Provider  Intervention Detail: Refill(s) Provided  Gap Closed?: Yes   Intervention Accepted By: Provider: 1  Time Spent (min): 10

## 2022-08-16 NOTE — TELEPHONE ENCOUNTER
Kehinde Barnes, APRN - CNP, patient out of refills on atorvastatin. Rx pended for your signature/modification as appropriate    LOV: 8/4/22  Next: 11/4/22    Thank you,  SAMUEL Austin, PharmD, [address] [place] [address], toll free: [phone]

## 2022-08-19 RX ORDER — ATORVASTATIN CALCIUM 80 MG/1
80 TABLET, FILM COATED ORAL DAILY
Qty: 90 TABLET | Refills: 1 | Status: SHIPPED | OUTPATIENT
Start: 2022-08-19 | End: 2022-09-19

## 2022-08-19 NOTE — TELEPHONE ENCOUNTER
Thank you!     Earle Wilkins, PharmD, [address] [facility]  Department, toll free: [phone]    For Pharmacy [address] [facility] in place:  No  Recommendation Provided To: Provider: 1 via Note to Provider  Intervention Detail: Refill(s) Provided  Gap Closed?: Yes   Intervention Accepted By: Provider: 1  Time Spent (min): 10

## 2022-08-29 ENCOUNTER — NURSE TRIAGE (OUTPATIENT)
Dept: OTHER | Facility: CLINIC | Age: 67
End: 2022-08-29

## 2022-08-29 NOTE — TELEPHONE ENCOUNTER
Received call from Ciara Lara at [facility] with Red Flag Complaint. Subjective: Caller states \"my stomach pushes on the R side onto my liver and gall bladder. I try to never get constipated. I have a big knot in my stomach and it's getting bigger. I have some nausea and once in a while I take some compazine. I'm noticing that I'm not passing any gas. \"     Onset:   couple of years ago the situation started, and in the last couple of months the knot feels like it's about the size of a fist.    Pain Severity: 6/10; bloating kind of pain, like her stomach is going to blow up    Temperature: no fever     What has been tried: nothing so far    Recommended disposition: See PCP within 24 Hours    Care advice provided, patient verbalizes understanding; denies any other questions or concerns; instructed to call back for any new or worsening symptoms. Wrote message to [facility] ([employer]) to please call and make an appt for pt to be seen within 24 hours. Attention Provider: Thank you for allowing me to participate in the care of your patient. The patient was connected to triage in response to information provided to the ECC/PSC. Please do not respond through this encounter as the response is not directed to a shared pool.     Reason for Disposition   [1] MODERATE pain (e.g., interferes with normal activities) AND [2] pain comes and goes (cramps) AND [3] present > 24 hours  (Exception: pain with Vomiting or Diarrhea - see that Guideline)    Protocols used: Abdominal Pain - Female-ADULT-

## 2022-09-06 DIAGNOSIS — F90.0 ATTENTION DEFICIT HYPERACTIVITY DISORDER (ADHD), PREDOMINANTLY INATTENTIVE TYPE: ICD-10-CM

## 2022-09-08 RX ORDER — DEXTROAMPHETAMINE SACCHARATE, AMPHETAMINE ASPARTATE MONOHYDRATE, DEXTROAMPHETAMINE SULFATE AND AMPHETAMINE SULFATE 7.5; 7.5; 7.5; 7.5 MG/1; MG/1; MG/1; MG/1
30 CAPSULE, EXTENDED RELEASE ORAL EVERY MORNING
Qty: 30 CAPSULE | Refills: 0 | Status: SHIPPED | OUTPATIENT
Start: 2022-09-08 | End: 2022-10-04 | Stop reason: SDUPTHER

## 2022-09-13 ENCOUNTER — OFFICE VISIT (OUTPATIENT)
Dept: FAMILY MEDICINE CLINIC | Age: 67
End: 2022-09-13
Payer: MEDICARE

## 2022-09-13 VITALS
SYSTOLIC BLOOD PRESSURE: 130 MMHG | WEIGHT: 160 LBS | DIASTOLIC BLOOD PRESSURE: 80 MMHG | HEART RATE: 94 BPM | BODY MASS INDEX: 29.44 KG/M2 | HEIGHT: 62 IN

## 2022-09-13 DIAGNOSIS — M48.062 SPINAL STENOSIS OF LUMBAR REGION WITH NEUROGENIC CLAUDICATION: ICD-10-CM

## 2022-09-13 DIAGNOSIS — F90.0 ATTENTION DEFICIT HYPERACTIVITY DISORDER (ADHD), PREDOMINANTLY INATTENTIVE TYPE: ICD-10-CM

## 2022-09-13 DIAGNOSIS — G89.29 CHRONIC BILATERAL LOW BACK PAIN WITH BILATERAL SCIATICA: ICD-10-CM

## 2022-09-13 DIAGNOSIS — M79.7 FIBROMYALGIA: ICD-10-CM

## 2022-09-13 DIAGNOSIS — M54.42 CHRONIC BILATERAL LOW BACK PAIN WITH BILATERAL SCIATICA: ICD-10-CM

## 2022-09-13 DIAGNOSIS — M54.41 CHRONIC BILATERAL LOW BACK PAIN WITH BILATERAL SCIATICA: ICD-10-CM

## 2022-09-13 PROCEDURE — G8399 PT W/DXA RESULTS DOCUMENT: HCPCS | Performed by: NURSE PRACTITIONER

## 2022-09-13 PROCEDURE — 4004F PT TOBACCO SCREEN RCVD TLK: CPT | Performed by: NURSE PRACTITIONER

## 2022-09-13 PROCEDURE — 1090F PRES/ABSN URINE INCON ASSESS: CPT | Performed by: NURSE PRACTITIONER

## 2022-09-13 PROCEDURE — 1123F ACP DISCUSS/DSCN MKR DOCD: CPT | Performed by: NURSE PRACTITIONER

## 2022-09-13 PROCEDURE — 3017F COLORECTAL CA SCREEN DOC REV: CPT | Performed by: NURSE PRACTITIONER

## 2022-09-13 PROCEDURE — 99214 OFFICE O/P EST MOD 30 MIN: CPT | Performed by: NURSE PRACTITIONER

## 2022-09-13 PROCEDURE — G8417 CALC BMI ABV UP PARAM F/U: HCPCS | Performed by: NURSE PRACTITIONER

## 2022-09-13 PROCEDURE — G8427 DOCREV CUR MEDS BY ELIG CLIN: HCPCS | Performed by: NURSE PRACTITIONER

## 2022-09-13 RX ORDER — ALPRAZOLAM 1 MG/1
1 TABLET ORAL 2 TIMES DAILY PRN
Qty: 60 TABLET | Refills: 2 | Status: CANCELLED | OUTPATIENT
Start: 2022-09-13 | End: 2022-10-13

## 2022-09-13 RX ORDER — GABAPENTIN 600 MG/1
TABLET ORAL
Qty: 90 TABLET | Refills: 2 | Status: CANCELLED | OUTPATIENT
Start: 2022-09-13 | End: 2022-10-13

## 2022-09-13 RX ORDER — BUPRENORPHINE 20 UG/H
1 PATCH TRANSDERMAL
Qty: 4 PATCH | Refills: 2 | Status: SHIPPED | OUTPATIENT
Start: 2022-09-13 | End: 2022-10-13

## 2022-09-13 ASSESSMENT — ENCOUNTER SYMPTOMS
HEARTBURN: 0
CONSTIPATION: 0
SORE THROAT: 0
SHORTNESS OF BREATH: 0
GASTROINTESTINAL NEGATIVE: 1
EYES NEGATIVE: 1
ALLERGIC/IMMUNOLOGIC NEGATIVE: 1
RHINORRHEA: 0
COLOR CHANGE: 0
RECTAL PAIN: 0
BLOOD IN STOOL: 0
TROUBLE SWALLOWING: 0
VOICE CHANGE: 0
ANAL BLEEDING: 0
ABDOMINAL PAIN: 0
DIARRHEA: 0
COUGH: 1

## 2022-09-13 ASSESSMENT — COPD QUESTIONNAIRES: COPD: 1

## 2022-09-13 NOTE — PROGRESS NOTES
[address], 78 y.o. female presents today with:  Chief Complaint   Patient presents with    Check-Up    Anxiety    Depression        Chronic pain, Fibromyalgia, back problems-  Butrans patches-  patient tolerating-  States first time she has eran been pain free-  Is willing to try a decreased dosage. Will also chronic pain-we will we will her on Butrans patches and stop tramadol but Butrans patches have become extremely expensive we will resume the tramadol    ADHD-  30mg xr daily with effectiveness  Anxiety-  Tried to 1 MG TID PRN-  She does not like the ER,  Did not feel it helped her anxiety. -  She is willing transition her to back to her XANAX 1mg that is not extended release but BID PRN not TID PRN. COPD  She complains of cough. There is no shortness of breath. This is a chronic problem. The problem occurs daily. The cough is non-productive. Pertinent negatives include no appetite change, chest pain, dyspnea on exertion, ear congestion, ear pain, fever, headaches, heartburn, malaise/fatigue, myalgias, nasal congestion, orthopnea, PND, postnasal drip, rhinorrhea, sneezing, sore throat, sweats, trouble swallowing or weight loss. Her symptoms are aggravated by change in weather. Risk factors for lung disease include smoking/tobacco exposure. Her past medical history is significant for COPD. ADD/ADHD:  Current treatment: Adderall XR- 30, which has been effective. Residual symptoms: none. Medication side effects: None. Patient denies anorexia, nausea, vomiting, abdominal pain, involuntary weight loss, palpitations, insomnia, irritability, headache and tremor. Controlled Substance Monitoring:    Acute and Chronic Pain Monitoring:   RX Monitoring 9/13/2022   Acute Pain Prescriptions -   Periodic Controlled Substance Monitoring Possible medication side effects, risk of tolerance/dependence & alternative treatments discussed. ;No signs of potential drug abuse or diversion identified. ;Assessed status:      Spouse name: Not on file    Number of children: Not on file    Years of education: Not on file    Highest education level: Not on file   Occupational History    Not on file   Tobacco Use    Smoking status: Every Day     Packs/day: 2.00     Years: 50.00     Pack years: 100.00     Types: Cigarettes     Last attempt to quit: 7/25/2019     Years since quitting: 3.1    Smokeless tobacco: Never   Vaping Use    Vaping Use: Never used   Substance and Sexual Activity    Alcohol use: Never    Drug use: Never    Sexual activity: Not on file   Other Topics Concern    Not on file   Social History Narrative    Not on file     Social Determinants of Health     Financial Resource Strain: Low Risk     Difficulty of Paying Living Expenses: Not hard at all   Food Insecurity: No Food Insecurity    Worried About Running Out of Food in the Last Year: Never true    Jerri of Food in the Last Year: Never true   Transportation Needs: Not on file   Physical Activity: Insufficiently Active    Days of Exercise per Week: 2 days    Minutes of Exercise per Session: 10 min   Stress: Not on file   Social Connections: Not on file   Intimate Partner Violence: Not on file   Housing Stability: Not on file     Family History   Problem Relation Age of Onset    Breast Cancer Maternal Aunt      No Known Allergies  Current Outpatient Medications   Medication Sig Dispense Refill    buprenorphine 20 MCG/HR PTWK Place 1 patch onto the skin every 7 days for 30 days. 4 patch 2    amphetamine-dextroamphetamine (ADDERALL XR) 30 MG extended release capsule Take 1 capsule by mouth every morning for 30 days. 30 capsule 0    atorvastatin (LIPITOR) 80 MG tablet Take 1 tablet by mouth daily 90 tablet 1    gabapentin (NEURONTIN) 600 MG tablet TAKE 1 TABLET BY MOUTH THREE TIMES DAILY for 30 days 90 tablet 2    cyclobenzaprine (FLEXERIL) 10 MG tablet TAKE 1 TABLET BY MOUTH TWICE DAILY AS NEEDED 60 tablet 11    Misc.  Devices (ROLLER [place]) MISC 1 each by Does not apply route daily 1 each 0    furosemide (LASIX) 20 MG tablet Take 1 tablet by mouth daily PRN 30 tablet 0    fluticasone-umeclidin-vilant (TRELEGY ELLIPTA) 100-62.5-25 MCG/INH AEPB Inhale 1 puff into the lungs daily 28 each 5    predniSONE (DELTASONE) 5 MG tablet Take 1 tablet by mouth daily 30 tablet 11    vitamin D (ERGOCALCIFEROL) 1.25 MG (99771 UT) CAPS capsule TAKE 1 CAPSULE BY MOUTH once per week AS DIRECTED 4 capsule 11    prochlorperazine (COMPAZINE) 10 MG tablet Take 1 tablet by mouth every 8 hours as needed (NAUSEA) 120 tablet 3    ARIPiprazole (ABILIFY) 10 MG tablet TAKE 1 TABLET DAILY IN THE MORNING      Handicap Placard MISC by Does not apply route Duration 5 years 1 each 0    Misc. Devices (PEDAL EXERCISER) MISC 1 each by Does not apply route daily 1 each 0    albuterol sulfate  (90 Base) MCG/ACT inhaler Inhale 2 puffs into the lungs every 6 hours as needed for Wheezing 1 Inhaler 3    omeprazole (PRILOSEC) 40 MG delayed release capsule Take 40 mg by mouth      polyethylene glycol (GLYCOLAX) 17 GM/SCOOP powder Take 17 g by mouth as needed      ipratropium-albuterol (DUONEB) 0.5-2.5 (3) MG/3ML SOLN nebulizer solution Inhale 3 mLs into the lungs every 4 hours as needed for Shortness of Breath 360 mL 0    venlafaxine (EFFEXOR XR) 75 MG extended release capsule TAKE 1 CAPSULE BY MOUTH TWICE DAILY  1    aspirin 81 MG chewable tablet       mirtazapine (REMERON) 15 MG tablet Take 15 mg by mouth nightly       No current facility-administered medications for this visit. PMH, Surgical Hx, Family Hx, and Social Hx reviewed and updated. Health Maintenance reviewed. Objective    Vitals:    09/13/22 0840   BP: 130/80   Pulse: 94   Weight: 160 lb (72.6 kg)   Height: 5' 2\" (1.575 m)       Physical Exam  Constitutional:       General: She is not in acute distress. Appearance: She is well-developed. HENT:      Head: Normocephalic and atraumatic.       Right Ear: External ear normal. Left Ear: External ear normal.      Nose: Nose normal.   Eyes:      Conjunctiva/sclera: Conjunctivae normal.      Pupils: Pupils are equal, round, and reactive to light. Neck:      Vascular: No JVD. Cardiovascular:      Rate and Rhythm: Normal rate and regular rhythm. Heart sounds: Normal heart sounds. No murmur heard. Pulmonary:      Effort: Pulmonary effort is normal. No respiratory distress. Breath sounds: Normal breath sounds. No wheezing or rales. Abdominal:      General: Bowel sounds are normal. There is no distension. Palpations: Abdomen is soft. There is no mass. Tenderness: There is no abdominal tenderness. Musculoskeletal:      Cervical back: Neck supple. Skin:     General: Skin is warm and dry. Neurological:      Mental Status: She is alert and oriented to person, place, and time. Assessment & Plan   Mariela Solo was seen today for check-up, anxiety and depression. Diagnoses and all orders for this visit:    Attention deficit hyperactivity disorder (ADHD), predominantly inattentive type    Chronic bilateral low back pain with bilateral sciatica L>R  -     buprenorphine 20 MCG/HR PTWK; Place 1 patch onto the skin every 7 days for 30 days. Spinal stenosis of lumbar region with neurogenic claudication  -     buprenorphine 20 MCG/HR PTWK; Place 1 patch onto the skin every 7 days for 30 days. Fibromyalgia  -     buprenorphine 20 MCG/HR PTWK; Place 1 patch onto the skin every 7 days for 30 days. No orders of the defined types were placed in this encounter. Orders Placed This Encounter   Medications    buprenorphine 20 MCG/HR PTWK     Sig: Place 1 patch onto the skin every 7 days for 30 days. Dispense:  4 patch     Refill:  2     Medications Discontinued During This Encounter   Medication Reason    acetaminophen (APAP EXTRA STRENGTH) 500 MG tablet Therapy completed     Return in about 3 months (around 12/13/2022).       Reviewed with the patient: current clinical status, medications, activities and diet. Side effects, adverse effects of the medication prescribed today, as well as treatment plan/ rationale and result expectations have been discussed with the patient who expresses understanding and desires to proceed. Close follow up to evaluate treatment results and for coordination of care. I have reviewed the patient's medical history in detail and updated the computerized patient record.     Guillermo Fortune, APRN - CNP

## 2022-09-19 ENCOUNTER — TELEPHONE (OUTPATIENT)
Dept: FAMILY MEDICINE CLINIC | Age: 67
End: 2022-09-19

## 2022-09-19 RX ORDER — ROSUVASTATIN CALCIUM 10 MG/1
10 TABLET, COATED ORAL DAILY
Qty: 30 TABLET | Refills: 5 | Status: SHIPPED | OUTPATIENT
Start: 2022-09-19

## 2022-09-19 NOTE — TELEPHONE ENCOUNTER
Patient called in asking if her RX for Lipitor cholesterol medication to be changed to something else this one messes with her muscles she says. Please send new RX to [facility] [address] in [facility].  [phone]  Please Advise Thank You

## 2022-10-04 DIAGNOSIS — F90.0 ATTENTION DEFICIT HYPERACTIVITY DISORDER (ADHD), PREDOMINANTLY INATTENTIVE TYPE: ICD-10-CM

## 2022-10-04 RX ORDER — DEXTROAMPHETAMINE SACCHARATE, AMPHETAMINE ASPARTATE MONOHYDRATE, DEXTROAMPHETAMINE SULFATE AND AMPHETAMINE SULFATE 7.5; 7.5; 7.5; 7.5 MG/1; MG/1; MG/1; MG/1
30 CAPSULE, EXTENDED RELEASE ORAL EVERY MORNING
Qty: 30 CAPSULE | Refills: 0 | Status: SHIPPED | OUTPATIENT
Start: 2022-10-04 | End: 2022-11-04 | Stop reason: SDUPTHER

## 2022-10-04 NOTE — TELEPHONE ENCOUNTER
Danae Lynch is requesting medication refill. Patient has confirmed the pharmacy. Rx requested:  Requested Prescriptions     Pending Prescriptions Disp Refills    amphetamine-dextroamphetamine (ADDERALL XR) 30 MG extended release capsule 30 capsule 0     Sig: Take 1 capsule by mouth every morning for 30 days. Last Office Visit:   9/13/2022    Last Tox screen:    ?     Last Medication contract:    ?    Next Visit Date:  Future Appointments   Date Time Provider Shelley Gorman   12/13/2022  2:15 PM Deirdre Berrios, [address]

## 2022-10-17 ENCOUNTER — TELEPHONE (OUTPATIENT)
Dept: PHARMACY | Facility: CLINIC | Age: 67
End: 2022-10-17

## 2022-10-17 NOTE — TELEPHONE ENCOUNTER
[facility] CLINICAL PHARMACY: ADHERENCE REVIEW  Identified care gap per [place]: fills at [facility] : Statin adherence    Last Visit: 9/13/22    Patient prescribed:   Rosuvastatin 10 mg daily      [id number] Records claims through 10/10/22 (YTD Mark Larsenandra = 73%; Potential Fail Date: 10/21/22): Rosuvastatin last filled for 30 day supply. Next refill due: 10/19/22    Per Reconciled Dispense Report:  rosuvastatin 10 mg tablet 09/19/2022 30 27 each Fer Grissom, APRN - CNP [facility]... Per [place] Portal:  Same as above; has 5 refills left. Per [address] [facility] [address]:   Rosuvastatin - same as above. Billed through [facility] Results   Component Value Date    CHOL 164 05/31/2022    TRIG 199 (H) 05/31/2022    HDL 57 05/31/2022    LDLCALC 67 05/31/2022     ALT   Date Value Ref Range Status   05/31/2022 21 0 - 33 U/L Final     AST   Date Value Ref Range Status   05/31/2022 19 0 - 35 U/L Final     The ASCVD Risk score (Nhan DK, et al., 2019) failed to calculate for the following reasons: The patient has a prior MI or stroke diagnosis       PLAN  The following are interventions that have been identified:   - Patient overdue refilling rosuvastatin and active on home medication list.   -[facility] will dispense a refill for rosuvastatin today. Attempting to reach patient to review. Left message asking for return call.  and [employer] message sent to patient      Future Appointments   Date Time Provider Shelley Gorman   12/13/2022  2:15 PM Fer Grissom [facility] [address], PharmD, [address]  Department, toll free: [phone], option 1    =======================================================  For Pharmacy [address] [place] [address] Street in place:  No  Recommendation Provided To: Patient/Caregiver: 1 via Telephone and [employer] Message and Pharmacy: 1  Intervention Detail: Adherence Monitorin and Refill(s) Provided  Gap Closed?: No   Intervention Accepted By: Patient/Caregiver: 0 and Pharmacy: 1  Time Spent (min): 30

## 2022-11-01 DIAGNOSIS — M54.41 CHRONIC BILATERAL LOW BACK PAIN WITH BILATERAL SCIATICA: ICD-10-CM

## 2022-11-01 DIAGNOSIS — M79.7 FIBROMYALGIA: ICD-10-CM

## 2022-11-01 DIAGNOSIS — M54.42 CHRONIC BILATERAL LOW BACK PAIN WITH BILATERAL SCIATICA: ICD-10-CM

## 2022-11-01 DIAGNOSIS — M48.062 SPINAL STENOSIS OF LUMBAR REGION WITH NEUROGENIC CLAUDICATION: ICD-10-CM

## 2022-11-01 DIAGNOSIS — G89.29 CHRONIC BILATERAL LOW BACK PAIN WITH BILATERAL SCIATICA: ICD-10-CM

## 2022-11-01 RX ORDER — GABAPENTIN 600 MG/1
TABLET ORAL
Qty: 90 TABLET | Refills: 2 | Status: SHIPPED | OUTPATIENT
Start: 2022-11-01 | End: 2022-12-01

## 2022-11-01 NOTE — TELEPHONE ENCOUNTER
requesting medication refill.  Please approve or deny this request.    Rx requested:  Requested Prescriptions     Pending Prescriptions Disp Refills    gabapentin (NEURONTIN) 600 MG tablet [Pharmacy Med Name: gabapentin 600 mg tablet] 90 tablet 2     Sig: TAKE 1 TABLET BY MOUTH THREE TIMES DAILY         Last Office Visit:   9/13/2022      Next Visit Date:  Future Appointments   Date Time Provider Shelley Gomran   12/13/2022  2:15 PM Esther West, [address]

## 2022-11-04 ENCOUNTER — HOSPITAL ENCOUNTER (OUTPATIENT)
Age: 67
Setting detail: OBSERVATION
Discharge: HOME | End: 2022-11-05
Attending: FAMILY MEDICINE | Admitting: FAMILY MEDICINE
Payer: MEDICARE

## 2022-11-04 ENCOUNTER — APPOINTMENT (OUTPATIENT)
Dept: GENERAL RADIOLOGY | Age: 67
End: 2022-11-04
Payer: MEDICARE

## 2022-11-04 ENCOUNTER — APPOINTMENT (OUTPATIENT)
Dept: CT IMAGING | Age: 67
End: 2022-11-04
Payer: MEDICARE

## 2022-11-04 DIAGNOSIS — G45.9 TIA (TRANSIENT ISCHEMIC ATTACK): Primary | ICD-10-CM

## 2022-11-04 DIAGNOSIS — F90.0 ATTENTION DEFICIT HYPERACTIVITY DISORDER (ADHD), PREDOMINANTLY INATTENTIVE TYPE: ICD-10-CM

## 2022-11-04 DIAGNOSIS — F41.9 ANXIETY: Primary | ICD-10-CM

## 2022-11-04 DIAGNOSIS — R53.1 ACUTE RIGHT-SIDED WEAKNESS: ICD-10-CM

## 2022-11-04 DIAGNOSIS — J44.1 COPD EXACERBATION (HCC): ICD-10-CM

## 2022-11-04 LAB
ALBUMIN SERPL-MCNC: 4 G/DL (ref 3.5–4.6)
ALP BLD-CCNC: 105 U/L (ref 40–130)
ALT SERPL-CCNC: 13 U/L (ref 0–33)
ANION GAP SERPL CALCULATED.3IONS-SCNC: 11 MEQ/L (ref 9–15)
APTT: 26.8 SEC (ref 24.4–36.8)
AST SERPL-CCNC: 19 U/L (ref 0–35)
BASOPHILS ABSOLUTE: 0.1 K/UL (ref 0–0.2)
BASOPHILS RELATIVE PERCENT: 1 %
BILIRUB SERPL-MCNC: <0.2 MG/DL (ref 0.2–0.7)
BUN BLDV-MCNC: 10 MG/DL (ref 8–23)
CALCIUM SERPL-MCNC: 8.6 MG/DL (ref 8.5–9.9)
CHLORIDE BLD-SCNC: 103 MEQ/L (ref 95–107)
CHP ED QC CHECK: YES
CO2: 23 MEQ/L (ref 20–31)
CREAT SERPL-MCNC: 0.8 MG/DL (ref 0.5–0.9)
EOSINOPHILS ABSOLUTE: 0 K/UL (ref 0–0.7)
EOSINOPHILS RELATIVE PERCENT: 0.2 %
GFR SERPL CREATININE-BSD FRML MDRD: >60 ML/MIN/{1.73_M2}
GLOBULIN: 2.9 G/DL (ref 2.3–3.5)
GLUCOSE BLD-MCNC: 83 MG/DL (ref 70–99)
GLUCOSE BLD-MCNC: 92 MG/DL
HCT VFR BLD CALC: 43.5 % (ref 37–47)
HEMOGLOBIN: 14.2 G/DL (ref 12–16)
INR BLD: 0.9
LYMPHOCYTES ABSOLUTE: 1.7 K/UL (ref 1–4.8)
LYMPHOCYTES RELATIVE PERCENT: 22.2 %
MCH RBC QN AUTO: 30.4 PG (ref 27–31.3)
MCHC RBC AUTO-ENTMCNC: 32.6 % (ref 33–37)
MCV RBC AUTO: 93.3 FL (ref 79.4–94.8)
MONOCYTES ABSOLUTE: 0.4 K/UL (ref 0.2–0.8)
MONOCYTES RELATIVE PERCENT: 5.5 %
NEUTROPHILS ABSOLUTE: 5.6 K/UL (ref 1.4–6.5)
NEUTROPHILS RELATIVE PERCENT: 71.1 %
PDW BLD-RTO: 14.4 % (ref 11.5–14.5)
PLATELET # BLD: 255 K/UL (ref 130–400)
POTASSIUM SERPL-SCNC: 4.7 MEQ/L (ref 3.4–4.9)
PROTHROMBIN TIME: 12.1 SEC (ref 12.3–14.9)
RBC # BLD: 4.66 M/UL (ref 4.2–5.4)
SODIUM BLD-SCNC: 137 MEQ/L (ref 135–144)
TOTAL CK: 74 U/L (ref 0–170)
TOTAL PROTEIN: 6.9 G/DL (ref 6.3–8)
TROPONIN: <0.01 NG/ML (ref 0–0.01)
TROPONIN: <0.01 NG/ML (ref 0–0.01)
WBC # BLD: 7.8 K/UL (ref 4.8–10.8)

## 2022-11-04 PROCEDURE — 74018 RADEX ABDOMEN 1 VIEW: CPT

## 2022-11-04 PROCEDURE — 70498 CT ANGIOGRAPHY NECK: CPT

## 2022-11-04 PROCEDURE — 85610 PROTHROMBIN TIME: CPT

## 2022-11-04 PROCEDURE — 6370000000 HC RX 637 (ALT 250 FOR IP): Performed by: FAMILY MEDICINE

## 2022-11-04 PROCEDURE — 6360000002 HC RX W HCPCS: Performed by: FAMILY MEDICINE

## 2022-11-04 PROCEDURE — 84484 ASSAY OF TROPONIN QUANT: CPT

## 2022-11-04 PROCEDURE — G0378 HOSPITAL OBSERVATION PER HR: HCPCS

## 2022-11-04 PROCEDURE — 6370000000 HC RX 637 (ALT 250 FOR IP): Performed by: PHYSICIAN ASSISTANT

## 2022-11-04 PROCEDURE — 96372 THER/PROPH/DIAG INJ SC/IM: CPT

## 2022-11-04 PROCEDURE — 82550 ASSAY OF CK (CPK): CPT

## 2022-11-04 PROCEDURE — 80053 COMPREHEN METABOLIC PANEL: CPT

## 2022-11-04 PROCEDURE — 85730 THROMBOPLASTIN TIME PARTIAL: CPT

## 2022-11-04 PROCEDURE — 71045 X-RAY EXAM CHEST 1 VIEW: CPT

## 2022-11-04 PROCEDURE — 85025 COMPLETE CBC W/AUTO DIFF WBC: CPT

## 2022-11-04 PROCEDURE — 70496 CT ANGIOGRAPHY HEAD: CPT

## 2022-11-04 PROCEDURE — 6370000000 HC RX 637 (ALT 250 FOR IP): Performed by: NURSE PRACTITIONER

## 2022-11-04 PROCEDURE — 6360000004 HC RX CONTRAST MEDICATION: Performed by: PHYSICIAN ASSISTANT

## 2022-11-04 PROCEDURE — 93005 ELECTROCARDIOGRAM TRACING: CPT | Performed by: FAMILY MEDICINE

## 2022-11-04 PROCEDURE — 36415 COLL VENOUS BLD VENIPUNCTURE: CPT

## 2022-11-04 PROCEDURE — 99285 EMERGENCY DEPT VISIT HI MDM: CPT

## 2022-11-04 RX ORDER — POLYETHYLENE GLYCOL 3350 17 G/17G
17 POWDER, FOR SOLUTION ORAL DAILY PRN
Status: DISCONTINUED | OUTPATIENT
Start: 2022-11-04 | End: 2022-11-05 | Stop reason: HOSPADM

## 2022-11-04 RX ORDER — ONDANSETRON 2 MG/ML
4 INJECTION INTRAMUSCULAR; INTRAVENOUS EVERY 6 HOURS PRN
Status: DISCONTINUED | OUTPATIENT
Start: 2022-11-04 | End: 2022-11-05 | Stop reason: HOSPADM

## 2022-11-04 RX ORDER — ALPRAZOLAM 1 MG/1
1 TABLET ORAL 2 TIMES DAILY
Qty: 60 TABLET | Refills: 0 | Status: SHIPPED | OUTPATIENT
Start: 2022-11-04 | End: 2022-12-04

## 2022-11-04 RX ORDER — MIRTAZAPINE 30 MG/1
60 TABLET, FILM COATED ORAL NIGHTLY
Status: DISCONTINUED | OUTPATIENT
Start: 2022-11-04 | End: 2022-11-05 | Stop reason: HOSPADM

## 2022-11-04 RX ORDER — IPRATROPIUM BROMIDE AND ALBUTEROL SULFATE 2.5; .5 MG/3ML; MG/3ML
1 SOLUTION RESPIRATORY (INHALATION) EVERY 4 HOURS PRN
Qty: 360 ML | Refills: 0 | Status: SHIPPED | OUTPATIENT
Start: 2022-11-04

## 2022-11-04 RX ORDER — ATORVASTATIN CALCIUM 80 MG/1
80 TABLET, FILM COATED ORAL NIGHTLY
Status: DISCONTINUED | OUTPATIENT
Start: 2022-11-04 | End: 2022-11-05 | Stop reason: HOSPADM

## 2022-11-04 RX ORDER — PREDNISONE 1 MG/1
TABLET ORAL
Qty: 30 TABLET | Refills: 11 | Status: SHIPPED | OUTPATIENT
Start: 2022-11-04

## 2022-11-04 RX ORDER — ENOXAPARIN SODIUM 100 MG/ML
40 INJECTION SUBCUTANEOUS DAILY
Status: DISCONTINUED | OUTPATIENT
Start: 2022-11-04 | End: 2022-11-05 | Stop reason: HOSPADM

## 2022-11-04 RX ORDER — NICOTINE 21 MG/24HR
1 PATCH, TRANSDERMAL 24 HOURS TRANSDERMAL DAILY
Status: DISCONTINUED | OUTPATIENT
Start: 2022-11-04 | End: 2022-11-05 | Stop reason: HOSPADM

## 2022-11-04 RX ORDER — ASPIRIN 325 MG
325 TABLET ORAL ONCE
Status: COMPLETED | OUTPATIENT
Start: 2022-11-04 | End: 2022-11-04

## 2022-11-04 RX ORDER — ONDANSETRON 4 MG/1
4 TABLET, ORALLY DISINTEGRATING ORAL EVERY 8 HOURS PRN
Status: DISCONTINUED | OUTPATIENT
Start: 2022-11-04 | End: 2022-11-05 | Stop reason: HOSPADM

## 2022-11-04 RX ORDER — PREDNISONE 1 MG/1
5 TABLET ORAL DAILY
Status: ON HOLD | COMMUNITY
End: 2022-11-05 | Stop reason: HOSPADM

## 2022-11-04 RX ORDER — DEXTROAMPHETAMINE SACCHARATE, AMPHETAMINE ASPARTATE MONOHYDRATE, DEXTROAMPHETAMINE SULFATE AND AMPHETAMINE SULFATE 7.5; 7.5; 7.5; 7.5 MG/1; MG/1; MG/1; MG/1
30 CAPSULE, EXTENDED RELEASE ORAL EVERY MORNING
Qty: 30 CAPSULE | Refills: 0 | Status: SHIPPED | OUTPATIENT
Start: 2022-11-04 | End: 2022-12-01 | Stop reason: SDUPTHER

## 2022-11-04 RX ORDER — CLOPIDOGREL BISULFATE 75 MG/1
75 TABLET ORAL DAILY
Status: DISCONTINUED | OUTPATIENT
Start: 2022-11-04 | End: 2022-11-05 | Stop reason: HOSPADM

## 2022-11-04 RX ADMIN — ATORVASTATIN CALCIUM 80 MG: 80 TABLET, FILM COATED ORAL at 22:16

## 2022-11-04 RX ADMIN — ENOXAPARIN SODIUM 40 MG: 100 INJECTION SUBCUTANEOUS at 15:11

## 2022-11-04 RX ADMIN — MIRTAZAPINE 60 MG: 30 TABLET, FILM COATED ORAL at 22:17

## 2022-11-04 RX ADMIN — CLOPIDOGREL BISULFATE 75 MG: 75 TABLET ORAL at 15:11

## 2022-11-04 RX ADMIN — ASPIRIN 325 MG: 325 TABLET ORAL at 15:11

## 2022-11-04 RX ADMIN — IOPAMIDOL 75 ML: 612 INJECTION, SOLUTION INTRAVENOUS at 13:31

## 2022-11-04 ASSESSMENT — LIFESTYLE VARIABLES
HOW OFTEN DO YOU HAVE A DRINK CONTAINING ALCOHOL: NEVER
HOW MANY STANDARD DRINKS CONTAINING ALCOHOL DO YOU HAVE ON A TYPICAL DAY: PATIENT DOES NOT DRINK

## 2022-11-04 ASSESSMENT — ENCOUNTER SYMPTOMS
CONSTIPATION: 0
EYE DISCHARGE: 0
SORE THROAT: 0
ABDOMINAL DISTENTION: 0
ABDOMINAL PAIN: 0
COLOR CHANGE: 0
RHINORRHEA: 0
SHORTNESS OF BREATH: 0

## 2022-11-04 ASSESSMENT — PAIN - FUNCTIONAL ASSESSMENT: PAIN_FUNCTIONAL_ASSESSMENT: NONE - DENIES PAIN

## 2022-11-04 NOTE — PROGRESS NOTES
DVT / VTE PROPHYLAXIS EVALUATION    Estimated Creatinine Clearance: 64 mL/min (based on SCr of 0.8 mg/dL). Recent Labs     11/04/22  1245 11/04/22  1300   BUN 10  --    CREATININE 0.80  --      --    HGB 14.2  --    HCT 43.5  --    INR  --  0.9     ADMITTING DX OR CHIEF COMPLAINT?  TIA  WARFARIN? DOAC'S? no  ANY APPARENT BLEEDING? no  SCHEDULED SURGERY? no     Current order:  Enoxaparin 40 mg SUBQ once daily      Plan:  No intervention recommended, continue current VTE prophylaxis as ordered     Patient Weight (kg)      50.9 and below .9 101-150.9 151-174.9 175 or greater   Estimated   CrCl  (ml/min) 30 or greater []   30 mg   SUBQ daily   [x]   40 mg   SUBQ daily []  30 mg SUBQ   BID  []  40 mg   SUBQ   BID []  60mg SUBQ BID    15-29.9 []  UFH 5000   units SUBQ BID []  30 mg   SUBQ daily [] 30 mg SUBQ   daily []  40 mg SUBQ   daily [] 60 mg SUBQ   daily    Less than 15 or dialysis []  UFH 5000   units SUBQ BID [] UFH 5000 units SUBQ TID []  UFH 7500   units   SUBQ TID         [facility] PharmD

## 2022-11-04 NOTE — ED PROVIDER NOTES
[facility] ED  eMERGENCY dEPARTMENT eNCOUnter      Pt Name: Diann Ortiz  MRN: [id number]  [place] 1955  Date of evaluation: 11/4/2022  Provider: Jesse Amaya PA-C    CHIEF COMPLAINT       Chief Complaint   Patient presents with    Facial Droop     Right sided weakness and facial droop while at doctor's office today, resolved on arrival          HISTORY OF PRESENT ILLNESS   (Location/Symptom, Timing/Onset,Context/Setting, Quality, Duration, Modifying Factors, Severity)  Note limiting factors. Diann Ortiz is a 78 y.o. female who presents to the emergency department plaint of right upper extremity weakness, and right-sided facial droop, which occurred around 11:45 AM today while patient was seeing her primary doctor. She states that she had no symptoms prior to going in, she has had TIAs before in the past, she complains of weakness to right upper extremity and right lower extremity she was able to stand and ambulate at time of the incident. She was sent to the ED by her primary physician for further evaluation. Arrival to the ED, patient states her symptoms are now resolved. Denies any recent illness or injury, past medical history significant for ADHD, anxiety, gastric reflux, fibromyalgia, hypertension, depression, irritable bowel, TIA, Tobacco abuse. Patient states she is not on any anticoagulation, she does take a daily aspirin. HPI    NursingNotes were reviewed. REVIEW OF SYSTEMS    (2-9 systems for level 4, 10 or more for level 5)     Review of Systems   Constitutional:  Negative for activity change and appetite change. HENT:  Negative for congestion, ear discharge, ear pain, nosebleeds, rhinorrhea and sore throat. Eyes:  Negative for discharge. Respiratory:  Negative for shortness of breath. Cardiovascular:  Negative for chest pain, palpitations and leg swelling. Gastrointestinal:  Negative for abdominal distention, abdominal pain and constipation. Genitourinary:  Negative for difficulty urinating and dysuria. Musculoskeletal:  Negative for arthralgias. Skin:  Negative for color change. Neurological:  Positive for weakness. Negative for dizziness, syncope, numbness and headaches. Right-sided weakness   Psychiatric/Behavioral:  Negative for agitation and confusion. Except as noted above the remainder of the review of systems was reviewed and negative. PAST MEDICAL HISTORY     Past Medical History:   Diagnosis Date    ADHD (attention deficit hyperactivity disorder)     Anxiety     Depression     Fibromyalgia     GERD (gastroesophageal reflux disease)     Hypertension     Irritable bowel syndrome          SURGICALHISTORY       Past Surgical History:   Procedure Laterality Date     SECTION      COLONOSCOPY      N GAUDENCIO ASCENCIO MD    DILATION AND CURETTAGE OF UTERUS N/A 2020    DIAGNOSTIC LAPAROSCOPY, LYSIS OF ADHESIONS, HYSTEROSCOPY WITH MYOSURE, CYSTOSCOPY performed by Clayton Richardson MD at [address] [place] [address]       Previous Medications    ALBUTEROL SULFATE  (90 BASE) MCG/ACT INHALER    Inhale 2 puffs into the lungs every 6 hours as needed for Wheezing    ARIPIPRAZOLE (ABILIFY) 10 MG TABLET    TAKE 1 TABLET DAILY IN THE MORNING    ASPIRIN 81 MG CHEWABLE TABLET        CYCLOBENZAPRINE (FLEXERIL) 10 MG TABLET    TAKE 1 TABLET BY MOUTH TWICE DAILY AS NEEDED    FLUTICASONE-UMECLIDIN-VILANT (TRELEGY ELLIPTA) 100-62.5-25 MCG/INH AEPB    Inhale 1 puff into the lungs daily    FUROSEMIDE (LASIX) 20 MG TABLET    Take 1 tablet by mouth daily PRN    GABAPENTIN (NEURONTIN) 600 MG TABLET    TAKE 1 TABLET BY MOUTH THREE TIMES DAILY    HANDICAP PLACARD MISC    by Does not apply route Duration 5 years    MIRTAZAPINE (REMERON) 15 MG TABLET    Take 15 mg by mouth nightly    MISC. DEVICES (PEDAL EXERCISER) MISC    1 each by Does not apply route daily    MISC.  DEVICES (ROLLER WALKER) MISC    1 each by Does not apply route daily    OMEPRAZOLE (PRILOSEC) 40 MG DELAYED RELEASE CAPSULE    Take 40 mg by mouth    POLYETHYLENE GLYCOL (GLYCOLAX) 17 GM/SCOOP POWDER    Take 17 g by mouth as needed    PROCHLORPERAZINE (COMPAZINE) 10 MG TABLET    Take 1 tablet by mouth every 8 hours as needed (NAUSEA)    ROSUVASTATIN (CRESTOR) 10 MG TABLET    Take 1 tablet by mouth daily    VENLAFAXINE (EFFEXOR XR) 75 MG EXTENDED RELEASE CAPSULE    TAKE 1 CAPSULE BY MOUTH TWICE DAILY    VITAMIN D (ERGOCALCIFEROL) 1.25 MG (79621 UT) CAPS CAPSULE    TAKE 1 CAPSULE BY MOUTH once per week AS DIRECTED       ALLERGIES     Patient has no known allergies. FAMILY HISTORY       Family History   Problem Relation Age of Onset    Breast Cancer Maternal Aunt           SOCIAL HISTORY       Social History     Socioeconomic History    Marital status:      Spouse name: None    Number of children: None    Years of education: None    Highest education level: None   Tobacco Use    Smoking status: Every Day     Packs/day: 2.00     Years: 50.00     Pack years: 100.00     Types: Cigarettes     Last attempt to quit: 7/25/2019     Years since quitting: 3.2    Smokeless tobacco: Never   Vaping Use    Vaping Use: Never used   Substance and Sexual Activity    Alcohol use: Never    Drug use: Never     Social Determinants of Health     Financial Resource Strain: Low Risk     Difficulty of Paying Living Expenses: Not hard at all   Food Insecurity: No Food Insecurity    Worried About Running Out of Food in the Last Year: Never true    Ran Out of Food in the Last Year: Never true   Physical Activity: Insufficiently Active    Days of Exercise per Week: 2 days    Minutes of Exercise per Session: 10 min       SCREENINGS   NIH Stroke Scale  Interval: Baseline  Level of Consciousness (1a): Alert  LOC Questions (1b):  Answers both correctly  LOC Commands (1c): Performs both tasks correctly  Best Gaze (2): Normal  Visual (3): No visual loss  Facial Palsy (4): Normal symmetrical movement  Motor Arm, Left (5a): No drift  Motor Arm, Right (5b): No drift  Motor Leg, Left (6a): No drift  Motor Leg, Right (6b): No drift  Limb Ataxia (7): Absent  Sensory (8): Normal  Best Language (9): No aphasia  Dysarthria (10): Normal  Extinction and Inattention (11): No abnormality  Total: 0Glasgow Coma Scale  Eye Opening: Spontaneous  Best Verbal Response: Oriented  Best Motor Response: Obeys commands  More Coma Scale Score: 15 @FLOW([id number])@      PHYSICAL EXAM    (up to 7 for level 4, 8 or more for level 5)     ED Triage Vitals [11/04/22 1224]   BP Temp Temp Source Pulse Resp SpO2 Height Weight   -- 97.7 °F (36.5 °C) Oral -- -- -- 5' 2\" (1.575 m) 160 lb (72.6 kg)       Physical Exam  Vitals and nursing note reviewed. Constitutional:       General: She is not in acute distress. Appearance: She is well-developed. She is not ill-appearing, toxic-appearing or diaphoretic. HENT:      Head: Normocephalic. Right Ear: Tympanic membrane normal.      Left Ear: Tympanic membrane normal.      Nose: Nose normal. No congestion. Mouth/Throat:      Mouth: Mucous membranes are moist.      Pharynx: No oropharyngeal exudate or posterior oropharyngeal erythema. Eyes:      Extraocular Movements: Extraocular movements intact. Conjunctiva/sclera: Conjunctivae normal.      Pupils: Pupils are equal, round, and reactive to light. Neck:      Vascular: No JVD. Trachea: No tracheal deviation. Cardiovascular:      Rate and Rhythm: Normal rate. Pulses: Normal pulses. Heart sounds: Normal heart sounds. No murmur heard. No friction rub. No gallop. Pulmonary:      Effort: Pulmonary effort is normal. No tachypnea, accessory muscle usage, respiratory distress or retractions. Breath sounds: No stridor. No wheezing, rhonchi or rales. Chest:      Chest wall: No tenderness. Abdominal:      General: Abdomen is flat. Bowel sounds are normal. There is no distension or abdominal bruit. Palpations: There is no shifting dullness, fluid wave, hepatomegaly, splenomegaly, mass or pulsatile mass. Tenderness: There is no abdominal tenderness. There is no right CVA tenderness, left CVA tenderness, guarding or rebound. Negative signs include Mayfield's sign, Rovsing's sign and McBurney's sign. Musculoskeletal:         General: No deformity. Cervical back: Normal range of motion and neck supple. No rigidity. Skin:     General: Skin is warm and dry. Capillary Refill: Capillary refill takes less than 2 seconds. Coloration: Skin is not jaundiced. Neurological:      General: No focal deficit present. Mental Status: She is alert and oriented to person, place, and time. Mental status is at baseline. Cranial Nerves: No cranial nerve deficit. Sensory: No sensory deficit. Motor: No weakness. Coordination: Coordination normal.      Comments: Patient is neurologically intact, no facial droop, no slurring of speech, no drift upper or lower extremities. Psychiatric:         Mood and Affect: Mood normal.       DIAGNOSTIC RESULTS     EKG: All EKG's are interpreted by the Emergency Department Physician who either signs or Co-signsthis chart in the absence of a cardiologist.    EKG shows normal sinus rhythm at 76 bpm there is no acute ST segment abnormality no ventricular ectopy  ms    RADIOLOGY:   Non-plain filmimages such as CT, Ultrasound and MRI are read by the radiologist. Plain radiographic images are visualized and preliminarily interpreted by the emergency physician with the below findings:        Interpretation per the Radiologist below, if available at the time ofthis note:     [address]   Final Result   1. No acute intracranial abnormality. 2. Small chronic-appearing lacunar infarctions in the left thalamus and right   cerebellar hemisphere. 3. Unremarkable CTA of the brain and neck.       RECOMMENDATIONS:   Unavailable         CTA NECK W WO CONTRAST   Final Result   1. No acute intracranial abnormality. 2. Small chronic-appearing lacunar infarctions in the left thalamus and right   cerebellar hemisphere. 3. Unremarkable CTA of the brain and neck. RECOMMENDATIONS:   Unavailable         XR CHEST PORTABLE   Final Result   No acute process. ED BEDSIDE ULTRASOUND:   Performed by ED Physician - none    LABS:  Labs Reviewed   CBC WITH AUTO DIFFERENTIAL - Abnormal; Notable for the following components:       Result Value    MCHC 32.6 (*)     All other components within normal limits   PROTIME-INR - Abnormal; Notable for the following components:    Protime 12.1 (*)     All other components within normal limits   POCT GLUCOSE - Normal   COMPREHENSIVE METABOLIC PANEL   TROPONIN   TROPONIN   CK   APTT       All other labs were within normal range or not returned as of this dictation. EMERGENCY DEPARTMENT COURSE and DIFFERENTIAL DIAGNOSIS/MDM:   Vitals:    Vitals:    11/04/22 1224 11/04/22 1300   BP:  (!) 87/57   Pulse:  77   Resp:  14   Temp: 97.7 °F (36.5 °C)    TempSrc: Oral    SpO2:  96%   Weight: 160 lb (72.6 kg)    Height: 5' 2\" (1.575 m)         MDM  Number of Diagnoses or Management Options  Acute right-sided weakness  TIA (transient ischemic attack)  Diagnosis management comments: Pt presented to the ED with a complaint of right-sided weakness and facial droop which presented while she was in the office with her primary doctor, she was sent to the emergency department for evaluation, but by the time she arrived in the ED her symptoms had resolved. Patient states she has had a TIA in the past in 2020, she states no residual effects from previous TIA. She is currently on 81 mg of aspirin daily. Denies any recent illness or injury. NIH stroke scale on arrival is 0 CTA of the head and neck were completed in the ED which showed no acute process. Patient remains neurologically intact, she displays no acute distress.   Labs are nonspecific at this time. Patient will be admitted in the hospital for further evaluation management for TIA, right sided weakness. I did speak with Dr. Kerrie Morton the [facility] hospitalist, he will accept admission of this patient with consults to neurology        CRITICAL CARE TIME   Total Critical Care time was  minutes, excluding separately reportableprocedures. There was a high probability of clinicallysignificant/life threatening deterioration in the patient's condition which required my urgent intervention. CONSULTS:  IP CONSULT TO NEUROLOGY    PROCEDURES:  Unless otherwise noted below, none     Procedures    FINAL IMPRESSION      1. TIA (transient ischemic attack)    2. Acute right-sided weakness          DISPOSITION/PLAN   DISPOSITION Admitted 11/04/2022 02:48:11 PM      PATIENT REFERRED TO:  No follow-up provider specified. DISCHARGE MEDICATIONS:  New Prescriptions    ALPRAZOLAM (XANAX) 1 MG TABLET    Take 1 tablet by mouth in the morning and 1 tablet in the evening. Do all this for 30 days.           (Please note that portions of this note were completed with a voice recognition program.  Efforts were made to edit the dictations but occasionally words are mis-transcribed.)    Jadiel Mota PA-C (electronically signed)  Attending Emergency Physician         Jadiel Mota PA-C  11/04/22 1100 [facility] Baljitlexa Brunner PA-C  11/04/22 [id number]

## 2022-11-04 NOTE — ED NOTES
Report called to Raeann Sharif, on [place] [address]. Patient agrees to admission plan. No neuro deficits noted in ED. Patient ambulatory in ED without difficulty, steady gait noted. No distress noted upon transfer to [place] [address]. Patient is on room air, IV saline locked.       Juan Miguel Kaufman RN  11/04/22 [id number]

## 2022-11-04 NOTE — CARE COORDINATION
[facility] EMERGENCY [facility] CENTER AT [place] Case Management Initial Discharge Assessment    Met with Patient to discuss discharge plan. PCP: AMANDA Calix CNP                                Date of Last Visit: 2 months ago    [facility] Patient: No        [facility] Notified: no    If no PCP, list provided? N/A    Discharge Planning    Living Arrangements: independently at home    Who do you live with? DTR, HER BOYFRIEND AND GRANDSON WHO IS 22    Who helps you with your care:  self or ANDREW-YEL DTR   If lives at home:     Do you have any barriers navigating in your home? No LIVES IN SPLIT LEVEL, SHE LIVES IN LOWER LEVEL W OWN BR    Patient can perform ADL? Yes ADLS, & AMBULATES INDEPENDENTLY    Current Services (outpatient and in home) :  None    Dialysis: No    Is transportation available to get to your appointments? Yes    DME Equipment:  no    Respiratory equipment: Nebulizer    Respiratory provider:  yes - 'S OFFICE     Pharmacy:  yes - [facility]    Consult with Medication Assistance Program?  No      [place] [address]? N/A    Patient agreeable to SNF/Rehab? N/A    Other discharge needs identified? Other CM TO REASSESS FOR ANY NEW NEEDS    Does Patient Have a High-Risk for Readmission Diagnosis (CHF, PN, MI, COPD)? Yes      Initial Discharge Plan? (Note: please see concurrent daily documentation for any updates after initial note).     RETURN HOME W FAMILY NO NEEDS    Readmission Risk              Risk of Unplanned Readmission:  0         Electronically signed by Yair Adorno RN on 11/4/2022 at 3:50 PM

## 2022-11-04 NOTE — H&P
Hospital Medicine  History and Physical    Patient:  Juan Galdamez  MRN: [id number]    CHIEF COMPLAINT:    Chief Complaint   Patient presents with    Facial Droop     Right sided weakness and facial droop while at doctor's office today, resolved on arrival        History Obtained From:  patient  Primary Care Physician: AMANDA Denis CNP    HISTORY OF PRESENT ILLNESS:   The patient is a 78 y.o. female who presents with a history of depression, anxiety, GERD, fibromyalgia, hypertension, ADHD who presents to the emergency room today after an episode that began at 11:45 AM today when she was at her primary doctor's office. She noted upper right upper extremity weakness, right-sided facial droop. This began spontaneously as it had not occurred prior in the morning. She has had previous TIAs though with similar complaints of right upper and lower extremity weakness. Upon evaluation the emergency room by the ER physician, the symptoms had resolved. Currently denies any chest pain, shortness of breath, nausea or vomiting. Past Medical History:      Diagnosis Date    ADHD (attention deficit hyperactivity disorder)     Anxiety     Depression     Fibromyalgia     GERD (gastroesophageal reflux disease)     Hypertension     Irritable bowel syndrome        Past Surgical History:      Procedure Laterality Date     SECTION      COLONOSCOPY      [facility] [place] [facility]  Ken Wilkes MD    DILATION AND CURETTAGE OF UTERUS N/A 2020    DIAGNOSTIC LAPAROSCOPY, LYSIS OF ADHESIONS, HYSTEROSCOPY WITH Lela Dense performed by Kevin Cisse MD at [facility]       Medications Prior to Admission:    Prior to Admission medications    Medication Sig Start Date End Date Taking?  Authorizing Provider   predniSONE (DELTASONE) 5 MG tablet Take 1 tablet by mouth daily 22   AMANDA Nogueira CNP   amphetamine-dextroamphetamine (ADDERALL XR) 30 MG extended release capsule Take 1 capsule by mouth every morning for 30 days. 11/4/22 12/4/22  AMANDA Brooks CNP   ipratropium-albuterol (DUONEB) 0.5-2.5 (3) MG/3ML SOLN nebulizer solution Inhale 3 mLs into the lungs every 4 hours as needed for Shortness of Breath 11/4/22   AMANDA Nogueira CNP   ALPRAZolam [place]) 1 MG tablet Take 1 tablet by mouth in the morning and 1 tablet in the evening. Do all this for 30 days. 11/4/22 12/4/22  AMANDA Brooks CNP   gabapentin (NEURONTIN) 600 MG tablet TAKE 1 TABLET BY MOUTH THREE TIMES DAILY 11/1/22 12/1/22  AMANDA Brooks CNP   rosuvastatin (CRESTOR) 10 MG tablet Take 1 tablet by mouth daily 9/19/22   AMANDA Brooks CNP   cyclobenzaprine (FLEXERIL) 10 MG tablet TAKE 1 TABLET BY MOUTH TWICE DAILY AS NEEDED 5/19/22   AMANDA Brooks CNP   Misc. Devices (ROLLER [place]) MISC 1 each by Does not apply route daily 5/9/22   AMANDA Brooks CNP   furosemide (LASIX) 20 MG tablet Take 1 tablet by mouth daily PRN 2/3/22   AMANDA Brooks CNP   fluticasone-umeclidin-vilant (TRELEGY ELLIPTA) 100-62.5-25 MCG/INH AEPB Inhale 1 puff into the lungs daily 11/9/21   AMANDA Nogueira CNP   vitamin D (ERGOCALCIFEROL) 1.25 MG (89203 UT) CAPS capsule TAKE 1 CAPSULE BY MOUTH once per week AS DIRECTED 9/28/21   AMANDA Nogueira CNP   prochlorperazine (COMPAZINE) 10 MG tablet Take 1 tablet by mouth every 8 hours as needed (NAUSEA) 8/13/21   AMANDA Nogueira CNP   ARIPiprazole (ABILIFY) 10 MG tablet TAKE 1 TABLET DAILY IN THE MORNING 7/7/21   Historical Provider, MD   Handicap Placard MISC by Does not apply route Duration 5 years 6/1/21   Chitra Pollack, APRN - CNP   Misc.  Devices (PEDAL EXERCISER) MISC 1 each by Does not apply route daily 6/1/21   AMANDA Nogueira CNP   albuterol sulfate  (90 Base) MCG/ACT inhaler Inhale 2 puffs into the lungs every 6 hours as needed for Wheezing 3/1/21   AMANDA Nogueira CNP   omeprazole (PRILOSEC) 40 MG delayed release capsule Take 40 mg by mouth 4/1/20   Historical Provider, MD   polyethylene glycol (GLYCOLAX) 17 GM/SCOOP powder Take 17 g by mouth as needed    Historical Provider, MD   venlafaxine (EFFEXOR XR) 75 MG extended release capsule TAKE 1 CAPSULE BY MOUTH TWICE DAILY 11/1/19   Historical Provider, MD   aspirin 81 MG chewable tablet     Historical Provider, MD   mirtazapine (REMERON) 15 MG tablet Take 15 mg by mouth nightly    Historical Provider, MD       Allergies:  Patient has no known allergies. Social History:   TOBACCO:   reports that she has been smoking cigarettes. She has a 100.00 pack-year smoking history. She has never used smokeless tobacco.  ETOH:   reports no history of alcohol use. Family History:       Problem Relation Age of Onset    Breast Cancer Maternal Aunt        REVIEW OF SYSTEMS:  Ten systems reviewed and negative except for as above. Physical Exam:    Vitals: BP (!) 87/57   Pulse 77   Temp 97.7 °F (36.5 °C) (Oral)   Resp 14   Ht 5' 2\" (1.575 m)   Wt 160 lb (72.6 kg)   SpO2 96%   BMI 29.26 kg/m²   Constitutional: alert, appears stated age and cooperative  Skin: Skin color, texture, turgor normal. No rashes or lesions  Eyes:Eye: Normal external eye, conjunctiva, JUAN LUIS. ENT: Head: Normocephalic, no lesions, without obvious abnormality. Neck: no adenopathy, no carotid bruit, no JVD, supple, symmetrical, trachea midline and thyroid not enlarged, symmetric, no tenderness/mass/nodules  Respiratory: clear to auscultation bilaterally  Cardiovascular: regular rate and rhythm, S1, S2 normal, no murmur, click, rub or gallop  Gastrointestinal: soft, non-tender; bowel sounds normal; no masses,  no organomegaly  Genitourinary: Deferred  Musculoskeletal:extremities normal, atraumatic, no cyanosis or edema  Neurologic: Mental status AAOx3 No facial asymmetry or droop. Normal muscle strength b/l. CN II-XII grossly intact.       Recent Labs     11/04/22  1245   WBC 7.8   HGB 14.2        Recent Labs     11/04/22  1223 11/04/22  1245   NA  --  137   K  --  4.7   CL  --  103   CO2  --  23   BUN  --  10   CREATININE  --  0.80   GLUCOSE 92 83   AST  --  19   ALT  --  13   BILITOT  --  <0.2   ALKPHOS  --  105     Troponin T:   Recent Labs     11/04/22  1245 11/04/22  1300   TROPONINI <0.010 <0.010       INR:   Recent Labs     11/04/22  1300   INR 0.9       -----------------------------------------------------------------   CTA HEAD W WO CONTRAST    Result Date: 11/4/2022  EXAMINATION: CTA OF THE HEAD WITHOUT AND WITH CONTRAST; CTA OF THE NECK 11/4/2022 1:26 pm: TECHNIQUE: CTA of the head/brain was performed without and with the administration of intravenous contrast. Multiplanar reformatted images are provided for review. MIP images are provided for review. Automated exposure control, iterative reconstruction, and/or weight based adjustment of the mA/kV was utilized to reduce the radiation dose to as low as reasonably achievable.; CTA of the neck was performed with the administration of intravenous contrast. Multiplanar reformatted images are provided for review. MIP images are provided for review. Stenosis of the internal carotid arteries measured using NASCET criteria. Automated exposure control, iterative reconstruction, and/or weight based adjustment of the mA/kV was utilized to reduce the radiation dose to as low as reasonably achievable. Noncontrast CT of the head with reconstructed 2-D images are also provided for review. COMPARISON: None. HISTORY: ORDERING SYSTEM PROVIDED HISTORY: right side weakness TECHNOLOGIST PROVIDED HISTORY: Reason for exam:->right side weakness Decision Support Exception - unselect if not a suspected or confirmed emergency medical condition->Emergency Medical Condition (MA) What reading provider will be dictating this exam?->CRC FINDINGS: CT HEAD: BRAIN/VENTRICLES:  No mass effect, edema or hemorrhage is seen.   Small chronic lacunar infarctions are seen in the left thalamus and right cerebellar hemisphere. Minimal generalized cerebral volume loss is seen with mild chronic microvascular ischemic changes. No hydrocephalus or extra-axial fluid is seen. ORBITS: The visualized portion of the orbits demonstrate no acute abnormality. SINUSES:  The visualized paranasal sinuses and mastoid air cells demonstrate no acute abnormality. SOFT TISSUES/SKULL: No acute abnormality of the visualized skull or soft tissues. CTA NECK: AORTIC ARCH/ARCH VESSELS: No dissection or arterial injury. No significant stenosis of the brachiocephalic or subclavian arteries. CAROTID ARTERIES: No dissection, arterial injury, or hemodynamically significant stenosis by NASCET criteria. VERTEBRAL ARTERIES: No dissection, arterial injury, or significant stenosis. SOFT TISSUES: The lung apices are clear. Prominent emphysematous changes 1 are seen in the lung apices. No cervical or superior mediastinal lymphadenopathy. The larynx and pharynx are unremarkable. No acute abnormality of the salivary and thyroid glands. BONES: No acute osseous abnormality. CTA HEAD: ANTERIOR CIRCULATION: No significant stenosis of the intracranial internal carotid, anterior cerebral, or middle cerebral arteries. No aneurysm. POSTERIOR CIRCULATION: No significant stenosis of the vertebral, basilar, or posterior cerebral arteries. No aneurysm. OTHER: No dural venous sinus thrombosis on this non-dedicated study. 1.  No acute intracranial abnormality. 2. Small chronic-appearing lacunar infarctions in the left thalamus and right cerebellar hemisphere. 3. Unremarkable CTA of the brain and neck.  RECOMMENDATIONS: Unavailable     CTA NECK W WO CONTRAST    Result Date: 11/4/2022  EXAMINATION: CTA OF THE HEAD WITHOUT AND WITH CONTRAST; CTA OF THE NECK 11/4/2022 1:26 pm: TECHNIQUE: CTA of the head/brain was performed without and with the administration of intravenous contrast. Multiplanar reformatted images are provided for review. MIP images are provided for review. Automated exposure control, iterative reconstruction, and/or weight based adjustment of the mA/kV was utilized to reduce the radiation dose to as low as reasonably achievable.; CTA of the neck was performed with the administration of intravenous contrast. Multiplanar reformatted images are provided for review. MIP images are provided for review. Stenosis of the internal carotid arteries measured using NASCET criteria. Automated exposure control, iterative reconstruction, and/or weight based adjustment of the mA/kV was utilized to reduce the radiation dose to as low as reasonably achievable. Noncontrast CT of the head with reconstructed 2-D images are also provided for review. COMPARISON: None. HISTORY: ORDERING SYSTEM PROVIDED HISTORY: right side weakness TECHNOLOGIST PROVIDED HISTORY: Reason for exam:->right side weakness Decision Support Exception - unselect if not a suspected or confirmed emergency medical condition->Emergency Medical Condition (MA) What reading provider will be dictating this exam?->CRC FINDINGS: CT HEAD: BRAIN/VENTRICLES:  No mass effect, edema or hemorrhage is seen. Small chronic lacunar infarctions are seen in the left thalamus and right cerebellar hemisphere. Minimal generalized cerebral volume loss is seen with mild chronic microvascular ischemic changes. No hydrocephalus or extra-axial fluid is seen. ORBITS: The visualized portion of the orbits demonstrate no acute abnormality. SINUSES:  The visualized paranasal sinuses and mastoid air cells demonstrate no acute abnormality. SOFT TISSUES/SKULL: No acute abnormality of the visualized skull or soft tissues. CTA NECK: AORTIC ARCH/ARCH VESSELS: No dissection or arterial injury. No significant stenosis of the brachiocephalic or subclavian arteries. CAROTID ARTERIES: No dissection, arterial injury, or hemodynamically significant stenosis by NASCET criteria.  VERTEBRAL ARTERIES: No dissection, arterial injury, or significant stenosis. SOFT TISSUES: The lung apices are clear. Prominent emphysematous changes 1 are seen in the lung apices. No cervical or superior mediastinal lymphadenopathy. The larynx and pharynx are unremarkable. No acute abnormality of the salivary and thyroid glands. BONES: No acute osseous abnormality. CTA HEAD: ANTERIOR CIRCULATION: No significant stenosis of the intracranial internal carotid, anterior cerebral, or middle cerebral arteries. No aneurysm. POSTERIOR CIRCULATION: No significant stenosis of the vertebral, basilar, or posterior cerebral arteries. No aneurysm. OTHER: No dural venous sinus thrombosis on this non-dedicated study. 1.  No acute intracranial abnormality. 2. Small chronic-appearing lacunar infarctions in the left thalamus and right cerebellar hemisphere. 3. Unremarkable CTA of the brain and neck. RECOMMENDATIONS: Unavailable     XR CHEST PORTABLE    Result Date: 11/4/2022  EXAMINATION: ONE XRAY VIEW OF THE CHEST 11/4/2022 12:47 pm COMPARISON: 04/16/2022 HISTORY: ORDERING SYSTEM PROVIDED HISTORY: cp TECHNOLOGIST PROVIDED HISTORY: Reason for exam:->cp What reading provider will be dictating this exam?->CRC FINDINGS: The lungs are without acute focal process. There is no effusion or pneumothorax. The cardiomediastinal silhouette is without acute process. The osseous structures are without acute process. No acute process.            Assessment and Plan   TIA, resolved  PT/OT/ST, neuro consult, was on asa, add plavix, consider imaging based on neuro recs  Hx HTN  Cont home medications  Depression/anxiety  DVT proph    Patient Active Problem List   Diagnosis Code    Abnormal endometrial ultrasound R93.5    Chronic pelvic pain in female R10.2, G89.29    Spinal stenosis of lumbar region with neurogenic claudication M48.062    Late effect of ischemic cerebral stroke I69.30    Reversible cerebrovascular vasoconstriction syndrome  I67.841    Closed head injury S09. 90XA    Cardiac arrhythmia I49.9    Nicotine dependence, cigarettes, uncomplicated X67.929    Proc/trtmt not crd out d/t pt lv bef seen by [facility] care prov Z53.21    TIA (transient ischemic attack) G45.9       Gardenia Manzo MD, MD  Admitting Hospitalist    Emergency Contact:

## 2022-11-04 NOTE — ED TRIAGE NOTES
Patient presents via EMS for complaints of right sided weakness and right facial droop while at her doctor's office this morning. Patient's symptoms have resolved on arrival to ED. No facial droop noted. Strength equal bilaterally in upper and lower extremities. Speech is clear.  Patient in no distress on arrival.

## 2022-11-05 VITALS
OXYGEN SATURATION: 95 % | TEMPERATURE: 97.6 F | SYSTOLIC BLOOD PRESSURE: 125 MMHG | HEIGHT: 62 IN | WEIGHT: 160 LBS | RESPIRATION RATE: 16 BRPM | BODY MASS INDEX: 29.44 KG/M2 | HEART RATE: 72 BPM | DIASTOLIC BLOOD PRESSURE: 55 MMHG

## 2022-11-05 LAB
CHOLESTEROL, TOTAL: 102 MG/DL (ref 0–199)
HBA1C MFR BLD: 6.1 % (ref 4.8–5.9)
HCT VFR BLD CALC: 40.5 % (ref 37–47)
HDLC SERPL-MCNC: 59 MG/DL (ref 40–59)
HEMOGLOBIN: 13.6 G/DL (ref 12–16)
LDL CHOLESTEROL CALCULATED: 13 MG/DL (ref 0–129)
MCH RBC QN AUTO: 31.2 PG (ref 27–31.3)
MCHC RBC AUTO-ENTMCNC: 33.5 % (ref 33–37)
MCV RBC AUTO: 93.2 FL (ref 79.4–94.8)
PDW BLD-RTO: 14.4 % (ref 11.5–14.5)
PLATELET # BLD: 286 K/UL (ref 130–400)
RBC # BLD: 4.35 M/UL (ref 4.2–5.4)
TRIGL SERPL-MCNC: 151 MG/DL (ref 0–150)
WBC # BLD: 6.1 K/UL (ref 4.8–10.8)

## 2022-11-05 PROCEDURE — 83036 HEMOGLOBIN GLYCOSYLATED A1C: CPT

## 2022-11-05 PROCEDURE — 99214 OFFICE O/P EST MOD 30 MIN: CPT | Performed by: PSYCHIATRY & NEUROLOGY

## 2022-11-05 PROCEDURE — 96372 THER/PROPH/DIAG INJ SC/IM: CPT

## 2022-11-05 PROCEDURE — G0378 HOSPITAL OBSERVATION PER HR: HCPCS

## 2022-11-05 PROCEDURE — 85027 COMPLETE CBC AUTOMATED: CPT

## 2022-11-05 PROCEDURE — 80061 LIPID PANEL: CPT

## 2022-11-05 PROCEDURE — 36415 COLL VENOUS BLD VENIPUNCTURE: CPT

## 2022-11-05 PROCEDURE — 6360000002 HC RX W HCPCS: Performed by: FAMILY MEDICINE

## 2022-11-05 PROCEDURE — 92610 EVALUATE SWALLOWING FUNCTION: CPT

## 2022-11-05 PROCEDURE — 6370000000 HC RX 637 (ALT 250 FOR IP): Performed by: FAMILY MEDICINE

## 2022-11-05 PROCEDURE — 92523 SPEECH SOUND LANG COMPREHEN: CPT

## 2022-11-05 RX ORDER — TRAMADOL HYDROCHLORIDE 50 MG/1
50 TABLET ORAL EVERY 6 HOURS PRN
Status: DISCONTINUED | OUTPATIENT
Start: 2022-11-05 | End: 2022-11-05 | Stop reason: HOSPADM

## 2022-11-05 RX ORDER — CLOPIDOGREL BISULFATE 75 MG/1
75 TABLET ORAL DAILY
Qty: 30 TABLET | Refills: 3 | Status: SHIPPED | OUTPATIENT
Start: 2022-11-06

## 2022-11-05 RX ORDER — ASPIRIN 81 MG/1
81 TABLET ORAL ONCE
Status: DISCONTINUED | OUTPATIENT
Start: 2022-11-05 | End: 2022-11-05 | Stop reason: HOSPADM

## 2022-11-05 RX ORDER — NICOTINE 21 MG/24HR
1 PATCH, TRANSDERMAL 24 HOURS TRANSDERMAL DAILY
Qty: 30 PATCH | Refills: 3 | Status: SHIPPED | OUTPATIENT
Start: 2022-11-06

## 2022-11-05 RX ADMIN — POLYETHYLENE GLYCOL 3350 17 G: 17 POWDER, FOR SOLUTION ORAL at 11:24

## 2022-11-05 RX ADMIN — CLOPIDOGREL BISULFATE 75 MG: 75 TABLET ORAL at 09:14

## 2022-11-05 RX ADMIN — TRAMADOL HYDROCHLORIDE 50 MG: 50 TABLET ORAL at 11:24

## 2022-11-05 RX ADMIN — ENOXAPARIN SODIUM 40 MG: 100 INJECTION SUBCUTANEOUS at 09:14

## 2022-11-05 ASSESSMENT — ENCOUNTER SYMPTOMS
COLOR CHANGE: 0
SHORTNESS OF BREATH: 0
NAUSEA: 0
BACK PAIN: 0
PHOTOPHOBIA: 0
VOMITING: 0
CHOKING: 0
TROUBLE SWALLOWING: 0

## 2022-11-05 NOTE — CONSULTS
Subjective:      Patient ID: German Miller is a 78 y.o. female who presents today for cerebral TIA. Rosana Curiel HPI 78 right-handed female admitted with symptoms of TIA    Review of Systems   Constitutional:  Negative for fever. HENT:  Negative for ear pain, tinnitus and trouble swallowing. Eyes:  Negative for photophobia and visual disturbance. Respiratory:  Negative for choking and shortness of breath. Cardiovascular:  Negative for chest pain and palpitations. Gastrointestinal:  Negative for nausea and vomiting. Musculoskeletal:  Negative for back pain, gait problem, joint swelling, myalgias, neck pain and neck stiffness. Skin:  Negative for color change. Allergic/Immunologic: Negative for food allergies. Neurological:  Negative for dizziness, tremors, seizures, syncope, facial asymmetry, speech difficulty, weakness, light-headedness, numbness and headaches. Psychiatric/Behavioral:  Negative for behavioral problems, confusion, hallucinations and sleep disturbance. patient presents with right upper extremity weakness and right facial droop lasting about 4 minutes. She was actually in the doctor's office when this occurred. She has no previous history of TIA. She takes aspirin 325 mg. She has multiple risk factors for cerebrovascular disease and is a smoker. Symptoms have all resolved. We will contact the emergency room and we had not recommended any intervention except for starting on Plavix.   MRI of the brain and large vessel evaluations are noted which are normal.    Past Medical History:   Diagnosis Date    ADHD (attention deficit hyperactivity disorder)     Anxiety     Depression     Fibromyalgia     GERD (gastroesophageal reflux disease)     Hypertension     Irritable bowel syndrome      Past Surgical History:   Procedure Laterality Date     SECTION      COLONOSCOPY      N GAUDENCIO Enrique MD    DILATION AND CURETTAGE OF UTERUS N/A 2020    DIAGNOSTIC LAPAROSCOPY, LYSIS OF ADHESIONS, HYSTEROSCOPY WITH Anamaria Cavanaugh performed by Shyam Leija MD at [facility] History     Socioeconomic History    Marital status:       Spouse name: Not on file    Number of children: Not on file    Years of education: Not on file    Highest education level: Not on file   Occupational History    Not on file   Tobacco Use    Smoking status: Every Day     Packs/day: 2.00     Years: 50.00     Pack years: 100.00     Types: Cigarettes     Last attempt to quit: 7/25/2019     Years since quitting: 3.2    Smokeless tobacco: Never   Vaping Use    Vaping Use: Never used   Substance and Sexual Activity    Alcohol use: Never    Drug use: Never    Sexual activity: Not on file   Other Topics Concern    Not on file   Social History Narrative    Not on file     Social Determinants of Health     Financial Resource Strain: Low Risk     Difficulty of Paying Living Expenses: Not hard at all   Food Insecurity: No Food Insecurity    Worried About Running Out of Food in the Last Year: Never true    [address] in the Last Year: Never true   Transportation Needs: Not on file   Physical Activity: Insufficiently Active    Days of Exercise per Week: 2 days    Minutes of Exercise per Session: 10 min   Stress: Not on file   Social Connections: Not on file   Intimate Partner Violence: Not on file   Housing Stability: Not on file     Family History   Problem Relation Age of Onset    Breast Cancer Maternal Aunt      No Known Allergies  Current Facility-Administered Medications   Medication Dose Route Frequency Provider Last Rate Last Admin    traMADol (ULTRAM) tablet 50 mg  50 mg Oral Q6H PRN Emily Mcgrath MD   50 mg at 11/05/22 1124    ondansetron (ZOFRAN-ODT) disintegrating tablet 4 mg  4 mg Oral Q8H PRN Emily Mcgrath MD        Or    ondansetron [facility]) injection 4 mg  4 mg IntraVENous Q6H PRN Emily Mcgrath MD        polyethylene glycol (GLYCOLAX) packet 17 g  17 g Oral Daily PRN Ismael Mata Adam Rodriguez MD   17 g at 11/05/22 1124    enoxaparin (LOVENOX) injection 40 mg  40 mg SubCUTAneous Daily Coreen Del Rio MD   40 mg at 11/05/22 0914    atorvastatin (LIPITOR) tablet 80 mg  80 mg Oral Nightly Coreen Del Roi MD   80 mg at 11/04/22 2216    clopidogrel (PLAVIX) tablet 75 mg  75 mg Oral Daily Coreen Del Rio MD   75 mg at 11/05/22 0914    nicotine (NICODERM CQ) 21 MG/24HR 1 patch  1 patch TransDERmal Daily Coreen Del Rio MD   1 patch at 11/05/22 0914    mirtazapine (REMERON) tablet 60 mg  60 mg Oral Nightly Nicoletto Bolzan-Roche, APRN - CNP   60 mg at 11/04/22 2217        Objective:   BP (!) 125/55   Pulse 72   Temp 97.6 °F (36.4 °C) (Oral)   Resp 16   Ht 5' 2\" (1.575 m)   Wt 160 lb (72.6 kg)   SpO2 95%   BMI 29.26 kg/m²     Physical Exam  Vitals reviewed. Eyes:      Pupils: Pupils are equal, round, and reactive to light. Cardiovascular:      Rate and Rhythm: Normal rate and regular rhythm. Heart sounds: No murmur heard. Pulmonary:      Effort: Pulmonary effort is normal.      Breath sounds: Normal breath sounds. Abdominal:      General: Bowel sounds are normal.   Musculoskeletal:         General: Normal range of motion. Cervical back: Normal range of motion. Skin:     General: Skin is warm. Neurological:      Mental Status: She is alert and oriented to person, place, and time. Cranial Nerves: No cranial nerve deficit. Sensory: No sensory deficit. Motor: No abnormal muscle tone. Coordination: Coordination normal.      Deep Tendon Reflexes: Reflexes are normal and symmetric. Babinski sign absent on the right side. Babinski sign absent on the left side.    Psychiatric:         Mood and Affect: Mood normal.       CTA HEAD W WO CONTRAST    Result Date: 11/4/2022  EXAMINATION: CTA OF THE HEAD WITHOUT AND WITH CONTRAST; CTA OF THE NECK 11/4/2022 1:26 pm: TECHNIQUE: CTA of the head/brain was performed without and with the administration of intravenous contrast. Multiplanar reformatted images are provided for review. MIP images are provided for review. Automated exposure control, iterative reconstruction, and/or weight based adjustment of the mA/kV was utilized to reduce the radiation dose to as low as reasonably achievable.; CTA of the neck was performed with the administration of intravenous contrast. Multiplanar reformatted images are provided for review. MIP images are provided for review. Stenosis of the internal carotid arteries measured using NASCET criteria. Automated exposure control, iterative reconstruction, and/or weight based adjustment of the mA/kV was utilized to reduce the radiation dose to as low as reasonably achievable. Noncontrast CT of the head with reconstructed 2-D images are also provided for review. COMPARISON: None. HISTORY: ORDERING SYSTEM PROVIDED HISTORY: right side weakness TECHNOLOGIST PROVIDED HISTORY: Reason for exam:->right side weakness Decision Support Exception - unselect if not a suspected or confirmed emergency medical condition->Emergency Medical Condition (MA) What reading provider will be dictating this exam?->CRC FINDINGS: CT HEAD: BRAIN/VENTRICLES:  No mass effect, edema or hemorrhage is seen. Small chronic lacunar infarctions are seen in the left thalamus and right cerebellar hemisphere. Minimal generalized cerebral volume loss is seen with mild chronic microvascular ischemic changes. No hydrocephalus or extra-axial fluid is seen. ORBITS: The visualized portion of the orbits demonstrate no acute abnormality. SINUSES:  The visualized paranasal sinuses and mastoid air cells demonstrate no acute abnormality. SOFT TISSUES/SKULL: No acute abnormality of the visualized skull or soft tissues. CTA NECK: AORTIC ARCH/ARCH VESSELS: No dissection or arterial injury. No significant stenosis of the brachiocephalic or subclavian arteries.  CAROTID ARTERIES: No dissection, arterial injury, or hemodynamically significant stenosis by NASCET criteria. VERTEBRAL ARTERIES: No dissection, arterial injury, or significant stenosis. SOFT TISSUES: The lung apices are clear. Prominent emphysematous changes 1 are seen in the lung apices. No cervical or superior mediastinal lymphadenopathy. The larynx and pharynx are unremarkable. No acute abnormality of the salivary and thyroid glands. BONES: No acute osseous abnormality. CTA HEAD: ANTERIOR CIRCULATION: No significant stenosis of the intracranial internal carotid, anterior cerebral, or middle cerebral arteries. No aneurysm. POSTERIOR CIRCULATION: No significant stenosis of the vertebral, basilar, or posterior cerebral arteries. No aneurysm. OTHER: No dural venous sinus thrombosis on this non-dedicated study. 1.  No acute intracranial abnormality. 2. Small chronic-appearing lacunar infarctions in the left thalamus and right cerebellar hemisphere. 3. Unremarkable CTA of the brain and neck. RECOMMENDATIONS: Unavailable     XR ABDOMEN (KUB) (SINGLE AP VIEW)    Result Date: 11/5/2022  EXAMINATION: ONE SUPINE XRAY VIEW(S) OF THE ABDOMEN 11/4/2022 8:43 pm COMPARISON: None. HISTORY: ORDERING SYSTEM PROVIDED HISTORY: abdominal pain TECHNOLOGIST PROVIDED HISTORY: Reason for exam:->abdominal pain What reading provider will be dictating this exam?->CRC FINDINGS: The bowel gas pattern is unremarkable. There is no evidence of obstruction or gross free air. IV contrast is identified within the renal collecting systems and urinary bladder. No abnormal soft tissue density is seen. Tiny phleboliths are noted in the pelvis. Mild osteo-degenerative changes are noted involving the visualized thoracolumbar spine. Unremarkable bowel gas pattern, with no evidence of obstruction or gross free air.      CTA NECK W WO CONTRAST    Result Date: 11/4/2022  EXAMINATION: CTA OF THE HEAD WITHOUT AND WITH CONTRAST; CTA OF THE NECK 11/4/2022 1:26 pm: TECHNIQUE: CTA of the head/brain was performed without and with the administration of intravenous contrast. Multiplanar reformatted images are provided for review. MIP images are provided for review. Automated exposure control, iterative reconstruction, and/or weight based adjustment of the mA/kV was utilized to reduce the radiation dose to as low as reasonably achievable.; CTA of the neck was performed with the administration of intravenous contrast. Multiplanar reformatted images are provided for review. MIP images are provided for review. Stenosis of the internal carotid arteries measured using NASCET criteria. Automated exposure control, iterative reconstruction, and/or weight based adjustment of the mA/kV was utilized to reduce the radiation dose to as low as reasonably achievable. Noncontrast CT of the head with reconstructed 2-D images are also provided for review. COMPARISON: None. HISTORY: ORDERING SYSTEM PROVIDED HISTORY: right side weakness TECHNOLOGIST PROVIDED HISTORY: Reason for exam:->right side weakness Decision Support Exception - unselect if not a suspected or confirmed emergency medical condition->Emergency Medical Condition (MA) What reading provider will be dictating this exam?->CRC FINDINGS: CT HEAD: BRAIN/VENTRICLES:  No mass effect, edema or hemorrhage is seen. Small chronic lacunar infarctions are seen in the left thalamus and right cerebellar hemisphere. Minimal generalized cerebral volume loss is seen with mild chronic microvascular ischemic changes. No hydrocephalus or extra-axial fluid is seen. ORBITS: The visualized portion of the orbits demonstrate no acute abnormality. SINUSES:  The visualized paranasal sinuses and mastoid air cells demonstrate no acute abnormality. SOFT TISSUES/SKULL: No acute abnormality of the visualized skull or soft tissues. CTA NECK: AORTIC ARCH/ARCH VESSELS: No dissection or arterial injury. No significant stenosis of the brachiocephalic or subclavian arteries.  CAROTID ARTERIES: No dissection, arterial injury, or hemodynamically significant stenosis by NASCET criteria. VERTEBRAL ARTERIES: No dissection, arterial injury, or significant stenosis. SOFT TISSUES: The lung apices are clear. Prominent emphysematous changes 1 are seen in the lung apices. No cervical or superior mediastinal lymphadenopathy. The larynx and pharynx are unremarkable. No acute abnormality of the salivary and thyroid glands. BONES: No acute osseous abnormality. CTA HEAD: ANTERIOR CIRCULATION: No significant stenosis of the intracranial internal carotid, anterior cerebral, or middle cerebral arteries. No aneurysm. POSTERIOR CIRCULATION: No significant stenosis of the vertebral, basilar, or posterior cerebral arteries. No aneurysm. OTHER: No dural venous sinus thrombosis on this non-dedicated study. 1.  No acute intracranial abnormality. 2. Small chronic-appearing lacunar infarctions in the left thalamus and right cerebellar hemisphere. 3. Unremarkable CTA of the brain and neck. RECOMMENDATIONS: Unavailable     XR CHEST PORTABLE    Result Date: 11/4/2022  EXAMINATION: ONE XRAY VIEW OF THE CHEST 11/4/2022 12:47 pm COMPARISON: 04/16/2022 HISTORY: ORDERING SYSTEM PROVIDED HISTORY: cp TECHNOLOGIST PROVIDED HISTORY: Reason for exam:->cp What reading provider will be dictating this exam?->CRC FINDINGS: The lungs are without acute focal process. There is no effusion or pneumothorax. The cardiomediastinal silhouette is without acute process. The osseous structures are without acute process. No acute process.        Lab Results   Component Value Date/Time    WBC 6.1 11/05/2022 05:15 AM    RBC 4.35 11/05/2022 05:15 AM    HGB 13.6 11/05/2022 05:15 AM    HCT 40.5 11/05/2022 05:15 AM    MCV 93.2 11/05/2022 05:15 AM    MCH 31.2 11/05/2022 05:15 AM    MCHC 33.5 11/05/2022 05:15 AM    RDW 14.4 11/05/2022 05:15 AM     11/05/2022 05:15 AM     Lab Results   Component Value Date/Time     11/04/2022 12:45 PM    K 4.7 11/04/2022 12:45 PM     11/04/2022 12:45 PM    CO2 23 11/04/2022 12:45 PM    BUN 10 11/04/2022 12:45 PM    CREATININE 0.80 11/04/2022 12:45 PM    GFRAA >60.0 05/31/2022 09:20 AM    LABGLOM >60.0 11/04/2022 12:45 PM    GLUCOSE 83 11/04/2022 12:45 PM    PROT 6.9 11/04/2022 12:45 PM    LABALBU 4.0 11/04/2022 12:45 PM    CALCIUM 8.6 11/04/2022 12:45 PM    BILITOT <0.2 11/04/2022 12:45 PM    ALKPHOS 105 11/04/2022 12:45 PM    AST 19 11/04/2022 12:45 PM    ALT 13 11/04/2022 12:45 PM     Lab Results   Component Value Date/Time    PROTIME 12.1 11/04/2022 01:00 PM    INR 0.9 11/04/2022 01:00 PM     Lab Results   Component Value Date/Time    FERRITIN 90.2 02/03/2020 03:33 PM    IRON 57 02/03/2020 03:33 PM    TIBC 298 02/03/2020 03:33 PM     Lab Results   Component Value Date/Time    TRIG 151 11/05/2022 05:15 AM    HDL 59 11/05/2022 05:15 AM    LDLCALC 13 11/05/2022 05:15 AM     No results found for: [address], Niko Glee, CANNAB, COCAINESCRN, LABMETH, OPIATESCREENURINE, PHENCYCLIDINESCREENURINE, PPXUR, ETOH  No results found for: LITHIUM, DILFRTOT, VALPROATE    Assessment:   Cerebral TIA with multiple risk factors for some vascular disease. Her major risk factor is to be smoking and we had a lengthy discussion regarding the same. Patient has not any headaches to suggest RCVS    Patient will now continue on aspirin 81 mg with 75 mg given that she was in aspirin failure. Risk benefits of combination therapy has been discussed. We had a  lengthy discussion regarding her smoking which appears to be her major risk factor. This consultation includes my consultation the emergency room and evaluation of her records.   Plan:

## 2022-11-05 NOTE — PROGRESS NOTES
[facility]   Facility/Department: [facility]  Speech Language Pathology  Clinical Bedside Swallow Evaluation    NAME:Anita Merino Stands  : 1955 (78 y.o.)   [x]   confirmed    MRN: [id number]  [facility]  ADMISSION DATE: 2022  PATIENT DIAGNOSIS(ES): TIA (transient ischemic attack) [G45.9]  Acute right-sided weakness [R53.1]  Chief Complaint   Patient presents with    Facial Droop     Right sided weakness and facial droop while at doctor's office today, resolved on arrival      Patient Active Problem List    Diagnosis Date Noted    TIA (transient ischemic attack) 2022    Spinal stenosis of lumbar region with neurogenic claudication 03/10/2021    Late effect of ischemic cerebral stroke 03/10/2021    Reversible cerebrovascular vasoconstriction syndrome  03/10/2021    Closed head injury 03/10/2021    Abnormal endometrial ultrasound     Chronic pelvic pain in female     Nicotine dependence, cigarettes, uncomplicated 827    Cardiac arrhythmia 10/24/2018    Proc/trtmt not crd out d/t pt lv bef seen by [facility] 10/20/2018     Past Medical History:   Diagnosis Date    ADHD (attention deficit hyperactivity disorder)     Anxiety     Depression     Fibromyalgia     GERD (gastroesophageal reflux disease)     Hypertension     Irritable bowel syndrome      Past Surgical History:   Procedure Laterality Date     SECTION      COLONOSCOPY      N GAUDENCIO Tapia MD    DILATION AND CURETTAGE OF UTERUS N/A 2020    DIAGNOSTIC LAPAROSCOPY, LYSIS OF ADHESIONS, HYSTEROSCOPY WITH Anell Likes performed by Angeles Wilkins MD at [facility]     No Known Allergies    DATE ONSET: 2022    Date of Evaluation: 2022   Evaluating Therapist: DANNY Braun    Dysphagia Diagnosis  Dysphagia Diagnosis: Swallow function appears WFL  Dysphagia Impression : Oropharyngeal phase of swallow WFL at bedside.  Pt is able to form and clear a cohesive bolus with min lingual residuals post swallow, given increased mastication time secondary to dentition. Pt reports that she does not wear bottom denture during meals, as it makes her gums sore. No overt s/s of aspiration observed following any of the presented consistencies. Hyolaryngeal movement was present during the evaluation, noted via observation. Recommended Diet  Recommendations: Self feed  Diet Solids Recommendation: Regular  Liquid Consistency Recommendation: Thin  Recommended Form of Meds: PO  Compensatory Swallowing Strategies : Small bites/sips;Eat/Feed slowly;Upright as possible for all oral intake      Reason for Referral  Dominic Novoa was referred for a bedside swallow evaluation to assess the efficiency of her swallow function, identify signs and symptoms of aspiration, identify risk factors, and make recommendations regarding safe dietary consistencies, effective compensatory strategies, and safe eating environment. General  Chart Reviewed: Yes  Comments: Pt admitted 11/4/22 secondary to UE weakness and R facial droop. CXR 11/4/22 revealed no acute process. CT Head from 11/4/22 revealed no acute intracranial abnormality, however, reported small chronic-appearing lacunar infarcts in the L thalmus and R cerebellar hemisphere. Subjective  Subjective: Pt was alert, cooperative, pleasant and anxious for evaluation. Pt reports she is anxious secondary to not being able to smoke. Behavior/Cognition: Alert; Cooperative;Pleasant mood  Respiratory Status: Room air  O2 Device: None (Room air)  Communication Observation: Functional  Follows Directions: Simple  Dentition: Adequate;Dentures top;Dentures bottom (Does not eat with bottom denture in, as it makes her gums sore.)  Patient Positioning: Upright in bed  Baseline Vocal Quality: Normal  Prior Dysphagia History: None  Consistencies Administered: Regular;Thin;Pureed    Vision and Hearing  Vision  Vision: Impaired  Vision Exceptions: Wears glasses at all times  Hearing  Hearing: Within functional limits    Current Diet level  Current Diet : Regular  Current Liquid Diet : Thin    Oral Motor  Labial: No impairment  Dentition: Upper dentures; Lower dentures; Other (comment) (Lower dentures not worn during meals)  Oral Hygiene: Moist;Clean  Lingual: No impairment  Mandible: No impairment    Oral/Pharyngeal Phase  Oral Phase - Comment: Oral phase of swallow WFL  Pharyngeal Phase: Pharyngeal phase of swallow WFL    PO Trials  Neuromuscular Estim Used: No  Assessment Method(s): Observation  Vocal Quality: No Impairment  Consistency Presented: Regular;Pureed; Thin  How Presented: Self-fed/presented  Bolus Acceptance: No impairment  Bolus Formation/Control: Impaired  Type of Impairment: Mastication  Propulsion: Delayed (# of seconds)  Oral Residue: Less than 10% of bolus  Initiation of Swallow: No impairment  Laryngeal Elevation: Functional  Aspiration Signs/Symptoms: None  Pharyngeal Phase Characteristics: No impairment, issues, or problems  Effective Modifications: None  Cues for Modifications: None      Dysphagia Diagnosis  Dysphagia Diagnosis: Swallow function appears WFL  Dysphagia Impression : Oropharyngeal phase of swallow WFL at bedside. Pt is able to form and clear a cohesive bolus with min lingual residuals post swallow, given increased mastication time secondary to dentition. Pt reports that she does not wear bottom denture during meals, as it makes her gums sore. No overt s/s of aspiration observed following any of the presented consistencies. Hyolaryngeal movement was present during the evaluation, noted via observation. Dysphagia Outcome Severity Scale: Level 6: Within functional limits/Modified independence     Recommendations  Requires SLP Intervention: No  Recommendations: Self feed  D/C Recommendations: No follow up therapy recommended post discharge  Diet Solids Recommendation: Regular  Liquid Consistency Recommendation:  Thin  Compensatory Swallowing Strategies : Small bites/sips;Eat/Feed slowly;Upright as possible for all oral intake  Recommended Form of Meds: PO  Duration of Treatment: No f/u  Frequency of Treatment: No f/u    Prognosis  Speech Therapy Prognosis  Prognosis: Good  Prognosis Considerations: Age; Motivation    Education  Individuals consulted  Consulted and agree with results and recommendations: RN;Patient  RN Name: Ghazal Gallegos    Treatment/Goals   N/A    Safety Devices  Safety Devices  Safety Devices in place: Yes  Type of devices: Bed alarm in place;Call light within reach;Nurse notified  Restraints Initially in Place: No    Pain Assessment  Patient c/o pain: Location and pain level: 8 pain in stomach secondary to \"hernia\"  Pt able to proceed with session.     Pain Re-assessment  Patient c/o pain: RN notified  Described as same as above      Therapy Time  SLP Individual Minutes  Time In: 4111  Time Out: 4843  Minutes: 10        Signature: Electronically signed by DANNY Bowman on 11/5/2022 at 10:37 AM

## 2022-11-05 NOTE — PROGRESS NOTES
[facility]   Facility/Department: [facility] 2W Loreen Litten  Speech Language Pathology  Initial Speech/Language/Cognitive Assessment    NAME:Anita Medina  : 1955 (78 y.o.)   [x]   confirmed    MRN: [id number]  [id number]  ADMISSION DATE: 2022  PATIENT DIAGNOSIS(ES): TIA (transient ischemic attack) [G45.9]  Acute right-sided weakness [R53.1]  Chief Complaint   Patient presents with    Facial Droop     Right sided weakness and facial droop while at doctor's office today, resolved on arrival      Patient Active Problem List    Diagnosis Date Noted    TIA (transient ischemic attack) 2022    Spinal stenosis of lumbar region with neurogenic claudication 03/10/2021    Late effect of ischemic cerebral stroke 03/10/2021    Reversible cerebrovascular vasoconstriction syndrome  03/10/2021    Closed head injury 03/10/2021    Abnormal endometrial ultrasound     Chronic pelvic pain in female     Nicotine dependence, cigarettes, uncomplicated 5916    Cardiac arrhythmia 10/24/2018    Proc/trtmt not crd out d/t pt lv bef seen by [facility] 10/20/2018     Past Medical History:   Diagnosis Date    ADHD (attention deficit hyperactivity disorder)     Anxiety     Depression     Fibromyalgia     GERD (gastroesophageal reflux disease)     Hypertension     Irritable bowel syndrome      Past Surgical History:   Procedure Laterality Date     SECTION      COLONOSCOPY      [facility]  Wilhelmenia Júnior PELLETIER    DILATION AND CURETTAGE OF UTERUS N/A 2020    DIAGNOSTIC LAPAROSCOPY, LYSIS OF ADHESIONS, HYSTEROSCOPY WITH Molinda Just performed by Amanda Cool MD at [address]: 2022    Date of Evaluation: 2022   Evaluating Therapist: DANNY Mckeon    Cognitive Diagnosis: Mild cognitive impairment characterized by decreased STM negatively impacting pt's ability to safely complete ADLs and participate in social activities. Pt motivated and eager for treatment.  SLP provided brief education regarding memory strategies and therapy. SLP unsure of discharge date, however, provided pt with memory strategy handout. Pt appreciative of education and agreeable with POC. Diagnosis: Mild cognitive impairment    Requires SLP Intervention: Yes     D/C Recommendations: No follow up therapy recommended post discharge      General  Chart reviewed: Yes  Subjective/Behavior:Alert, cooperative, pleasant, and anxious. Pt reports that she is anxious secondary to not being able to smoke. Pt has nicotine patch. Oxygen: Room air  Previous level of function and limitations: See below  Social/Functional History  Lives With: Daughter  Type of Home: House  Home Layout: Multi-level  Homemaking Responsibilities: Yes  Ambulation Assistance: Independent  Active : Yes  Mode of Transportation: Car    Vision and Hearing  Vision  Vision: Impaired  Vision Exceptions: Wears glasses at all times  Hearing  Hearing: Within functional limits     Oral/Motor  Oral Motor   Labial: No impairment  Dentition: Upper dentures; Lower dentures; Other (comment)  Oral Hygiene: Moist;Clean  Lingual: No impairment  Mandible: No impairment    Motor Speech  Motor Speech  Apraxic Characteristics: None  Dysarthric Characteristics: None  Intelligibility: No impairment  Overall Impairment Severity: None    Comprehension  Auditory Comprehension  Comprehension: Within Functional Limits  Basic Questions: WFL  Complex Questions: WFL  Two Step Commands: WFL  Multistep Commands: WFL  Complex/Abstract Commands: WFL  Common Objects: WFL  Conversation: WFL    Expression  Expression  Primary Mode of Expression: Verbal  Verbal Expression  Verbal Expression: Exceptions to functional limits  Repetition: WFL  Automatic Speech: WFL  Confrontation: WFL  Convergent: WFL  Divergent: WFL  Responsive: WFL  Conversation: WFL  Written Expression  Dominant Hand: Right  Written Expression: Unable to assess    Cognition  Orientation  Overall Orientation Status: Within Normal Limits  Orientation Level: Oriented X4  Attention  Attention: Within Functional Limits  Selective Attention: [facility]  Sustained Attention: [facility]  Memory  Memory: Exceptions to [facility]  Short-term Memory: Mild (Recalled 2 words after 5-min delay, with the need for max cues and binary choice in order to recall remaining word. Pt reports memory deficits are impacting her social life, as she will become \"embarassed\" about being forgetful)  Working Memory: WFL  Problem Solving  Problem Solving: Within Functional Limits  Numeric Reasoning  Numeric Reasoning: Unable to assess  Abstract Reasoning  Abstract Reasoning: Within Functional Limits  Safety/Judgment  Safety/Judgment: Within Functional Limits    Additional Assessments   N/A    Recommendations  Requires SLP Intervention: Yes  D/C Recommendations: No follow up therapy recommended post discharge  Patient Education: Results of SLE  Patient Education Response: Verbalizes understanding  Duration of Treatment: For LOS or until all goals met  Frequency of Treatment: 1-2x/wk    Prognosis  Speech Therapy Prognosis  Prognosis: Good  Prognosis Considerations: Age; Motivation    Education  Individuals consulted  Consulted and agree with results and recommendations: RN;Patient  RN Name: Derek Isidro    Treatment/Goals  Short Term Goals  Time Frame for Short Term Goals: 1 week  Goal 1: Pt will be educated on 3 memory strategies and pt will state their use within functional ADLs with 80% acc given cues as needed to promote safety with the completion of ADLs. Goal 2: Pt will complete delayed recall task using taught strategies with 80% acc given cues as needed in order to promote safety with the completion of ADLs.   Long Term Goals  Time Frame for Long Term Goals: 2 weeks  Goal 1: Pt will increase cognitive skills in order to promote safety with the completion of ADLs and to prevent social isolation  Patient's goals: Pt was involved with creation of POC    Safety Devices  Safety Devices  Safety Devices in place: Yes  Type of devices: Bed alarm in place;Call light within reach;Nurse notified  Restraints Initially in Place: No    Pain Assessment  Patient c/o pain: Location and pain level: 8 pain in stomach secondary to \"hernia\"  Pt able to proceed with session.     Pain Re-assessment  Patient c/o pain: RN notified  Described as same as above         Therapy Time  SLP Individual Minutes  Time In: 3746  Time Out: 1006  Minutes: 17          Signature: Electronically signed by DANNY Beltrán on 11/5/2022 at 10:50 AM

## 2022-11-05 NOTE — CARE COORDINATION
Met with patient, patient plans to d/c home today w/ no needs. Cm to follow for any new d/c needs or changes to the d/c plan.

## 2022-11-05 NOTE — DISCHARGE SUMMARY
Physician Discharge Summary     Patient ID:  Hailey Alexandra  [id number]  76 y.o.  1955    Admit date: 11/4/2022    Discharge date : 11/05/22     Admitting Physician: Brennan Howe MD     Discharge Physician: Wu Paulino MD     Admission Diagnoses: TIA (transient ischemic attack) [G45.9]  Acute right-sided weakness [R53.1]    Discharge Diagnoses: TIA, resolved    Admission Condition: fair    Discharged Condition: good    Hospital Course:   TIA, resolved  PT/OT/ST, neuro consult, was on asa, add plavix, consider imaging based on neuro recs  11/5 - likely TIA, resolved, cont asa, statin, added plavix for failure of treatment. DC today.   Hx HTN  Cont home medications  Depression/anxiety    Consults: neurology    Significant Diagnostic Studies: as below    Discharge Exam:  BP (!) 125/55   Pulse 72   Temp 97.6 °F (36.4 °C) (Oral)   Resp 16   Ht 5' 2\" (1.575 m)   Wt 160 lb (72.6 kg)   SpO2 95%   BMI 29.26 kg/m²   General appearance: alert, appears stated age, and cooperative  Lungs: clear to auscultation bilaterally  Heart: regular rate and rhythm, S1, S2 normal, no murmur, click, rub or gallop  Abdomen: soft, non-tender; bowel sounds normal; no masses,  no organomegaly  Extremities: extremities normal, atraumatic, no cyanosis or edema  Skin: Skin color, texture, turgor normal. No rashes or lesions    Labs:   Recent Labs     11/04/22  1245 11/05/22  0515   WBC 7.8 6.1   HGB 14.2 13.6   HCT 43.5 40.5    286     Recent Labs     11/04/22  1245      K 4.7      CO2 23   BUN 10   CREATININE 0.80   CALCIUM 8.6     Recent Labs     11/04/22  1245   AST 19   ALT 13   BILITOT <0.2   ALKPHOS 105     Recent Labs     11/04/22  1300   INR 0.9     Recent Labs     11/04/22  1245 11/04/22  1300   CKTOTAL  --  74   TROPONINI <0.010 <0.010       Urinalysis:   Lab Results   Component Value Date/Time    BLOODU neg 07/29/2021 06:48 PM    SPECGRAV 1.015 07/29/2021 06:48 PM    GLUCOSEU neg 07/29/2021 06:48 PM Radiology:   Most recent    Chest CT      WITH CONTRAST:No results found for this or any previous visit. WITHOUT CONTRAST: No results found for this or any previous visit. CXR      2-view: Results for orders placed during the hospital encounter of 04/16/22    XR CHEST (2 VW)    Narrative  EXAMINATION:  CHEST RADIOGRAPH (2 VIEW AP/LATERAL)    Exam Date/Time:  4/16/2022 6:30 PM    Clinical History:   sob    Comparison:  6/10/2020      FINDINGS:  Lines, tubes, and devices:  None. Cardiomediastinal silhouette:  Heart size within normal limits. Lungs and pleura:  No focal consolidation, pleural effusion or pneumothorax. Impression  No acute cardiopulmonary process. Results for orders placed in visit on 06/10/20    XR CHEST STANDARD (2 VW)    Narrative  X-RAY: A CHEST, 2 VIEW(S):    CLINICAL HISTORY:  J44.1 COPD with acute exacerbation (HCC) ICD10  , cough and congestion. Exposure to COVID-19 virus    DATE: 6/10/2020 10:56 AM    COMPARISONS: 5/4/2020    FINDINGS: Normal cardiac silhouette. Lungs are hyperexpanded and have the appearance of COPD. Lungs are clear without consolidation. No pleural effusion or pneumothorax. The patient has a mild dextroscoliosis of dorsal spine. Impression  NO ACUTE CARDIOPULMONARY ABNORMALITY. NO CHANGE.    COPD. Portable: Results for orders placed during the hospital encounter of 11/04/22    XR CHEST PORTABLE    Narrative  EXAMINATION:  ONE XRAY VIEW OF THE CHEST    11/4/2022 12:47 pm    COMPARISON:  04/16/2022    HISTORY:  ORDERING SYSTEM PROVIDED HISTORY: cp  TECHNOLOGIST PROVIDED HISTORY:  Reason for exam:->cp  What reading provider will be dictating this exam?->CRC    FINDINGS:  The lungs are without acute focal process. There is no effusion or  pneumothorax. The cardiomediastinal silhouette is without acute process. The  osseous structures are without acute process. Impression  No acute process.       Echo No results found for this or any previous visit. Disposition: home    In process/preliminary results:  Outstanding Order Results       No orders found for last 30 day(s). Patient Instructions:   Current Discharge Medication List        START taking these medications    Details   nicotine (NICODERM CQ) 21 MG/24HR Place 1 patch onto the skin daily  Qty: 30 patch, Refills: 3      clopidogrel (PLAVIX) 75 MG tablet Take 1 tablet by mouth daily  Qty: 30 tablet, Refills: 3           CONTINUE these medications which have NOT CHANGED    Details   predniSONE (DELTASONE) 5 MG tablet Take 1 tablet by mouth daily  Qty: 30 tablet, Refills: 11      amphetamine-dextroamphetamine (ADDERALL XR) 30 MG extended release capsule Take 1 capsule by mouth every morning for 30 days. Qty: 30 capsule, Refills: 0    Associated Diagnoses: Attention deficit hyperactivity disorder (ADHD), predominantly inattentive type      ipratropium-albuterol (DUONEB) 0.5-2.5 (3) MG/3ML SOLN nebulizer solution Inhale 3 mLs into the lungs every 4 hours as needed for Shortness of Breath  Qty: 360 mL, Refills: 0    Associated Diagnoses: COPD exacerbation (HCC)      ALPRAZolam (XANAX) 1 MG tablet Take 1 tablet by mouth in the morning and 1 tablet in the evening. Do all this for 30 days. Qty: 60 tablet, Refills: 0    Associated Diagnoses: Anxiety      gabapentin (NEURONTIN) 600 MG tablet TAKE 1 TABLET BY MOUTH THREE TIMES DAILY  Qty: 90 tablet, Refills: 2    Associated Diagnoses: Chronic bilateral low back pain with bilateral sciatica; Spinal stenosis of lumbar region with neurogenic claudication; Fibromyalgia      rosuvastatin (CRESTOR) 10 MG tablet Take 1 tablet by mouth daily  Qty: 30 tablet, Refills: 5      cyclobenzaprine (FLEXERIL) 10 MG tablet TAKE 1 TABLET BY MOUTH TWICE DAILY AS NEEDED  Qty: 60 tablet, Refills: 11    Associated Diagnoses: Fibromyalgia      !! Misc.  Devices (ROLLER WALKER) MISC 1 each by Does not apply route daily  Qty: 1 each, Refills: 0      furosemide (LASIX) 20 MG tablet Take 1 tablet by mouth daily PRN  Qty: 30 tablet, Refills: 0      vitamin D (ERGOCALCIFEROL) 1.25 MG (87782 UT) CAPS capsule TAKE 1 CAPSULE BY MOUTH once per week AS DIRECTED  Qty: 4 capsule, Refills: 11      prochlorperazine (COMPAZINE) 10 MG tablet Take 1 tablet by mouth every 8 hours as needed (NAUSEA)  Qty: 120 tablet, Refills: 3      ARIPiprazole (ABILIFY) 10 MG tablet TAKE 1 TABLET DAILY IN THE MORNING      Handicap Placard MISC by Does not apply route Duration 5 years  Qty: 1 each, Refills: 0    Associated Diagnoses: Spinal stenosis of lumbar region with neurogenic claudication; COPD (chronic obstructive pulmonary disease) with acute bronchitis ([facility] Fibromyalgia      !! Misc. Devices (PEDAL EXERCISER) MISC 1 each by Does not apply route daily  Qty: 1 each, Refills: 0    Associated Diagnoses: Spinal stenosis of lumbar region with neurogenic claudication; COPD (chronic obstructive pulmonary disease) with acute bronchitis ([facility] Fibromyalgia      albuterol sulfate  (90 Base) MCG/ACT inhaler Inhale 2 puffs into the lungs every 6 hours as needed for Wheezing  Qty: 1 Inhaler, Refills: 3    Associated Diagnoses: COPD (chronic obstructive pulmonary disease) with acute bronchitis (HCC)      polyethylene glycol (GLYCOLAX) 17 GM/SCOOP powder Take 17 g by mouth as needed      venlafaxine (EFFEXOR XR) 75 MG extended release capsule TAKE 1 CAPSULE BY MOUTH TWICE DAILY  Refills: 1      aspirin 81 MG chewable tablet       mirtazapine (REMERON) 15 MG tablet Take 15 mg by mouth nightly       !! - Potential duplicate medications found. Please discuss with provider.         STOP taking these medications       fluticasone-umeclidin-vilant (Ovidio Michelle) 100-62.5-25 MCG/INH AEPB Comments:   Reason for Stopping:         omeprazole (PRILOSEC) 40 MG delayed release capsule Comments:   Reason for Stopping:             Activity: activity as tolerated  Diet: regular diet  Wound Care: none needed    Follow-up with PCP 1-2 weeks.     DC time 35 minutes    Signed:  Electronically signed by Troy Caba MD on 11/5/2022 at 2:43 PM

## 2022-11-06 LAB
GFR SERPL CREATININE-BSD FRML MDRD: >60 ML/MIN/{1.73_M2}
PERFORMED ON: NORMAL
POC CREATININE: 0.9 MG/DL (ref 0.6–1.2)
POC SAMPLE TYPE: NORMAL

## 2022-11-07 LAB
EKG ATRIAL RATE: 76 BPM
EKG P AXIS: 61 DEGREES
EKG P-R INTERVAL: 144 MS
EKG Q-T INTERVAL: 390 MS
EKG QRS DURATION: 70 MS
EKG QTC CALCULATION (BAZETT): 438 MS
EKG R AXIS: 53 DEGREES
EKG T AXIS: 69 DEGREES
EKG VENTRICULAR RATE: 76 BPM

## 2022-11-07 PROCEDURE — 93010 ELECTROCARDIOGRAM REPORT: CPT | Performed by: INTERNAL MEDICINE

## 2022-11-15 ENCOUNTER — NURSE TRIAGE (OUTPATIENT)
Dept: OTHER | Facility: CLINIC | Age: 67
End: 2022-11-15

## 2022-11-15 NOTE — TELEPHONE ENCOUNTER
Location of patient: [address] call from Daniel Monet at [employer] with [employer]. Subjective: Caller states \"genital boils\"     Current Symptoms: Has some boils on vagina, about the size of peas. They drain a little when she squeezes. She says they feel hard, no rashes or itching. Onset: 2 weeks ago; unchanged    Associated Symptoms: NA    Pain Severity: 6/10; aching; constant    Temperature: no fever     What has been tried: neosporin, ibuprofen    LMP: NA Pregnant: NA    Recommended disposition: See PCP within 3 Days    Care advice provided, patient verbalizes understanding; denies any other questions or concerns; instructed to call back for any new or worsening symptoms. Patient/Caller agrees with recommended disposition; writer provided warm transfer to [employer] at [employer] for appointment scheduling    Attention Provider: Thank you for allowing me to participate in the care of your patient. The patient was connected to triage in response to information provided to the ECC/PSC. Please do not respond through this encounter as the response is not directed to a shared pool.         Reason for Disposition   Patient wants to be seen    Protocols used: Vaginal Symptoms-ADULT-OH

## 2022-11-16 ENCOUNTER — TELEPHONE (OUTPATIENT)
Dept: FAMILY MEDICINE CLINIC | Age: 67
End: 2022-11-16

## 2022-11-16 ENCOUNTER — TELEPHONE (OUTPATIENT)
Dept: PHARMACY | Facility: CLINIC | Age: 67
End: 2022-11-16

## 2022-11-16 NOTE — TELEPHONE ENCOUNTER
[facility] HEALTH CLINICAL PHARMACY: ADHERENCE REVIEW  Identified care gap per [place]: fills at [address] : Statin adherence    [facility] [place] [address] - [place]ie Du, [address] [place] [address]  Phone: [phone] Fax: [phone]     Last Visit: 9/13/22 with AMANDA Arcos CNP      Patient prescribed:    Current Outpatient Medications   Medication Instructions    albuterol sulfate  (90 Base) MCG/ACT inhaler 2 puffs, Inhalation, EVERY 6 HOURS PRN    ALPRAZolam (XANAX) 1 mg, Oral, 2 TIMES DAILY    amphetamine-dextroamphetamine (ADDERALL XR) 30 MG extended release capsule 30 mg, Oral, EVERY MORNING    ARIPiprazole (ABILIFY) 10 MG tablet TAKE 1 TABLET DAILY IN THE MORNING    aspirin 81 MG chewable tablet No dose, route, or frequency recorded. clopidogrel (PLAVIX) 75 mg, Oral, DAILY    cyclobenzaprine (FLEXERIL) 10 MG tablet TAKE 1 TABLET BY MOUTH TWICE DAILY AS NEEDED    furosemide (LASIX) 20 mg, Oral, DAILY, PRN    gabapentin (NEURONTIN) 600 MG tablet TAKE 1 TABLET BY MOUTH THREE TIMES DAILY    Handicap Placard MISC Does not apply, Duration 5 years    ipratropium-albuterol (DUONEB) 0.5-2.5 (3) MG/3ML SOLN nebulizer solution 3 mLs, Inhalation, EVERY 4 HOURS PRN    mirtazapine (REMERON) 15 mg, Oral, NIGHTLY    Misc. Devices (PEDAL EXERCISER) MISC 1 each, Does not apply, DAILY    Misc.  Devices (ROLLER WALKER) MISC 1 each, Does not apply, DAILY    nicotine (NICODERM CQ) 21 MG/24HR 1 patch, TransDERmal, DAILY    polyethylene glycol (GLYCOLAX) 17 g, Oral, PRN    predniSONE (DELTASONE) 5 MG tablet Take 1 tablet by mouth daily    prochlorperazine (COMPAZINE) 10 mg, Oral, EVERY 8 HOURS PRN    rosuvastatin (CRESTOR) 10 mg, Oral, DAILY    venlafaxine (EFFEXOR XR) 75 MG extended release capsule TAKE 1 CAPSULE BY MOUTH TWICE DAILY    vitamin D (ERGOCALCIFEROL) 1.25 MG (26924 UT) CAPS capsule TAKE 1 CAPSULE BY MOUTH once per week AS DIRECTED ASSESSMENT    STATIN ADHERENCE  Insurance Records claims through 11/7/22  YTD [facility] =  76%; Potential Fail Date: 11/18/22  Rosuvastatin last filled for 30 day supply. Next refill due: 11/16/22    Per Reconciled Dispense Report:  rosuvastatin 10 mg tablet 10/17/2022 30 30 each Yamini Buchanan, AMANDA - CNP [facility]...           4 refill(s) remaining. Per [place] Portal:  Same as above; 4 refill(s) remaining. Per [facility]:   Same as above. Billed through [employer]. Lab Results   Component Value Date    CHOL 102 11/05/2022    TRIG 151 (H) 11/05/2022    HDL 59 11/05/2022    LDLCALC 13 11/05/2022     ALT   Date Value Ref Range Status   11/04/2022 13 0 - 33 U/L Final     AST   Date Value Ref Range Status   11/04/2022 19 0 - 35 U/L Final     Comment:     Specimen hemolysis has exceeded the interference as defined  by Roche. Value may be falsely increased. Suggest  recollection if clinically indicated. The ASCVD Risk score (Mauston DK, et al., 2019) failed to calculate for the following reasons: The patient has a prior MI or stroke diagnosis       PLAN  The following are interventions that have been identified:   - Patient overdue refilling rosuvastatin and active on home medication list.   - Patient eligible for 90 day supply of rosuvastatin  [facility] Misti Rushing will refill rosuvastatin for 30 days today. Patient has refills remaining for her December refill date. Attempting to reach patient to review. Left message asking for return call.  and [employer] message sent to patient      Future Appointments   Date Time Provider Shelley Gorman   11/16/2022 11:30 AM Shiv Isbell, [address]   12/6/2022  2:15 PM MD SARAH Ngo PM [facility] EM[facility]GENCY [facility] CENTER AT [place]   12/13/2022  2:15 PM AMANDA Byrnes - CNP [facility] [address], [place], [address] [facility] free: [phone], option 1  =======================================================  For Pharmacy [address] [facility] [address] [address] in place:  No  Recommendation Provided To: Pharmacy: 1  Intervention Detail: Adherence Monitorin  Gap Closed?: No   Intervention Accepted By: Pharmacy: 1  Time Spent (min): 20

## 2022-11-29 ENCOUNTER — TELEPHONE (OUTPATIENT)
Dept: FAMILY MEDICINE CLINIC | Age: 67
End: 2022-11-29

## 2022-11-29 DIAGNOSIS — F90.0 ATTENTION DEFICIT HYPERACTIVITY DISORDER (ADHD), PREDOMINANTLY INATTENTIVE TYPE: ICD-10-CM

## 2022-11-29 RX ORDER — DOXYCYCLINE HYCLATE 100 MG
100 TABLET ORAL 2 TIMES DAILY
Qty: 14 TABLET | Refills: 0 | Status: SHIPPED | OUTPATIENT
Start: 2022-11-29 | End: 2022-12-06

## 2022-11-29 NOTE — TELEPHONE ENCOUNTER
Patient requesting medication refill. Please approve or deny this request. PATIENTS ALTA IS 12/6/22    Rx requested:  Requested Prescriptions     Pending Prescriptions Disp Refills    amphetamine-dextroamphetamine (ADDERALL XR) 30 MG extended release capsule 30 capsule 0     Sig: Take 1 capsule by mouth every morning for 30 days.          Last Office Visit:   9/13/2022      Next Visit Date:  Future Appointments   Date Time Provider Shelley Gorman   12/6/2022  2:15 PM MD SARAH Anthony PM [facility] EMERGENCY [facility] AT [place]   12/13/2022  2:15 PM Charley Parrish, [address]

## 2022-11-29 NOTE — TELEPHONE ENCOUNTER
Patient called is in need of an appointment with Yamini DUGGAN she's in pain has what she believes is a boil on her private and is in pain and cant stand it. Patients next appointment isn't till 12/06/22 and was wanting to know if we can squeeze her in this week to get this checked out and get her some antibiotics for this.  Please call patient [phone]  I also pended patients adderall due to saying she will run out a few days prior to this appointment date on 12/06/22         Please Advise Thank You

## 2022-12-01 RX ORDER — DEXTROAMPHETAMINE SACCHARATE, AMPHETAMINE ASPARTATE MONOHYDRATE, DEXTROAMPHETAMINE SULFATE AND AMPHETAMINE SULFATE 7.5; 7.5; 7.5; 7.5 MG/1; MG/1; MG/1; MG/1
30 CAPSULE, EXTENDED RELEASE ORAL EVERY MORNING
Qty: 30 CAPSULE | Refills: 0 | Status: SHIPPED | OUTPATIENT
Start: 2022-12-01 | End: 2022-12-31

## 2022-12-13 ENCOUNTER — OFFICE VISIT (OUTPATIENT)
Dept: FAMILY MEDICINE CLINIC | Age: 67
End: 2022-12-13
Payer: MEDICARE

## 2022-12-13 VITALS
BODY MASS INDEX: 30 KG/M2 | WEIGHT: 163 LBS | HEART RATE: 86 BPM | DIASTOLIC BLOOD PRESSURE: 88 MMHG | SYSTOLIC BLOOD PRESSURE: 132 MMHG | HEIGHT: 62 IN

## 2022-12-13 DIAGNOSIS — F41.9 ANXIETY: Primary | ICD-10-CM

## 2022-12-13 DIAGNOSIS — F33.1 MODERATE EPISODE OF RECURRENT MAJOR DEPRESSIVE DISORDER (HCC): ICD-10-CM

## 2022-12-13 DIAGNOSIS — M48.062 SPINAL STENOSIS OF LUMBAR REGION WITH NEUROGENIC CLAUDICATION: ICD-10-CM

## 2022-12-13 DIAGNOSIS — M54.41 CHRONIC BILATERAL LOW BACK PAIN WITH BILATERAL SCIATICA: ICD-10-CM

## 2022-12-13 DIAGNOSIS — G89.29 CHRONIC BILATERAL LOW BACK PAIN WITH BILATERAL SCIATICA: ICD-10-CM

## 2022-12-13 DIAGNOSIS — M54.42 CHRONIC BILATERAL LOW BACK PAIN WITH BILATERAL SCIATICA: ICD-10-CM

## 2022-12-13 DIAGNOSIS — J44.9 CHRONIC OBSTRUCTIVE PULMONARY DISEASE, UNSPECIFIED COPD TYPE (HCC): ICD-10-CM

## 2022-12-13 DIAGNOSIS — F90.0 ATTENTION DEFICIT HYPERACTIVITY DISORDER (ADHD), PREDOMINANTLY INATTENTIVE TYPE: ICD-10-CM

## 2022-12-13 DIAGNOSIS — M79.7 FIBROMYALGIA: ICD-10-CM

## 2022-12-13 PROCEDURE — G8484 FLU IMMUNIZE NO ADMIN: HCPCS | Performed by: NURSE PRACTITIONER

## 2022-12-13 PROCEDURE — G8427 DOCREV CUR MEDS BY ELIG CLIN: HCPCS | Performed by: NURSE PRACTITIONER

## 2022-12-13 PROCEDURE — 3017F COLORECTAL CA SCREEN DOC REV: CPT | Performed by: NURSE PRACTITIONER

## 2022-12-13 PROCEDURE — 1036F TOBACCO NON-USER: CPT | Performed by: NURSE PRACTITIONER

## 2022-12-13 PROCEDURE — 1123F ACP DISCUSS/DSCN MKR DOCD: CPT | Performed by: NURSE PRACTITIONER

## 2022-12-13 PROCEDURE — 1090F PRES/ABSN URINE INCON ASSESS: CPT | Performed by: NURSE PRACTITIONER

## 2022-12-13 PROCEDURE — 3023F SPIROM DOC REV: CPT | Performed by: NURSE PRACTITIONER

## 2022-12-13 PROCEDURE — 99214 OFFICE O/P EST MOD 30 MIN: CPT | Performed by: NURSE PRACTITIONER

## 2022-12-13 PROCEDURE — G8417 CALC BMI ABV UP PARAM F/U: HCPCS | Performed by: NURSE PRACTITIONER

## 2022-12-13 PROCEDURE — G8399 PT W/DXA RESULTS DOCUMENT: HCPCS | Performed by: NURSE PRACTITIONER

## 2022-12-13 RX ORDER — ALPRAZOLAM 1 MG/1
1 TABLET ORAL 2 TIMES DAILY
Qty: 60 TABLET | Refills: 2 | Status: SHIPPED | OUTPATIENT
Start: 2022-12-13 | End: 2023-01-12

## 2022-12-13 RX ORDER — ACETAMINOPHEN AND CODEINE PHOSPHATE 300; 30 MG/1; MG/1
1 TABLET ORAL 2 TIMES DAILY PRN
Qty: 14 TABLET | Refills: 0 | Status: SHIPPED | OUTPATIENT
Start: 2022-12-13 | End: 2022-12-20

## 2022-12-13 RX ORDER — NICOTINE 21 MG/24HR
1 PATCH, TRANSDERMAL 24 HOURS TRANSDERMAL DAILY
Qty: 30 PATCH | Refills: 5 | Status: SHIPPED | OUTPATIENT
Start: 2022-12-13

## 2022-12-13 RX ORDER — ERGOCALCIFEROL 1.25 MG/1
CAPSULE ORAL
Qty: 4 CAPSULE | Refills: 11 | Status: SHIPPED | OUTPATIENT
Start: 2022-12-13

## 2022-12-13 ASSESSMENT — ENCOUNTER SYMPTOMS
EYES NEGATIVE: 1
RHINORRHEA: 0
HEARTBURN: 0
COLOR CHANGE: 0
ANAL BLEEDING: 0
SORE THROAT: 0
DIARRHEA: 0
VOICE CHANGE: 0
RECTAL PAIN: 0
COUGH: 1
SHORTNESS OF BREATH: 0
CONSTIPATION: 0
TROUBLE SWALLOWING: 0
ALLERGIC/IMMUNOLOGIC NEGATIVE: 1
BLOOD IN STOOL: 0
GASTROINTESTINAL NEGATIVE: 1
ABDOMINAL PAIN: 0

## 2022-12-13 ASSESSMENT — COPD QUESTIONNAIRES: COPD: 1

## 2022-12-13 NOTE — PROGRESS NOTES
[address], 75 y.o. female presents today with:  Chief Complaint   Patient presents with    3 Month Follow-Up    ADHD    Anxiety        Chronic pain, Fibromyalgia, back problems-  Butrans patches-  patient tolerating-  States first time she has eran been pain free-  Is willing to try a decreased dosage. Will also chronic pain-we will we will her on Butrans patches and stop tramadol but Butrans patches have become extremely expensive we will resume the tramadol    ADHD-  30mg xr daily with effectiveness  Anxiety-  Tried to 1 MG TID PRN-  She does not like the ER,  Did not feel it helped her anxiety. -  She is willing transition her to back to her XANAX 1mg that is not extended release but BID PRN not TID PRN. COPD  She complains of cough. There is no shortness of breath. This is a chronic problem. The problem occurs daily. The cough is non-productive. Pertinent negatives include no appetite change, chest pain, dyspnea on exertion, ear congestion, ear pain, fever, headaches, heartburn, malaise/fatigue, myalgias, nasal congestion, orthopnea, PND, postnasal drip, rhinorrhea, sneezing, sore throat, sweats, trouble swallowing or weight loss. Her symptoms are aggravated by change in weather. Risk factors for lung disease include smoking/tobacco exposure. Her past medical history is significant for COPD. ADHD  Associated symptoms include coughing. Pertinent negatives include no abdominal pain, chest pain, fatigue, fever, headaches, myalgias, rash, sore throat or weakness. Anxiety  Patient reports no chest pain, dizziness, nervous/anxious behavior, palpitations or shortness of breath. ADD/ADHD:  Current treatment: Adderall XR- 30, which has been effective. Residual symptoms: none. Medication side effects: None. Patient denies anorexia, nausea, vomiting, abdominal pain, involuntary weight loss, palpitations, insomnia, irritability, headache and tremor.     Controlled Substance Monitoring:    Acute and Chronic Pain Monitoring:   RX Monitoring 2022   Acute Pain Prescriptions -   Periodic Controlled Substance Monitoring Possible medication side effects, risk of tolerance/dependence & alternative treatments discussed. ;No signs of potential drug abuse or diversion identified. ;Assessed functional status. Chronic Pain > 50 MEDD -   Chronic Pain > 80 MEDD -           Review of Systems   Constitutional: Negative. Negative for activity change, appetite change, fatigue, fever, malaise/fatigue, unexpected weight change and weight loss. HENT: Negative. Negative for dental problem, ear pain, nosebleeds, postnasal drip, rhinorrhea, sneezing, sore throat, trouble swallowing and voice change. Eyes: Negative. Negative for visual disturbance. Respiratory:  Positive for cough. Negative for shortness of breath. Cardiovascular: Negative. Negative for chest pain, dyspnea on exertion, palpitations, leg swelling and PND. Gastrointestinal: Negative. Negative for abdominal pain, anal bleeding, blood in stool, constipation, diarrhea, heartburn and rectal pain. Endocrine: Negative. Negative for cold intolerance, heat intolerance, polydipsia, polyphagia and polyuria. Genitourinary: Negative. Musculoskeletal: Negative. Negative for myalgias. Skin: Negative. Negative for color change and rash. Allergic/Immunologic: Negative. Neurological: Negative. Negative for dizziness, syncope, weakness and headaches. Hematological: Negative. Negative for adenopathy. Does not bruise/bleed easily. Psychiatric/Behavioral: Negative. Negative for dysphoric mood and sleep disturbance. The patient is not nervous/anxious.       Past Medical History:   Diagnosis Date    ADHD (attention deficit hyperactivity disorder)     Anxiety     Depression     Fibromyalgia     GERD (gastroesophageal reflux disease)     Hypertension     Irritable bowel syndrome      Past Surgical History:   Procedure Laterality Date     SECTION      COLONOSCOPY      N GAUDENCIO Duran MD    DILATION AND CURETTAGE OF UTERUS N/A 2020    DIAGNOSTIC LAPAROSCOPY, LYSIS OF ADHESIONS, HYSTEROSCOPY WITH Eleno Malling performed by Marisela Coffman MD at [address] [place] [address] History     Socioeconomic History    Marital status:      Spouse name: Not on file    Number of children: Not on file    Years of education: Not on file    Highest education level: Not on file   Occupational History    Not on file   Tobacco Use    Smoking status: Former     Packs/day: 2.00     Years: 50.00     Pack years: 100.00     Types: Cigarettes     Quit date: 2022     Years since quittin.1    Smokeless tobacco: Never   Vaping Use    Vaping Use: Never used   Substance and Sexual Activity    Alcohol use: Never    Drug use: Never    Sexual activity: Not on file   Other Topics Concern    Not on file   Social History Narrative    Not on file     Social Determinants of Health     Financial Resource Strain: Low Risk     Difficulty of Paying Living Expenses: Not hard at all   Food Insecurity: No Food Insecurity    Worried About Running Out of Food in the Last Year: Never true    Jerri of Food in the Last Year: Never true   Transportation Needs: Not on file   Physical Activity: Insufficiently Active    Days of Exercise per Week: 2 days    Minutes of Exercise per Session: 10 min   Stress: Not on file   Social Connections: Not on file   Intimate Partner Violence: Not on file   Housing Stability: Not on file     Family History   Problem Relation Age of Onset    Breast Cancer Maternal Aunt      No Known Allergies  Current Outpatient Medications   Medication Sig Dispense Refill    ALPRAZolam (XANAX) 1 MG tablet Take 1 tablet by mouth in the morning and 1 tablet in the evening. Do all this for 30 days.  60 tablet 2    vitamin D (ERGOCALCIFEROL) 1.25 MG (27701 UT) CAPS capsule TAKE 1 CAPSULE BY MOUTH once per week AS DIRECTED 4 capsule 11    nicotine (NICODERM CQ) 21 MG/24HR Place 1 patch onto the skin daily 30 patch 5    amphetamine-dextroamphetamine (ADDERALL XR) 30 MG extended release capsule Take 1 capsule by mouth every morning for 30 days. 30 capsule 0    clopidogrel (PLAVIX) 75 MG tablet Take 1 tablet by mouth daily 30 tablet 3    predniSONE (DELTASONE) 5 MG tablet Take 1 tablet by mouth daily 30 tablet 11    ipratropium-albuterol (DUONEB) 0.5-2.5 (3) MG/3ML SOLN nebulizer solution Inhale 3 mLs into the lungs every 4 hours as needed for Shortness of Breath 360 mL 0    gabapentin (NEURONTIN) 600 MG tablet TAKE 1 TABLET BY MOUTH THREE TIMES DAILY 90 tablet 2    cyclobenzaprine (FLEXERIL) 10 MG tablet TAKE 1 TABLET BY MOUTH TWICE DAILY AS NEEDED 60 tablet 11    furosemide (LASIX) 20 MG tablet Take 1 tablet by mouth daily PRN 30 tablet 0    ARIPiprazole (ABILIFY) 10 MG tablet TAKE 1 TABLET DAILY IN THE MORNING      albuterol sulfate  (90 Base) MCG/ACT inhaler Inhale 2 puffs into the lungs every 6 hours as needed for Wheezing 1 Inhaler 3    aspirin 81 MG chewable tablet       mirtazapine (REMERON) 15 MG tablet Take 15 mg by mouth nightly      acetaminophen-codeine (TYLENOL #3) 300-30 MG per tablet Take 1 tablet by mouth 2 times daily as needed for Pain for up to 7 days. 14 tablet 0    rosuvastatin (CRESTOR) 10 MG tablet Take 1 tablet by mouth daily 30 tablet 5    Misc. Devices (ROLLER WALKER) MISC 1 each by Does not apply route daily 1 each 0    prochlorperazine (COMPAZINE) 10 MG tablet Take 1 tablet by mouth every 8 hours as needed (NAUSEA) 120 tablet 3    Handicap Placard MISC by Does not apply route Duration 5 years 1 each 0    Misc. Devices (PEDAL EXERCISER) MISC 1 each by Does not apply route daily 1 each 0    polyethylene glycol (GLYCOLAX) 17 GM/SCOOP powder Take 17 g by mouth as needed      venlafaxine (EFFEXOR XR) 75 MG extended release capsule TAKE 1 CAPSULE BY MOUTH TWICE DAILY  1     No current facility-administered medications for this visit.      PMH, Surgical Hx, Family Hx, and Social Hx reviewed and updated. Health Maintenance reviewed. Objective    Vitals:    12/13/22 1412   BP: 132/88   Pulse: 86   Weight: 163 lb (73.9 kg)   Height: 5' 2\" (1.575 m)       Physical Exam  Constitutional:       General: She is not in acute distress. Appearance: She is well-developed. HENT:      Head: Normocephalic and atraumatic. Right Ear: External ear normal.      Left Ear: External ear normal.      Nose: Nose normal.   Eyes:      Conjunctiva/sclera: Conjunctivae normal.      Pupils: Pupils are equal, round, and reactive to light. Neck:      Vascular: No JVD. Cardiovascular:      Rate and Rhythm: Normal rate and regular rhythm. Heart sounds: Normal heart sounds. No murmur heard. Pulmonary:      Effort: Pulmonary effort is normal. No respiratory distress. Breath sounds: Normal breath sounds. No wheezing or rales. Abdominal:      General: Bowel sounds are normal. There is no distension. Palpations: Abdomen is soft. There is no mass. Tenderness: There is no abdominal tenderness. Musculoskeletal:      Cervical back: Neck supple. Skin:     General: Skin is warm and dry. Neurological:      Mental Status: She is alert and oriented to person, place, and time. Assessment & Plan   Katherin Villanueva was seen today for 3 month follow-up, adhd and anxiety. Diagnoses and all orders for this visit:    Anxiety  -     ALPRAZolam (XANAX) 1 MG tablet; Take 1 tablet by mouth in the morning and 1 tablet in the evening. Do all this for 30 days. Moderate episode of recurrent major depressive disorder (HCC)    Attention deficit hyperactivity disorder (ADHD), predominantly inattentive type    Fibromyalgia  -     acetaminophen-codeine (TYLENOL #3) 300-30 MG per tablet; Take 1 tablet by mouth 2 times daily as needed for Pain for up to 7 days.     Spinal stenosis of lumbar region with neurogenic claudication  -     acetaminophen-codeine (TYLENOL #3) 300-30 MG per tablet; Take 1 tablet by mouth 2 times daily as needed for Pain for up to 7 days. Chronic bilateral low back pain with bilateral sciatica L>R  -     acetaminophen-codeine (TYLENOL #3) 300-30 MG per tablet; Take 1 tablet by mouth 2 times daily as needed for Pain for up to 7 days. Chronic obstructive pulmonary disease, unspecified COPD type ([facility] [address]    Other orders  -     vitamin D (ERGOCALCIFEROL) 1.25 MG (44329 UT) CAPS capsule; TAKE 1 CAPSULE BY MOUTH once per week AS DIRECTED  -     nicotine (NICODERM CQ) 21 MG/24HR; Place 1 patch onto the skin daily    No orders of the defined types were placed in this encounter. Orders Placed This Encounter   Medications    ALPRAZolam (XANAX) 1 MG tablet     Sig: Take 1 tablet by mouth in the morning and 1 tablet in the evening. Do all this for 30 days. Dispense:  60 tablet     Refill:  2    vitamin D (ERGOCALCIFEROL) 1.25 MG (81029 UT) CAPS capsule     Sig: TAKE 1 CAPSULE BY MOUTH once per week AS DIRECTED     Dispense:  4 capsule     Refill:  11    nicotine (NICODERM CQ) 21 MG/24HR     Sig: Place 1 patch onto the skin daily     Dispense:  30 patch     Refill:  5    acetaminophen-codeine (TYLENOL #3) 300-30 MG per tablet     Sig: Take 1 tablet by mouth 2 times daily as needed for Pain for up to 7 days. Dispense:  14 tablet     Refill:  0     Reduce doses taken as pain becomes manageable     Medications Discontinued During This Encounter   Medication Reason    vitamin D (ERGOCALCIFEROL) 1.25 MG (31307 UT) CAPS capsule REORDER    ALPRAZolam (XANAX) 1 MG tablet REORDER    nicotine (NICODERM CQ) 21 MG/24HR REORDER     No follow-ups on file. Reviewed with the patient: current clinical status, medications, activities and diet. Side effects, adverse effects of the medication prescribed today, as well as treatment plan/ rationale and result expectations have been discussed with the patient who expresses understanding and desires to proceed.     Close follow up to evaluate treatment results and for coordination of care. I have reviewed the patient's medical history in detail and updated the computerized patient record.     AMANDA Fan - CNP

## 2022-12-16 ENCOUNTER — TELEPHONE (OUTPATIENT)
Dept: PHARMACY | Facility: CLINIC | Age: 67
End: 2022-12-16

## 2022-12-16 NOTE — TELEPHONE ENCOUNTER
[facility] HEALTH CLINICAL PHARMACY: ADHERENCE REVIEW  Identified care gap per [place]: fills at [address] : Statin adherence    [facility] AT [place] [address] - [place], [address] [place] [address]  Phone: [phone] Fax: [phone]       Last Visit: 12/13/22 with AMANDA Partida CNP      Patient prescribed:    Current Outpatient Medications   Medication Instructions    acetaminophen-codeine (TYLENOL #3) 300-30 MG per tablet 1 tablet, Oral, 2 TIMES DAILY PRN    albuterol sulfate  (90 Base) MCG/ACT inhaler 2 puffs, Inhalation, EVERY 6 HOURS PRN    ALPRAZolam (XANAX) 1 mg, Oral, 2 TIMES DAILY    amphetamine-dextroamphetamine (ADDERALL XR) 30 MG extended release capsule 30 mg, Oral, EVERY MORNING    ARIPiprazole (ABILIFY) 10 MG tablet TAKE 1 TABLET DAILY IN THE MORNING    aspirin 81 MG chewable tablet No dose, route, or frequency recorded. clopidogrel (PLAVIX) 75 mg, Oral, DAILY    cyclobenzaprine (FLEXERIL) 10 MG tablet TAKE 1 TABLET BY MOUTH TWICE DAILY AS NEEDED    furosemide (LASIX) 20 mg, Oral, DAILY, PRN    gabapentin (NEURONTIN) 600 MG tablet TAKE 1 TABLET BY MOUTH THREE TIMES DAILY    Handicap Placard MISC Does not apply, Duration 5 years    ipratropium-albuterol (DUONEB) 0.5-2.5 (3) MG/3ML SOLN nebulizer solution 3 mLs, Inhalation, EVERY 4 HOURS PRN    mirtazapine (REMERON) 15 mg, Oral, NIGHTLY    Misc. Devices (PEDAL EXERCISER) MISC 1 each, Does not apply, DAILY    Misc.  Devices (ROLLER WALKER) MISC 1 each, Does not apply, DAILY    nicotine (NICODERM CQ) 21 MG/24HR 1 patch, TransDERmal, DAILY    polyethylene glycol (GLYCOLAX) 17 g, Oral, PRN    predniSONE (DELTASONE) 5 MG tablet Take 1 tablet by mouth daily    prochlorperazine (COMPAZINE) 10 mg, Oral, EVERY 8 HOURS PRN    rosuvastatin (CRESTOR) 10 mg, Oral, DAILY    venlafaxine (EFFEXOR XR) 75 MG extended release capsule TAKE 1 CAPSULE BY MOUTH TWICE DAILY    vitamin D (ERGOCALCIFEROL) 1.25 MG (97926 UT) CAPS capsule TAKE 1 CAPSULE BY MOUTH once per week AS [facility] Records claims through 12/5/22  YTD [place] =  79%; Potential Fail Date: 12/18/22  Rosuvastatin last filled for 30 day supply. Next refill due: 12/16/22    Per [facility] Portal:  Last filled 11/16/22 for 30 days; 3 refill(s) remaining. Per [facility]:   Same as above. Billed through [facility]. Lab Results   Component Value Date    CHOL 102 11/05/2022    TRIG 151 (H) 11/05/2022    HDL 59 11/05/2022    LDLCALC 13 11/05/2022     ALT   Date Value Ref Range Status   11/04/2022 13 0 - 33 U/L Final     AST   Date Value Ref Range Status   11/04/2022 19 0 - 35 U/L Final     Comment:     Specimen hemolysis has exceeded the interference as defined  by Roche. Value may be falsely increased. Suggest  recollection if clinically indicated. The ASCVD Risk score (Nhan DK, et al., 2019) failed to calculate for the following reasons: The patient has a prior MI or stroke diagnosis       PLAN  The following are interventions that have been identified:   - Patient overdue refilling rosuvastatin and active on home medication list.   - Patient eligible for 90 day supply of rosuvastatin  [facility] will refill rosuvastatin for 30 days today. Unable to speak with patient to ask if she would like to switch to 90 day refills. Attempting to reach patient to review. Left message asking for return call.  and "[employer]t message sent to patient      Future Appointments   Date Time Provider Shelley Gorman   1/4/2023  1:00 PM MD SARAH Siddiqui [facility] EMERGENCY [facility] AT [place]   3/14/2023 11:15 AM America Person, APRN - CNP [facility] [address], PharmD, [facility]  Department, toll free: [phone], option 1  =======================================================  For Pharmacy Admin Tracking Only    Program: [address] in place:  No  Recommendation Provided To: Pharmacy: 1  Intervention Detail: Adherence Monitorin  Gap Closed?: No   Intervention Accepted By: Pharmacy: 1  Time Spent (min): 20

## 2022-12-22 DIAGNOSIS — M54.41 CHRONIC BILATERAL LOW BACK PAIN WITH BILATERAL SCIATICA: ICD-10-CM

## 2022-12-22 DIAGNOSIS — G89.29 CHRONIC BILATERAL LOW BACK PAIN WITH BILATERAL SCIATICA: ICD-10-CM

## 2022-12-22 DIAGNOSIS — M79.7 FIBROMYALGIA: ICD-10-CM

## 2022-12-22 DIAGNOSIS — M48.062 SPINAL STENOSIS OF LUMBAR REGION WITH NEUROGENIC CLAUDICATION: ICD-10-CM

## 2022-12-22 DIAGNOSIS — M54.42 CHRONIC BILATERAL LOW BACK PAIN WITH BILATERAL SCIATICA: ICD-10-CM

## 2022-12-22 NOTE — TELEPHONE ENCOUNTER
Pharmacy requesting medication refill. Please approve or deny this request.    Rx requested:  Requested Prescriptions     Pending Prescriptions Disp Refills    buprenorphine 20 MCG/HR PTWK [Pharmacy Med Name: buprenorphine 20 mcg/hour weekly transdermal patch] 4 patch 2     Sig: Place 1 patch onto the skin every 7 days for 30 days.          Last Office Visit:   12/13/2022      Next Visit Date:  Future Appointments   Date Time Provider Shelley Gorman   1/4/2023  1:00 PM Annamary Bernheim, MD [place] [facility]   3/14/2023 11:15 AM Thee Perry, [address]

## 2022-12-23 RX ORDER — BUPRENORPHINE 20 UG/H
1 PATCH TRANSDERMAL
Qty: 4 PATCH | Refills: 2 | Status: SHIPPED | OUTPATIENT
Start: 2022-12-23 | End: 2023-01-22

## 2022-12-27 DIAGNOSIS — F90.0 ATTENTION DEFICIT HYPERACTIVITY DISORDER (ADHD), PREDOMINANTLY INATTENTIVE TYPE: ICD-10-CM

## 2022-12-27 RX ORDER — DEXTROAMPHETAMINE SACCHARATE, AMPHETAMINE ASPARTATE MONOHYDRATE, DEXTROAMPHETAMINE SULFATE AND AMPHETAMINE SULFATE 7.5; 7.5; 7.5; 7.5 MG/1; MG/1; MG/1; MG/1
30 CAPSULE, EXTENDED RELEASE ORAL EVERY MORNING
Qty: 30 CAPSULE | Refills: 0 | Status: SHIPPED | OUTPATIENT
Start: 2022-12-27 | End: 2023-01-26

## 2022-12-27 NOTE — TELEPHONE ENCOUNTER
Patient requesting medication refill. Please approve or deny this request.    Rx requested:  Requested Prescriptions     Pending Prescriptions Disp Refills    amphetamine-dextroamphetamine (ADDERALL XR) 30 MG extended release capsule 30 capsule 0     Sig: Take 1 capsule by mouth every morning for 30 days.          Last Office Visit:   12/13/2022      Next Visit Date:  Future Appointments   Date Time Provider Shelley Gorman   1/4/2023  1:00 PM MD SARAH Anthony [facility] EMERGENCY [facility] AT [place]   3/14/2023 11:15 AM Charley Parrish, [address]

## 2022-12-28 RX ORDER — DEXTROAMPHETAMINE SACCHARATE, AMPHETAMINE ASPARTATE MONOHYDRATE, DEXTROAMPHETAMINE SULFATE AND AMPHETAMINE SULFATE 7.5; 7.5; 7.5; 7.5 MG/1; MG/1; MG/1; MG/1
30 CAPSULE, EXTENDED RELEASE ORAL EVERY MORNING
Qty: 30 CAPSULE | Refills: 0 | OUTPATIENT
Start: 2022-12-28 | End: 2023-01-27

## 2023-01-03 ENCOUNTER — TELEPHONE (OUTPATIENT)
Dept: FAMILY MEDICINE CLINIC | Age: 68
End: 2023-01-03

## 2023-01-04 ENCOUNTER — NURSE TRIAGE (OUTPATIENT)
Dept: OTHER | Facility: CLINIC | Age: 68
End: 2023-01-04

## 2023-01-04 NOTE — TELEPHONE ENCOUNTER
Location of patient: [facility] Reyna Gilliam call from [place] at [employer] with [employer]. Subjective: Caller states \"I am taking a blood thinner\"     Current Symptoms: Having unusual bruising randomly. SOB on and off, not at time of call. On Plavix and a baby aspirin. Chronic lightheadedness and dizziness, not worse than usual.  Bruising on abdomen. Did bend over tub to wash hair and then noticed the bruising on her abdomen. Also bruising seems to appear more on the R side of her body. Onset: 1 month ago; intermittent, unchanged    Associated Symptoms: NA    Pain Severity: 0/10; N/A; none  Chronic back pain    Temperature: denies fever     LMP: NA Pregnant: NA    Recommended disposition: Go to ED/UCC Now (Or to Office with PCP Approval)    Care advice provided, patient verbalizes understanding; denies any other questions or concerns; instructed to call back for any new or worsening symptoms. Writer provided warm transfer to [place] at [facility] for second level triage. Attention Provider: Thank you for allowing me to participate in the care of your patient. The patient was connected to triage in response to information provided to the [facility]/PSC. Please do not respond through this encounter as the response is not directed to a shared pool.     Reason for Disposition   Bruise on head, face, chest, or abdomen and taking Coumadin (warfarin) or other strong blood thinner, or known bleeding disorder (e.g., thrombocytopenia)    Protocols used: Bruises-ADULT-[place]

## 2023-01-05 ENCOUNTER — OFFICE VISIT (OUTPATIENT)
Dept: FAMILY MEDICINE CLINIC | Age: 68
End: 2023-01-05

## 2023-01-05 VITALS
DIASTOLIC BLOOD PRESSURE: 62 MMHG | TEMPERATURE: 97.5 F | BODY MASS INDEX: 31.28 KG/M2 | HEIGHT: 62 IN | WEIGHT: 170 LBS | HEART RATE: 89 BPM | SYSTOLIC BLOOD PRESSURE: 122 MMHG | OXYGEN SATURATION: 96 %

## 2023-01-05 DIAGNOSIS — R30.0 DYSURIA: ICD-10-CM

## 2023-01-05 DIAGNOSIS — R10.84 GENERALIZED ABDOMINAL PAIN: ICD-10-CM

## 2023-01-05 DIAGNOSIS — I63.81 MULTIPLE LACUNAR INFARCTS (HCC): ICD-10-CM

## 2023-01-05 DIAGNOSIS — R06.02 SHORTNESS OF BREATH: ICD-10-CM

## 2023-01-05 DIAGNOSIS — G45.9 TIA (TRANSIENT ISCHEMIC ATTACK): Primary | ICD-10-CM

## 2023-01-05 LAB
BILIRUBIN URINE: NEGATIVE
BLOOD, URINE: NEGATIVE
CLARITY: CLEAR
COLOR: YELLOW
GLUCOSE URINE: NEGATIVE MG/DL
KETONES, URINE: NEGATIVE MG/DL
LEUKOCYTE ESTERASE, URINE: NEGATIVE
NITRITE, URINE: NEGATIVE
PH UA: 6.5 (ref 5–9)
PROTEIN UA: NEGATIVE MG/DL
SPECIFIC GRAVITY UA: 1 (ref 1–1.03)
URINE REFLEX TO CULTURE: NORMAL
UROBILINOGEN, URINE: 0.2 E.U./DL

## 2023-01-05 RX ORDER — CLOPIDOGREL BISULFATE 75 MG/1
37.5 TABLET ORAL DAILY
Qty: 30 TABLET | Refills: 0 | Status: SHIPPED | OUTPATIENT
Start: 2023-01-05

## 2023-01-05 ASSESSMENT — PATIENT HEALTH QUESTIONNAIRE - PHQ9
6. FEELING BAD ABOUT YOURSELF - OR THAT YOU ARE A FAILURE OR HAVE LET YOURSELF OR YOUR FAMILY DOWN: 0
4. FEELING TIRED OR HAVING LITTLE ENERGY: 0
5. POOR APPETITE OR OVEREATING: 0
7. TROUBLE CONCENTRATING ON THINGS, SUCH AS READING THE NEWSPAPER OR WATCHING TELEVISION: 0
1. LITTLE INTEREST OR PLEASURE IN DOING THINGS: 1
10. IF YOU CHECKED OFF ANY PROBLEMS, HOW DIFFICULT HAVE THESE PROBLEMS MADE IT FOR YOU TO DO YOUR WORK, TAKE CARE OF THINGS AT HOME, OR GET ALONG WITH OTHER PEOPLE: 0
3. TROUBLE FALLING OR STAYING ASLEEP: 0
SUM OF ALL RESPONSES TO PHQ QUESTIONS 1-9: 2
2. FEELING DOWN, DEPRESSED OR HOPELESS: 1
8. MOVING OR SPEAKING SO SLOWLY THAT OTHER PEOPLE COULD HAVE NOTICED. OR THE OPPOSITE, BEING SO FIGETY OR RESTLESS THAT YOU HAVE BEEN MOVING AROUND A LOT MORE THAN USUAL: 0
SUM OF ALL RESPONSES TO PHQ9 QUESTIONS 1 & 2: 2
9. THOUGHTS THAT YOU WOULD BE BETTER OFF DEAD, OR OF HURTING YOURSELF: 0
SUM OF ALL RESPONSES TO PHQ QUESTIONS 1-9: 2

## 2023-01-05 NOTE — PROGRESS NOTES
Chief Complaint   Patient presents with    Bleeding/Bruising     Pt reports she was put on plavix for a month now & she also takes a baby aspirin easily bruising but noticed SOB & bruising easily on body. HPI: Jimmy Rule [address] y.o. female presenting for    Patient is complaining of shortness of breath and bruising easily  Was recently put on plavix after having a TIA ( was seen by neurology )   Patient was put on plavix with aspirin to due to increase risks for stroke. Judy has not followed up with neurology   Would like to see if the medication can be adjusted or changed. Current Outpatient Medications   Medication Sig Dispense Refill    clopidogrel (PLAVIX) 75 MG tablet Take 0.5 tablets by mouth daily 30 tablet 0    amphetamine-dextroamphetamine (ADDERALL XR) 30 MG extended release capsule Take 1 capsule by mouth every morning for 30 days. 30 capsule 0    buprenorphine 20 MCG/HR PTWK Place 1 patch onto the skin every 7 days for 30 days. 4 patch 2    ALPRAZolam (XANAX) 1 MG tablet Take 1 tablet by mouth in the morning and 1 tablet in the evening. Do all this for 30 days. 60 tablet 2    vitamin D (ERGOCALCIFEROL) 1.25 MG (07065 UT) CAPS capsule TAKE 1 CAPSULE BY MOUTH once per week AS DIRECTED 4 capsule 11    nicotine (NICODERM CQ) 21 MG/24HR Place 1 patch onto the skin daily 30 patch 5    predniSONE (DELTASONE) 5 MG tablet Take 1 tablet by mouth daily 30 tablet 11    ipratropium-albuterol (DUONEB) 0.5-2.5 (3) MG/3ML SOLN nebulizer solution Inhale 3 mLs into the lungs every 4 hours as needed for Shortness of Breath 360 mL 0    rosuvastatin (CRESTOR) 10 MG tablet Take 1 tablet by mouth daily 30 tablet 5    cyclobenzaprine (FLEXERIL) 10 MG tablet TAKE 1 TABLET BY MOUTH TWICE DAILY AS NEEDED 60 tablet 11    Misc.  Devices (ROLLER WALKER) MISC 1 each by Does not apply route daily 1 each 0    furosemide (LASIX) 20 MG tablet Take 1 tablet by mouth daily PRN 30 tablet 0    prochlorperazine (COMPAZINE) 10 MG tablet Take 1 tablet by mouth every 8 hours as needed (NAUSEA) 120 tablet 3    ARIPiprazole (ABILIFY) 10 MG tablet TAKE 1 TABLET DAILY IN THE MORNING      Handicap Placard MISC by Does not apply route Duration 5 years 1 each 0    Misc. Devices (PEDAL EXERCISER) MISC 1 each by Does not apply route daily 1 each 0    albuterol sulfate  (90 Base) MCG/ACT inhaler Inhale 2 puffs into the lungs every 6 hours as needed for Wheezing 1 Inhaler 3    polyethylene glycol (GLYCOLAX) 17 GM/SCOOP powder Take 17 g by mouth as needed      venlafaxine (EFFEXOR XR) 75 MG extended release capsule TAKE 1 CAPSULE BY MOUTH TWICE DAILY  1    aspirin 81 MG chewable tablet       mirtazapine (REMERON) 15 MG tablet Take 15 mg by mouth nightly      gabapentin (NEURONTIN) 600 MG tablet TAKE 1 TABLET BY MOUTH THREE TIMES DAILY 90 tablet 2     No current facility-administered medications for this visit. ROS  CONSTITUTIONAL: The patient denies fevers, chills, sweats and body ache. HEENT: Denies headache, blurry vision, eye pain, tinnitus, vertigo,  sore throat, neck or thyroid masses. RESPIRATORY: Denies cough, sputum, hemoptysis. CARDIAC: Denies chest pain, pressure, palpitations, Denies lower extremity edema. GASTROINTESTINAL: Denies abdominal pain, constipation, diarrhea, bleeding in the stools,   GENITOURINARY: admits to dysuria, denies hematuria, nocturia or frequency, urinary incontinence. NEUROLOGIC: Denies headaches, dizziness, syncope, weakness  MUSCULOSKELETAL: denies changes in range of motion, joint pain, stiffness. ENDOCRINOLOGY: Denies heat or cold intolerance. HEMATOLOGY: Denies easy bleeding or blood transfusion,anemia  DERMATOLOGY: Denies changes in moles or pigmentation changes. PSYCHIATRY: Denies depression, agitation or anxiety.     Past Medical History:   Diagnosis Date    ADHD (attention deficit hyperactivity disorder)     Anxiety     Depression     Fibromyalgia     GERD (gastroesophageal reflux disease)     Hypertension     Irritable bowel syndrome         Past Surgical History:   Procedure Laterality Date     SECTION      COLONOSCOPY      [facility]  Todd Castillo MD    DILATION AND CURETTAGE OF UTERUS N/A 2020    DIAGNOSTIC LAPAROSCOPY, LYSIS OF ADHESIONS, HYSTEROSCOPY WITH Brenda Horse performed by Ivette Barrett MD at [facility] OR        Family History   Problem Relation Age of Onset    Breast Cancer Maternal Aunt         Social History     Socioeconomic History    Marital status:       Spouse name: Not on file    Number of children: Not on file    Years of education: Not on file    Highest education level: Not on file   Occupational History    Not on file   Tobacco Use    Smoking status: Former     Packs/day: 2.00     Years: 50.00     Pack years: 100.00     Types: Cigarettes     Quit date: 2022     Years since quittin.1    Smokeless tobacco: Never   Vaping Use    Vaping Use: Never used   Substance and Sexual Activity    Alcohol use: Never    Drug use: Never    Sexual activity: Not on file   Other Topics Concern    Not on file   Social History Narrative    Not on file     Social Determinants of Health     Financial Resource Strain: Low Risk     Difficulty of Paying Living Expenses: Not hard at all   Food Insecurity: No Food Insecurity    Worried About Running Out of Food in the Last Year: Never true    Jerri of Food in the Last Year: Never true   Transportation Needs: Not on file   Physical Activity: Insufficiently Active    Days of Exercise per Week: 2 days    Minutes of Exercise per Session: 10 min   Stress: Not on file   Social Connections: Not on file   Intimate Partner Violence: Not on file   Housing Stability: Not on file        /62   Pulse 89   Temp 97.5 °F (36.4 °C) (Temporal)   Ht 5' 2\" (1.575 m)   Wt 170 lb (77.1 kg)   SpO2 96%   BMI 31.09 kg/m²        Physical Exam:    General appearance - alert, well appearing, and in no distress  Mental Status - alert, oriented to person, place, and time  Eyes - pupils equal and reactive, extraocular eye movements intact   Ears - bilateral TM's and external ear canals normal   Nose - normal and patent, no erythema, discharge or polyps   Sinuses - Normal paranasal sinuses without tenderness   Throat - mucous membranes moist, pharynx normal without lesions   Neck - supple, no significant adenopathy   Thyroid - thyroid is normal in size without nodules or tenderness    Chest - clear to auscultation, no wheezes, rales or rhonchi, symmetric air entry   Heart - normal rate, regular rhythm, normal S1, S2, no murmurs, rubs, clicks or gallops  Abdomen -  tenderness to palpation in the lower abdomen. Bowel sounds are present. Back exam - full range of motion, no tenderness, palpable spasm or pain on motion   Neurological - alert, oriented, normal speech, no focal findings or movement disorder noted   Musculoskeletal - no joint tenderness, deformity or swelling   Extremities - peripheral pulses normal, no pedal edema, no clubbing or cyanosis   Skin - normal coloration and turgor, no rashes, no suspicious skin lesions noted        Labs   TSH   Date Value Ref Range Status   06/09/2020 0.475 0.440 - 3.860 uIU/mL Final     No results found for: TSH        A/P: Fabio Armenta 75 y.o. female presenting for     1. TIA (transient ischemic attack)  Given patinet's high risk for cardiovacular events. Will cut the plavix in half. Will put in a referral for neurology given MRI results showing old lacunar infarcts. - clopidogrel (PLAVIX) 75 MG tablet; Take 0.5 tablets by mouth daily  Dispense: 30 tablet; Refill: 0  - Mark Mason MD, Neurology, [facility]  - Comprehensive Metabolic Panel; Future    2. Generalized abdominal pain    - XR ABDOMEN (KUB) (SINGLE AP VIEW); Future  - Comprehensive Metabolic Panel; Future    3. Shortness of breath    - CBC with Auto Differential; Future  - XR CHEST STANDARD (2 VW);  Future  - Comprehensive Metabolic Panel; Future    4. Dysuria  Rule out UT   - Urinalysis with Reflex to Culture; Future  - Comprehensive Metabolic Panel; Future    5. Multiple lacunar infarcts (Mark Schaeffer MD, Neurology, [place]        Please note, this report has been partially produced using speech recognition software  and may cause  and /or contain errors related to that system including grammar, punctuation and spelling as well as words and phrases that may seem inappropriate. If there are questions or concerns please feel free to contact me to clarify.

## 2023-01-25 DIAGNOSIS — F90.0 ATTENTION DEFICIT HYPERACTIVITY DISORDER (ADHD), PREDOMINANTLY INATTENTIVE TYPE: ICD-10-CM

## 2023-01-25 NOTE — TELEPHONE ENCOUNTER
Arleth Crystal is requesting medication refill. Patient confirmed the pharmacy    Rx requested:  Requested Prescriptions     Pending Prescriptions Disp Refills    amphetamine-dextroamphetamine (ADDERALL XR) 30 MG extended release capsule 30 capsule 0     Sig: Take 1 capsule by mouth every morning for 30 days. Last Office Visit:   12/13/2022    Last Tox screen:    ?     Last Medication contract:    ?    Next Visit Date:  Future Appointments   Date Time Provider Shelley Gorman   1/31/2023  1:00 PM MD DAVID GaoUC [facility] EMERGENCY [facility] [place]   2/2/2023 11:00 AM AMANDA Sanchez CNPOX [facility] EMERGENCY [facility] [place]   3/14/2023 11:15 AM AMANDA Sanchez CNP [facility]   4/25/2023  2:45 PM Mark Palma MD [address] Neurology -

## 2023-01-26 DIAGNOSIS — F90.0 ATTENTION DEFICIT HYPERACTIVITY DISORDER (ADHD), PREDOMINANTLY INATTENTIVE TYPE: ICD-10-CM

## 2023-01-30 RX ORDER — DEXTROAMPHETAMINE SACCHARATE, AMPHETAMINE ASPARTATE MONOHYDRATE, DEXTROAMPHETAMINE SULFATE AND AMPHETAMINE SULFATE 7.5; 7.5; 7.5; 7.5 MG/1; MG/1; MG/1; MG/1
CAPSULE, EXTENDED RELEASE ORAL
Qty: 30 CAPSULE | Refills: 0 | OUTPATIENT
Start: 2023-01-30 | End: 2023-03-01

## 2023-01-30 RX ORDER — DEXTROAMPHETAMINE SACCHARATE, AMPHETAMINE ASPARTATE MONOHYDRATE, DEXTROAMPHETAMINE SULFATE AND AMPHETAMINE SULFATE 7.5; 7.5; 7.5; 7.5 MG/1; MG/1; MG/1; MG/1
30 CAPSULE, EXTENDED RELEASE ORAL EVERY MORNING
Qty: 30 CAPSULE | Refills: 0 | Status: SHIPPED | OUTPATIENT
Start: 2023-01-30 | End: 2023-02-02

## 2023-01-31 ENCOUNTER — TELEPHONE (OUTPATIENT)
Dept: PAIN MANAGEMENT | Age: 68
End: 2023-01-31

## 2023-01-31 ENCOUNTER — OFFICE VISIT (OUTPATIENT)
Dept: PAIN MANAGEMENT | Age: 68
End: 2023-01-31
Payer: MEDICARE

## 2023-01-31 VITALS
DIASTOLIC BLOOD PRESSURE: 74 MMHG | WEIGHT: 170 LBS | TEMPERATURE: 97.6 F | HEIGHT: 62 IN | SYSTOLIC BLOOD PRESSURE: 126 MMHG | BODY MASS INDEX: 31.28 KG/M2

## 2023-01-31 DIAGNOSIS — M47.816 FACET SYNDROME, LUMBAR: ICD-10-CM

## 2023-01-31 DIAGNOSIS — R52 PAIN: Primary | ICD-10-CM

## 2023-01-31 DIAGNOSIS — M47.817 LUMBOSACRAL SPONDYLOSIS WITHOUT MYELOPATHY: ICD-10-CM

## 2023-01-31 DIAGNOSIS — M51.36 DDD (DEGENERATIVE DISC DISEASE), LUMBAR: ICD-10-CM

## 2023-01-31 PROCEDURE — 1036F TOBACCO NON-USER: CPT | Performed by: PAIN MEDICINE

## 2023-01-31 PROCEDURE — 3017F COLORECTAL CA SCREEN DOC REV: CPT | Performed by: PAIN MEDICINE

## 2023-01-31 PROCEDURE — 1090F PRES/ABSN URINE INCON ASSESS: CPT | Performed by: PAIN MEDICINE

## 2023-01-31 PROCEDURE — G8399 PT W/DXA RESULTS DOCUMENT: HCPCS | Performed by: PAIN MEDICINE

## 2023-01-31 PROCEDURE — G8427 DOCREV CUR MEDS BY ELIG CLIN: HCPCS | Performed by: PAIN MEDICINE

## 2023-01-31 PROCEDURE — 1123F ACP DISCUSS/DSCN MKR DOCD: CPT | Performed by: PAIN MEDICINE

## 2023-01-31 PROCEDURE — G8417 CALC BMI ABV UP PARAM F/U: HCPCS | Performed by: PAIN MEDICINE

## 2023-01-31 PROCEDURE — 99214 OFFICE O/P EST MOD 30 MIN: CPT | Performed by: PAIN MEDICINE

## 2023-01-31 PROCEDURE — G8484 FLU IMMUNIZE NO ADMIN: HCPCS | Performed by: PAIN MEDICINE

## 2023-01-31 RX ORDER — OXYCODONE HYDROCHLORIDE AND ACETAMINOPHEN 5; 325 MG/1; MG/1
1 TABLET ORAL EVERY 6 HOURS PRN
Qty: 28 TABLET | Refills: 0 | Status: SHIPPED | OUTPATIENT
Start: 2023-01-31 | End: 2023-02-07

## 2023-01-31 ASSESSMENT — ENCOUNTER SYMPTOMS
NAUSEA: 0
SHORTNESS OF BREATH: 1
BACK PAIN: 1
DIARRHEA: 0
CONSTIPATION: 0

## 2023-01-31 NOTE — TELEPHONE ENCOUNTER
Received call from pharmacy regarding percocet Rx. The patient received a 28 day supply of buprenorphine on 1/17. They were concerned about the patient getting both of these pain medications. Please advise.
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
## 2023-02-02 ENCOUNTER — OFFICE VISIT (OUTPATIENT)
Dept: FAMILY MEDICINE CLINIC | Age: 68
End: 2023-02-02
Payer: MEDICARE

## 2023-02-02 VITALS
HEIGHT: 62 IN | BODY MASS INDEX: 30.36 KG/M2 | WEIGHT: 165 LBS | SYSTOLIC BLOOD PRESSURE: 138 MMHG | DIASTOLIC BLOOD PRESSURE: 70 MMHG | HEART RATE: 78 BPM

## 2023-02-02 DIAGNOSIS — G45.9 TIA (TRANSIENT ISCHEMIC ATTACK): ICD-10-CM

## 2023-02-02 DIAGNOSIS — R10.84 GENERALIZED ABDOMINAL PAIN: ICD-10-CM

## 2023-02-02 DIAGNOSIS — J44.9 CHRONIC OBSTRUCTIVE PULMONARY DISEASE, UNSPECIFIED COPD TYPE (HCC): ICD-10-CM

## 2023-02-02 DIAGNOSIS — F33.1 MODERATE EPISODE OF RECURRENT MAJOR DEPRESSIVE DISORDER (HCC): ICD-10-CM

## 2023-02-02 DIAGNOSIS — M46.1 BILATERAL SACROILIITIS (HCC): Primary | ICD-10-CM

## 2023-02-02 DIAGNOSIS — R30.0 DYSURIA: ICD-10-CM

## 2023-02-02 DIAGNOSIS — R06.02 SHORTNESS OF BREATH: ICD-10-CM

## 2023-02-02 LAB
ALBUMIN SERPL-MCNC: 4.2 G/DL (ref 3.5–4.6)
ALP BLD-CCNC: 100 U/L (ref 40–130)
ALT SERPL-CCNC: 16 U/L (ref 0–33)
ANION GAP SERPL CALCULATED.3IONS-SCNC: 14 MEQ/L (ref 9–15)
AST SERPL-CCNC: 21 U/L (ref 0–35)
BASOPHILS ABSOLUTE: 0.1 K/UL (ref 0–0.2)
BASOPHILS RELATIVE PERCENT: 0.9 %
BILIRUB SERPL-MCNC: 0.3 MG/DL (ref 0.2–0.7)
BUN BLDV-MCNC: 11 MG/DL (ref 8–23)
CALCIUM SERPL-MCNC: 9.3 MG/DL (ref 8.5–9.9)
CHLORIDE BLD-SCNC: 102 MEQ/L (ref 95–107)
CO2: 21 MEQ/L (ref 20–31)
CREAT SERPL-MCNC: 0.93 MG/DL (ref 0.5–0.9)
EOSINOPHILS ABSOLUTE: 0.1 K/UL (ref 0–0.7)
EOSINOPHILS RELATIVE PERCENT: 0.8 %
GFR SERPL CREATININE-BSD FRML MDRD: >60 ML/MIN/{1.73_M2}
GLOBULIN: 2.6 G/DL (ref 2.3–3.5)
GLUCOSE BLD-MCNC: 114 MG/DL (ref 70–99)
HCT VFR BLD CALC: 44.9 % (ref 37–47)
HEMOGLOBIN: 14.6 G/DL (ref 12–16)
LYMPHOCYTES ABSOLUTE: 2.1 K/UL (ref 1–4.8)
LYMPHOCYTES RELATIVE PERCENT: 25.7 %
MCH RBC QN AUTO: 30.1 PG (ref 27–31.3)
MCHC RBC AUTO-ENTMCNC: 32.5 % (ref 33–37)
MCV RBC AUTO: 92.5 FL (ref 79.4–94.8)
MONOCYTES ABSOLUTE: 0.5 K/UL (ref 0.2–0.8)
MONOCYTES RELATIVE PERCENT: 5.6 %
NEUTROPHILS ABSOLUTE: 5.4 K/UL (ref 1.4–6.5)
NEUTROPHILS RELATIVE PERCENT: 67 %
PDW BLD-RTO: 14.7 % (ref 11.5–14.5)
PLATELET # BLD: 335 K/UL (ref 130–400)
POTASSIUM SERPL-SCNC: 4.4 MEQ/L (ref 3.4–4.9)
RBC # BLD: 4.86 M/UL (ref 4.2–5.4)
SODIUM BLD-SCNC: 137 MEQ/L (ref 135–144)
TOTAL PROTEIN: 6.8 G/DL (ref 6.3–8)
WBC # BLD: 8 K/UL (ref 4.8–10.8)

## 2023-02-02 PROCEDURE — 3017F COLORECTAL CA SCREEN DOC REV: CPT | Performed by: NURSE PRACTITIONER

## 2023-02-02 PROCEDURE — 99214 OFFICE O/P EST MOD 30 MIN: CPT | Performed by: NURSE PRACTITIONER

## 2023-02-02 PROCEDURE — 1036F TOBACCO NON-USER: CPT | Performed by: NURSE PRACTITIONER

## 2023-02-02 PROCEDURE — G8417 CALC BMI ABV UP PARAM F/U: HCPCS | Performed by: NURSE PRACTITIONER

## 2023-02-02 PROCEDURE — 3023F SPIROM DOC REV: CPT | Performed by: NURSE PRACTITIONER

## 2023-02-02 PROCEDURE — 1090F PRES/ABSN URINE INCON ASSESS: CPT | Performed by: NURSE PRACTITIONER

## 2023-02-02 PROCEDURE — G8399 PT W/DXA RESULTS DOCUMENT: HCPCS | Performed by: NURSE PRACTITIONER

## 2023-02-02 PROCEDURE — 1123F ACP DISCUSS/DSCN MKR DOCD: CPT | Performed by: NURSE PRACTITIONER

## 2023-02-02 PROCEDURE — G8484 FLU IMMUNIZE NO ADMIN: HCPCS | Performed by: NURSE PRACTITIONER

## 2023-02-02 PROCEDURE — G8427 DOCREV CUR MEDS BY ELIG CLIN: HCPCS | Performed by: NURSE PRACTITIONER

## 2023-02-02 RX ORDER — DEXTROAMPHETAMINE SACCHARATE, AMPHETAMINE ASPARTATE, DEXTROAMPHETAMINE SULFATE AND AMPHETAMINE SULFATE 5; 5; 5; 5 MG/1; MG/1; MG/1; MG/1
20 TABLET ORAL DAILY
Qty: 30 TABLET | Refills: 0 | Status: CANCELLED | OUTPATIENT
Start: 2023-02-02 | End: 2023-03-04

## 2023-02-02 RX ORDER — ALPRAZOLAM 1 MG/1
TABLET ORAL
COMMUNITY
Start: 2023-01-14

## 2023-02-02 SDOH — ECONOMIC STABILITY: HOUSING INSECURITY
IN THE LAST 12 MONTHS, WAS THERE A TIME WHEN YOU DID NOT HAVE A STEADY PLACE TO SLEEP OR SLEPT IN A SHELTER (INCLUDING NOW)?: NO

## 2023-02-02 SDOH — ECONOMIC STABILITY: INCOME INSECURITY: HOW HARD IS IT FOR YOU TO PAY FOR THE VERY BASICS LIKE FOOD, HOUSING, MEDICAL CARE, AND HEATING?: NOT HARD AT ALL

## 2023-02-02 SDOH — ECONOMIC STABILITY: FOOD INSECURITY: WITHIN THE PAST 12 MONTHS, THE FOOD YOU BOUGHT JUST DIDN'T LAST AND YOU DIDN'T HAVE MONEY TO GET MORE.: NEVER TRUE

## 2023-02-02 SDOH — ECONOMIC STABILITY: FOOD INSECURITY: WITHIN THE PAST 12 MONTHS, YOU WORRIED THAT YOUR FOOD WOULD RUN OUT BEFORE YOU GOT MONEY TO BUY MORE.: NEVER TRUE

## 2023-02-06 ENCOUNTER — TELEPHONE (OUTPATIENT)
Dept: FAMILY MEDICINE CLINIC | Age: 68
End: 2023-02-06

## 2023-02-06 DIAGNOSIS — F90.0 ATTENTION DEFICIT HYPERACTIVITY DISORDER (ADHD), PREDOMINANTLY INATTENTIVE TYPE: Primary | ICD-10-CM

## 2023-02-06 RX ORDER — DULOXETIN HYDROCHLORIDE 30 MG/1
30 CAPSULE, DELAYED RELEASE ORAL DAILY
Qty: 30 CAPSULE | Refills: 5 | Status: SHIPPED | OUTPATIENT
Start: 2023-02-06

## 2023-02-06 RX ORDER — PREGABALIN 75 MG/1
75 CAPSULE ORAL 2 TIMES DAILY
Qty: 60 CAPSULE | Refills: 2 | Status: SHIPPED | OUTPATIENT
Start: 2023-02-06 | End: 2023-03-08

## 2023-02-06 ASSESSMENT — ENCOUNTER SYMPTOMS
ALLERGIC/IMMUNOLOGIC NEGATIVE: 1
SORE THROAT: 0
CONSTIPATION: 0
EYES NEGATIVE: 1
BLOOD IN STOOL: 0
COUGH: 1
RECTAL PAIN: 0
GASTROINTESTINAL NEGATIVE: 1
SHORTNESS OF BREATH: 0
TROUBLE SWALLOWING: 0
VOICE CHANGE: 0
COLOR CHANGE: 0
ANAL BLEEDING: 0
HEARTBURN: 0
DIARRHEA: 0
ABDOMINAL PAIN: 0
RHINORRHEA: 0

## 2023-02-06 ASSESSMENT — COPD QUESTIONNAIRES: COPD: 1

## 2023-02-06 NOTE — TELEPHONE ENCOUNTER
Provider wanted the her to call back with the BP questions. She states that the blood thinner was also giving her shortness of breath, and she did not want to take the Plavix. She also stated she wanted a referral to one of our gastro providers.

## 2023-02-07 RX ORDER — DEXTROAMPHETAMINE SACCHARATE, AMPHETAMINE ASPARTATE MONOHYDRATE, DEXTROAMPHETAMINE SULFATE AND AMPHETAMINE SULFATE 5; 5; 5; 5 MG/1; MG/1; MG/1; MG/1
20 CAPSULE, EXTENDED RELEASE ORAL EVERY MORNING
Qty: 30 CAPSULE | Refills: 0 | Status: SHIPPED | OUTPATIENT
Start: 2023-02-07 | End: 2023-03-09

## 2023-02-07 NOTE — PROGRESS NOTES
[address], 75 y.o. female presents today with:  Chief Complaint   Patient presents with    1 Month Follow-Up     Pt seen Dr Ondina Castanon for a TIA on 1/5/23        Chronic pain, Fibromyalgia, back problems-  Butrans patches-  patient tolerating-  States first time she has eran been pain free-  Is willing to try a decreased dosage. Will also chronic pain-we will we will her on Butrans patches and stop tramadol but Butrans patches have become extremely expensive we will resume the tramadol    ADHD-  30mg xr daily with effectiveness  Anxiety-  Tried to 1 MG TID PRN-  She does not like the ER,  Did not feel it helped her anxiety. -  She is willing transition her to back to her XANAX 1mg that is not extended release but BID PRN not TID PRN. COPD  She complains of cough. There is no shortness of breath. This is a chronic problem. The problem occurs daily. The cough is non-productive. Pertinent negatives include no appetite change, chest pain, dyspnea on exertion, ear congestion, ear pain, fever, headaches, heartburn, malaise/fatigue, myalgias, nasal congestion, orthopnea, PND, postnasal drip, rhinorrhea, sneezing, sore throat, sweats, trouble swallowing or weight loss. Her symptoms are aggravated by change in weather. Risk factors for lung disease include smoking/tobacco exposure. Her past medical history is significant for COPD. ADHD  Associated symptoms include coughing. Pertinent negatives include no abdominal pain, chest pain, fatigue, fever, headaches, myalgias, rash, sore throat or weakness. Anxiety  Patient reports no chest pain, dizziness, nervous/anxious behavior, palpitations or shortness of breath. ADD/ADHD:  Current treatment: Adderall XR- 30, which has been effective. Residual symptoms: none. Medication side effects: None. Patient denies anorexia, nausea, vomiting, abdominal pain, involuntary weight loss, palpitations, insomnia, irritability, headache and tremor.     Controlled Substance Monitoring:    Acute and Chronic Pain Monitoring:   RX Monitoring 2/2/2023   Acute Pain Prescriptions -   Periodic Controlled Substance Monitoring Possible medication side effects, risk of tolerance/dependence & alternative treatments discussed. ;No signs of potential drug abuse or diversion identified. ;Assessed functional status. Chronic Pain > 50 MEDD -   Chronic Pain > 80 MEDD -           Review of Systems   Constitutional: Negative. Negative for activity change, appetite change, fatigue, fever, malaise/fatigue, unexpected weight change and weight loss. HENT: Negative. Negative for dental problem, ear pain, nosebleeds, postnasal drip, rhinorrhea, sneezing, sore throat, trouble swallowing and voice change. Eyes: Negative. Negative for visual disturbance. Respiratory:  Positive for cough. Negative for shortness of breath. Cardiovascular: Negative. Negative for chest pain, dyspnea on exertion, palpitations, leg swelling and PND. Gastrointestinal: Negative. Negative for abdominal pain, anal bleeding, blood in stool, constipation, diarrhea, heartburn and rectal pain. Endocrine: Negative. Negative for cold intolerance, heat intolerance, polydipsia, polyphagia and polyuria. Genitourinary: Negative. Musculoskeletal: Negative. Negative for myalgias. Skin: Negative. Negative for color change and rash. Allergic/Immunologic: Negative. Neurological: Negative. Negative for dizziness, syncope, weakness and headaches. Hematological: Negative. Negative for adenopathy. Does not bruise/bleed easily. Psychiatric/Behavioral: Negative. Negative for dysphoric mood and sleep disturbance. The patient is not nervous/anxious.       Past Medical History:   Diagnosis Date    ADHD (attention deficit hyperactivity disorder)     Anxiety     Depression     Fibromyalgia     GERD (gastroesophageal reflux disease)     Hypertension     Irritable bowel syndrome      Past Surgical History:   Procedure Laterality Date     SECTION      COLONOSCOPY      [facility]  Yasmine Ryder MD    DILATION AND CURETTAGE OF UTERUS N/A 2020    DIAGNOSTIC LAPAROSCOPY, LYSIS OF ADHESIONS, HYSTEROSCOPY WITH Zeenat Drain performed by Ryan Bishop MD at [address] [place] [address] History     Socioeconomic History    Marital status:      Spouse name: Not on file    Number of children: Not on file    Years of education: Not on file    Highest education level: Not on file   Occupational History    Not on file   Tobacco Use    Smoking status: Former     Packs/day: 2.00     Years: 50.00     Pack years: 100.00     Types: Cigarettes     Quit date: 2022     Years since quittin.2    Smokeless tobacco: Never   Vaping Use    Vaping Use: Never used   Substance and Sexual Activity    Alcohol use: Never    Drug use: Never    Sexual activity: Not on file   Other Topics Concern    Not on file   Social History Narrative    Not on file     Social Determinants of Health     Financial Resource Strain: Low Risk     Difficulty of Paying Living Expenses: Not hard at all   Food Insecurity: No Food Insecurity    Worried About Running Out of Food in the Last Year: Never true    [address] in the Last Year: Never true   Transportation Needs: Unknown    Lack of Transportation (Medical): Not on file    Lack of Transportation (Non-Medical):  No   Physical Activity: Insufficiently Active    Days of Exercise per Week: 2 days    Minutes of Exercise per Session: 10 min   Stress: Not on file   Social Connections: Not on file   Intimate Partner Violence: Not on file   Housing Stability: Unknown    Unable to Pay for Housing in the Last Year: Not on file    Number of Places Lived in the Last Year: Not on file    Unstable Housing in the Last Year: No     Family History   Problem Relation Age of Onset    Breast Cancer Maternal Aunt      No Known Allergies  Current Outpatient Medications   Medication Sig Dispense Refill    DULoxetine (CYMBALTA) 30 MG extended release capsule Take 1 capsule by mouth daily 30 capsule 5    pregabalin (LYRICA) 75 MG capsule Take 1 capsule by mouth 2 times daily for 30 days. Max Daily Amount: 150 mg 60 capsule 2    ALPRAZolam (XANAX) 1 MG tablet Take 1 tablet by mouth in the morning and 1 tablet in the evening. Do all this for 30 days. oxyCODONE-acetaminophen (PERCOCET) 5-325 MG per tablet Take 1 tablet by mouth every 6 hours as needed for Pain for up to 7 days. Intended supply: 7 days. Take lowest dose possible to manage pain Max Daily Amount: 4 tablets 28 tablet 0    clopidogrel (PLAVIX) 75 MG tablet Take 0.5 tablets by mouth daily 30 tablet 0    vitamin D (ERGOCALCIFEROL) 1.25 MG (21896 UT) CAPS capsule TAKE 1 CAPSULE BY MOUTH once per week AS DIRECTED 4 capsule 11    nicotine (NICODERM CQ) 21 MG/24HR Place 1 patch onto the skin daily 30 patch 5    predniSONE (DELTASONE) 5 MG tablet Take 1 tablet by mouth daily 30 tablet 11    ipratropium-albuterol (DUONEB) 0.5-2.5 (3) MG/3ML SOLN nebulizer solution Inhale 3 mLs into the lungs every 4 hours as needed for Shortness of Breath 360 mL 0    rosuvastatin (CRESTOR) 10 MG tablet Take 1 tablet by mouth daily 30 tablet 5    cyclobenzaprine (FLEXERIL) 10 MG tablet TAKE 1 TABLET BY MOUTH TWICE DAILY AS NEEDED 60 tablet 11    Misc. Devices (ROLLER WALKER) MISC 1 each by Does not apply route daily 1 each 0    furosemide (LASIX) 20 MG tablet Take 1 tablet by mouth daily PRN 30 tablet 0    ARIPiprazole (ABILIFY) 10 MG tablet TAKE 1 TABLET DAILY IN THE MORNING      Handicap Placard MISC by Does not apply route Duration 5 years 1 each 0    Misc.  Devices (PEDAL EXERCISER) MISC 1 each by Does not apply route daily 1 each 0    albuterol sulfate  (90 Base) MCG/ACT inhaler Inhale 2 puffs into the lungs every 6 hours as needed for Wheezing 1 Inhaler 3    polyethylene glycol (GLYCOLAX) 17 GM/SCOOP powder Take 17 g by mouth as needed      venlafaxine (EFFEXOR XR) 75 MG extended release capsule TAKE 1 CAPSULE BY MOUTH TWICE DAILY  1    aspirin 81 MG chewable tablet       mirtazapine (REMERON) 15 MG tablet Take 15 mg by mouth nightly       No current facility-administered medications for this visit. PMH, Surgical Hx, Family Hx, and Social Hx reviewed and updated. Health Maintenance reviewed. Objective    Vitals:    02/02/23 1056   BP: 138/70   Pulse: 78   Weight: 165 lb (74.8 kg)   Height: 5' 2\" (1.575 m)       Physical Exam  Constitutional:       General: She is not in acute distress. Appearance: She is well-developed. HENT:      Head: Normocephalic and atraumatic. Right Ear: External ear normal.      Left Ear: External ear normal.      Nose: Nose normal.   Eyes:      Conjunctiva/sclera: Conjunctivae normal.      Pupils: Pupils are equal, round, and reactive to light. Neck:      Vascular: No JVD. Cardiovascular:      Rate and Rhythm: Normal rate and regular rhythm. Heart sounds: Normal heart sounds. No murmur heard. Pulmonary:      Effort: Pulmonary effort is normal. No respiratory distress. Breath sounds: Normal breath sounds. No wheezing or rales. Abdominal:      General: Bowel sounds are normal. There is no distension. Palpations: Abdomen is soft. There is no mass. Tenderness: There is no abdominal tenderness. Musculoskeletal:      Cervical back: Neck supple. Skin:     General: Skin is warm and dry. Neurological:      Mental Status: She is alert and oriented to person, place, and time. Assessment & Plan   Raheel Betts was seen today for 1 month follow-up. Diagnoses and all orders for this visit:    Bilateral sacroiliitis ([address]  -     pregabalin (LYRICA) 75 MG capsule; Take 1 capsule by mouth 2 times daily for 30 days.  Max Daily Amount: 150 mg    Chronic obstructive pulmonary disease, unspecified COPD type (HCC)    Moderate episode of recurrent major depressive disorder ([address]    TIA (transient ischemic attack)    Other orders  - DULoxetine (CYMBALTA) 30 MG extended release capsule; Take 1 capsule by mouth daily    No orders of the defined types were placed in this encounter. Orders Placed This Encounter   Medications    DULoxetine (CYMBALTA) 30 MG extended release capsule     Sig: Take 1 capsule by mouth daily     Dispense:  30 capsule     Refill:  5    pregabalin (LYRICA) 75 MG capsule     Sig: Take 1 capsule by mouth 2 times daily for 30 days. Max Daily Amount: 150 mg     Dispense:  60 capsule     Refill:  2     Medications Discontinued During This Encounter   Medication Reason    prochlorperazine (COMPAZINE) 10 MG tablet LIST CLEANUP    gabapentin (NEURONTIN) 600 MG tablet LIST CLEANUP    amphetamine-dextroamphetamine (ADDERALL XR) 30 MG extended release capsule LIST CLEANUP     Return in about 3 months (around 5/2/2023). Reviewed with the patient: current clinical status, medications, activities and diet. Side effects, adverse effects of the medication prescribed today, as well as treatment plan/ rationale and result expectations have been discussed with the patient who expresses understanding and desires to proceed. Close follow up to evaluate treatment results and for coordination of care. I have reviewed the patient's medical history in detail and updated the computerized patient record.     María Valenzuela, APRN - CNP

## 2023-02-09 ENCOUNTER — TELEPHONE (OUTPATIENT)
Dept: PAIN MANAGEMENT | Age: 68
End: 2023-02-09

## 2023-02-09 RX ORDER — OXYCODONE HYDROCHLORIDE AND ACETAMINOPHEN 5; 325 MG/1; MG/1
1 TABLET ORAL DAILY
Qty: 30 TABLET | Refills: 0 | Status: CANCELLED | OUTPATIENT
Start: 2023-02-09 | End: 2023-03-11

## 2023-02-09 NOTE — TELEPHONE ENCOUNTER
CHECKED STATUS OF SENT MESSAGE TO [facility] ON 2/1/23 @ 437PM AND THE MESSAGE IS STILL MARKED AS NEW.    WAITING ON PROVIDER TO READ AND REPLY TO MESSAGE REGARDING THE MEDICARE GUIDELINES    PLEASE ADVISE PATIENT

## 2023-02-09 NOTE — TELEPHONE ENCOUNTER
Requested Prescriptions     Pending Prescriptions Disp Refills    oxyCODONE-acetaminophen (PERCOCET) 5-325 MG per tablet 30 tablet 0     Sig: Take 1 tablet by mouth daily for 30 days. Max Daily Amount: 1 tablet       Patient last seen on:  1-31-23  Date of last refill:  1-31-23   Future appts: None, waiting on insurance auth for injection      PT STATES DR. DICKEY WAS GOING TO PRESCRIBE PERCOCET 1/2 PILL IN MORNING AND 1/2 PILL IN AFTERNOON. WILL  DR. SO REFILL IN ABSENCE OF DR. DICKEY?

## 2023-02-10 NOTE — TELEPHONE ENCOUNTER
Spoke to pharmacy and patient. Pharmacy states that Dr Idris Gautam cancelled the percocet Rx because she received Butrans patches from her PCP on 1/17. PT states that she is now out of those patches and that she discussed switching to the percocet with Dr. Tayler Mackenzie once she completed them.

## 2023-02-10 NOTE — TELEPHONE ENCOUNTER
Pt was called to inquire and will call her pharmacy to check on 1-31-23 Rx. Pt seemed confused. Pt will call back if refill is needed.

## 2023-02-11 NOTE — TELEPHONE ENCOUNTER
There appears to be some confusion. Reviewed Dr Marian Hickey message from 1/31/23, Dr Shandra Vera did not want Percocet filled while patient is on Butrans patch. [place] Butrans patch 20 mcg/hr #4 given on 1/17/23 and 12/23/22. I'm afraid that for clarity, this will have to be addressed by Dr Shandra Vera upon her return.     -No new Rx of Percocet to be sent while the patient is on Butrans, supported by Dr Marian Hickey message on 1/31/23. Controlled Substance Monitoring:    Acute and Chronic Pain Monitoring:   RX Monitoring 2/11/2023   Acute Pain Prescriptions -   Periodic Controlled Substance Monitoring Obtaining appropriate analgesic effect of treatment.    Chronic Pain > 50 MEDD -   Chronic Pain > 80 MEDD -

## 2023-02-14 ENCOUNTER — TELEPHONE (OUTPATIENT)
Dept: PAIN MANAGEMENT | Age: 68
End: 2023-02-14

## 2023-02-14 NOTE — TELEPHONE ENCOUNTER
REFERRAL NUMBER: [id number]    BILAT L3,4,5 MBB    AUTH FROM 2/15/23-2/28/23    OK to schedule procedure approved as above. Please note sides/levels approved and date range. (If applicable, sides/levels approved may differ from those ordered)    TO BE SCHEDULED WITH DR. DICKEY

## 2023-02-15 ENCOUNTER — TELEPHONE (OUTPATIENT)
Dept: FAMILY MEDICINE CLINIC | Age: 68
End: 2023-02-15

## 2023-02-15 NOTE — TELEPHONE ENCOUNTER
Patient calling into the office stating that Dr. Ritesh Desir wants to keep treating her with pain patches. Dr. Ritesh Desir is currently treating ephraim with 1/2  a percocet 2 times daily for her pain. Meg Fish does not feel that it is enough. Meg Fish mentions that she has an appointment scheduled to get an injection and she knows if will hurt. Please advise as to what you would like the patient to do.

## 2023-02-15 NOTE — TELEPHONE ENCOUNTER
Patient calling into the office stating that Dr. Louise Valero wants to keep treating her with pain patches. Dr. Louise Valero is currently treating ephraim with 1/2  a percocet 2 times daily for her pain. Swapnil Garza does not feel that it is enough. Swapnil Garza mentions that she has an appointment scheduled to get an injection and she knows if will hurt. Please advise as to what you would like the patient to do.

## 2023-02-17 DIAGNOSIS — F90.0 ATTENTION DEFICIT HYPERACTIVITY DISORDER (ADHD), PREDOMINANTLY INATTENTIVE TYPE: ICD-10-CM

## 2023-02-17 RX ORDER — DEXTROAMPHETAMINE SACCHARATE, AMPHETAMINE ASPARTATE MONOHYDRATE, DEXTROAMPHETAMINE SULFATE AND AMPHETAMINE SULFATE 7.5; 7.5; 7.5; 7.5 MG/1; MG/1; MG/1; MG/1
30 CAPSULE, EXTENDED RELEASE ORAL EVERY MORNING
Qty: 30 CAPSULE | Refills: 0 | OUTPATIENT
Start: 2023-02-17 | End: 2023-03-19

## 2023-02-17 NOTE — TELEPHONE ENCOUNTER
Patient is calling because Madelaine in [facility] has Adderall 30 mg xr in stock. She is asking if you can please send the script to them. Rx is pended. Thank you.

## 2023-02-22 ENCOUNTER — PROCEDURE VISIT (OUTPATIENT)
Dept: PAIN MANAGEMENT | Age: 68
End: 2023-02-22

## 2023-02-22 DIAGNOSIS — M47.817 LUMBOSACRAL SPONDYLOSIS WITHOUT MYELOPATHY: ICD-10-CM

## 2023-02-22 DIAGNOSIS — M51.26 LUMBAR HERNIATED DISC: ICD-10-CM

## 2023-02-22 DIAGNOSIS — M51.36 DDD (DEGENERATIVE DISC DISEASE), LUMBAR: Primary | ICD-10-CM

## 2023-02-22 DIAGNOSIS — M47.816 FACET SYNDROME, LUMBAR: ICD-10-CM

## 2023-02-22 RX ORDER — BETAMETHASONE SODIUM PHOSPHATE AND BETAMETHASONE ACETATE 3; 3 MG/ML; MG/ML
6 INJECTION, SUSPENSION INTRA-ARTICULAR; INTRALESIONAL; INTRAMUSCULAR; SOFT TISSUE ONCE
Status: COMPLETED | OUTPATIENT
Start: 2023-02-22 | End: 2023-02-22

## 2023-02-22 RX ORDER — LIDOCAINE HYDROCHLORIDE 10 MG/ML
3 INJECTION, SOLUTION EPIDURAL; INFILTRATION; INTRACAUDAL; PERINEURAL ONCE
Status: COMPLETED | OUTPATIENT
Start: 2023-02-22 | End: 2023-02-22

## 2023-02-22 RX ADMIN — BETAMETHASONE SODIUM PHOSPHATE AND BETAMETHASONE ACETATE 6 MG: 3; 3 INJECTION, SUSPENSION INTRA-ARTICULAR; INTRALESIONAL; INTRAMUSCULAR; SOFT TISSUE at 14:08

## 2023-02-22 RX ADMIN — LIDOCAINE HYDROCHLORIDE 3 ML: 10 INJECTION, SOLUTION EPIDURAL; INFILTRATION; INTRACAUDAL; PERINEURAL at 14:07

## 2023-02-22 NOTE — PROGRESS NOTES
[facility]) Physicians  [facility]  [address], [address] [facility] , [address], [place] [address]: [phone]  F: [phone]          LUMBAR/SACRAL MEDIAL BRANCH BLOCK      Provider: Veto Andrade MD          Patient Name: Anastacio Nolasco : 1955        Date: 2023       Anastacio Nolasco is here today for interventional pain management. Discussed the risks including but not limited to bleeding, infection, worsened pain, damage to surrounding structures, side effects, toxicity, allergic reactions to medications used, need for surgery, pneumothorax, abdominal and visceral injury, as well as catastrophic injury such as vision loss, paralysis, spinal cord or plexus injury, stroke, bowel or bladder incontinence, chest tube insertion, ventilator dependence, and death. Discussed the risks, benefits, and alternatives to the procedure including no procedure at all. Discussed that we cannot undo any permanent neurologic or orthopaedic damage or change the course of any underlying disease. After thorough discussion, patient expressed understanding and willingness to proceed. Written consent was obtained and is in the chart. Verbal consent to proceed was obtained. Standard ASIPP guidelines were followed and sterile technique used. Area was cleaned with Betadine x3. Informed consent was obtained. Fluoroscopic guidance was used for this procedure. Junction of superior articular process and transverse process was identified. For L5 dorsal primary ramus groove of sacral ala and SAP of S1 was identified. Appropriate length spinal needle was used and advanced to correct anatomic location. Negative aspiration was achieved. At each site approximately 1/2cc of 1% preservative free Lidocaine was injected without difficulty. Patient tolerated the procedure well, no obvious complications occurred during the procedure.   Patient was appropriately monitored and discharged home in stable condition with their usual motor strength. The patient will keep close track of pain over the next several hours and call our office tomorrow and let us know what percentage of pain relief is experienced. Post op Instructions were given to patient. [x] Bilateral [] T12 [] S1      [] L1 [] S2     [] Right [] L2 [] S3      [x] L3      [] Left [x] L4         [x] L5 [] S. I. Patient has >80% pain relief following procedure with positive facet joint provocative maneuvers. Schedule RF ablation.      Quintin Johnson MD

## 2023-03-01 ENCOUNTER — TELEPHONE (OUTPATIENT)
Dept: PAIN MANAGEMENT | Age: 68
End: 2023-03-01

## 2023-03-01 NOTE — TELEPHONE ENCOUNTER
Left voicemail for the patient to schedule procedure.     (JENNIFER L3,4,5 MBB-AUTH APPROVED 3/8/23-3/21/23)

## 2023-03-01 NOTE — TELEPHONE ENCOUNTER
ASHLEY L3,4,5 MBB    AUTH APPROVED FROM 3/8/23-3/21/23    REFERRAL # [id number]  OK to schedule procedure approved as above. Please note sides/levels approved and date range. (If applicable, sides/levels approved may differ from those ordered)    TO BE SCHEDULED WITH DR. DICKEY

## 2023-03-08 ENCOUNTER — PROCEDURE VISIT (OUTPATIENT)
Dept: PAIN MANAGEMENT | Age: 68
End: 2023-03-08

## 2023-03-08 DIAGNOSIS — M47.817 LUMBOSACRAL SPONDYLOSIS WITHOUT MYELOPATHY: Primary | ICD-10-CM

## 2023-03-08 RX ORDER — LIDOCAINE HYDROCHLORIDE 10 MG/ML
10 INJECTION, SOLUTION EPIDURAL; INFILTRATION; INTRACAUDAL; PERINEURAL ONCE
Status: COMPLETED | OUTPATIENT
Start: 2023-03-08 | End: 2023-03-08

## 2023-03-08 RX ADMIN — LIDOCAINE HYDROCHLORIDE 10 MG: 10 INJECTION, SOLUTION EPIDURAL; INFILTRATION; INTRACAUDAL; PERINEURAL at 13:02

## 2023-03-08 NOTE — PROGRESS NOTES
[facility]) Physicians  Neurosurgery and Pain [facility]  [address], [address] [facility] , [address], [place] [address]: [phone]  F: [phone]          LUMBAR/SACRAL MEDIAL BRANCH BLOCK      Provider: Hector Baez MD          Patient Name: Enmanuel Chinchilla : 1955        Date: 3/8/2023       Enmanuel Chinchilla is here today for interventional pain management. Discussed the risks including but not limited to bleeding, infection, worsened pain, damage to surrounding structures, side effects, toxicity, allergic reactions to medications used, need for surgery, pneumothorax, abdominal and visceral injury, as well as catastrophic injury such as vision loss, paralysis, spinal cord or plexus injury, stroke, bowel or bladder incontinence, chest tube insertion, ventilator dependence, and death. Discussed the risks, benefits, and alternatives to the procedure including no procedure at all. Discussed that we cannot undo any permanent neurologic or orthopaedic damage or change the course of any underlying disease. After thorough discussion, patient expressed understanding and willingness to proceed. Written consent was obtained and is in the chart. Verbal consent to proceed was obtained. Standard ASIPP guidelines were followed and sterile technique used. Area was cleaned with Betadine x3. Informed consent was obtained. Fluoroscopic guidance was used for this procedure. Junction of superior articular process and transverse process was identified. For L5 dorsal primary ramus groove of sacral ala and SAP of S1 was identified. Appropriate length spinal needle was used and advanced to correct anatomic location. Negative aspiration was achieved. At each site approximately 1/2cc of 1% preservative free Lidocaine was injected without difficulty. Patient tolerated the procedure well, no obvious complications occurred during the procedure.   Patient was appropriately monitored and discharged home in stable condition with their usual motor strength. The patient will keep close track of pain over the next several hours and call our office tomorrow and let us know what percentage of pain relief is experienced. Post op Instructions were given to patient. [x] Bilateral [] T12 [] S1      [] L1 [] S2     [] Right [] L2 [] S3      [x] L3      [] Left [x] L4         [x] L5 [] S. I. Patient has >80% pain relief following procedure with positive facet joint provocative maneuvers. Schedule RF ablation.      Hanh Gautam MD

## 2023-03-09 ENCOUNTER — TELEPHONE (OUTPATIENT)
Dept: PAIN MANAGEMENT | Age: 68
End: 2023-03-09

## 2023-03-09 NOTE — TELEPHONE ENCOUNTER
REFERRAL NUMBER [id number]    BILAT L3,4,5 RFA    AUTH FROM 3/15/23-3/28/23    OK to schedule procedure approved as above. Please note sides/levels approved and date range.    (If applicable, sides/levels approved may differ from those ordered)    TO [address]

## 2023-03-14 ENCOUNTER — OFFICE VISIT (OUTPATIENT)
Dept: FAMILY MEDICINE CLINIC | Age: 68
End: 2023-03-14
Payer: MEDICARE

## 2023-03-14 VITALS
HEIGHT: 62 IN | WEIGHT: 172 LBS | BODY MASS INDEX: 31.65 KG/M2 | SYSTOLIC BLOOD PRESSURE: 120 MMHG | HEART RATE: 81 BPM | DIASTOLIC BLOOD PRESSURE: 60 MMHG

## 2023-03-14 DIAGNOSIS — F33.1 MODERATE EPISODE OF RECURRENT MAJOR DEPRESSIVE DISORDER (HCC): ICD-10-CM

## 2023-03-14 DIAGNOSIS — J44.9 CHRONIC OBSTRUCTIVE PULMONARY DISEASE, UNSPECIFIED COPD TYPE (HCC): Primary | ICD-10-CM

## 2023-03-14 DIAGNOSIS — M46.1 BILATERAL SACROILIITIS (HCC): ICD-10-CM

## 2023-03-14 DIAGNOSIS — F41.9 ANXIETY: ICD-10-CM

## 2023-03-14 DIAGNOSIS — F90.0 ATTENTION DEFICIT HYPERACTIVITY DISORDER (ADHD), PREDOMINANTLY INATTENTIVE TYPE: ICD-10-CM

## 2023-03-14 DIAGNOSIS — M79.7 FIBROMYALGIA: ICD-10-CM

## 2023-03-14 DIAGNOSIS — I10 ESSENTIAL HYPERTENSION: ICD-10-CM

## 2023-03-14 PROCEDURE — G8484 FLU IMMUNIZE NO ADMIN: HCPCS | Performed by: NURSE PRACTITIONER

## 2023-03-14 PROCEDURE — 3074F SYST BP LT 130 MM HG: CPT | Performed by: NURSE PRACTITIONER

## 2023-03-14 PROCEDURE — 1036F TOBACCO NON-USER: CPT | Performed by: NURSE PRACTITIONER

## 2023-03-14 PROCEDURE — G8427 DOCREV CUR MEDS BY ELIG CLIN: HCPCS | Performed by: NURSE PRACTITIONER

## 2023-03-14 PROCEDURE — 1090F PRES/ABSN URINE INCON ASSESS: CPT | Performed by: NURSE PRACTITIONER

## 2023-03-14 PROCEDURE — G8417 CALC BMI ABV UP PARAM F/U: HCPCS | Performed by: NURSE PRACTITIONER

## 2023-03-14 PROCEDURE — 3078F DIAST BP <80 MM HG: CPT | Performed by: NURSE PRACTITIONER

## 2023-03-14 PROCEDURE — 3023F SPIROM DOC REV: CPT | Performed by: NURSE PRACTITIONER

## 2023-03-14 PROCEDURE — 1123F ACP DISCUSS/DSCN MKR DOCD: CPT | Performed by: NURSE PRACTITIONER

## 2023-03-14 PROCEDURE — 3017F COLORECTAL CA SCREEN DOC REV: CPT | Performed by: NURSE PRACTITIONER

## 2023-03-14 PROCEDURE — G8399 PT W/DXA RESULTS DOCUMENT: HCPCS | Performed by: NURSE PRACTITIONER

## 2023-03-14 PROCEDURE — 99214 OFFICE O/P EST MOD 30 MIN: CPT | Performed by: NURSE PRACTITIONER

## 2023-03-14 RX ORDER — PREGABALIN 100 MG/1
100 CAPSULE ORAL 2 TIMES DAILY
Qty: 60 CAPSULE | Refills: 2 | Status: SHIPPED | OUTPATIENT
Start: 2023-03-14 | End: 2023-04-13

## 2023-03-14 RX ORDER — DEXTROAMPHETAMINE SACCHARATE, AMPHETAMINE ASPARTATE MONOHYDRATE, DEXTROAMPHETAMINE SULFATE AND AMPHETAMINE SULFATE 5; 5; 5; 5 MG/1; MG/1; MG/1; MG/1
20 CAPSULE, EXTENDED RELEASE ORAL EVERY MORNING
Qty: 30 CAPSULE | Refills: 0 | Status: SHIPPED | OUTPATIENT
Start: 2023-03-14 | End: 2023-04-13

## 2023-03-14 RX ORDER — DULOXETIN HYDROCHLORIDE 60 MG/1
60 CAPSULE, DELAYED RELEASE ORAL DAILY
Qty: 30 CAPSULE | Refills: 5 | Status: SHIPPED | OUTPATIENT
Start: 2023-03-14

## 2023-03-14 RX ORDER — ALPRAZOLAM 1 MG/1
TABLET ORAL
Qty: 60 TABLET | Refills: 2 | Status: SHIPPED | OUTPATIENT
Start: 2023-03-14 | End: 2023-04-13

## 2023-03-14 RX ORDER — BUPRENORPHINE 20 UG/H
1 PATCH TRANSDERMAL
Qty: 4 PATCH | Refills: 2 | Status: SHIPPED | OUTPATIENT
Start: 2023-03-14 | End: 2023-04-13

## 2023-03-14 ASSESSMENT — ENCOUNTER SYMPTOMS
GASTROINTESTINAL NEGATIVE: 1
SHORTNESS OF BREATH: 0
RECTAL PAIN: 0
VOICE CHANGE: 0
DIARRHEA: 0
ANAL BLEEDING: 0
RHINORRHEA: 0
ABDOMINAL PAIN: 0
COLOR CHANGE: 0
COUGH: 1
EYES NEGATIVE: 1
HEARTBURN: 0
TROUBLE SWALLOWING: 0
SORE THROAT: 0
CONSTIPATION: 0
ALLERGIC/IMMUNOLOGIC NEGATIVE: 1
BLOOD IN STOOL: 0

## 2023-03-14 ASSESSMENT — COPD QUESTIONNAIRES: COPD: 1

## 2023-03-14 NOTE — PROGRESS NOTES
Subjective  Sherif Painting, 75 y.o. female presents today with:  Chief Complaint   Patient presents with    3 Month Follow-Up    ADHD    COPD    Anxiety        Chronic pain, Fibromyalgia, back problems-  Butrans patches-  patient tolerating- is seeing pain management and they added percocet for her chronic pain-  she is getting an ablation in her back in a couple weeks  ADHD-  20mg xr daily with effectiveness  Anxiety-  Tried to 1 MG TID PRN-  She does not like the ER,  Did not feel it helped her anxiety. -  She is willing transition her to back to her XANAX 1mg that is not extended release but BID PRN not TID PRN. COPD  She complains of cough. There is no shortness of breath. This is a chronic problem. The problem occurs daily. The cough is non-productive. Pertinent negatives include no appetite change, chest pain, dyspnea on exertion, ear congestion, ear pain, fever, headaches, heartburn, malaise/fatigue, myalgias, nasal congestion, orthopnea, PND, postnasal drip, rhinorrhea, sneezing, sore throat, sweats, trouble swallowing or weight loss. Her symptoms are aggravated by change in weather. Risk factors for lung disease include smoking/tobacco exposure. Her past medical history is significant for COPD. Anxiety  Patient reports no chest pain, dizziness, nervous/anxious behavior, palpitations or shortness of breath. ADD/ADHD:  Current treatment: Adderall XR- 20, which has been effective. Residual symptoms: none. Medication side effects: None. Patient denies anorexia, nausea, vomiting, abdominal pain, involuntary weight loss, palpitations, insomnia, irritability, headache and tremor. Controlled Substance Monitoring:    Acute and Chronic Pain Monitoring:   RX Monitoring 3/14/2023   Acute Pain Prescriptions -   Periodic Controlled Substance Monitoring Possible medication side effects, risk of tolerance/dependence & alternative treatments discussed. ;No signs of potential drug abuse or diversion identified. ;Assessed functional status. Chronic Pain > 50 MEDD -   Chronic Pain > 80 MEDD -           Review of Systems   Constitutional: Negative. Negative for activity change, appetite change, fatigue, fever, malaise/fatigue, unexpected weight change and weight loss. HENT: Negative. Negative for dental problem, ear pain, nosebleeds, postnasal drip, rhinorrhea, sneezing, sore throat, trouble swallowing and voice change. Eyes: Negative. Negative for visual disturbance. Respiratory:  Positive for cough. Negative for shortness of breath. Cardiovascular: Negative. Negative for chest pain, dyspnea on exertion, palpitations, leg swelling and PND. Gastrointestinal: Negative. Negative for abdominal pain, anal bleeding, blood in stool, constipation, diarrhea, heartburn and rectal pain. Endocrine: Negative. Negative for cold intolerance, heat intolerance, polydipsia, polyphagia and polyuria. Genitourinary: Negative. Musculoskeletal: Negative. Negative for myalgias. Skin: Negative. Negative for color change and rash. Allergic/Immunologic: Negative. Neurological: Negative. Negative for dizziness, syncope, weakness and headaches. Hematological: Negative. Negative for adenopathy. Does not bruise/bleed easily. Psychiatric/Behavioral: Negative. Negative for dysphoric mood and sleep disturbance. The patient is not nervous/anxious.       Past Medical History:   Diagnosis Date    ADHD (attention deficit hyperactivity disorder)     Anxiety     Depression     Fibromyalgia     GERD (gastroesophageal reflux disease)     Hypertension     Irritable bowel syndrome      Past Surgical History:   Procedure Laterality Date     SECTION      COLONOSCOPY      [facility] GASTRO  Orval Werner PELLETIER    DILATION AND CURETTAGE OF UTERUS N/A 2020    DIAGNOSTIC LAPAROSCOPY, LYSIS OF ADHESIONS, HYSTEROSCOPY WITH Peder Pica performed by Jean Cordon MD at [address] [place] [address] History    Marital status:      Spouse name: Not on file    Number of children: Not on file    Years of education: Not on file    Highest education level: Not on file   Occupational History    Not on file   Tobacco Use    Smoking status: Former     Packs/day: 2.00     Years: 50.00     Pack years: 100.00     Types: Cigarettes     Quit date: 2022     Years since quittin.3    Smokeless tobacco: Never   Vaping Use    Vaping Use: Never used   Substance and Sexual Activity    Alcohol use: Never    Drug use: Never    Sexual activity: Not on file   Other Topics Concern    Not on file   Social History Narrative    Not on file     Social Determinants of Health     Financial Resource Strain: Low Risk     Difficulty of Paying Living Expenses: Not hard at all   Food Insecurity: No Food Insecurity    Worried About Running Out of Food in the Last Year: Never true    [address] in the Last Year: Never true   Transportation Needs: Unknown    Lack of Transportation (Medical): Not on file    Lack of Transportation (Non-Medical): No   Physical Activity: Insufficiently Active    Days of Exercise per Week: 2 days    Minutes of Exercise per Session: 10 min   Stress: Not on file   Social Connections: Not on file   Intimate Partner Violence: Not on file   Housing Stability: Unknown    Unable to Pay for Housing in the Last Year: Not on file    Number of Places Lived in the Last Year: Not on file    Unstable Housing in the Last Year: No     Family History   Problem Relation Age of Onset    Breast Cancer Maternal Aunt      No Known Allergies  Current Outpatient Medications   Medication Sig Dispense Refill    ALPRAZolam (XANAX) 1 MG tablet Take 1 tablet by mouth in the morning and 1 tablet in the evening. Do all this for 30 days. 60 tablet 2    amphetamine-dextroamphetamine (ADDERALL XR) 20 MG extended release capsule Take 1 capsule by mouth every morning for 30 days.  Max Daily Amount: 20 mg 30 capsule 0    DULoxetine (CYMBALTA) 60 MG extended release capsule Take 1 capsule by mouth daily 30 capsule 5    pregabalin (LYRICA) 100 MG capsule Take 1 capsule by mouth 2 times daily for 30 days. Max Daily Amount: 200 mg 60 capsule 2    buprenorphine 20 MCG/HR PTWK Place 1 patch onto the skin every 7 days for 30 days. Max Daily Amount: 1 patch 4 patch 2    vitamin D (ERGOCALCIFEROL) 1.25 MG (50031 UT) CAPS capsule TAKE 1 CAPSULE BY MOUTH once per week AS DIRECTED 4 capsule 11    nicotine (NICODERM CQ) 21 MG/24HR Place 1 patch onto the skin daily 30 patch 5    predniSONE (DELTASONE) 5 MG tablet Take 1 tablet by mouth daily 30 tablet 11    ipratropium-albuterol (DUONEB) 0.5-2.5 (3) MG/3ML SOLN nebulizer solution Inhale 3 mLs into the lungs every 4 hours as needed for Shortness of Breath 360 mL 0    rosuvastatin (CRESTOR) 10 MG tablet Take 1 tablet by mouth daily 30 tablet 5    cyclobenzaprine (FLEXERIL) 10 MG tablet TAKE 1 TABLET BY MOUTH TWICE DAILY AS NEEDED 60 tablet 11    furosemide (LASIX) 20 MG tablet Take 1 tablet by mouth daily PRN 30 tablet 0    ARIPiprazole (ABILIFY) 10 MG tablet TAKE 1 TABLET DAILY IN THE MORNING      Handicap Placard MISC by Does not apply route Duration 5 years 1 each 0    albuterol sulfate  (90 Base) MCG/ACT inhaler Inhale 2 puffs into the lungs every 6 hours as needed for Wheezing 1 Inhaler 3    polyethylene glycol (GLYCOLAX) 17 GM/SCOOP powder Take 17 g by mouth as needed      aspirin 81 MG chewable tablet       mirtazapine (REMERON) 15 MG tablet Take 15 mg by mouth nightly      Misc. Devices (ROLLER WALKER) MISC 1 each by Does not apply route daily 1 each 0    Misc. Devices (PEDAL EXERCISER) MISC 1 each by Does not apply route daily 1 each 0     No current facility-administered medications for this visit. PMH, Surgical Hx, Family Hx, and Social Hx reviewed and updated. Health Maintenance reviewed.     Objective    Vitals:    03/14/23 1116   BP: 120/60   Pulse: 81   Weight: 172 lb (78 kg) Height: 5' 2\" (1.575 m)       Physical Exam  Constitutional:       General: She is not in acute distress. Appearance: She is well-developed. HENT:      Head: Normocephalic and atraumatic. Right Ear: External ear normal.      Left Ear: External ear normal.      Nose: Nose normal.   Eyes:      Conjunctiva/sclera: Conjunctivae normal.      Pupils: Pupils are equal, round, and reactive to light. Neck:      Vascular: No JVD. Cardiovascular:      Rate and Rhythm: Normal rate and regular rhythm. Heart sounds: Normal heart sounds. No murmur heard. Pulmonary:      Effort: Pulmonary effort is normal. No respiratory distress. Breath sounds: Normal breath sounds. No wheezing or rales. Abdominal:      General: Bowel sounds are normal. There is no distension. Palpations: Abdomen is soft. There is no mass. Tenderness: There is no abdominal tenderness. Musculoskeletal:      Cervical back: Neck supple. Skin:     General: Skin is warm and dry. Neurological:      Mental Status: She is alert and oriented to person, place, and time. Assessment & Plan   Ted Tang was seen today for 3 month follow-up, adhd, copd and anxiety. Diagnoses and all orders for this visit:    Chronic obstructive pulmonary disease, unspecified COPD type ([address]    Attention deficit hyperactivity disorder (ADHD), predominantly inattentive type  -     Pain Management Drug Screen; Future  -     amphetamine-dextroamphetamine (ADDERALL XR) 20 MG extended release capsule; Take 1 capsule by mouth every morning for 30 days. Max Daily Amount: 20 mg    Moderate episode of recurrent major depressive disorder (HCC)    Bilateral sacroiliitis (HCC)  -     pregabalin (LYRICA) 100 MG capsule; Take 1 capsule by mouth 2 times daily for 30 days. Max Daily Amount: 200 mg  -     buprenorphine 20 MCG/HR PTWK; Place 1 patch onto the skin every 7 days for 30 days.  Max Daily Amount: 1 patch    Fibromyalgia  -     buprenorphine 20 MCG/HR PTWK; Place 1 patch onto the skin every 7 days for 30 days. Max Daily Amount: 1 patch    Anxiety  -     Pain Management Drug Screen; Future  -     ALPRAZolam (XANAX) 1 MG tablet; Take 1 tablet by mouth in the morning and 1 tablet in the evening. Do all this for 30 days. Essential hypertension    Other orders  -     DULoxetine (CYMBALTA) 60 MG extended release capsule; Take 1 capsule by mouth daily    Orders Placed This Encounter   Procedures    Pain Management Drug Screen     Standing Status:   Future     Number of Occurrences:   1     Standing Expiration Date:   3/14/2024       Orders Placed This Encounter   Medications    ALPRAZolam (XANAX) 1 MG tablet     Sig: Take 1 tablet by mouth in the morning and 1 tablet in the evening. Do all this for 30 days. Dispense:  60 tablet     Refill:  2    amphetamine-dextroamphetamine (ADDERALL XR) 20 MG extended release capsule     Sig: Take 1 capsule by mouth every morning for 30 days. Max Daily Amount: 20 mg     Dispense:  30 capsule     Refill:  0    DULoxetine (CYMBALTA) 60 MG extended release capsule     Sig: Take 1 capsule by mouth daily     Dispense:  30 capsule     Refill:  5    pregabalin (LYRICA) 100 MG capsule     Sig: Take 1 capsule by mouth 2 times daily for 30 days. Max Daily Amount: 200 mg     Dispense:  60 capsule     Refill:  2    buprenorphine 20 MCG/HR PTWK     Sig: Place 1 patch onto the skin every 7 days for 30 days.  Max Daily Amount: 1 patch     Dispense:  4 patch     Refill:  2     Medications Discontinued During This Encounter   Medication Reason    clopidogrel (PLAVIX) 75 MG tablet Therapy completed    venlafaxine (EFFEXOR XR) 75 MG extended release capsule Therapy completed    ALPRAZolam (XANAX) 1 MG tablet REORDER    amphetamine-dextroamphetamine (ADDERALL XR) 20 MG extended release capsule REORDER    DULoxetine (CYMBALTA) 30 MG extended release capsule REORDER    pregabalin (LYRICA) 75 MG capsule REORDER     Return in about 3 months (around 6/14/2023). Reviewed with the patient: current clinical status, medications, activities and diet. Side effects, adverse effects of the medication prescribed today, as well as treatment plan/ rationale and result expectations have been discussed with the patient who expresses understanding and desires to proceed. Close follow up to evaluate treatment results and for coordination of care. I have reviewed the patient's medical history in detail and updated the computerized patient record.     Judy Rodriguez, APRN - CNP

## 2023-03-15 ENCOUNTER — OFFICE VISIT (OUTPATIENT)
Dept: PAIN MANAGEMENT | Age: 68
End: 2023-03-15

## 2023-03-15 DIAGNOSIS — M47.817 LUMBOSACRAL SPONDYLOSIS WITHOUT MYELOPATHY: ICD-10-CM

## 2023-03-15 DIAGNOSIS — M47.816 FACET SYNDROME, LUMBAR: ICD-10-CM

## 2023-03-15 RX ORDER — LIDOCAINE HYDROCHLORIDE 10 MG/ML
3 INJECTION, SOLUTION EPIDURAL; INFILTRATION; INTRACAUDAL; PERINEURAL ONCE
Status: COMPLETED | OUTPATIENT
Start: 2023-03-15 | End: 2023-03-15

## 2023-03-15 RX ORDER — BETAMETHASONE SODIUM PHOSPHATE AND BETAMETHASONE ACETATE 3; 3 MG/ML; MG/ML
6 INJECTION, SUSPENSION INTRA-ARTICULAR; INTRALESIONAL; INTRAMUSCULAR; SOFT TISSUE ONCE
Status: COMPLETED | OUTPATIENT
Start: 2023-03-15 | End: 2023-03-15

## 2023-03-15 RX ADMIN — LIDOCAINE HYDROCHLORIDE 3 ML: 10 INJECTION, SOLUTION EPIDURAL; INFILTRATION; INTRACAUDAL; PERINEURAL at 12:45

## 2023-03-15 RX ADMIN — BETAMETHASONE SODIUM PHOSPHATE AND BETAMETHASONE ACETATE 6 MG: 3; 3 INJECTION, SUSPENSION INTRA-ARTICULAR; INTRALESIONAL; INTRAMUSCULAR; SOFT TISSUE at 12:44

## 2023-03-15 NOTE — PROGRESS NOTES
[facility]) Physicians  Neurosurgery and Pain [facility]  [address], [address] [facility] , [address] [facility], [address]: [phone]  F: [phone]             Provider: Rosa Elena Arellano MD          Patient Name: Ginger Galvin : 1955        Date: 3/15/2023         PROCEDURE:  Bilateral L3, L4, L5 medial branch ablation by radiofrequency. Discussed the risks including but not limited to bleeding, infection, worsened pain, damage to surrounding structures, side effects, toxicity, allergic reactions to medications used, need for surgery, pneumothorax, abdominal and visceral injury, as well as catastrophic injury such as vision loss, paralysis, spinal cord or plexus injury, stroke, bowel or bladder incontinence, chest tube insertion, ventilator dependence, and death. Discussed the risks, benefits, and alternatives to the procedure including no procedure at all. Discussed that we cannot undo any permanent neurologic or orthopaedic damage or change the course of any underlying disease. After thorough discussion, patient expressed understanding and willingness to proceed. Written consent was obtained and is in the chart. Verbal consent to proceed was obtained. Description of Procedure: The procedure and potential complications were explained to the patient and a voluntary informed, signed consent was obtained. The patient was placed prone on the x-ray table and the mid back was prepped with Betadine solution. Under fluoroscopic guidance the skin was infiltrated with 1% preservative free lidocaine. A 20-gauge, 3.5 inch long curved cannula with a 10 mm active curved tip was inserted to place the cannula tip on the junction of the superior articular process and transverse process where the medial branches are located. After motor tests were done 3 mL of 1% preservative free lidocaine was injected.   Ablation was carried out at 80 degrees Celsius for 95 seconds and it was repeated one more time by repositioning the cannula tip.  6 mg of dexamethasone was divided and injected at each level before the removal of the cannula. The patient tolerated the procedure very well. Summary and Recommendations: The patient will follow up in the office.     Hilda Antonio MD.

## 2023-03-18 LAB
6MAM UR QL: NOT DETECTED
7-AMINOCLONAZEPAM: NOT DETECTED
ALPHA-OH-ALPRAZOLAM: PRESENT
ALPHA-OH-MIDAZOLAM, URINE: NOT DETECTED
ALPRAZOLAM: PRESENT
AMPHET UR QL SCN: NOT DETECTED
BARBITURATES: NOT DETECTED
BENZOYLECGONINE: NOT DETECTED
BUPRENORPHINE: NOT DETECTED
CARISOPRODOL UR QL: NOT DETECTED
CLONAZEPAM UR QL: NOT DETECTED
CODEINE: NOT DETECTED
CREAT UR-MCNC: 21.1 MG/DL (ref 20–400)
DIAZEPAM: NOT DETECTED
ETHYL GLUCURONIDE: NOT DETECTED
FENTANYL UR QL: NOT DETECTED
GABAPENTIN: PRESENT
HYDROCODONE UR QL: NOT DETECTED
HYDROMORPHONE: NOT DETECTED
LORAZEPAM UR QL: NOT DETECTED
MARIJUANA METABOLITE: PRESENT
MDA: NOT DETECTED
MDEA: NOT DETECTED
MDMA UR QL: NOT DETECTED
MEPERIDINE: NOT DETECTED
METHADONE: NOT DETECTED
METHAMPHETAMINE: NOT DETECTED
METHYLPHENIDATE: NOT DETECTED
MIDAZOLAM UR QL SCN: NOT DETECTED
MORPHINE: NOT DETECTED
NALOXONE: NOT DETECTED
NORBUPRENORPHINE, FREE: NOT DETECTED
NORDIAZEPAM: NOT DETECTED
NORFENTANYL: NOT DETECTED
NORHYDROCODONE, URINE: NOT DETECTED
NOROXYCODONE: NOT DETECTED
NOROXYMORPHONE, URINE: NOT DETECTED
OXAZEPAM UR QL: NOT DETECTED
OXYCODONE UR QL: NOT DETECTED
OXYMORPHONE UR QL: NOT DETECTED
PAIN MANAGEMENT DRUG PANEL: NORMAL
PATHOLOGY STUDY: NORMAL
PCP: NOT DETECTED
PHENTERMINE: NOT DETECTED
PREGABALIN: PRESENT
TAPENTADOL, URINE: NOT DETECTED
TAPENTADOL-O-SULFATE, URINE: NOT DETECTED
TEMAZEPAM: NOT DETECTED
TRAMADOL: NOT DETECTED
ZOLPIDEM: NOT DETECTED

## 2023-03-31 ENCOUNTER — TELEPHONE (OUTPATIENT)
Dept: PHARMACY | Facility: CLINIC | Age: 68
End: 2023-03-31

## 2023-03-31 RX ORDER — ROSUVASTATIN CALCIUM 10 MG/1
TABLET, COATED ORAL
Qty: 30 TABLET | Refills: 5 | Status: SHIPPED | OUTPATIENT
Start: 2023-03-31

## 2023-03-31 NOTE — LETTER
[place]  [address], [address]        [place] [address]           03/31/23     Dear Angi Kan,    We tried to reach you recently regarding your ROSUVASTATIN CALCIUM 10 MG  DAILY, but were unable to reach you on the telephone. We have on file that you are currently taking ROSUVASTATIN CALCIUM 10 MG. If you are no longer taking or taking differently, please call us at the number below so that we can discuss this and update your medication profile. It appears that this medication has not been filled at proper times. We are worried you might be missing doses or not taking it as directed. It is important that you take your medications regularly and try not to miss a single dose.     Some ways to help you remember to take and refill your medications are to:  · Use a pill box, set an alarm, and/or keep your medication near something that you do every day  · Fill a 3-month supply of your prescription at a time to save you time and trips to the pharmacy - if you would like assistance in switching your prescriptions to a 3-month supply, please contact us  · Ask your pharmacy if they participate in [facility]", a program where you can  all of your medications on the same day  · Ask your pharmacy if you can be set up with automatic refill, where they will automatically refill your prescription when it is due and let you know it's ready to     Sincerely,   [facility] // Department, toll free [phone], Option 1

## 2023-03-31 NOTE — TELEPHONE ENCOUNTER
Constantino Owens, APRN - CNP [facility]    22 Office Visit Amanda Hughes APRN - CNP [facility]    22 Office Visit Amanda Hughes APRN - CNP [facility]    21 Office Visit Amanda Hughes APRN - CNP [facility]    Showing recent visits within past 540 days with a meds authorizing provider and meeting all other requirements  Future Appointments  Date Type Provider Dept   06/15/23 Appointment AMANDA Little - CNP [facility]    Showing future appointments within next 150 days with a meds authorizing provider and meeting all other requirements    Future Appointments   Date Time Provider Shelley Gorman   2023 10:45 AM Buzzy Pamela, APRN - CNP [facility]   2023  2:45 PM Ruth Maldonado MD [address] Neurology -   6/15/2023  2:00 PM Amanda Hughes [facility]  // Department, toll free [phone], Option 2    For Pharmacy Admin Tracking Only    Program: [address] in place:  No  Recommendation Provided To: Pharmacy: 1  Intervention Detail: Adherence Monitorin  Intervention Accepted By: Pharmacy: 1  Gap Closed?: Yes   Time Spent (min): 15

## 2023-03-31 NOTE — TELEPHONE ENCOUNTER
Future Appointments    Encounter Information    Provider Department Appt Notes   4/4/2023 Delonte Huizar, [facility] [facility] Neurosurgery increased pain after RFA   4/25/2023 MD PAUL Ga LUCI [facility] Neurology last seen 03/2021   6/15/2023 [address], APRN - [facility] Primary and Specialty Care 3 Month follow up     Past Visits    Date Provider Specialty Visit Type Primary Dx   03/15/2023 Parvin Cherry MD Pain Management Office Visit Lumbosacral spondylosis without myelopathy   03/14/2023 [address], APRN - CNP Family Medicine Office Visit Chronic obstructive pulmonary disease, unspecified COPD type ([facility]

## 2023-04-07 ENCOUNTER — TELEPHONE (OUTPATIENT)
Dept: FAMILY MEDICINE CLINIC | Age: 68
End: 2023-04-07

## 2023-05-02 ENCOUNTER — OFFICE VISIT (OUTPATIENT)
Dept: FAMILY MEDICINE CLINIC | Age: 68
End: 2023-05-02
Payer: MEDICARE

## 2023-05-02 VITALS
WEIGHT: 172 LBS | BODY MASS INDEX: 31.65 KG/M2 | SYSTOLIC BLOOD PRESSURE: 130 MMHG | DIASTOLIC BLOOD PRESSURE: 64 MMHG | HEIGHT: 62 IN | HEART RATE: 74 BPM

## 2023-05-02 DIAGNOSIS — M79.7 FIBROMYALGIA: ICD-10-CM

## 2023-05-02 DIAGNOSIS — F90.0 ATTENTION DEFICIT HYPERACTIVITY DISORDER (ADHD), PREDOMINANTLY INATTENTIVE TYPE: Primary | ICD-10-CM

## 2023-05-02 DIAGNOSIS — F33.1 MODERATE EPISODE OF RECURRENT MAJOR DEPRESSIVE DISORDER (HCC): ICD-10-CM

## 2023-05-02 DIAGNOSIS — Z12.39 ENCOUNTER FOR SCREENING BREAST EXAMINATION: ICD-10-CM

## 2023-05-02 DIAGNOSIS — Z12.31 BREAST CANCER SCREENING BY MAMMOGRAM: ICD-10-CM

## 2023-05-02 DIAGNOSIS — K58.1 IRRITABLE BOWEL SYNDROME WITH CONSTIPATION: ICD-10-CM

## 2023-05-02 DIAGNOSIS — M46.1 BILATERAL SACROILIITIS (HCC): ICD-10-CM

## 2023-05-02 DIAGNOSIS — F41.1 GAD (GENERALIZED ANXIETY DISORDER): ICD-10-CM

## 2023-05-02 DIAGNOSIS — J44.9 CHRONIC OBSTRUCTIVE PULMONARY DISEASE, UNSPECIFIED COPD TYPE (HCC): ICD-10-CM

## 2023-05-02 DIAGNOSIS — M48.062 SPINAL STENOSIS OF LUMBAR REGION WITH NEUROGENIC CLAUDICATION: ICD-10-CM

## 2023-05-02 DIAGNOSIS — I10 ESSENTIAL HYPERTENSION: ICD-10-CM

## 2023-05-02 PROCEDURE — 1090F PRES/ABSN URINE INCON ASSESS: CPT | Performed by: NURSE PRACTITIONER

## 2023-05-02 PROCEDURE — 1123F ACP DISCUSS/DSCN MKR DOCD: CPT | Performed by: NURSE PRACTITIONER

## 2023-05-02 PROCEDURE — G8399 PT W/DXA RESULTS DOCUMENT: HCPCS | Performed by: NURSE PRACTITIONER

## 2023-05-02 PROCEDURE — 3075F SYST BP GE 130 - 139MM HG: CPT | Performed by: NURSE PRACTITIONER

## 2023-05-02 PROCEDURE — 1036F TOBACCO NON-USER: CPT | Performed by: NURSE PRACTITIONER

## 2023-05-02 PROCEDURE — 3023F SPIROM DOC REV: CPT | Performed by: NURSE PRACTITIONER

## 2023-05-02 PROCEDURE — 3017F COLORECTAL CA SCREEN DOC REV: CPT | Performed by: NURSE PRACTITIONER

## 2023-05-02 PROCEDURE — 3078F DIAST BP <80 MM HG: CPT | Performed by: NURSE PRACTITIONER

## 2023-05-02 PROCEDURE — G8427 DOCREV CUR MEDS BY ELIG CLIN: HCPCS | Performed by: NURSE PRACTITIONER

## 2023-05-02 PROCEDURE — 99214 OFFICE O/P EST MOD 30 MIN: CPT | Performed by: NURSE PRACTITIONER

## 2023-05-02 PROCEDURE — G8417 CALC BMI ABV UP PARAM F/U: HCPCS | Performed by: NURSE PRACTITIONER

## 2023-05-02 RX ORDER — ALPRAZOLAM 1 MG/1
TABLET ORAL
COMMUNITY
Start: 2023-04-13 | End: 2023-05-02 | Stop reason: SDUPTHER

## 2023-05-02 RX ORDER — DEXTROAMPHETAMINE SACCHARATE, AMPHETAMINE ASPARTATE MONOHYDRATE, DEXTROAMPHETAMINE SULFATE AND AMPHETAMINE SULFATE 5; 5; 5; 5 MG/1; MG/1; MG/1; MG/1
20 CAPSULE, EXTENDED RELEASE ORAL DAILY
Qty: 30 CAPSULE | Refills: 0 | Status: SHIPPED | OUTPATIENT
Start: 2023-05-02 | End: 2023-05-09

## 2023-05-02 RX ORDER — DEXTROAMPHETAMINE SACCHARATE, AMPHETAMINE ASPARTATE MONOHYDRATE, DEXTROAMPHETAMINE SULFATE AND AMPHETAMINE SULFATE 5; 5; 5; 5 MG/1; MG/1; MG/1; MG/1
20 CAPSULE, EXTENDED RELEASE ORAL DAILY
Qty: 30 CAPSULE | Refills: 0 | Status: SHIPPED | OUTPATIENT
Start: 2023-06-01 | End: 2023-05-09

## 2023-05-02 RX ORDER — DEXTROAMPHETAMINE SACCHARATE, AMPHETAMINE ASPARTATE MONOHYDRATE, DEXTROAMPHETAMINE SULFATE AND AMPHETAMINE SULFATE 5; 5; 5; 5 MG/1; MG/1; MG/1; MG/1
20 CAPSULE, EXTENDED RELEASE ORAL DAILY
Qty: 30 CAPSULE | Refills: 0 | Status: SHIPPED | OUTPATIENT
Start: 2023-07-01 | End: 2023-05-09

## 2023-05-02 RX ORDER — ALPRAZOLAM 1 MG/1
TABLET ORAL
Qty: 60 TABLET | Refills: 2 | Status: SHIPPED | OUTPATIENT
Start: 2023-05-02 | End: 2023-06-01

## 2023-05-02 RX ORDER — GABAPENTIN 600 MG/1
600 TABLET ORAL 3 TIMES DAILY
COMMUNITY
Start: 2023-03-19 | End: 2023-05-04 | Stop reason: CLARIF

## 2023-05-02 RX ORDER — HYDROCODONE BITARTRATE AND ACETAMINOPHEN 5; 325 MG/1; MG/1
1 TABLET ORAL DAILY PRN
Qty: 7 TABLET | Refills: 0 | Status: SHIPPED | OUTPATIENT
Start: 2023-05-02 | End: 2023-05-09

## 2023-05-02 RX ORDER — PREGABALIN 150 MG/1
150 CAPSULE ORAL 2 TIMES DAILY
Qty: 60 CAPSULE | Refills: 2 | Status: SHIPPED | OUTPATIENT
Start: 2023-05-02 | End: 2023-06-01

## 2023-05-02 RX ORDER — ALBUTEROL SULFATE 90 UG/1
2 AEROSOL, METERED RESPIRATORY (INHALATION) EVERY 6 HOURS PRN
Qty: 1 EACH | Refills: 5 | Status: SHIPPED | OUTPATIENT
Start: 2023-05-02

## 2023-05-04 ENCOUNTER — CARE COORDINATION (OUTPATIENT)
Dept: CARE COORDINATION | Age: 68
End: 2023-05-04

## 2023-05-04 DIAGNOSIS — J44.9 CHRONIC OBSTRUCTIVE PULMONARY DISEASE, UNSPECIFIED COPD TYPE (HCC): Primary | ICD-10-CM

## 2023-05-04 RX ORDER — FUROSEMIDE 20 MG/1
20 TABLET ORAL DAILY PRN
Qty: 30 TABLET | Refills: 5 | Status: SHIPPED | OUTPATIENT
Start: 2023-05-04

## 2023-05-04 SDOH — ECONOMIC STABILITY: INCOME INSECURITY: IN THE LAST 12 MONTHS, WAS THERE A TIME WHEN YOU WERE NOT ABLE TO PAY THE MORTGAGE OR RENT ON TIME?: NO

## 2023-05-04 SDOH — ECONOMIC STABILITY: HOUSING INSECURITY: IN THE LAST 12 MONTHS, HOW MANY PLACES HAVE YOU LIVED?: 1

## 2023-05-04 ASSESSMENT — SOCIAL DETERMINANTS OF HEALTH (SDOH)
HOW OFTEN DO YOU ATTEND CHURCH OR RELIGIOUS SERVICES?: NEVER
HOW OFTEN DO YOU ATTENT MEETINGS OF THE CLUB OR ORGANIZATION YOU BELONG TO?: NEVER
HOW OFTEN DO YOU GET TOGETHER WITH FRIENDS OR RELATIVES?: THREE TIMES A WEEK
IN A TYPICAL WEEK, HOW MANY TIMES DO YOU TALK ON THE PHONE WITH FAMILY, FRIENDS, OR NEIGHBORS?: THREE TIMES A WEEK
DO YOU BELONG TO ANY CLUBS OR ORGANIZATIONS SUCH AS CHURCH GROUPS UNIONS, FRATERNAL OR ATHLETIC GROUPS, OR SCHOOL GROUPS?: NO

## 2023-05-04 ASSESSMENT — ENCOUNTER SYMPTOMS: DYSPNEA ASSOCIATED WITH: MINIMAL EXERTION

## 2023-05-04 NOTE — TELEPHONE ENCOUNTER
Talked with patient today for care coordination enrollment. Patient states is having worsening SOB, cough and chest congestion more often over last few months. Wondering if she can get pulmonology referral?  Thank you    2. Discussed Lasix with patient. Admits she was not taking. Does report occas edema in feet/ankles and hands. Also discussed worsening SOB, wet cough. Agreed to restart.   Needs refill

## 2023-05-04 NOTE — CARE COORDINATION
Remote Patient Kit Ordering Note      Date/Time:  5/4/2023 3:45 PM      [x] CCSS confirmed patient shipping address  [x] Patient will receive package over the next 2-4 business days. Someone 21 years or older must be present to sign for UPS delivery. [x] Patient to contact virtual installation-specific phone number listed in the patient instructions. [x] If the patient does not contact HRS within 24 hours, an [employer] [facility] Adamvandana will call the patient directly: If the patient does not answer, HRS will follow up with the clinical team notifying them about the unsuccessful attempt to contact the patient. HRS will make three call attempts to the patient. [x] LPN will contact patient once equipment is active to welcome them to the program.                                                         [x] Hours of RPM monitoring - Monday-Friday [id number]                     All questions answered at this time. ACM made aware the RPM kit has been ordered. CCSS notified patient of RPM equipment order.

## 2023-05-05 NOTE — PROGRESS NOTES
Remote Patient Monitoring Treatment Plan    Received request from ACM/CTN  to order remote patient monitoring for in home monitoring of COPD and order completed. Market: [place]   Kit requested: complete kit   Care Plan preset: COPD   BP cuff size requested: regular (9.05\"-15.74\")   Weight Scale requested: regular (<330lbs)       Patient will be monitoring blood pressure   pulse ox   weight  survey questions. Patient will engage in Remote Patient Monitoring each day to develop the skills necessary for self management. RPM Care Team Responsibilities:   Alerts will be reviewed daily and addressed within 2-4 hours during operational hours (Monday -Friday 9 am-4 pm)  Alert response and intervention documented in patient medical record  Alert response escalated to PCP per protocol and documented in patient medical record  Patient monitored over approximately  days  Discharge from program based on self-management readiness    See care coordination encounters for additional details. Kwesi Kraft M.D., M.P.H  [address] [facility] Remote Patient Monitoring Provider   [facility]   [email or web address]   Phone: [phone]  Fax: [phone]

## 2023-05-08 ENCOUNTER — OFFICE VISIT (OUTPATIENT)
Dept: PAIN MANAGEMENT | Age: 68
End: 2023-05-08
Payer: MEDICARE

## 2023-05-08 VITALS
HEIGHT: 62 IN | SYSTOLIC BLOOD PRESSURE: 138 MMHG | BODY MASS INDEX: 32.2 KG/M2 | DIASTOLIC BLOOD PRESSURE: 70 MMHG | TEMPERATURE: 97.1 F | WEIGHT: 175 LBS

## 2023-05-08 DIAGNOSIS — M51.26 LUMBAR HERNIATED DISC: Primary | ICD-10-CM

## 2023-05-08 DIAGNOSIS — R14.0 ABDOMINAL DISTENTION: ICD-10-CM

## 2023-05-08 DIAGNOSIS — M54.16 LUMBAR RADICULOPATHY: ICD-10-CM

## 2023-05-08 DIAGNOSIS — F11.20 OPIOID DEPENDENCE WITH CURRENT USE (HCC): ICD-10-CM

## 2023-05-08 DIAGNOSIS — R10.9 STOMACH PAIN: ICD-10-CM

## 2023-05-08 PROCEDURE — 3017F COLORECTAL CA SCREEN DOC REV: CPT | Performed by: PAIN MEDICINE

## 2023-05-08 PROCEDURE — 1036F TOBACCO NON-USER: CPT | Performed by: PAIN MEDICINE

## 2023-05-08 PROCEDURE — 99213 OFFICE O/P EST LOW 20 MIN: CPT | Performed by: PAIN MEDICINE

## 2023-05-08 PROCEDURE — G8417 CALC BMI ABV UP PARAM F/U: HCPCS | Performed by: PAIN MEDICINE

## 2023-05-08 PROCEDURE — 1123F ACP DISCUSS/DSCN MKR DOCD: CPT | Performed by: PAIN MEDICINE

## 2023-05-08 PROCEDURE — 1090F PRES/ABSN URINE INCON ASSESS: CPT | Performed by: PAIN MEDICINE

## 2023-05-08 PROCEDURE — G8427 DOCREV CUR MEDS BY ELIG CLIN: HCPCS | Performed by: PAIN MEDICINE

## 2023-05-08 PROCEDURE — G8399 PT W/DXA RESULTS DOCUMENT: HCPCS | Performed by: PAIN MEDICINE

## 2023-05-08 ASSESSMENT — ENCOUNTER SYMPTOMS
ALLERGIC/IMMUNOLOGIC NEGATIVE: 1
NAUSEA: 0
SHORTNESS OF BREATH: 0
DIARRHEA: 0
HEARTBURN: 0
BACK PAIN: 1
ABDOMINAL PAIN: 1
BLOOD IN STOOL: 0
SORE THROAT: 0
BACK PAIN: 1
EYES NEGATIVE: 1
COUGH: 1
RECTAL PAIN: 0
VOICE CHANGE: 0
ANAL BLEEDING: 0
SHORTNESS OF BREATH: 1
COLOR CHANGE: 0
TROUBLE SWALLOWING: 0
CONSTIPATION: 0
CONSTIPATION: 0
RHINORRHEA: 0
DIARRHEA: 0

## 2023-05-08 ASSESSMENT — COPD QUESTIONNAIRES: COPD: 1

## 2023-05-08 NOTE — PROGRESS NOTES
Present    Feeling of Stress : To some extent   Social Connections: Socially Isolated    Frequency of Communication with Friends and Family: Three times a week    Frequency of Social Gatherings with Friends and Family: Three times a week    Attends Orthodox Services: Never    Active Member of Clubs or Organizations: No    Attends Club or Organization Meetings: Never    Marital Status:    Intimate Partner Violence: Not on file   Housing Stability: Low Risk     Unable to Pay for Housing in the Last Year: No    Number of Places Lived in the Last Year: 1    Unstable Housing in the Last Year: No       Current Outpatient Medications on File Prior to Visit   Medication Sig Dispense Refill    furosemide (LASIX) 20 MG tablet Take 1 tablet by mouth daily as needed (as needed for swelling) 30 tablet 5    pregabalin (LYRICA) 150 MG capsule Take 1 capsule by mouth 2 times daily for 30 days. Max Daily Amount: 300 mg 60 capsule 2    amphetamine-dextroamphetamine (ADDERALL XR) 20 MG extended release capsule Take 1 capsule by mouth daily for 30 days. Max Daily Amount: 20 mg 30 capsule 0    [START ON 6/1/2023] amphetamine-dextroamphetamine (ADDERALL XR) 20 MG extended release capsule Take 1 capsule by mouth daily for 30 days. Max Daily Amount: 20 mg 30 capsule 0    [START ON 7/1/2023] amphetamine-dextroamphetamine (ADDERALL XR) 20 MG extended release capsule Take 1 capsule by mouth daily for 30 days. Max Daily Amount: 20 mg 30 capsule 0    ALPRAZolam (XANAX) 1 MG tablet Take 1 tablet by mouth in the morning and 1 tablet in the evening. Do all this for 30 days. 60 tablet 2    albuterol sulfate HFA (PROVENTIL;VENTOLIN;PROAIR) 108 (90 Base) MCG/ACT inhaler Inhale 2 puffs into the lungs every 6 hours as needed for Wheezing 1 each 5    HYDROcodone-acetaminophen (NORCO) 5-325 MG per tablet Take 1 tablet by mouth daily as needed for Pain for up to 7 days. Intended supply: 3 days.  Take lowest dose possible to manage pain Max Daily

## 2023-05-09 ENCOUNTER — TELEPHONE (OUTPATIENT)
Dept: PHARMACY | Facility: CLINIC | Age: 68
End: 2023-05-09

## 2023-05-09 DIAGNOSIS — F90.0 ATTENTION DEFICIT HYPERACTIVITY DISORDER (ADHD), PREDOMINANTLY INATTENTIVE TYPE: ICD-10-CM

## 2023-05-09 RX ORDER — DEXTROAMPHETAMINE SACCHARATE, AMPHETAMINE ASPARTATE MONOHYDRATE, DEXTROAMPHETAMINE SULFATE AND AMPHETAMINE SULFATE 5; 5; 5; 5 MG/1; MG/1; MG/1; MG/1
20 CAPSULE, EXTENDED RELEASE ORAL
Refills: 0 | Status: CANCELLED | OUTPATIENT
Start: 2023-07-01 | End: 2023-07-31

## 2023-05-09 RX ORDER — DEXTROAMPHETAMINE SACCHARATE, AMPHETAMINE ASPARTATE, DEXTROAMPHETAMINE SULFATE AND AMPHETAMINE SULFATE 5; 5; 5; 5 MG/1; MG/1; MG/1; MG/1
20 TABLET ORAL 2 TIMES DAILY
Qty: 60 TABLET | Refills: 0 | Status: SHIPPED | OUTPATIENT
Start: 2023-05-09 | End: 2023-06-08

## 2023-05-09 NOTE — TELEPHONE ENCOUNTER
POPULATION HEALTH CLINICAL PHARMACY: ADHERENCE REVIEW  Identified care gap per [facility]: fills at [address]: Statin adherence    ASSESSMENT  [address] Records claims through 23 (Prior Year [place] = not reported; YTD Mark Mcgee = 81%; Potential Fail Date: 23): Rosuvastatin 10 mg tab last filled on 3/31/23 for 30 day supply. Next refill due: 23    Prescribed si tablet/capsule daily    Per Reconcile Dispense History:  rosuvastatin 10 mg tablet 2023 30 30 each Yamini Buchanan, APRN - CNP [facility]...   rosuvastatin 10 mg tablet 2023 30 30 each [address], APRN - [address]   rosuvastatin 10 mg tablet 2023 30 30 each [address], APRN - [address]...   rosuvastatin 10 mg tablet 2022 30 30 each [address], APRN - [address] [facility] per hyperlink    Lab Results   Component Value Date    CHOL 102 2022    TRIG 151 (H) 2022    HDL 59 2022    LDLCALC 13 2022     ALT   Date Value Ref Range Status   2023 16 0 - 33 U/L Final     AST   Date Value Ref Range Status   2023 21 0 - 35 U/L Final     The ASCVD Risk score (Nhan DK, et al., 2019) failed to calculate for the following reasons: The valid total cholesterol range is 130 to 320 mg/dL     PLAN  The following are interventions that have been identified:   Patient overdue refilling rosuvastatin and active on home medication list.   2 fill, believe has refill available at pharmacy  Patient eligible for 90 day supply of rosuvastatin    Routing to [facility] to please follow up with pharmacy and patient as appropriate.     Maycol Jarvis, PharmD, [facility]  Department, toll free: [phone], option 1

## 2023-05-09 NOTE — TELEPHONE ENCOUNTER
I sent the prescription. Let her know the reason the other one was only once a day is because it was extended release.    Its hard to know what is going to be in stock at the pharmacy with the medication shortage we have been having

## 2023-05-09 NOTE — PROGRESS NOTES
History:   Procedure Laterality Date     SECTION      COLONOSCOPY      [facility]  Anam Sorensen MD    DILATION AND CURETTAGE OF UTERUS N/A 2020    DIAGNOSTIC LAPAROSCOPY, LYSIS OF ADHESIONS, HYSTEROSCOPY WITH Adolfo Rishi performed by Breanne Dennis MD at [address] [place] [address] History     Socioeconomic History    Marital status:      Spouse name: Not on file    Number of children: Not on file    Years of education: Not on file    Highest education level: Not on file   Occupational History    Not on file   Tobacco Use    Smoking status: Former     Packs/day: 2.00     Years: 50.00     Pack years: 100.00     Types: Cigarettes     Quit date: 2022     Years since quittin.5    Smokeless tobacco: Never   Vaping Use    Vaping Use: Never used   Substance and Sexual Activity    Alcohol use: Never    Drug use: Never    Sexual activity: Not Currently   Other Topics Concern    Not on file   Social History Narrative    Not on file     Social Determinants of Health     Financial Resource Strain: Low Risk     Difficulty of Paying Living Expenses: Not hard at all   Food Insecurity: No Food Insecurity    Worried About [address] [facility] in the Last Year: Never true    [facility] in the Last Year: Never true   Transportation Needs: No Transportation Needs    Lack of Transportation (Medical): No    Lack of Transportation (Non-Medical): No   Physical Activity: Insufficiently Active    Days of Exercise per Week: 2 days    Minutes of Exercise per Session: 10 min   Stress: Stress Concern Present    Feeling of Stress : To some extent   Social Connections: Socially Isolated    Frequency of Communication with Friends and Family: Three times a week    Frequency of Social Gatherings with Friends and Family:  Three times a week    Attends Scientologist Services: Never    Active Member of Clubs or Organizations: No    Attends Club or Organization Meetings: Never    Marital Status:

## 2023-05-09 NOTE — TELEPHONE ENCOUNTER
Patient calling into the office stating that she is supposed to get Adderall 20 mg, and she takes it twice a day. . I could not find the that script. [facility]

## 2023-05-09 NOTE — TELEPHONE ENCOUNTER
Per [facility], Rosuvastatin 10mg will be filled for a 30ds because that's the way it was written. Attempted to reach patient to see if interested in switching Rosuvastatin to a 90ds.  Mailed letter     For Pharmacy Admin Tracking Only    Program: [address] in place:  No  Recommendation Provided To: Pharmacy: 1  Intervention Detail: Adherence Monitorin  Intervention Accepted By: Pharmacy: 1  Gap Closed?: No   Time Spent (min): 10

## 2023-05-11 ENCOUNTER — CARE COORDINATION (OUTPATIENT)
Dept: CARE COORDINATION | Age: 68
End: 2023-05-11

## 2023-05-11 NOTE — CARE COORDINATION
Attempted to reach patient for care coordination follow up.   Left message to return call [phone]

## 2023-05-17 ENCOUNTER — OFFICE VISIT (OUTPATIENT)
Dept: GASTROENTEROLOGY | Age: 68
End: 2023-05-17
Payer: MEDICARE

## 2023-05-17 ENCOUNTER — PREP FOR PROCEDURE (OUTPATIENT)
Dept: GASTROENTEROLOGY | Age: 68
End: 2023-05-17

## 2023-05-17 VITALS
HEART RATE: 78 BPM | DIASTOLIC BLOOD PRESSURE: 68 MMHG | OXYGEN SATURATION: 98 % | SYSTOLIC BLOOD PRESSURE: 122 MMHG | BODY MASS INDEX: 31.64 KG/M2 | WEIGHT: 173 LBS

## 2023-05-17 DIAGNOSIS — R10.9 ABDOMINAL PAIN, UNSPECIFIED ABDOMINAL LOCATION: Primary | ICD-10-CM

## 2023-05-17 DIAGNOSIS — K59.00 CONSTIPATION, UNSPECIFIED CONSTIPATION TYPE: ICD-10-CM

## 2023-05-17 DIAGNOSIS — R10.9 ABDOMINAL WALL PAIN: ICD-10-CM

## 2023-05-17 PROCEDURE — 3017F COLORECTAL CA SCREEN DOC REV: CPT | Performed by: INTERNAL MEDICINE

## 2023-05-17 PROCEDURE — 1036F TOBACCO NON-USER: CPT | Performed by: INTERNAL MEDICINE

## 2023-05-17 PROCEDURE — 1123F ACP DISCUSS/DSCN MKR DOCD: CPT | Performed by: INTERNAL MEDICINE

## 2023-05-17 PROCEDURE — G8417 CALC BMI ABV UP PARAM F/U: HCPCS | Performed by: INTERNAL MEDICINE

## 2023-05-17 PROCEDURE — G8399 PT W/DXA RESULTS DOCUMENT: HCPCS | Performed by: INTERNAL MEDICINE

## 2023-05-17 PROCEDURE — 99204 OFFICE O/P NEW MOD 45 MIN: CPT | Performed by: INTERNAL MEDICINE

## 2023-05-17 PROCEDURE — G8427 DOCREV CUR MEDS BY ELIG CLIN: HCPCS | Performed by: INTERNAL MEDICINE

## 2023-05-17 PROCEDURE — 1090F PRES/ABSN URINE INCON ASSESS: CPT | Performed by: INTERNAL MEDICINE

## 2023-05-17 RX ORDER — SODIUM, POTASSIUM,MAG SULFATES 17.5-3.13G
SOLUTION, RECONSTITUTED, ORAL ORAL
Qty: 1 EACH | Refills: 0 | Status: SHIPPED | OUTPATIENT
Start: 2023-05-17

## 2023-05-17 ASSESSMENT — ENCOUNTER SYMPTOMS
COLOR CHANGE: 0
SHORTNESS OF BREATH: 0
EYE REDNESS: 0
NAUSEA: 0
VOICE CHANGE: 0
TROUBLE SWALLOWING: 0
RECTAL PAIN: 0
WHEEZING: 0
ABDOMINAL PAIN: 1
VOMITING: 0
ABDOMINAL DISTENTION: 0
PHOTOPHOBIA: 0
EYE PAIN: 0
CONSTIPATION: 1
CHEST TIGHTNESS: 0
DIARRHEA: 0
BLOOD IN STOOL: 0

## 2023-05-17 NOTE — PROGRESS NOTES
11/05/2022 05:15 AM    WBC 7.8 11/04/2022 12:45 PM    WBC 8.1 05/31/2022 09:20 AM    WBC 7.7 04/16/2022 06:07 PM    HGB 14.6 02/02/2023 11:54 AM    HGB 13.6 11/05/2022 05:15 AM    HGB 14.2 11/04/2022 12:45 PM    HGB 14.0 05/31/2022 09:20 AM    HGB 13.8 04/16/2022 06:07 PM    HCT 44.9 02/02/2023 11:54 AM    HCT 40.5 11/05/2022 05:15 AM    HCT 43.5 11/04/2022 12:45 PM    HCT 43.2 05/31/2022 09:20 AM    HCT 42.3 04/16/2022 06:07 PM    MCV 92.5 02/02/2023 11:54 AM    MCV 93.2 11/05/2022 05:15 AM    MCV 93.3 11/04/2022 12:45 PM    MCV 96.2 05/31/2022 09:20 AM    MCV 94.8 04/16/2022 06:07 PM     02/02/2023 11:54 AM     11/05/2022 05:15 AM     11/04/2022 12:45 PM     05/31/2022 09:20 AM     04/16/2022 06:07 PM    .  Lab Results   Component Value Date/Time    ALT 16 02/02/2023 11:54 AM    ALT 13 11/04/2022 12:45 PM    ALT 21 05/31/2022 09:20 AM    AST 21 02/02/2023 11:54 AM    AST 19 11/04/2022 12:45 PM    AST 19 05/31/2022 09:20 AM    ALKPHOS 100 02/02/2023 11:54 AM    ALKPHOS 105 11/04/2022 12:45 PM    ALKPHOS 100 05/31/2022 09:20 AM    BILITOT 0.3 02/02/2023 11:54 AM    BILITOT <0.2 11/04/2022 12:45 PM    BILITOT <0.2 05/31/2022 09:20 AM       No results found. Lab Results   Component Value Date/Time    IRON 57 02/03/2020 03:33 PM    TIBC 298 02/03/2020 03:33 PM    FERRITIN 90.2 02/03/2020 03:33 PM     Lab Results   Component Value Date/Time    INR 0.9 11/04/2022 01:00 PM     No components found for: ACUTEHEPATITISSCREEN  No components found for: CELIACPANEL  No components found for: STOOLCULTURE, C.DIFF, STOOLOVAPARASITE, STOOLLEUCOCYTE        Assessment:      Assessment and plan:    1.   Abdominal pain:  Also endorses bulging at the abdominal wall that was noted on the exam  We will obtain cross-sectional study for further evaluation of the pain , abdominal wall/hernia evaluation  We will initiate bowel regimen for constipation including high-fiber diet and avoiding dietary response,

## 2023-05-19 ENCOUNTER — CARE COORDINATION (OUTPATIENT)
Dept: CARE COORDINATION | Age: 68
End: 2023-05-19

## 2023-05-19 NOTE — CARE COORDINATION
Attempted to reach patient for care coordination and RPM follow up.   Left message to return call to [facility] [employer] [phone]

## 2023-05-25 ENCOUNTER — CARE COORDINATION (OUTPATIENT)
Dept: CARE COORDINATION | Age: 68
End: 2023-05-25

## 2023-05-25 ENCOUNTER — HOSPITAL ENCOUNTER (OUTPATIENT)
Dept: CT IMAGING | Age: 68
Discharge: HOME | End: 2023-05-27
Payer: MEDICARE

## 2023-05-25 DIAGNOSIS — R10.9 ABDOMINAL PAIN, UNSPECIFIED ABDOMINAL LOCATION: ICD-10-CM

## 2023-05-25 DIAGNOSIS — R10.9 ABDOMINAL WALL PAIN: ICD-10-CM

## 2023-05-25 PROCEDURE — 6360000004 HC RX CONTRAST MEDICATION: Performed by: RADIOLOGY

## 2023-05-25 PROCEDURE — 74177 CT ABD & PELVIS W/CONTRAST: CPT

## 2023-05-25 RX ORDER — SODIUM CHLORIDE 9 MG/ML
INJECTION, SOLUTION INTRAVENOUS CONTINUOUS
Status: DISCONTINUED | OUTPATIENT
Start: 2023-05-25 | End: 2023-05-28 | Stop reason: HOSPADM

## 2023-05-25 RX ADMIN — IOPAMIDOL 70 ML: 612 INJECTION, SOLUTION INTRAVENOUS at 11:48

## 2023-05-25 NOTE — CARE COORDINATION
Attempted to reach patient for care coordination follow up.   Left message to return call [phone]  Left message regarding RPM if received and to activate

## 2023-05-26 ENCOUNTER — CARE COORDINATION (OUTPATIENT)
Dept: CARE COORDINATION | Age: 68
End: 2023-05-26

## 2023-05-26 NOTE — CARE COORDINATION
Attempted to reach patient for care coordination follow up. Also need RPM activation.     Left message to return call [phone]

## 2023-05-31 ENCOUNTER — TELEPHONE (OUTPATIENT)
Dept: GASTROENTEROLOGY | Age: 68
End: 2023-05-31

## 2023-06-02 ENCOUNTER — CARE COORDINATION (OUTPATIENT)
Dept: CARE COORDINATION | Age: 68
End: 2023-06-02

## 2023-06-02 NOTE — CARE COORDINATION
Attempted to reach patient for care coordination follow up and RPM activation.   Left message [phone]

## 2023-06-19 ENCOUNTER — OFFICE VISIT (OUTPATIENT)
Dept: PAIN MANAGEMENT | Age: 68
End: 2023-06-19
Payer: MEDICARE

## 2023-06-19 VITALS
HEIGHT: 62 IN | BODY MASS INDEX: 31.47 KG/M2 | WEIGHT: 171 LBS | TEMPERATURE: 98 F | DIASTOLIC BLOOD PRESSURE: 68 MMHG | SYSTOLIC BLOOD PRESSURE: 120 MMHG

## 2023-06-19 DIAGNOSIS — M46.1 BILATERAL SACROILIITIS (HCC): Primary | ICD-10-CM

## 2023-06-19 DIAGNOSIS — R10.9 STOMACH PAIN: ICD-10-CM

## 2023-06-19 DIAGNOSIS — M51.36 DDD (DEGENERATIVE DISC DISEASE), LUMBAR: ICD-10-CM

## 2023-06-19 DIAGNOSIS — M47.817 LUMBOSACRAL SPONDYLOSIS WITHOUT MYELOPATHY: ICD-10-CM

## 2023-06-19 PROCEDURE — 1090F PRES/ABSN URINE INCON ASSESS: CPT | Performed by: PAIN MEDICINE

## 2023-06-19 PROCEDURE — 3017F COLORECTAL CA SCREEN DOC REV: CPT | Performed by: PAIN MEDICINE

## 2023-06-19 PROCEDURE — 99213 OFFICE O/P EST LOW 20 MIN: CPT | Performed by: PAIN MEDICINE

## 2023-06-19 PROCEDURE — G8399 PT W/DXA RESULTS DOCUMENT: HCPCS | Performed by: PAIN MEDICINE

## 2023-06-19 PROCEDURE — G8417 CALC BMI ABV UP PARAM F/U: HCPCS | Performed by: PAIN MEDICINE

## 2023-06-19 PROCEDURE — 1123F ACP DISCUSS/DSCN MKR DOCD: CPT | Performed by: PAIN MEDICINE

## 2023-06-19 PROCEDURE — 1036F TOBACCO NON-USER: CPT | Performed by: PAIN MEDICINE

## 2023-06-19 PROCEDURE — G8427 DOCREV CUR MEDS BY ELIG CLIN: HCPCS | Performed by: PAIN MEDICINE

## 2023-06-19 ASSESSMENT — ENCOUNTER SYMPTOMS
CONSTIPATION: 0
BACK PAIN: 1
SHORTNESS OF BREATH: 1
DIARRHEA: 0
NAUSEA: 0

## 2023-06-19 NOTE — PROGRESS NOTES
hypertrophy or thickening of ligamenta flava. There is no central spinal canal stenosis. There is no neural foraminal stenosis. L4/5:  Broad-based disc bulging. Moderate facet arthropathy. Borderline canal narrowing. Mild left lateral recess narrowing. No significant foraminal narrowing bilaterally. L5/S1: There is no evidence of disc bulging or disc herniation. No significant facet hypertrophy or thickening of ligamenta flava. There is no central spinal canal stenosis. There is no neural foraminal stenosis. Retroperitoneum: No abnormality of the visualized Aorta or retroperitoneum. Impression   MILD DEGENERATIVE DISC DISEASE PRIMARILY AT L4-5 WITH BORDERLINE TO MILD CANAL NARROWING. Assessment:        Diagnosis Orders   1. Lumbar radiculopathy      2. Lumbar herniated disc      3. DDD (degenerative disc disease), lumbar           Assessment:      Diagnosis Orders   1. Bilateral sacroiliitis (HCC)  ND INJECT SI JOINT ARTHRGRPHY&/ANES/STEROID W/JUAN      2. Lumbosacral spondylosis without myelopathy        3. DDD (degenerative disc disease), lumbar        4. Stomach pain            Plan:          No orders of the defined types were placed in this encounter. Orders Placed This Encounter   Procedures    ND INJECT SI JOINT ARTHRGRPHY&/ANES/STEROID W/JUAN     Standing Status:   Future     Standing Expiration Date:   9/17/2023     1. Continue Lyrica and Cymbalta per her PCP. 2. Continue Buprenorphine patch. 3. Trial of SI Joint injection as ordered above. Pertinent imaging reviewed. She has failed conservative treatment.   Discussed the risks including but not limited to bleeding, infection, worsened pain, damage to surrounding structures, side effects, toxicity, allergic reactions to medications used, need for surgery, premature damage or degeneration of the joint, as well as catastrophic injury such as vision loss, paralysis, stroke, bowel or bladder incontinence,

## 2023-06-20 ENCOUNTER — PROCEDURE VISIT (OUTPATIENT)
Dept: PAIN MANAGEMENT | Age: 68
End: 2023-06-20

## 2023-06-20 DIAGNOSIS — M46.1 BILATERAL SACROILIITIS (HCC): ICD-10-CM

## 2023-06-20 RX ORDER — LIDOCAINE HYDROCHLORIDE 10 MG/ML
2 INJECTION, SOLUTION EPIDURAL; INFILTRATION; INTRACAUDAL; PERINEURAL ONCE
Status: COMPLETED | OUTPATIENT
Start: 2023-06-20 | End: 2023-06-20

## 2023-06-20 RX ORDER — BETAMETHASONE SODIUM PHOSPHATE AND BETAMETHASONE ACETATE 3; 3 MG/ML; MG/ML
6 INJECTION, SUSPENSION INTRA-ARTICULAR; INTRALESIONAL; INTRAMUSCULAR; SOFT TISSUE ONCE
Status: COMPLETED | OUTPATIENT
Start: 2023-06-20 | End: 2023-06-20

## 2023-06-20 RX ADMIN — BETAMETHASONE SODIUM PHOSPHATE AND BETAMETHASONE ACETATE 6 MG: 3; 3 INJECTION, SUSPENSION INTRA-ARTICULAR; INTRALESIONAL; INTRAMUSCULAR; SOFT TISSUE at 09:28

## 2023-06-20 RX ADMIN — LIDOCAINE HYDROCHLORIDE 2 ML: 10 INJECTION, SOLUTION EPIDURAL; INFILTRATION; INTRACAUDAL; PERINEURAL at 09:29

## 2023-06-20 NOTE — PROGRESS NOTES
[facility]) Physicians  Neurosurgery and Pain [address] [facility] [place], [place]: [phone]  F: [phone]      Patient Name: Mariela Block  : 1955     Date:  2023      Physician: Jeremias Higgins MD        Mariela Block is here today for interventional pain management. Preoperatively, the patient presents with SI joint mediated pain, as per history and exam. Patient has failed NSAIDs, PT, and conservative treatment. Patient has significant psychological and functional impairment due to this condition. Discussed the risks including but not limited to bleeding, infection, worsened pain, damage to surrounding structures, side effects, toxicity, allergic reactions to medications used, need for surgery, abdominal and visceral injury, as well as catastrophic injury such as vision loss, paralysis, spinal cord or plexus injury, stroke, bowel or bladder incontinence, chest tube insertion, ventilator dependence, and death. Discussed the risks, benefits, and alternatives to the procedure including no procedure at all. Discussed that we cannot undo any permanent neurologic or orthopaedic damage or change the course of any underlying disease. After thorough discussion, patient expressed understanding and willingness to proceed. Written consent was obtained and is in the chart. Verbal consent to proceed was obtained. Standard ASIPP guidelines were followed and sterile technique used. Area was cleaned with Betadine x3. Informed consent was obtained. Fluoroscopic guidance was used for this procedure. S.I. JOINT:  Bilateral  Appropriate length spinal needle was advanced to the S.I. Joint. Negative aspiration was achieved. In total, approximately 6mg of Betamethasone and 1ml of 1% preservative free Lidocaine was injected without difficulty. Patient tolerated the procedure well, no obvious complications occurred during the procedure.

## 2023-06-23 ENCOUNTER — CARE COORDINATION (OUTPATIENT)
Dept: CARE COORDINATION | Age: 68
End: 2023-06-23

## 2023-06-23 NOTE — CARE COORDINATION
Attempted to reach patient for care coordination follow up. Left detailed message to return call and to call to activate RPM or call me so we can return kit.

## 2023-06-26 ENCOUNTER — PATIENT MESSAGE (OUTPATIENT)
Dept: SPIRITUAL SERVICES | Age: 68
End: 2023-06-26

## 2023-06-27 ENCOUNTER — CARE COORDINATION (OUTPATIENT)
Dept: CARE COORDINATION | Age: 68
End: 2023-06-27

## 2023-06-28 ENCOUNTER — CARE COORDINATION (OUTPATIENT)
Dept: CARE COORDINATION | Age: 68
End: 2023-06-28

## 2023-06-28 DIAGNOSIS — J44.9 CHRONIC OBSTRUCTIVE PULMONARY DISEASE, UNSPECIFIED COPD TYPE (HCC): Primary | ICD-10-CM

## 2023-06-30 ENCOUNTER — CARE COORDINATION (OUTPATIENT)
Dept: CARE COORDINATION | Age: 68
End: 2023-06-30

## 2023-07-13 ENCOUNTER — TELEPHONE (OUTPATIENT)
Dept: FAMILY MEDICINE CLINIC | Age: 68
End: 2023-07-13

## 2023-07-13 DIAGNOSIS — F90.0 ATTENTION DEFICIT HYPERACTIVITY DISORDER (ADHD), PREDOMINANTLY INATTENTIVE TYPE: ICD-10-CM

## 2023-07-13 RX ORDER — DEXTROAMPHETAMINE SACCHARATE, AMPHETAMINE ASPARTATE, DEXTROAMPHETAMINE SULFATE AND AMPHETAMINE SULFATE 5; 5; 5; 5 MG/1; MG/1; MG/1; MG/1
20 TABLET ORAL 2 TIMES DAILY
Qty: 60 TABLET | Refills: 0 | Status: SHIPPED | OUTPATIENT
Start: 2023-08-11 | End: 2023-09-10

## 2023-07-13 RX ORDER — DEXTROAMPHETAMINE SACCHARATE, AMPHETAMINE ASPARTATE, DEXTROAMPHETAMINE SULFATE AND AMPHETAMINE SULFATE 5; 5; 5; 5 MG/1; MG/1; MG/1; MG/1
20 TABLET ORAL 2 TIMES DAILY
Qty: 60 TABLET | Refills: 0 | Status: SHIPPED | OUTPATIENT
Start: 2023-09-10 | End: 2023-10-10

## 2023-07-13 RX ORDER — DEXTROAMPHETAMINE SACCHARATE, AMPHETAMINE ASPARTATE, DEXTROAMPHETAMINE SULFATE AND AMPHETAMINE SULFATE 5; 5; 5; 5 MG/1; MG/1; MG/1; MG/1
20 TABLET ORAL 2 TIMES DAILY
Qty: 60 TABLET | Refills: 0 | Status: SHIPPED | OUTPATIENT
Start: 2023-07-13 | End: 2023-08-12

## 2023-07-13 NOTE — TELEPHONE ENCOUNTER
Patient called because when she picked up her Adderall, there was only 30 tablets. She said she takes 1 tablet twice daily. Please advise, thank you.

## 2023-07-17 RX ORDER — SODIUM CHLORIDE 9 MG/ML
INJECTION, SOLUTION INTRAVENOUS CONTINUOUS
Status: CANCELLED | OUTPATIENT
Start: 2023-07-17

## 2023-07-17 RX ORDER — SODIUM CHLORIDE 9 MG/ML
INJECTION, SOLUTION INTRAVENOUS PRN
Status: CANCELLED | OUTPATIENT
Start: 2023-07-17

## 2023-07-17 RX ORDER — SODIUM CHLORIDE 0.9 % (FLUSH) 0.9 %
5-40 SYRINGE (ML) INJECTION EVERY 12 HOURS SCHEDULED
Status: CANCELLED | OUTPATIENT
Start: 2023-07-17

## 2023-07-25 ENCOUNTER — HOSPITAL ENCOUNTER (OUTPATIENT)
Dept: CT IMAGING | Age: 68
Discharge: HOME | End: 2023-07-27
Payer: MEDICARE

## 2023-07-25 ENCOUNTER — HOSPITAL ENCOUNTER (OUTPATIENT)
Dept: WOMENS IMAGING | Age: 68
Discharge: HOME | End: 2023-07-27
Payer: MEDICARE

## 2023-07-25 VITALS — BODY MASS INDEX: 30.78 KG/M2 | HEIGHT: 63 IN

## 2023-07-25 DIAGNOSIS — Z12.31 BREAST CANCER SCREENING BY MAMMOGRAM: ICD-10-CM

## 2023-07-25 DIAGNOSIS — Z12.39 ENCOUNTER FOR SCREENING BREAST EXAMINATION: ICD-10-CM

## 2023-07-25 DIAGNOSIS — Z87.891 PERSONAL HISTORY OF TOBACCO USE: ICD-10-CM

## 2023-07-25 PROCEDURE — 71271 CT THORAX LUNG CANCER SCR C-: CPT

## 2023-07-25 PROCEDURE — 77063 BREAST TOMOSYNTHESIS BI: CPT

## 2023-07-28 DIAGNOSIS — R92.8 ABNORMALITY OF LEFT BREAST ON SCREENING MAMMOGRAM: Primary | ICD-10-CM

## 2023-08-01 ENCOUNTER — HOSPITAL ENCOUNTER (OUTPATIENT)
Dept: ULTRASOUND IMAGING | Age: 68
Discharge: HOME | End: 2023-08-03
Payer: MEDICARE

## 2023-08-01 ENCOUNTER — HOSPITAL ENCOUNTER (OUTPATIENT)
Dept: WOMENS IMAGING | Age: 68
Discharge: HOME | End: 2023-08-03
Payer: MEDICARE

## 2023-08-01 DIAGNOSIS — R92.8 ABNORMAL MAMMOGRAM OF LEFT BREAST: ICD-10-CM

## 2023-08-01 DIAGNOSIS — R92.8 ABNORMALITY OF LEFT BREAST ON SCREENING MAMMOGRAM: ICD-10-CM

## 2023-08-01 PROCEDURE — 76642 ULTRASOUND BREAST LIMITED: CPT

## 2023-08-01 PROCEDURE — G0279 TOMOSYNTHESIS, MAMMO: HCPCS

## 2023-08-02 ENCOUNTER — TELEPHONE (OUTPATIENT)
Dept: WOMENS IMAGING | Age: 68
End: 2023-08-02

## 2023-08-02 NOTE — TELEPHONE ENCOUNTER
08/02/223  Spoke with patient regarding her abnormal mammogram. Made appointment for her with Dr. Shelia Hsieh. Thierno Nesbitt on August 9, Wed, and 1:45 at the [place] office. Reviewed her mammogram, answered questions, and reviewed what would happen at office visit with Dr. Shelia Hsieh. Thierno Nesbitt. Address and directions given. Encouraged to call with any questions or concerns.

## 2023-08-03 ENCOUNTER — OFFICE VISIT (OUTPATIENT)
Dept: FAMILY MEDICINE CLINIC | Age: 68
End: 2023-08-03
Payer: MEDICARE

## 2023-08-03 VITALS
DIASTOLIC BLOOD PRESSURE: 62 MMHG | HEIGHT: 63 IN | HEART RATE: 81 BPM | SYSTOLIC BLOOD PRESSURE: 130 MMHG | BODY MASS INDEX: 29.95 KG/M2 | WEIGHT: 169 LBS

## 2023-08-03 DIAGNOSIS — M54.41 CHRONIC BILATERAL LOW BACK PAIN WITH BILATERAL SCIATICA: ICD-10-CM

## 2023-08-03 DIAGNOSIS — F33.1 MODERATE EPISODE OF RECURRENT MAJOR DEPRESSIVE DISORDER (HCC): ICD-10-CM

## 2023-08-03 DIAGNOSIS — G57.93 NEUROPATHY INVOLVING BOTH LOWER EXTREMITIES: ICD-10-CM

## 2023-08-03 DIAGNOSIS — G89.29 CHRONIC BILATERAL LOW BACK PAIN WITH BILATERAL SCIATICA: ICD-10-CM

## 2023-08-03 DIAGNOSIS — F90.0 ATTENTION DEFICIT HYPERACTIVITY DISORDER (ADHD), PREDOMINANTLY INATTENTIVE TYPE: ICD-10-CM

## 2023-08-03 DIAGNOSIS — M46.1 BILATERAL SACROILIITIS (HCC): ICD-10-CM

## 2023-08-03 DIAGNOSIS — M48.062 SPINAL STENOSIS OF LUMBAR REGION WITH NEUROGENIC CLAUDICATION: ICD-10-CM

## 2023-08-03 DIAGNOSIS — M54.42 CHRONIC BILATERAL LOW BACK PAIN WITH BILATERAL SCIATICA: ICD-10-CM

## 2023-08-03 DIAGNOSIS — M79.7 FIBROMYALGIA: ICD-10-CM

## 2023-08-03 DIAGNOSIS — J44.9 CHRONIC OBSTRUCTIVE PULMONARY DISEASE, UNSPECIFIED COPD TYPE (HCC): Primary | ICD-10-CM

## 2023-08-03 DIAGNOSIS — F41.1 GAD (GENERALIZED ANXIETY DISORDER): ICD-10-CM

## 2023-08-03 DIAGNOSIS — J44.1 COPD EXACERBATION (HCC): ICD-10-CM

## 2023-08-03 PROCEDURE — 1090F PRES/ABSN URINE INCON ASSESS: CPT | Performed by: NURSE PRACTITIONER

## 2023-08-03 PROCEDURE — 1036F TOBACCO NON-USER: CPT | Performed by: NURSE PRACTITIONER

## 2023-08-03 PROCEDURE — 99214 OFFICE O/P EST MOD 30 MIN: CPT | Performed by: NURSE PRACTITIONER

## 2023-08-03 PROCEDURE — 3017F COLORECTAL CA SCREEN DOC REV: CPT | Performed by: NURSE PRACTITIONER

## 2023-08-03 PROCEDURE — G8399 PT W/DXA RESULTS DOCUMENT: HCPCS | Performed by: NURSE PRACTITIONER

## 2023-08-03 PROCEDURE — 3023F SPIROM DOC REV: CPT | Performed by: NURSE PRACTITIONER

## 2023-08-03 PROCEDURE — G8417 CALC BMI ABV UP PARAM F/U: HCPCS | Performed by: NURSE PRACTITIONER

## 2023-08-03 PROCEDURE — 1123F ACP DISCUSS/DSCN MKR DOCD: CPT | Performed by: NURSE PRACTITIONER

## 2023-08-03 PROCEDURE — G8428 CUR MEDS NOT DOCUMENT: HCPCS | Performed by: NURSE PRACTITIONER

## 2023-08-03 RX ORDER — DEXTROAMPHETAMINE SACCHARATE, AMPHETAMINE ASPARTATE, DEXTROAMPHETAMINE SULFATE AND AMPHETAMINE SULFATE 5; 5; 5; 5 MG/1; MG/1; MG/1; MG/1
20 TABLET ORAL 2 TIMES DAILY
Qty: 60 TABLET | Refills: 0 | Status: SHIPPED | OUTPATIENT
Start: 2023-09-02 | End: 2023-08-09

## 2023-08-03 RX ORDER — ALPRAZOLAM 1 MG/1
TABLET ORAL
Qty: 60 TABLET | Refills: 2 | Status: SHIPPED | OUTPATIENT
Start: 2023-08-03 | End: 2023-09-02

## 2023-08-03 RX ORDER — GABAPENTIN 400 MG/1
400 CAPSULE ORAL 3 TIMES DAILY
Qty: 90 CAPSULE | Refills: 2 | Status: SHIPPED | OUTPATIENT
Start: 2023-08-03 | End: 2023-09-02

## 2023-08-03 RX ORDER — BUPRENORPHINE 20 UG/H
1 PATCH TRANSDERMAL
Qty: 4 PATCH | Refills: 2 | Status: SHIPPED | OUTPATIENT
Start: 2023-08-03 | End: 2023-09-02

## 2023-08-03 RX ORDER — DEXTROAMPHETAMINE SACCHARATE, AMPHETAMINE ASPARTATE, DEXTROAMPHETAMINE SULFATE AND AMPHETAMINE SULFATE 5; 5; 5; 5 MG/1; MG/1; MG/1; MG/1
20 TABLET ORAL 2 TIMES DAILY
Qty: 60 TABLET | Refills: 0 | Status: SHIPPED | OUTPATIENT
Start: 2023-08-03 | End: 2023-09-02

## 2023-08-03 RX ORDER — DEXTROAMPHETAMINE SACCHARATE, AMPHETAMINE ASPARTATE, DEXTROAMPHETAMINE SULFATE AND AMPHETAMINE SULFATE 5; 5; 5; 5 MG/1; MG/1; MG/1; MG/1
20 TABLET ORAL 2 TIMES DAILY
Qty: 60 TABLET | Refills: 0 | Status: SHIPPED | OUTPATIENT
Start: 2023-10-02 | End: 2023-08-09

## 2023-08-03 RX ORDER — IPRATROPIUM BROMIDE AND ALBUTEROL SULFATE 2.5; .5 MG/3ML; MG/3ML
1 SOLUTION RESPIRATORY (INHALATION) EVERY 4 HOURS PRN
Qty: 360 ML | Refills: 0 | Status: SHIPPED | OUTPATIENT
Start: 2023-08-03

## 2023-08-08 RX ORDER — LIDOCAINE HYDROCHLORIDE 10 MG/ML
10 INJECTION, SOLUTION INFILTRATION; PERINEURAL ONCE
Status: CANCELLED | OUTPATIENT
Start: 2023-08-09

## 2023-08-09 ENCOUNTER — OFFICE VISIT (OUTPATIENT)
Dept: SURGERY | Age: 68
End: 2023-08-09

## 2023-08-09 ENCOUNTER — TELEPHONE (OUTPATIENT)
Dept: SURGERY | Age: 68
End: 2023-08-09

## 2023-08-09 ENCOUNTER — HOSPITAL ENCOUNTER (OUTPATIENT)
Age: 68
Setting detail: SPECIMEN
Discharge: HOME | End: 2023-08-09

## 2023-08-09 VITALS
DIASTOLIC BLOOD PRESSURE: 62 MMHG | WEIGHT: 169.6 LBS | HEART RATE: 73 BPM | BODY MASS INDEX: 30.05 KG/M2 | HEIGHT: 63 IN | RESPIRATION RATE: 16 BRPM | OXYGEN SATURATION: 93 % | SYSTOLIC BLOOD PRESSURE: 132 MMHG | TEMPERATURE: 97.7 F

## 2023-08-09 DIAGNOSIS — R92.8 ABNORMAL MAMMOGRAM OF LEFT BREAST: Primary | ICD-10-CM

## 2023-08-09 DIAGNOSIS — R92.8 ABNORMAL MAMMOGRAM OF LEFT BREAST: ICD-10-CM

## 2023-08-09 DIAGNOSIS — E66.9 OBESITY, CLASS I, BMI 30-34.9: ICD-10-CM

## 2023-08-09 DIAGNOSIS — K43.9 VENTRAL HERNIA WITHOUT OBSTRUCTION OR GANGRENE: ICD-10-CM

## 2023-08-09 RX ORDER — PREDNISONE 5 MG/1
5 TABLET ORAL DAILY
COMMUNITY
Start: 2023-08-05

## 2023-08-09 RX ORDER — ARIPIPRAZOLE 15 MG/1
15 TABLET ORAL EVERY MORNING
COMMUNITY
Start: 2023-08-05

## 2023-08-09 RX ORDER — LIDOCAINE HYDROCHLORIDE AND EPINEPHRINE BITARTRATE 20; .01 MG/ML; MG/ML
5 INJECTION, SOLUTION SUBCUTANEOUS ONCE
Status: COMPLETED | OUTPATIENT
Start: 2023-08-09 | End: 2023-08-09

## 2023-08-09 RX ADMIN — LIDOCAINE HYDROCHLORIDE AND EPINEPHRINE BITARTRATE 5 ML: 20; .01 INJECTION, SOLUTION SUBCUTANEOUS at 14:28

## 2023-08-09 RX ADMIN — Medication 3 MEQ: at 14:28

## 2023-08-09 ASSESSMENT — ENCOUNTER SYMPTOMS
COLOR CHANGE: 0
COUGH: 0
SHORTNESS OF BREATH: 0
ABDOMINAL PAIN: 0
SORE THROAT: 0
CHEST TIGHTNESS: 0
VOMITING: 0
NAUSEA: 0

## 2023-08-09 NOTE — TELEPHONE ENCOUNTER
NADIA for patient. I am trying to move her appointment time up today.  I was trying to see if she could get to clinic by 12:30pm.

## 2023-08-09 NOTE — PROGRESS NOTES
NEW BREAST PATIENT         SERVICE DATE: 23  SERVICE TIME:  1:51 PM EDT    REFERRED BY:  AMANDA Robb CNP  REASON FOR TODAY'S VISIT:    Chief Complaint   Patient presents with    New Patient    Abnormal Mammogram    Abnormal Ultrasound     Left Breast BIRADS 4, Does not do self breast exams, denies skin changes, nipple drainage or breast pain bilateral breasts      CHAPERONE WAS OFFERED, PATIENT RESPONDED: no    HISTORY AND CHIEF COMPLAINT:  Rupesh Mcnulty is a 75 y.o.  female who is here for a New Patient, Abnormal Mammogram, and Abnormal Ultrasound (Left Breast BIRADS 4, Does not do self breast exams, denies skin changes, nipple drainage or breast pain bilateral breasts)  She does not feel this area      BREAST HISTORY  Her past breast history (prior to this encounter) is as follows: Abnormal mammogram:   Yes, BIRADS 4 Left Breast  Abnormal Breast US:  Yes, BIRADS 4 Left Breast  Breast biopsy:    No  Breast cysts:    No  Breast surgery:    No  Breast cancer              No  History of Breastfeeding: No   Currently Breastfeeding: No      RISK FACTORS FOR BREAST CANCER:  Family History of Breast Cancer: Yes, significant for Maternal Aunt DX age 34 .   History of ovarian cancer: no  Ashkenazi Ancestry: no  Age at the birth of first child: 20  Age at the onset of menses: 5  Age at menopause: 53   Hormonal therapy: no  Postmenopausal obesity: yes, BMI 30.53    BRA SIZE: 40C    Past Medical History:   Diagnosis Date    ADHD (attention deficit hyperactivity disorder)     Anxiety     Depression     Fibromyalgia     GERD (gastroesophageal reflux disease)     Hypertension     Irritable bowel syndrome      Past Surgical History:   Procedure Laterality Date     SECTION      COLONOSCOPY      N [facility]  Helen Service MD    DILATION AND CURETTAGE OF UTERUS N/A 2020    DIAGNOSTIC LAPAROSCOPY, LYSIS OF ADHESIONS, HYSTEROSCOPY WITH Fernanda Goods performed by Madiha Taylor MD at [facility]

## 2023-08-10 ENCOUNTER — TELEPHONE (OUTPATIENT)
Dept: FAMILY MEDICINE CLINIC | Age: 68
End: 2023-08-10

## 2023-08-10 ASSESSMENT — ENCOUNTER SYMPTOMS
CONSTIPATION: 0
VOICE CHANGE: 0
SHORTNESS OF BREATH: 0
RECTAL PAIN: 0
HEARTBURN: 0
EYES NEGATIVE: 1
BACK PAIN: 1
RHINORRHEA: 0
COUGH: 1
ALLERGIC/IMMUNOLOGIC NEGATIVE: 1
BLOOD IN STOOL: 0
TROUBLE SWALLOWING: 0
ANAL BLEEDING: 0
COLOR CHANGE: 0
ABDOMINAL PAIN: 1
DIARRHEA: 0
SORE THROAT: 0

## 2023-08-10 ASSESSMENT — COPD QUESTIONNAIRES: COPD: 1

## 2023-08-10 NOTE — TELEPHONE ENCOUNTER
Patient calling in pharmacy is asking for a PA to be started for this RX    buprenorphine 20 MCG/HR PTWK [[id number]]  DISCONTINUED    Order Details  Dose: 1 patch Route: TransDERmal Frequency: EVERY 7 DAYS   Dispense Quantity: 4 patch Refills: 2          Sig: Place 1 patch onto the skin every 7 days for 30 days.

## 2023-08-11 NOTE — PROGRESS NOTES
Abdomen is soft. There is no mass. Tenderness: There is no abdominal tenderness. Musculoskeletal:      Cervical back: Neck supple. Skin:     General: Skin is warm and dry. Neurological:      Mental Status: She is alert and oriented to person, place, and time. Assessment & Plan   Diego Kocher was seen today for 3 month follow-up, adhd, copd, results and discuss medications. Diagnoses and all orders for this visit:    Chronic obstructive pulmonary disease, unspecified COPD type ([address])    Chronic bilateral low back pain with bilateral sciatica L>R  -     buprenorphine 20 MCG/HR PTWK; Place 1 patch onto the skin every 7 days for 30 days. Spinal stenosis of lumbar region with neurogenic claudication  -     buprenorphine 20 MCG/HR PTWK; Place 1 patch onto the skin every 7 days for 30 days. Fibromyalgia  -     buprenorphine 20 MCG/HR PTWK; Place 1 patch onto the skin every 7 days for 30 days. Moderate episode of recurrent major depressive disorder (HCC)  -     ALPRAZolam (XANAX) 1 MG tablet; Take 1 tablet by mouth in the morning and 1 tablet in the evening. Do all this for 30 days. DINO (generalized anxiety disorder)  -     ALPRAZolam (XANAX) 1 MG tablet; Take 1 tablet by mouth in the morning and 1 tablet in the evening. Do all this for 30 days. Bilateral sacroiliitis (HCC)    Attention deficit hyperactivity disorder (ADHD), predominantly inattentive type  -     amphetamine-dextroamphetamine (ADDERALL) 20 MG tablet; Take 1 tablet by mouth 2 times daily for 30 days. Max Daily Amount: 40 mg  -     Discontinue: amphetamine-dextroamphetamine (ADDERALL) 20 MG tablet; Take 1 tablet by mouth 2 times daily for 30 days. Max Daily Amount: 40 mg  -     Discontinue: amphetamine-dextroamphetamine (ADDERALL) 20 MG tablet; Take 1 tablet by mouth 2 times daily for 30 days. Max Daily Amount: 40 mg    Neuropathy involving both lower extremities  -     gabapentin (NEURONTIN) 400 MG capsule;  Take 1 capsule by mouth 3

## 2023-08-16 ENCOUNTER — OFFICE VISIT (OUTPATIENT)
Dept: SURGERY | Age: 68
End: 2023-08-16
Payer: MEDICARE

## 2023-08-16 ENCOUNTER — TELEPHONE (OUTPATIENT)
Dept: SURGERY | Age: 68
End: 2023-08-16

## 2023-08-16 VITALS
RESPIRATION RATE: 16 BRPM | BODY MASS INDEX: 29.91 KG/M2 | SYSTOLIC BLOOD PRESSURE: 118 MMHG | HEIGHT: 63 IN | HEART RATE: 80 BPM | WEIGHT: 168.8 LBS | DIASTOLIC BLOOD PRESSURE: 68 MMHG

## 2023-08-16 DIAGNOSIS — D05.12 BREAST NEOPLASM, TIS (DCIS), LEFT: Primary | ICD-10-CM

## 2023-08-16 DIAGNOSIS — E66.9 OBESITY, CLASS I, BMI 30-34.9: ICD-10-CM

## 2023-08-16 DIAGNOSIS — Z85.3 PERSONAL HISTORY OF MALIGNANT NEOPLASM OF BREAST: ICD-10-CM

## 2023-08-16 PROCEDURE — 3017F COLORECTAL CA SCREEN DOC REV: CPT | Performed by: SURGERY

## 2023-08-16 PROCEDURE — G8417 CALC BMI ABV UP PARAM F/U: HCPCS | Performed by: SURGERY

## 2023-08-16 PROCEDURE — 99215 OFFICE O/P EST HI 40 MIN: CPT | Performed by: SURGERY

## 2023-08-16 PROCEDURE — 1090F PRES/ABSN URINE INCON ASSESS: CPT | Performed by: SURGERY

## 2023-08-16 PROCEDURE — 1123F ACP DISCUSS/DSCN MKR DOCD: CPT | Performed by: SURGERY

## 2023-08-16 PROCEDURE — G8427 DOCREV CUR MEDS BY ELIG CLIN: HCPCS | Performed by: SURGERY

## 2023-08-16 PROCEDURE — 1036F TOBACCO NON-USER: CPT | Performed by: SURGERY

## 2023-08-16 PROCEDURE — G8399 PT W/DXA RESULTS DOCUMENT: HCPCS | Performed by: SURGERY

## 2023-08-16 RX ORDER — SODIUM CHLORIDE 9 MG/ML
INJECTION, SOLUTION INTRAVENOUS PRN
OUTPATIENT
Start: 2023-08-16

## 2023-08-16 RX ORDER — SODIUM CHLORIDE 0.9 % (FLUSH) 0.9 %
5-40 SYRINGE (ML) INJECTION PRN
OUTPATIENT
Start: 2023-08-16

## 2023-08-16 RX ORDER — SODIUM CHLORIDE 0.9 % (FLUSH) 0.9 %
5-40 SYRINGE (ML) INJECTION EVERY 12 HOURS SCHEDULED
OUTPATIENT
Start: 2023-08-16

## 2023-08-16 NOTE — PROGRESS NOTES
generated using Dragon voice recognition system, and there may be some incorrect words, spellings, punctuation that were not noticed in checking the note before saving.

## 2023-08-16 NOTE — TELEPHONE ENCOUNTER
PATIENT:  Jeanna Freeman    DATE:     8/16/2023    TELEPHONE CONVERSATION:    Jeanna Freeman was called regarding biopsy results. concordant. needs appt.     Dictated by:  Cristian Hagen MD  8/16/2023

## 2023-08-16 NOTE — TELEPHONE ENCOUNTER
NADIA for patient offering a follow-up appointment in our clinic [facility] today @ 1:45 pm per Dr. Sanchez Number.

## 2023-08-17 PROBLEM — Z85.3 PERSONAL HISTORY OF MALIGNANT NEOPLASM OF BREAST: Status: ACTIVE | Noted: 2023-08-17

## 2023-08-17 PROBLEM — D05.12 BREAST NEOPLASM, TIS (DCIS), LEFT: Status: ACTIVE | Noted: 2023-08-17

## 2023-08-21 ENCOUNTER — TELEPHONE (OUTPATIENT)
Dept: SURGERY | Age: 68
End: 2023-08-21

## 2023-08-24 ENCOUNTER — PREP FOR PROCEDURE (OUTPATIENT)
Dept: SURGERY | Age: 68
End: 2023-08-24

## 2023-08-24 DIAGNOSIS — D05.12 BREAST NEOPLASM, TIS (DCIS), LEFT: Primary | ICD-10-CM

## 2023-08-25 ENCOUNTER — TELEPHONE (OUTPATIENT)
Dept: SURGERY | Age: 68
End: 2023-08-25

## 2023-08-28 ENCOUNTER — HOSPITAL ENCOUNTER (OUTPATIENT)
Dept: RADIATION ONCOLOGY | Age: 68
Discharge: HOME | End: 2023-08-28
Payer: MEDICARE

## 2023-08-28 VITALS
DIASTOLIC BLOOD PRESSURE: 66 MMHG | HEART RATE: 72 BPM | TEMPERATURE: 98.4 F | WEIGHT: 168.8 LBS | RESPIRATION RATE: 18 BRPM | OXYGEN SATURATION: 96 % | HEIGHT: 63 IN | SYSTOLIC BLOOD PRESSURE: 116 MMHG | BODY MASS INDEX: 29.91 KG/M2

## 2023-08-28 DIAGNOSIS — D05.12 BREAST NEOPLASM, TIS (DCIS), LEFT: Primary | ICD-10-CM

## 2023-08-28 LAB
Lab: NEGATIVE
Lab: NORMAL

## 2023-08-28 PROCEDURE — 99205 OFFICE O/P NEW HI 60 MIN: CPT | Performed by: RADIOLOGY

## 2023-08-28 PROCEDURE — 99497 ADVNCD CARE PLAN 30 MIN: CPT | Performed by: RADIOLOGY

## 2023-08-28 PROCEDURE — 99212 OFFICE O/P EST SF 10 MIN: CPT | Performed by: RADIOLOGY

## 2023-08-29 ENCOUNTER — HOSPITAL ENCOUNTER (OUTPATIENT)
Dept: OCCUPATIONAL THERAPY | Age: 68
Setting detail: THERAPIES SERIES
Discharge: HOME | End: 2023-08-29
Payer: MEDICARE

## 2023-08-29 ENCOUNTER — HOSPITAL ENCOUNTER (OUTPATIENT)
Dept: PREADMISSION TESTING | Age: 68
Discharge: HOME | End: 2023-09-02
Payer: MEDICARE

## 2023-08-29 ENCOUNTER — TELEPHONE (OUTPATIENT)
Dept: SURGERY | Age: 68
End: 2023-08-29

## 2023-08-29 ENCOUNTER — SOCIAL WORK (OUTPATIENT)
Dept: RADIATION ONCOLOGY | Age: 68
End: 2023-08-29

## 2023-08-29 ENCOUNTER — NURSE ONLY (OUTPATIENT)
Dept: SURGERY | Age: 68
End: 2023-08-29

## 2023-08-29 VITALS
HEART RATE: 70 BPM | SYSTOLIC BLOOD PRESSURE: 144 MMHG | TEMPERATURE: 98.8 F | BODY MASS INDEX: 32.98 KG/M2 | RESPIRATION RATE: 18 BRPM | DIASTOLIC BLOOD PRESSURE: 58 MMHG | HEIGHT: 60 IN | WEIGHT: 168 LBS | OXYGEN SATURATION: 96 %

## 2023-08-29 DIAGNOSIS — D05.12 BREAST NEOPLASM, TIS (DCIS), LEFT: Primary | ICD-10-CM

## 2023-08-29 LAB
ABO + RH BLD: NORMAL
ANION GAP SERPL CALCULATED.3IONS-SCNC: 10 MEQ/L (ref 9–15)
BLD GP AB SCN SERPL QL: NORMAL
BUN SERPL-MCNC: 12 MG/DL (ref 8–23)
CALCIUM SERPL-MCNC: 9.2 MG/DL (ref 8.5–9.9)
CHLORIDE SERPL-SCNC: 104 MEQ/L (ref 95–107)
CO2 SERPL-SCNC: 28 MEQ/L (ref 20–31)
CREAT SERPL-MCNC: 0.91 MG/DL (ref 0.5–0.9)
EKG ATRIAL RATE: 69 BPM
EKG P AXIS: 44 DEGREES
EKG P-R INTERVAL: 140 MS
EKG Q-T INTERVAL: 424 MS
EKG QRS DURATION: 76 MS
EKG QTC CALCULATION (BAZETT): 454 MS
EKG R AXIS: 27 DEGREES
EKG T AXIS: 61 DEGREES
EKG VENTRICULAR RATE: 69 BPM
ERYTHROCYTE [DISTWIDTH] IN BLOOD BY AUTOMATED COUNT: 16.8 % (ref 11.5–14.5)
GLUCOSE SERPL-MCNC: 89 MG/DL (ref 70–99)
HCT VFR BLD AUTO: 45.3 % (ref 37–47)
HGB BLD-MCNC: 14.8 G/DL (ref 12–16)
MCH RBC QN AUTO: 30 PG (ref 27–31.3)
MCHC RBC AUTO-ENTMCNC: 32.6 % (ref 33–37)
MCV RBC AUTO: 92 FL (ref 79.4–94.8)
PLATELET # BLD AUTO: 294 K/UL (ref 130–400)
POTASSIUM SERPL-SCNC: 3.4 MEQ/L (ref 3.4–4.9)
RBC # BLD AUTO: 4.93 M/UL (ref 4.2–5.4)
SODIUM SERPL-SCNC: 142 MEQ/L (ref 135–144)
WBC # BLD AUTO: 8.8 K/UL (ref 4.8–10.8)

## 2023-08-29 PROCEDURE — 86850 RBC ANTIBODY SCREEN: CPT

## 2023-08-29 PROCEDURE — 93005 ELECTROCARDIOGRAM TRACING: CPT

## 2023-08-29 PROCEDURE — 93010 ELECTROCARDIOGRAM REPORT: CPT | Performed by: INTERNAL MEDICINE

## 2023-08-29 PROCEDURE — 86900 BLOOD TYPING SEROLOGIC ABO: CPT

## 2023-08-29 PROCEDURE — 80048 BASIC METABOLIC PNL TOTAL CA: CPT

## 2023-08-29 PROCEDURE — 86901 BLOOD TYPING SEROLOGIC RH(D): CPT

## 2023-08-29 PROCEDURE — 85027 COMPLETE CBC AUTOMATED: CPT

## 2023-08-29 PROCEDURE — 97166 OT EVAL MOD COMPLEX 45 MIN: CPT

## 2023-08-29 ASSESSMENT — ENCOUNTER SYMPTOMS
CONSTIPATION: 1
TROUBLE SWALLOWING: 1
ABDOMINAL DISTENTION: 0
ABDOMINAL PAIN: 0
EYE REDNESS: 0
SHORTNESS OF BREATH: 1
EYE PAIN: 0
BACK PAIN: 1
EYE ITCHING: 0
COUGH: 0
SORE THROAT: 0
WHEEZING: 0
CHEST TIGHTNESS: 0
EYE DISCHARGE: 0
ALLERGIC/IMMUNOLOGIC NEGATIVE: 1
BLOOD IN STOOL: 0
PHOTOPHOBIA: 0
NAUSEA: 0
SINUS PAIN: 0
DIARRHEA: 0
VOMITING: 0
RHINORRHEA: 0
SINUS PRESSURE: 0

## 2023-08-29 NOTE — PROGRESS NOTES
SW met with pt for pre-breast surgery education and support. Additionally, pt was seen for consultation in radiation oncology on 23, at which time she endorsed a distress of 8/10. Both of these concerns were discussed today. Pt states she lives in the home she shares with her daughter. Her 22 yo grandson also lives with them. Pt also has a son, who lives nearby. Pt reports she is a , and her   in 45 during the course of an operation. Pt reports she continues to replay this loss in her head, and now particularly, this contributes to the fear she is experiencing in facing her own surgery. SW provided emotional support and also looked at some practical cognitive strategies to employ to slow down and refocus thoughts. Additionally, talked about using breathing to slow down when anxiety is increasing too high. Pt added that this technique works for her as she was taught to do so by a counselor many years ago. SW reminded her to employ this tactic. Pt states she is enrolled as a patient of the [facility], with a historical diagnosis of ADHD, anxiety, and depression. She has a long-term relationship with her current counselor, her next appointment is reportedly next week, and she is also seen by [facility] psychiatry services. Discussed talking about her diagnosis with her counselor. Pt has agreed to do so, especially as she reports that she has no one to talk with, \"I'm supposed to be the strong one,\" as she is the sole parent and grandparent in her family. Supportive services at the cancer center were discussed. Pt was provided with SW contact information, should she have further question or need.

## 2023-08-29 NOTE — PROGRESS NOTES
Occupational Therapy Lymphedema Prehab Screen    Date: 2023  Patient Name: Yasmine Raymond        MRN: [id number]  Account: [de-identified]   : 1955  (75 y.o.)    [x]   Patient's date of birth was confirmed. Pt. seen at end of the day as she missed her 3:00 pm appt. Chart Review:  Diagnosis: There were no encounter diagnoses. L lumpectomy  Past Medical History:   Diagnosis Date    ADHD (attention deficit hyperactivity disorder)     Anxiety     Cancer (HCC)     Cerebral artery occlusion with cerebral infarction [facility])     TIA 2022    COPD (chronic obstructive pulmonary disease) (HCC)     Depression     Fibromyalgia     GERD (gastroesophageal reflux disease)     Hyperlipidemia     Hypertension     Irritable bowel syndrome      Past Surgical History:   Procedure Laterality Date    ABDOMINAL EXPLORATION SURGERY Right     Ruptured cyst to right ovary    APPENDECTOMY      BREAST BIOPSY      left      SECTION      COLONOSCOPY      N [facility] ANDREW Cardoso MD    DILATION AND CURETTAGE OF UTERUS N/A 2020    DIAGNOSTIC LAPAROSCOPY, LYSIS OF ADHESIONS, HYSTEROSCOPY WITH MYOSURE, CYSTOSCOPY performed by Montrell Saxena MD at [address]       left leg, right leg-negative    TONSILLECTOMY         Strength:   WFL BUEs    ROM:    WFL BUEs, difficulty with R shoulder due to pain. Takes extended time to flex shoulder. Pain in shoulder and back limit her functional skills and participation in homemaking. Observation:   NA    Patient is R hand dominant. Circumferential Measurements    Location Rt UE (cm)   Date: 2023 Lt UE (cm)   Date:  2023    3rd DIP/ PIP 5.1/6.2 4.9/6.2   10 cm from distal 3rd finger 22 22   15 cm 20.5 21   20 cm 16 16   30 cm 22.5 21.5   40 cm 25.5 24.5   50 cm 28.5 27.5   60 cm NT NT                Brief Fatigue Inventory   Throughout our lives, most of us have times when we feel very tired or fatigued.  Have you felt unusually tired or fatigued in the

## 2023-08-29 NOTE — TELEPHONE ENCOUNTER
I reached out to the patient again as she missed two of her three pre-op appointments today. I was finally able to speak with her. She will be here for her teach w/ the RN. I will answer any other questions for her at that time.

## 2023-08-29 NOTE — H&P
Preoperative Consultation      Name: Janeth Ibanez  YOB: 1955  Date of Service: 8/29/2023  PCP: AMANDA Wade CNP    CHIEF COMPLAINT:  left breast cancer    HISTORY OF PRESENT ILLNESS:      The patient is a 75 y.o. female with significant past medical history of left breast cancer, TIA, HTN, HPL, COPD, depression, anxiety, ADHD, fibromyalgia, neuropathy who presents for a preoperative consultation at the request of surgeon, Dr. Gio Welsh, who plans on performing left breast lumpectomy with sentinel lymph node biopsy on 8/31/23 at [facility] [place]. Pt reports she had her routine mammogram in Aug and it came back abnormal. Pt reports she then had abnormal ultrasound and biopsy of her left breast. Pt reports she was dx with left breast cancer. Pt denies any skin changes to bilateral breasts. Pt denies breast pain to bilateral breasts. Pt denies nipple drainage bilateral breasts. Pt reports she has full range of motion bilateral upper extremities. Pt reports she does not do self breast exams. Pt reports family hx breast cancer-maternal aunt. Pt reports she met with oncologist yesterday and will start radiation after surgery. Pt complains of chronic back pain today. Pt rates pain 8/10 and describes it as \"sore, achy, constant pain\". Pt reports she is not currently taking anything for pain. Pt reports she is nervous for upcoming surgery and \"I think it is making my back pain worse\". Emotional support provided.      Smoking hx: former smoker, quit 11/22 (2pk/day for 50yr)    Known Anesthesia Problems: None  Bleeding Risk: No recent or remote history of abnormal bleeding  Patient Objection to Receiving Blood Products: No  Personal of FH of DVT/PE: No    Medical/Cardiac Clearance: No    Past Medical History:        Diagnosis Date    ADHD (attention deficit hyperactivity disorder)     Anxiety     Cancer ([address])     Cerebral artery occlusion with cerebral infarction [facility])     TIA Nov 2022    COPD (chronic

## 2023-08-30 ENCOUNTER — ANESTHESIA EVENT (OUTPATIENT)
Dept: OPERATING ROOM | Age: 68
End: 2023-08-30
Payer: MEDICARE

## 2023-08-31 ENCOUNTER — APPOINTMENT (OUTPATIENT)
Dept: NUCLEAR MEDICINE | Age: 68
End: 2023-08-31
Attending: SURGERY
Payer: MEDICARE

## 2023-08-31 ENCOUNTER — ANESTHESIA (OUTPATIENT)
Dept: OPERATING ROOM | Age: 68
End: 2023-08-31
Payer: MEDICARE

## 2023-08-31 ENCOUNTER — HOSPITAL ENCOUNTER (OUTPATIENT)
Age: 68
Setting detail: OUTPATIENT SURGERY
Discharge: HOME | End: 2023-08-31
Attending: SURGERY | Admitting: SURGERY
Payer: MEDICARE

## 2023-08-31 VITALS
RESPIRATION RATE: 16 BRPM | WEIGHT: 168 LBS | SYSTOLIC BLOOD PRESSURE: 142 MMHG | DIASTOLIC BLOOD PRESSURE: 62 MMHG | OXYGEN SATURATION: 93 % | HEIGHT: 60 IN | HEART RATE: 77 BPM | BODY MASS INDEX: 32.98 KG/M2 | TEMPERATURE: 98.5 F

## 2023-08-31 DIAGNOSIS — D05.12 BREAST NEOPLASM, TIS (DCIS), LEFT: ICD-10-CM

## 2023-08-31 DIAGNOSIS — D05.12 DUCTAL CARCINOMA IN SITU OF LEFT BREAST: ICD-10-CM

## 2023-08-31 PROCEDURE — 3700000000 HC ANESTHESIA ATTENDED CARE: Performed by: SURGERY

## 2023-08-31 PROCEDURE — 7100000010 HC PHASE II RECOVERY - FIRST 15 MIN: Performed by: SURGERY

## 2023-08-31 PROCEDURE — 7100000000 HC PACU RECOVERY - FIRST 15 MIN: Performed by: SURGERY

## 2023-08-31 PROCEDURE — 6360000002 HC RX W HCPCS: Performed by: SURGERY

## 2023-08-31 PROCEDURE — 2580000003 HC RX 258: Performed by: SURGERY

## 2023-08-31 PROCEDURE — 2720000010 HC SURG SUPPLY STERILE: Performed by: SURGERY

## 2023-08-31 PROCEDURE — 88307 TISSUE EXAM BY PATHOLOGIST: CPT

## 2023-08-31 PROCEDURE — 76942 ECHO GUIDE FOR BIOPSY: CPT | Performed by: STUDENT IN AN ORGANIZED HEALTH CARE EDUCATION/TRAINING PROGRAM

## 2023-08-31 PROCEDURE — 3700000001 HC ADD 15 MINUTES (ANESTHESIA): Performed by: SURGERY

## 2023-08-31 PROCEDURE — 2580000003 HC RX 258: Performed by: STUDENT IN AN ORGANIZED HEALTH CARE EDUCATION/TRAINING PROGRAM

## 2023-08-31 PROCEDURE — 7100000001 HC PACU RECOVERY - ADDTL 15 MIN: Performed by: SURGERY

## 2023-08-31 PROCEDURE — 6360000002 HC RX W HCPCS: Performed by: STUDENT IN AN ORGANIZED HEALTH CARE EDUCATION/TRAINING PROGRAM

## 2023-08-31 PROCEDURE — 6360000002 HC RX W HCPCS: Performed by: NURSE ANESTHETIST, CERTIFIED REGISTERED

## 2023-08-31 PROCEDURE — 3430000000 HC RX DIAGNOSTIC RADIOPHARMACEUTICAL: Performed by: SURGERY

## 2023-08-31 PROCEDURE — 3600000014 HC SURGERY LEVEL 4 ADDTL 15MIN: Performed by: SURGERY

## 2023-08-31 PROCEDURE — 3600000004 HC SURGERY LEVEL 4 BASE: Performed by: SURGERY

## 2023-08-31 PROCEDURE — 88305 TISSUE EXAM BY PATHOLOGIST: CPT

## 2023-08-31 PROCEDURE — 88342 IMHCHEM/IMCYTCHM 1ST ANTB: CPT

## 2023-08-31 PROCEDURE — A9520 TC99 TILMANOCEPT DIAG 0.5MCI: HCPCS | Performed by: SURGERY

## 2023-08-31 PROCEDURE — 38792 RA TRACER ID OF SENTINL NODE: CPT

## 2023-08-31 PROCEDURE — 7100000011 HC PHASE II RECOVERY - ADDTL 15 MIN: Performed by: SURGERY

## 2023-08-31 PROCEDURE — 2709999900 HC NON-CHARGEABLE SUPPLY: Performed by: SURGERY

## 2023-08-31 DEVICE — CLIP INT SM TI EZ LD LIG SYS WECK HORIZON: Type: IMPLANTABLE DEVICE | Site: BREAST | Status: FUNCTIONAL

## 2023-08-31 RX ORDER — SODIUM CHLORIDE 0.9 % (FLUSH) 0.9 %
5-40 SYRINGE (ML) INJECTION EVERY 12 HOURS SCHEDULED
Status: DISCONTINUED | OUTPATIENT
Start: 2023-08-31 | End: 2023-08-31 | Stop reason: HOSPADM

## 2023-08-31 RX ORDER — SODIUM CHLORIDE 0.9 % (FLUSH) 0.9 %
5-40 SYRINGE (ML) INJECTION PRN
Status: DISCONTINUED | OUTPATIENT
Start: 2023-08-31 | End: 2023-08-31 | Stop reason: HOSPADM

## 2023-08-31 RX ORDER — SODIUM CHLORIDE 9 MG/ML
INJECTION, SOLUTION INTRAVENOUS PRN
Status: DISCONTINUED | OUTPATIENT
Start: 2023-08-31 | End: 2023-08-31 | Stop reason: HOSPADM

## 2023-08-31 RX ORDER — MIDAZOLAM HYDROCHLORIDE 1 MG/ML
INJECTION INTRAMUSCULAR; INTRAVENOUS PRN
Status: DISCONTINUED | OUTPATIENT
Start: 2023-08-31 | End: 2023-08-31 | Stop reason: SDUPTHER

## 2023-08-31 RX ORDER — MAGNESIUM HYDROXIDE 1200 MG/15ML
LIQUID ORAL PRN
Status: DISCONTINUED | OUTPATIENT
Start: 2023-08-31 | End: 2023-08-31 | Stop reason: HOSPADM

## 2023-08-31 RX ORDER — ONDANSETRON 2 MG/ML
INJECTION INTRAMUSCULAR; INTRAVENOUS PRN
Status: DISCONTINUED | OUTPATIENT
Start: 2023-08-31 | End: 2023-08-31 | Stop reason: SDUPTHER

## 2023-08-31 RX ORDER — PROPOFOL 10 MG/ML
INJECTION, EMULSION INTRAVENOUS PRN
Status: DISCONTINUED | OUTPATIENT
Start: 2023-08-31 | End: 2023-08-31 | Stop reason: SDUPTHER

## 2023-08-31 RX ORDER — ROPIVACAINE HYDROCHLORIDE 5 MG/ML
INJECTION, SOLUTION EPIDURAL; INFILTRATION; PERINEURAL
Status: COMPLETED | OUTPATIENT
Start: 2023-08-31 | End: 2023-08-31

## 2023-08-31 RX ORDER — FENTANYL CITRATE 50 UG/ML
INJECTION, SOLUTION INTRAMUSCULAR; INTRAVENOUS PRN
Status: DISCONTINUED | OUTPATIENT
Start: 2023-08-31 | End: 2023-08-31 | Stop reason: SDUPTHER

## 2023-08-31 RX ORDER — SODIUM CHLORIDE 9 MG/ML
INJECTION, SOLUTION INTRAVENOUS PRN
Status: CANCELLED | OUTPATIENT
Start: 2023-08-31

## 2023-08-31 RX ORDER — SODIUM CHLORIDE 0.9 % (FLUSH) 0.9 %
5-40 SYRINGE (ML) INJECTION PRN
Status: CANCELLED | OUTPATIENT
Start: 2023-08-31

## 2023-08-31 RX ORDER — ONDANSETRON 2 MG/ML
4 INJECTION INTRAMUSCULAR; INTRAVENOUS
Status: DISCONTINUED | OUTPATIENT
Start: 2023-08-31 | End: 2023-08-31 | Stop reason: HOSPADM

## 2023-08-31 RX ORDER — SODIUM CHLORIDE, SODIUM LACTATE, POTASSIUM CHLORIDE, CALCIUM CHLORIDE 600; 310; 30; 20 MG/100ML; MG/100ML; MG/100ML; MG/100ML
INJECTION, SOLUTION INTRAVENOUS CONTINUOUS
Status: DISCONTINUED | OUTPATIENT
Start: 2023-08-31 | End: 2023-08-31 | Stop reason: HOSPADM

## 2023-08-31 RX ORDER — ACETAMINOPHEN 325 MG/1
650 TABLET ORAL
Status: DISCONTINUED | OUTPATIENT
Start: 2023-08-31 | End: 2023-08-31 | Stop reason: HOSPADM

## 2023-08-31 RX ORDER — SODIUM CHLORIDE 0.9 % (FLUSH) 0.9 %
5-40 SYRINGE (ML) INJECTION EVERY 12 HOURS SCHEDULED
Status: CANCELLED | OUTPATIENT
Start: 2023-08-31

## 2023-08-31 RX ORDER — DEXAMETHASONE SODIUM PHOSPHATE 10 MG/ML
INJECTION INTRAMUSCULAR; INTRAVENOUS PRN
Status: DISCONTINUED | OUTPATIENT
Start: 2023-08-31 | End: 2023-08-31 | Stop reason: SDUPTHER

## 2023-08-31 RX ADMIN — ROPIVACAINE HYDROCHLORIDE 30 ML: 5 INJECTION, SOLUTION EPIDURAL; INFILTRATION; PERINEURAL at 11:05

## 2023-08-31 RX ADMIN — HYDROMORPHONE HYDROCHLORIDE 0.5 MG: 1 INJECTION, SOLUTION INTRAMUSCULAR; INTRAVENOUS; SUBCUTANEOUS at 12:52

## 2023-08-31 RX ADMIN — TILMANOCEPT 1.9 MILLICURIE: KIT at 10:05

## 2023-08-31 RX ADMIN — FENTANYL CITRATE 25 MCG: 50 INJECTION, SOLUTION INTRAMUSCULAR; INTRAVENOUS at 12:32

## 2023-08-31 RX ADMIN — SODIUM CHLORIDE, POTASSIUM CHLORIDE, SODIUM LACTATE AND CALCIUM CHLORIDE: 600; 310; 30; 20 INJECTION, SOLUTION INTRAVENOUS at 10:59

## 2023-08-31 RX ADMIN — FENTANYL CITRATE 50 MCG: 50 INJECTION, SOLUTION INTRAMUSCULAR; INTRAVENOUS at 12:37

## 2023-08-31 RX ADMIN — DEXAMETHASONE SODIUM PHOSPHATE 10 MG: 10 INJECTION INTRAMUSCULAR; INTRAVENOUS at 11:09

## 2023-08-31 RX ADMIN — FENTANYL CITRATE 25 MCG: 50 INJECTION, SOLUTION INTRAMUSCULAR; INTRAVENOUS at 11:37

## 2023-08-31 RX ADMIN — MIDAZOLAM HYDROCHLORIDE 2 MG: 1 INJECTION, SOLUTION INTRAMUSCULAR; INTRAVENOUS at 10:59

## 2023-08-31 RX ADMIN — CEFAZOLIN 2000 MG: 2 INJECTION, POWDER, FOR SOLUTION INTRAMUSCULAR; INTRAVENOUS at 11:06

## 2023-08-31 RX ADMIN — ONDANSETRON 4 MG: 2 INJECTION INTRAMUSCULAR; INTRAVENOUS at 12:10

## 2023-08-31 RX ADMIN — PROPOFOL 200 MG: 10 INJECTION, EMULSION INTRAVENOUS at 11:02

## 2023-08-31 ASSESSMENT — PAIN SCALES - GENERAL
PAINLEVEL_OUTOF10: 6
PAINLEVEL_OUTOF10: 6
PAINLEVEL_OUTOF10: 3
PAINLEVEL_OUTOF10: 3
PAINLEVEL_OUTOF10: 7

## 2023-08-31 ASSESSMENT — PAIN DESCRIPTION - ORIENTATION
ORIENTATION: LEFT

## 2023-08-31 ASSESSMENT — PAIN DESCRIPTION - DESCRIPTORS
DESCRIPTORS: SORE

## 2023-08-31 ASSESSMENT — PAIN - FUNCTIONAL ASSESSMENT: PAIN_FUNCTIONAL_ASSESSMENT: 0-10

## 2023-08-31 ASSESSMENT — PAIN DESCRIPTION - LOCATION
LOCATION: ARM
LOCATION: BREAST
LOCATION: BREAST

## 2023-08-31 NOTE — H&P
UPDATED HISTORY AND PHYSICAL EXAMINATION    SERVICE DATE:  8/31/2023   SERVICE TIME:  8:28 AM    PHYSICAL EXAM MUST BE COMPLETED ON ADMISSION    The History and Physical (completed in the past 30 days) has been reviewed and the patient has been examined. The contents accurately reflect the patient's condition with the following additions or revisions since the H&P was completed. Examination indicates no changes. This H&P can be found in the Epic.         SIGNATURE: Yolande Burns MD PATIENT NAME: Keri Perez   DATE: 8/31/23 MRN: [id number]   TIME: 8:28 AM EDT PHONE: [phone]

## 2023-08-31 NOTE — ANESTHESIA PROCEDURE NOTES
Peripheral Block    Patient location during procedure: OR  Reason for block: post-op pain management and at surgeon's request  Start time: 8/31/2023 11:05 AM  End time: 8/31/2023 11:15 AM  Preanesthetic Checklist  Completed: patient identified, IV checked, site marked, risks and benefits discussed, surgical/procedural consents, equipment checked, pre-op evaluation, timeout performed, anesthesia consent given, oxygen available and monitors applied/VS acknowledged  Peripheral Block   Patient position: supine  Prep: ChloraPrep  Provider prep: mask and sterile gloves (Sterile probe cover)  Patient monitoring: cardiac monitor, continuous pulse ox, frequent blood pressure checks and IV access  Block type: PECS I and PECS II  Laterality: left  Injection technique: single-shot  Guidance: ultrasound guided  Local infiltration: ropivacaine  Infiltration strength: 0.5 %  Local infiltration: ropivacaine  Dose: 30 mL    Needle   Needle type: combined needle/nerve stimulator   Needle gauge: 22 G  Needle localization: anatomical landmarks and ultrasound guidance  Needle length: 5 cm  Assessment   Injection assessment: negative aspiration for heme, no paresthesia on injection and local visualized surrounding nerve on ultrasound  Paresthesia pain: immediately resolved  Slow fractionated injection: yes  Hemodynamics: stable  Real-time US image taken/store: yes    Additional Notes  Ultrasound image printed and saved in patient chart.     Sterile probe cover used    Medications Administered  ropivacaine (NAROPIN) injection 0.5% - Perineural   30 mL - 8/31/2023 11:05:00 AM

## 2023-08-31 NOTE — PROGRESS NOTES
Discharge instructions reviewed with patient and daughter. Both verbalized understanding of instructions with no questions.

## 2023-08-31 NOTE — PROGRESS NOTES
Pt discharged vi wheelchair to daughter. All personal belongings and discharge instructions taken with patient.

## 2023-08-31 NOTE — ANESTHESIA POSTPROCEDURE EVALUATION
Department of Anesthesiology  Postprocedure Note    Patient: Juana Guzman  MRN: [id number]  [facility] [facility] 1955  Date of evaluation: 8/31/2023      Procedure Summary     Date: 08/31/23 Room / Location: [address] [place] [address] / [facility]    Anesthesia Start: 1059 Anesthesia Stop: 9067    Procedure: Left Breast Lumpectomy w/ [place] Lymph Node Biopsy (Left: Breast) Diagnosis:       Ductal carcinoma in situ of left breast      (Ductal carcinoma in situ of left breast [D05.12])    Surgeons: Monster Baez MD Responsible Provider: Eli Jim MD    Anesthesia Type: general ASA Status: 3          Anesthesia Type: No value filed.     Quinton Phase I: Quinton Score: 10    Quinton Phase II:        Anesthesia Post Evaluation    Patient location during evaluation: PACU  Patient participation: complete - patient participated  Level of consciousness: awake  Pain score: 0  Airway patency: patent  Nausea & Vomiting: no nausea and no vomiting  Complications: no  Cardiovascular status: hemodynamically stable  Respiratory status: acceptable  Hydration status: euvolemic  Pain management: adequate

## 2023-08-31 NOTE — OP NOTE
OPERATIVE REPORT    LOG ID: [id number]  Surgery Date: 8/31/2023  Incision/Procedure Start Time: 1129  Incision Close/Procedure End Time: 5033    Procedure Performed: Procedure(s):  Left Breast Lumpectomy w/ [place] Lymph Node Biopsy     Surgeon(s)/Proceduralist(s) and Assistant(s):  Surgeon(s) and Role:     * Jeronimo Santillan MD - Primary  First Assistant: Edgar Le  Scrub Person First: Dorice Degree       Anesthesia: General   Anesthesiologist: Pat Sanchez MD  CRNA: AMANDA Irene - DEEPTI       Pre-Operative Diagnosis:  Ductal carcinoma in situ of left breast [D05.12]   Post-Operative Diagnosis:  SAME    ESTIMATED BLOOD LOSS: Minimal, 5 cc. COMPLICATIONS: No complications. FINDINGS: intraoperative US verified mass was in specimen     DRAINS: No drains were placed. PREOPERATIVE ANTIBIOTICS: ANCEF 2 GRAMS     SPECIMEN:   ID Type Source Tests Collected by Time Destination   A : LEFT BREAST MASS RED PAINT SUPERIOR YELLOW PAINT LATERAL Tissue Breast SURGICAL PATHOLOGY Jeronimo Santillan MD 8/31/2023 1151    B : LEFT BREAST SUPERIOR MARGIN INK MARKS NEW MARGIN Tissue Breast SURGICAL PATHOLOGY Jeronimo Santillan MD 8/31/2023 1152    C : LEFT BREAST MEDIAL MARGIN INK MARKS NEW MARGIN Tissue Breast SURGICAL PATHOLOGY Jeronimo Santillan MD 8/31/2023 1153    D : LEFT BREAST INFERIOR MARGIN INK MARKS NEW MARGIN Tissue Breast SURGICAL PATHOLOGY Jeronimo Santillan MD 8/31/2023 1154    E : Lyndel Stains Tissue Breast SURGICAL PATHOLOGY Jeronimo Santillan MD 8/31/2023 1155    G : LEFT SENTINEL [facility] Jeronimo Santillan MD 8/31/2023 1136         She received Venodynes bilaterally. Cancer Staging   Breast neoplasm, Tis (DCIS), left  Staging form: Breast, AJCC 8th Edition  - Clinical: Stage 0 (cTis (DCIS), cN0, cM0, G2, ER+, LA+) - Signed by Jeronimo Santillan MD on 8/17/2023       INDICATIONS:  Royal Quintana is a 75 y.o.   female  who presented with a left breast imaging

## 2023-09-01 ENCOUNTER — TELEPHONE (OUTPATIENT)
Dept: SURGERY | Age: 68
End: 2023-09-01

## 2023-09-01 NOTE — TELEPHONE ENCOUNTER
I was calling the patient post Left Breast Lumpectomy and SLNB. LMOM for the patient to call the office with any questions or concerns.

## 2023-09-07 ENCOUNTER — TELEPHONE (OUTPATIENT)
Dept: PHARMACY | Facility: CLINIC | Age: 68
End: 2023-09-07

## 2023-09-07 NOTE — TELEPHONE ENCOUNTER
meeting all other requirements  Future Appointments  Date Type Provider Dept   11/03/23 Appointment Jose L Mims, APRN - CNP [facility]   Showing future appointments within next 150 days with a meds authorizing provider and meeting all other requirements    Future Appointments   Date Time Provider [address]   9/14/2023  2:00 PM Ingrid Ibrahim MD [facility]   9/18/2023  8:00 AM SCHEDULE, [facility] NURSE [facility] [place] [facility]   9/18/2023  8:45 AM Joey Brito MD [facility] [place]   9/18/2023  9:00 AM SCHEDULE, MLOZ CT SIM [facility] [place] [facility]   11/3/2023  1:15 PM AMANDA Nogueira - CNP [facility]  [address] // Department, toll free [phone], Option 3    For Pharmacy Admin Tracking Only    Program: Genie in place:  No  Gap Closed?: No   Time Spent (min): 10

## 2023-09-08 ENCOUNTER — TELEPHONE (OUTPATIENT)
Dept: FAMILY MEDICINE CLINIC | Age: 68
End: 2023-09-08

## 2023-09-08 NOTE — TELEPHONE ENCOUNTER
Patient calling in asking for RX of LINZESS 290MCG 1x daily b4 first meal of day    Please send to [facility] in [place] on [address]

## 2023-09-10 ENCOUNTER — HOSPITAL ENCOUNTER (OUTPATIENT)
Age: 68
Setting detail: OBSERVATION
Discharge: HOME | End: 2023-09-11
Attending: STUDENT IN AN ORGANIZED HEALTH CARE EDUCATION/TRAINING PROGRAM | Admitting: STUDENT IN AN ORGANIZED HEALTH CARE EDUCATION/TRAINING PROGRAM
Payer: MEDICARE

## 2023-09-10 ENCOUNTER — APPOINTMENT (OUTPATIENT)
Dept: CT IMAGING | Age: 68
End: 2023-09-10
Payer: MEDICARE

## 2023-09-10 DIAGNOSIS — N61.0 CELLULITIS OF LEFT BREAST: Primary | ICD-10-CM

## 2023-09-10 DIAGNOSIS — J44.1 COPD EXACERBATION (HCC): ICD-10-CM

## 2023-09-10 DIAGNOSIS — I95.9 HYPOTENSION, UNSPECIFIED HYPOTENSION TYPE: ICD-10-CM

## 2023-09-10 PROBLEM — T81.49XA WOUND INFECTION AFTER SURGERY: Status: ACTIVE | Noted: 2023-09-10

## 2023-09-10 LAB
ALBUMIN SERPL-MCNC: 3.6 G/DL (ref 3.5–4.6)
ALP SERPL-CCNC: 93 U/L (ref 40–130)
ALT SERPL-CCNC: 11 U/L (ref 0–33)
ANION GAP SERPL CALCULATED.3IONS-SCNC: 9 MEQ/L (ref 9–15)
AST SERPL-CCNC: 15 U/L (ref 0–35)
BASOPHILS # BLD: 0.1 K/UL (ref 0–0.2)
BASOPHILS NFR BLD: 0.7 %
BILIRUB SERPL-MCNC: 0.4 MG/DL (ref 0.2–0.7)
BUN SERPL-MCNC: 9 MG/DL (ref 8–23)
CALCIUM SERPL-MCNC: 8.8 MG/DL (ref 8.5–9.9)
CHLORIDE SERPL-SCNC: 101 MEQ/L (ref 95–107)
CO2 SERPL-SCNC: 24 MEQ/L (ref 20–31)
CREAT SERPL-MCNC: 0.89 MG/DL (ref 0.5–0.9)
EOSINOPHIL # BLD: 0.1 K/UL (ref 0–0.7)
EOSINOPHIL NFR BLD: 1.2 %
ERYTHROCYTE [DISTWIDTH] IN BLOOD BY AUTOMATED COUNT: 16.4 % (ref 11.5–14.5)
GLOBULIN SER CALC-MCNC: 2.9 G/DL (ref 2.3–3.5)
GLUCOSE SERPL-MCNC: 119 MG/DL (ref 70–99)
HCT VFR BLD AUTO: 42.9 % (ref 37–47)
HGB BLD-MCNC: 14.1 G/DL (ref 12–16)
LACTATE BLDV-SCNC: 1.5 MMOL/L (ref 0.5–2.2)
LYMPHOCYTES # BLD: 1.5 K/UL (ref 1–4.8)
LYMPHOCYTES NFR BLD: 16.1 %
MCH RBC QN AUTO: 30.2 PG (ref 27–31.3)
MCHC RBC AUTO-ENTMCNC: 32.9 % (ref 33–37)
MCV RBC AUTO: 91.7 FL (ref 79.4–94.8)
MONOCYTES # BLD: 0.6 K/UL (ref 0.2–0.8)
MONOCYTES NFR BLD: 6.7 %
NEUTROPHILS # BLD: 7.3 K/UL (ref 1.4–6.5)
NEUTS SEG NFR BLD: 75.3 %
PLATELET # BLD AUTO: 247 K/UL (ref 130–400)
POTASSIUM SERPL-SCNC: 3.7 MEQ/L (ref 3.4–4.9)
PROCALCITONIN SERPL IA-MCNC: 0.08 NG/ML (ref 0–0.15)
PROT SERPL-MCNC: 6.5 G/DL (ref 6.3–8)
RBC # BLD AUTO: 4.68 M/UL (ref 4.2–5.4)
SODIUM SERPL-SCNC: 134 MEQ/L (ref 135–144)
WBC # BLD AUTO: 9.7 K/UL (ref 4.8–10.8)

## 2023-09-10 PROCEDURE — 6370000000 HC RX 637 (ALT 250 FOR IP): Performed by: STUDENT IN AN ORGANIZED HEALTH CARE EDUCATION/TRAINING PROGRAM

## 2023-09-10 PROCEDURE — 2580000003 HC RX 258: Performed by: STUDENT IN AN ORGANIZED HEALTH CARE EDUCATION/TRAINING PROGRAM

## 2023-09-10 PROCEDURE — 71260 CT THORAX DX C+: CPT

## 2023-09-10 PROCEDURE — 80053 COMPREHEN METABOLIC PANEL: CPT

## 2023-09-10 PROCEDURE — 94664 DEMO&/EVAL PT USE INHALER: CPT

## 2023-09-10 PROCEDURE — 96375 TX/PRO/DX INJ NEW DRUG ADDON: CPT

## 2023-09-10 PROCEDURE — 83605 ASSAY OF LACTIC ACID: CPT

## 2023-09-10 PROCEDURE — 96372 THER/PROPH/DIAG INJ SC/IM: CPT

## 2023-09-10 PROCEDURE — 94760 N-INVAS EAR/PLS OXIMETRY 1: CPT

## 2023-09-10 PROCEDURE — 96367 TX/PROPH/DG ADDL SEQ IV INF: CPT

## 2023-09-10 PROCEDURE — G0378 HOSPITAL OBSERVATION PER HR: HCPCS

## 2023-09-10 PROCEDURE — 6360000004 HC RX CONTRAST MEDICATION: Performed by: PHYSICIAN ASSISTANT

## 2023-09-10 PROCEDURE — 6360000002 HC RX W HCPCS: Performed by: STUDENT IN AN ORGANIZED HEALTH CARE EDUCATION/TRAINING PROGRAM

## 2023-09-10 PROCEDURE — 99285 EMERGENCY DEPT VISIT HI MDM: CPT

## 2023-09-10 PROCEDURE — 36415 COLL VENOUS BLD VENIPUNCTURE: CPT

## 2023-09-10 PROCEDURE — 2580000003 HC RX 258: Performed by: PHYSICIAN ASSISTANT

## 2023-09-10 PROCEDURE — 96365 THER/PROPH/DIAG IV INF INIT: CPT

## 2023-09-10 PROCEDURE — 96376 TX/PRO/DX INJ SAME DRUG ADON: CPT

## 2023-09-10 PROCEDURE — 87040 BLOOD CULTURE FOR BACTERIA: CPT

## 2023-09-10 PROCEDURE — 6370000000 HC RX 637 (ALT 250 FOR IP): Performed by: PHYSICIAN ASSISTANT

## 2023-09-10 PROCEDURE — 96361 HYDRATE IV INFUSION ADD-ON: CPT

## 2023-09-10 PROCEDURE — 84145 PROCALCITONIN (PCT): CPT

## 2023-09-10 PROCEDURE — 94640 AIRWAY INHALATION TREATMENT: CPT

## 2023-09-10 PROCEDURE — 96366 THER/PROPH/DIAG IV INF ADDON: CPT

## 2023-09-10 PROCEDURE — 85025 COMPLETE CBC W/AUTO DIFF WBC: CPT

## 2023-09-10 PROCEDURE — 6360000002 HC RX W HCPCS: Performed by: PHYSICIAN ASSISTANT

## 2023-09-10 RX ORDER — ALBUTEROL SULFATE 90 UG/1
2 AEROSOL, METERED RESPIRATORY (INHALATION) EVERY 6 HOURS PRN
Status: DISCONTINUED | OUTPATIENT
Start: 2023-09-10 | End: 2023-09-11 | Stop reason: HOSPADM

## 2023-09-10 RX ORDER — ROSUVASTATIN CALCIUM 10 MG/1
TABLET, COATED ORAL
Qty: 30 TABLET | Refills: 5 | Status: SHIPPED | OUTPATIENT
Start: 2023-09-10

## 2023-09-10 RX ORDER — ONDANSETRON 2 MG/ML
4 INJECTION INTRAMUSCULAR; INTRAVENOUS ONCE
Status: COMPLETED | OUTPATIENT
Start: 2023-09-10 | End: 2023-09-10

## 2023-09-10 RX ORDER — SODIUM CHLORIDE 9 MG/ML
INJECTION, SOLUTION INTRAVENOUS PRN
Status: DISCONTINUED | OUTPATIENT
Start: 2023-09-10 | End: 2023-09-11 | Stop reason: HOSPADM

## 2023-09-10 RX ORDER — ONDANSETRON 4 MG/1
4 TABLET, ORALLY DISINTEGRATING ORAL EVERY 8 HOURS PRN
Status: DISCONTINUED | OUTPATIENT
Start: 2023-09-10 | End: 2023-09-11 | Stop reason: HOSPADM

## 2023-09-10 RX ORDER — MORPHINE SULFATE 2 MG/ML
2 INJECTION, SOLUTION INTRAMUSCULAR; INTRAVENOUS ONCE
Status: COMPLETED | OUTPATIENT
Start: 2023-09-10 | End: 2023-09-10

## 2023-09-10 RX ORDER — SODIUM CHLORIDE 0.9 % (FLUSH) 0.9 %
5-40 SYRINGE (ML) INJECTION EVERY 12 HOURS SCHEDULED
Status: DISCONTINUED | OUTPATIENT
Start: 2023-09-10 | End: 2023-09-11 | Stop reason: HOSPADM

## 2023-09-10 RX ORDER — IPRATROPIUM BROMIDE AND ALBUTEROL SULFATE 2.5; .5 MG/3ML; MG/3ML
2 SOLUTION RESPIRATORY (INHALATION) ONCE
Status: COMPLETED | OUTPATIENT
Start: 2023-09-10 | End: 2023-09-10

## 2023-09-10 RX ORDER — ACETAMINOPHEN 325 MG/1
650 TABLET ORAL EVERY 6 HOURS PRN
Status: DISCONTINUED | OUTPATIENT
Start: 2023-09-10 | End: 2023-09-11 | Stop reason: HOSPADM

## 2023-09-10 RX ORDER — POLYETHYLENE GLYCOL 3350 17 G/17G
17 POWDER, FOR SOLUTION ORAL DAILY PRN
Status: DISCONTINUED | OUTPATIENT
Start: 2023-09-10 | End: 2023-09-11 | Stop reason: HOSPADM

## 2023-09-10 RX ORDER — 0.9 % SODIUM CHLORIDE 0.9 %
1000 INTRAVENOUS SOLUTION INTRAVENOUS ONCE
Status: COMPLETED | OUTPATIENT
Start: 2023-09-10 | End: 2023-09-10

## 2023-09-10 RX ORDER — SODIUM CHLORIDE 0.9 % (FLUSH) 0.9 %
5-40 SYRINGE (ML) INJECTION PRN
Status: DISCONTINUED | OUTPATIENT
Start: 2023-09-10 | End: 2023-09-11 | Stop reason: HOSPADM

## 2023-09-10 RX ORDER — 0.9 % SODIUM CHLORIDE 0.9 %
1300 INTRAVENOUS SOLUTION INTRAVENOUS ONCE
Status: COMPLETED | OUTPATIENT
Start: 2023-09-10 | End: 2023-09-10

## 2023-09-10 RX ORDER — ONDANSETRON 2 MG/ML
4 INJECTION INTRAMUSCULAR; INTRAVENOUS EVERY 6 HOURS PRN
Status: DISCONTINUED | OUTPATIENT
Start: 2023-09-10 | End: 2023-09-11 | Stop reason: HOSPADM

## 2023-09-10 RX ORDER — ACETAMINOPHEN 650 MG/1
650 SUPPOSITORY RECTAL EVERY 6 HOURS PRN
Status: DISCONTINUED | OUTPATIENT
Start: 2023-09-10 | End: 2023-09-11 | Stop reason: HOSPADM

## 2023-09-10 RX ORDER — PREDNISONE 5 MG/1
5 TABLET ORAL DAILY
Status: DISCONTINUED | OUTPATIENT
Start: 2023-09-11 | End: 2023-09-11 | Stop reason: HOSPADM

## 2023-09-10 RX ORDER — ROSUVASTATIN CALCIUM 5 MG/1
10 TABLET, COATED ORAL DAILY
Status: DISCONTINUED | OUTPATIENT
Start: 2023-09-10 | End: 2023-09-11 | Stop reason: HOSPADM

## 2023-09-10 RX ORDER — GABAPENTIN 400 MG/1
800 CAPSULE ORAL NIGHTLY
Status: DISCONTINUED | OUTPATIENT
Start: 2023-09-10 | End: 2023-09-11

## 2023-09-10 RX ORDER — SULFAMETHOXAZOLE AND TRIMETHOPRIM 800; 160 MG/1; MG/1
1 TABLET ORAL ONCE
Status: COMPLETED | OUTPATIENT
Start: 2023-09-10 | End: 2023-09-10

## 2023-09-10 RX ORDER — DULOXETIN HYDROCHLORIDE 60 MG/1
60 CAPSULE, DELAYED RELEASE ORAL DAILY
Status: DISCONTINUED | OUTPATIENT
Start: 2023-09-11 | End: 2023-09-11 | Stop reason: HOSPADM

## 2023-09-10 RX ORDER — MIRTAZAPINE 15 MG/1
15 TABLET, FILM COATED ORAL NIGHTLY
Status: DISCONTINUED | OUTPATIENT
Start: 2023-09-10 | End: 2023-09-10

## 2023-09-10 RX ORDER — ENOXAPARIN SODIUM 100 MG/ML
40 INJECTION SUBCUTANEOUS DAILY
Status: DISCONTINUED | OUTPATIENT
Start: 2023-09-10 | End: 2023-09-11 | Stop reason: HOSPADM

## 2023-09-10 RX ORDER — IPRATROPIUM BROMIDE AND ALBUTEROL SULFATE 2.5; .5 MG/3ML; MG/3ML
1 SOLUTION RESPIRATORY (INHALATION) EVERY 4 HOURS PRN
Status: DISCONTINUED | OUTPATIENT
Start: 2023-09-10 | End: 2023-09-11 | Stop reason: HOSPADM

## 2023-09-10 RX ORDER — ARIPIPRAZOLE 5 MG/1
15 TABLET ORAL EVERY MORNING
Status: DISCONTINUED | OUTPATIENT
Start: 2023-09-11 | End: 2023-09-11 | Stop reason: HOSPADM

## 2023-09-10 RX ORDER — SODIUM CHLORIDE, SODIUM LACTATE, POTASSIUM CHLORIDE, AND CALCIUM CHLORIDE .6; .31; .03; .02 G/100ML; G/100ML; G/100ML; G/100ML
1000 INJECTION, SOLUTION INTRAVENOUS ONCE
Status: COMPLETED | OUTPATIENT
Start: 2023-09-10 | End: 2023-09-10

## 2023-09-10 RX ADMIN — MIRTAZAPINE 45 MG: 15 TABLET, FILM COATED ORAL at 21:52

## 2023-09-10 RX ADMIN — CEFEPIME 2000 MG: 2 INJECTION, POWDER, FOR SOLUTION INTRAVENOUS at 21:57

## 2023-09-10 RX ADMIN — ROSUVASTATIN CALCIUM 10 MG: 5 TABLET, FILM COATED ORAL at 21:52

## 2023-09-10 RX ADMIN — IPRATROPIUM BROMIDE AND ALBUTEROL SULFATE 2 DOSE: 2.5; .5 SOLUTION RESPIRATORY (INHALATION) at 17:32

## 2023-09-10 RX ADMIN — VANCOMYCIN HYDROCHLORIDE 1500 MG: 1.5 INJECTION, POWDER, LYOPHILIZED, FOR SOLUTION INTRAVENOUS at 18:27

## 2023-09-10 RX ADMIN — ENOXAPARIN SODIUM 40 MG: 100 INJECTION SUBCUTANEOUS at 18:27

## 2023-09-10 RX ADMIN — SODIUM CHLORIDE 1300 ML: 9 INJECTION, SOLUTION INTRAVENOUS at 15:03

## 2023-09-10 RX ADMIN — SULFAMETHOXAZOLE AND TRIMETHOPRIM 1 TABLET: 800; 160 TABLET ORAL at 15:14

## 2023-09-10 RX ADMIN — MORPHINE SULFATE 2 MG: 2 INJECTION, SOLUTION INTRAMUSCULAR; INTRAVENOUS at 13:00

## 2023-09-10 RX ADMIN — SODIUM CHLORIDE 1000 ML: 9 INJECTION, SOLUTION INTRAVENOUS at 13:00

## 2023-09-10 RX ADMIN — IOPAMIDOL 50 ML: 612 INJECTION, SOLUTION INTRAVENOUS at 13:56

## 2023-09-10 RX ADMIN — ONDANSETRON 4 MG: 2 INJECTION INTRAMUSCULAR; INTRAVENOUS at 13:00

## 2023-09-10 RX ADMIN — PIPERACILLIN AND TAZOBACTAM 3375 MG: 3; .375 INJECTION, POWDER, LYOPHILIZED, FOR SOLUTION INTRAVENOUS at 13:09

## 2023-09-10 RX ADMIN — SODIUM CHLORIDE, POTASSIUM CHLORIDE, SODIUM LACTATE AND CALCIUM CHLORIDE 1000 ML: 600; 310; 30; 20 INJECTION, SOLUTION INTRAVENOUS at 18:21

## 2023-09-10 ASSESSMENT — ENCOUNTER SYMPTOMS
DIARRHEA: 0
CHEST TIGHTNESS: 0
PHOTOPHOBIA: 0
COLOR CHANGE: 1
SHORTNESS OF BREATH: 0
COUGH: 0
VOMITING: 0
SHORTNESS OF BREATH: 1
ABDOMINAL DISTENTION: 0
SORE THROAT: 0
ABDOMINAL PAIN: 0
RHINORRHEA: 0
EYE PAIN: 0
NAUSEA: 0
BACK PAIN: 0

## 2023-09-10 ASSESSMENT — PAIN DESCRIPTION - ONSET
ONSET: ON-GOING
ONSET: ON-GOING

## 2023-09-10 ASSESSMENT — PAIN SCALES - GENERAL
PAINLEVEL_OUTOF10: 6
PAINLEVEL_OUTOF10: 6
PAINLEVEL_OUTOF10: 8

## 2023-09-10 ASSESSMENT — PAIN DESCRIPTION - LOCATION
LOCATION: BREAST

## 2023-09-10 ASSESSMENT — PAIN DESCRIPTION - PAIN TYPE
TYPE: ACUTE PAIN
TYPE: ACUTE PAIN

## 2023-09-10 ASSESSMENT — PAIN DESCRIPTION - ORIENTATION
ORIENTATION: LEFT

## 2023-09-10 ASSESSMENT — PAIN DESCRIPTION - FREQUENCY
FREQUENCY: CONTINUOUS
FREQUENCY: CONTINUOUS

## 2023-09-10 ASSESSMENT — PAIN - FUNCTIONAL ASSESSMENT: PAIN_FUNCTIONAL_ASSESSMENT: 0-10

## 2023-09-10 ASSESSMENT — PAIN DESCRIPTION - DESCRIPTORS: DESCRIPTORS: SORE

## 2023-09-10 NOTE — TELEPHONE ENCOUNTER
I do not see where see has taken that before and that's a very high dose. We can discuss it at her next appt.

## 2023-09-10 NOTE — ED TRIAGE NOTES
The patient came to the ED for wound check s/p left breast lumpectomy on 8/31. Pt states the area began swollen, red and warmth to the touch yesterday.

## 2023-09-10 NOTE — ED PROVIDER NOTES
Surgical incisions appear well healing. Abdominal:      General: Bowel sounds are normal.      Palpations: Abdomen is soft. Tenderness: There is no abdominal tenderness. Musculoskeletal:         General: Normal range of motion. Cervical back: Normal range of motion and neck supple. Lymphadenopathy:      Cervical: No cervical adenopathy. Skin:     General: Skin is warm and dry. Capillary Refill: Capillary refill takes less than 2 seconds. Findings: No rash. Neurological:      Mental Status: She is alert and oriented to person, place, and time. Psychiatric:         Thought Content: Thought content normal.         Judgment: Judgment normal.         RESULTS     EKG: All EKG's are interpreted by the Emergency Department Physician who either signs or Co-signsthis chart in the absence of a cardiologist.        RADIOLOGY:   Deepali Rochelle such as CT, Ultrasound and MRI are read by the radiologist. [facility] radiographic images are visualized and preliminarily interpreted by the emergency physician with the below findings:        Interpretation per the Radiologist below, if available at the time ofthis note:    CT CHEST W CONTRAST   Final Result   Moderate-sized postoperative hematoma in the inferior left breast with   hemorrhage seen along the tract extending to the lateral margin of the breast.      No drainable abscess or hematoma. Mild diffuse cellulitis throughout the left breast, with moderate edema of   the medial dermis. Stable moderate centrilobular emphysema with an upper lobe predominance.                ED BEDSIDE ULTRASOUND:   Performed by ED Physician - none    LABS:  Labs Reviewed   CBC WITH AUTO DIFFERENTIAL - Abnormal; Notable for the following components:       Result Value    MCHC 32.9 (*)     RDW 16.4 (*)     Neutrophils Absolute 7.3 (*)     All other components within normal limits   COMPREHENSIVE METABOLIC PANEL - Abnormal; Notable for the following components:

## 2023-09-10 NOTE — ED NOTES
Pt O2 86% on RA   Pt has hx of COPD   Morphine administered previously to reading   2L NC applied   PA Grisel Queen, RN  09/10/23 2486

## 2023-09-11 VITALS
RESPIRATION RATE: 18 BRPM | BODY MASS INDEX: 31.1 KG/M2 | HEART RATE: 75 BPM | OXYGEN SATURATION: 94 % | HEIGHT: 62 IN | WEIGHT: 169 LBS | DIASTOLIC BLOOD PRESSURE: 47 MMHG | SYSTOLIC BLOOD PRESSURE: 118 MMHG | TEMPERATURE: 98.1 F

## 2023-09-11 PROCEDURE — 94761 N-INVAS EAR/PLS OXIMETRY MLT: CPT

## 2023-09-11 PROCEDURE — 6360000002 HC RX W HCPCS: Performed by: STUDENT IN AN ORGANIZED HEALTH CARE EDUCATION/TRAINING PROGRAM

## 2023-09-11 PROCEDURE — 96372 THER/PROPH/DIAG INJ SC/IM: CPT

## 2023-09-11 PROCEDURE — 96366 THER/PROPH/DIAG IV INF ADDON: CPT

## 2023-09-11 PROCEDURE — G0378 HOSPITAL OBSERVATION PER HR: HCPCS

## 2023-09-11 PROCEDURE — 2580000003 HC RX 258: Performed by: STUDENT IN AN ORGANIZED HEALTH CARE EDUCATION/TRAINING PROGRAM

## 2023-09-11 PROCEDURE — 6370000000 HC RX 637 (ALT 250 FOR IP): Performed by: STUDENT IN AN ORGANIZED HEALTH CARE EDUCATION/TRAINING PROGRAM

## 2023-09-11 PROCEDURE — 2700000000 HC OXYGEN THERAPY PER DAY

## 2023-09-11 RX ORDER — DOXYCYCLINE HYCLATE 100 MG
100 TABLET ORAL 2 TIMES DAILY
Qty: 11 TABLET | Refills: 0 | Status: SHIPPED | OUTPATIENT
Start: 2023-09-11 | End: 2023-09-17

## 2023-09-11 RX ORDER — GABAPENTIN 400 MG/1
400 CAPSULE ORAL NIGHTLY
Status: DISCONTINUED | OUTPATIENT
Start: 2023-09-11 | End: 2023-09-11 | Stop reason: HOSPADM

## 2023-09-11 RX ADMIN — ENOXAPARIN SODIUM 40 MG: 100 INJECTION SUBCUTANEOUS at 09:52

## 2023-09-11 RX ADMIN — DULOXETINE HYDROCHLORIDE 60 MG: 60 CAPSULE, DELAYED RELEASE ORAL at 09:52

## 2023-09-11 RX ADMIN — SODIUM CHLORIDE, PRESERVATIVE FREE 10 ML: 5 INJECTION INTRAVENOUS at 13:58

## 2023-09-11 RX ADMIN — ARIPIPRAZOLE 15 MG: 5 TABLET ORAL at 09:52

## 2023-09-11 RX ADMIN — CEFEPIME 2000 MG: 2 INJECTION, POWDER, FOR SOLUTION INTRAVENOUS at 09:55

## 2023-09-11 RX ADMIN — PREDNISONE 5 MG: 5 TABLET ORAL at 09:52

## 2023-09-11 RX ADMIN — VANCOMYCIN HYDROCHLORIDE 750 MG: 750 INJECTION, POWDER, LYOPHILIZED, FOR SOLUTION INTRAVENOUS at 06:36

## 2023-09-11 ASSESSMENT — PAIN SCALES - GENERAL: PAINLEVEL_OUTOF10: 0

## 2023-09-11 NOTE — PROGRESS NOTES
CLINICAL PHARMACY NOTE: MEDS TO BEDS    Total # of Prescriptions Filled: 1   The following medications were delivered to the patient:  Doxycycline hyclate 100mg tab     Additional Documentation:

## 2023-09-11 NOTE — PROGRESS NOTES
09/11/23 1213   Resting (Room Air)   SpO2 87   HR 72   Resting (On O2)   SpO2 96   HR 72   O2 Device Nasal cannula   O2 Flow Rate (l/min) 2 l/min   During Walk (On O2)   SpO2 94   HR 72   O2 Device Nasal cannula   O2 Flow Rate (l/min) 2 l/min   Need Additional O2 Flow Rate Rows No   Walk/Assistance Device Ambulation   Rate of Dyspnea 0   After Walk   SpO2 96   HR 72   O2 Device Nasal cannula   O2 Flow Rate (l/min) 2 l/min   Rate of Dyspnea 0   Does the Patient Qualify for Home O2 Yes   Liter Flow at Rest 2   Liter Flow on Exertion 2   Does the Patient Need Portable Oxygen Tanks Yes

## 2023-09-11 NOTE — CARE COORDINATION
Met with pt at bedside to discuss discharge planning. Pt lives at home with daughter and grandson. No O2, no VA, no services piror. Pt plans to return home and denies needs. Pt does c/o shortness of breath with exertion and a Home O2 eval has been ordered. CM will continue to follow for needs. 0 Pt qualifies for oxygen on discharge. [place] of choice offered and pt would like Medical Services. Packet with order sent to [facility] with [id number] [facility].   1641 Pt qualified for home O2 2L NC continuous. Per Ruby Fregoso will give a Sequel.

## 2023-09-11 NOTE — PROGRESS NOTES
09/11/23 1213   Resting (Room Air)   SpO2 87   HR 72   Resting (On O2)   SpO2 96   HR 72   During Walk (On O2)   SpO2 94   HR 72   O2 Device Nasal cannula   O2 Flow Rate (l/min) 2 l/min   Need Additional O2 Flow Rate Rows No   Walk/Assistance Device Ambulation   Rate of Dyspnea 0   After Walk   SpO2 96   HR 72   O2 Device Nasal cannula   O2 Flow Rate (l/min) 2 l/min   Rate of Dyspnea 0   Does the Patient Qualify for Home O2 Yes   Liter Flow at Rest 2   Liter Flow on Exertion 2   Does the Patient Need Portable Oxygen Tanks Yes

## 2023-09-11 NOTE — DISCHARGE INSTRUCTIONS
Discharge instructions:     -You were hospitalized for a skin infection. I have prescribed you an antibiotic (doxycycline) to be taken twice daily. Please finish this course of antibiotics to completion.

## 2023-09-12 ENCOUNTER — CARE COORDINATION (OUTPATIENT)
Dept: CARE COORDINATION | Age: 68
End: 2023-09-12

## 2023-09-12 DIAGNOSIS — T81.49XA WOUND INFECTION AFTER SURGERY: Primary | ICD-10-CM

## 2023-09-12 PROCEDURE — 1111F DSCHRG MED/CURRENT MED MERGE: CPT | Performed by: NURSE PRACTITIONER

## 2023-09-12 NOTE — CARE COORDINATION
[facility] Care Transitions Initial Follow Up Call    Call within 2 business days of discharge: Yes    Patient Current Location:  Home: [address] [place] [address] [address] [id number]    Care Transition Nurse contacted the patient by telephone to perform post hospital discharge assessment. Verified name and  with patient as identifiers. Provided introduction to self, and explanation of the Care Transition Nurse role. Patient: Galen Lilly Patient : 1955   MRN: [id number]  Reason for Admission: 9/10-23 Post Op Wound Infection; S/P Lumpectomy with [place] Lymph Node Biopsy 23   Discharge Date: 23 RARS: No data recorded    Last Discharge [address] [facility] [facility]       Date Complaint Diagnosis Description Type Department Provider    9/10/23 Wound Check Cellulitis of left breast ... ED to Hosp-Admission (Discharged) (ADMITTED) Easter Gilford, MD          Was this an external facility discharge? No Discharge Facility: [facility]    Challenges to be reviewed by the provider   Additional needs identified to be addressed with provider: Yes  home health care-Pt requesting [facility] for wound checks               Method of communication with provider: chart routing. Pt reports fatigue, denies CP, SOB, cough, fever, chills, n/v/d, night sweats. Pt reports Left breast soreness, wound draining clear fluid, denies foul odor, DSD applied under Left breast. Discussed s/s of infection and knowing when to seek medical attention. Pt v/u. Pt requesting [facility] [place]. referral for wound monitoring. LPN/CTN routed message to PCP. Pt sent home with O2 @ 2L NC. Oxygen provided by Medical Services. Pt does not have a Pulse Oximeter. Advised Pt to purchase a Pulse Oximeter to monitor saturation. Pt v/u. Discussed Pulse Ox d/c Instructions, Pt v/u. Medication Review completed, 1111F order placed. Pt continues antibiotic therapy. Advised Pt to take antibiotics exactly as directed.  Do not stop taking the medicine just because you are feeling

## 2023-09-14 NOTE — TELEPHONE ENCOUNTER
Talked to Jose Angel Garcia and she will just wait until her appointment in November to discuss the medication.

## 2023-09-15 LAB
BACTERIA BLD CULT ORG #2: NORMAL
BACTERIA BLD CULT: NORMAL

## 2023-09-18 ENCOUNTER — APPOINTMENT (OUTPATIENT)
Dept: RADIATION ONCOLOGY | Age: 68
End: 2023-09-18
Payer: MEDICARE

## 2023-09-18 ENCOUNTER — OFFICE VISIT (OUTPATIENT)
Dept: SURGERY | Age: 68
End: 2023-09-18

## 2023-09-18 VITALS
DIASTOLIC BLOOD PRESSURE: 60 MMHG | WEIGHT: 169.8 LBS | SYSTOLIC BLOOD PRESSURE: 130 MMHG | BODY MASS INDEX: 31.25 KG/M2 | TEMPERATURE: 97.5 F | RESPIRATION RATE: 12 BRPM | OXYGEN SATURATION: 94 % | HEIGHT: 62 IN | HEART RATE: 75 BPM

## 2023-09-18 DIAGNOSIS — N64.89 SEROMA OF BREAST: ICD-10-CM

## 2023-09-18 DIAGNOSIS — N64.9 BREAST DISORDER: Primary | ICD-10-CM

## 2023-09-18 DIAGNOSIS — D05.12 BREAST NEOPLASM, TIS (DCIS), LEFT: ICD-10-CM

## 2023-09-18 DIAGNOSIS — Z85.3 PERSONAL HISTORY OF MALIGNANT NEOPLASM OF BREAST: ICD-10-CM

## 2023-09-18 PROCEDURE — 99024 POSTOP FOLLOW-UP VISIT: CPT | Performed by: SURGERY

## 2023-09-18 RX ORDER — ALPRAZOLAM 1 MG/1
1 TABLET ORAL 2 TIMES DAILY PRN
COMMUNITY

## 2023-09-18 RX ORDER — SULFAMETHOXAZOLE AND TRIMETHOPRIM 800; 160 MG/1; MG/1
1 TABLET ORAL 2 TIMES DAILY
Qty: 20 TABLET | Refills: 0 | Status: SHIPPED | OUTPATIENT
Start: 2023-09-18 | End: 2023-09-28

## 2023-09-18 RX ORDER — BUPRENORPHINE 20 UG/H
PATCH TRANSDERMAL
COMMUNITY
Start: 2023-09-12

## 2023-09-18 RX ORDER — LIDOCAINE HYDROCHLORIDE 10 MG/ML
3 INJECTION, SOLUTION INFILTRATION; PERINEURAL ONCE
Status: COMPLETED | OUTPATIENT
Start: 2023-09-18 | End: 2023-09-18

## 2023-09-18 RX ADMIN — LIDOCAINE HYDROCHLORIDE 3 ML: 10 INJECTION, SOLUTION INFILTRATION; PERINEURAL at 09:25

## 2023-09-19 RX ORDER — DULOXETIN HYDROCHLORIDE 60 MG/1
60 CAPSULE, DELAYED RELEASE ORAL DAILY
Qty: 30 CAPSULE | Refills: 0 | Status: SHIPPED | OUTPATIENT
Start: 2023-09-19

## 2023-09-20 ENCOUNTER — CARE COORDINATION (OUTPATIENT)
Dept: CASE MANAGEMENT | Age: 68
End: 2023-09-20

## 2023-09-23 LAB — CULTURE WOUND: NORMAL

## 2023-09-25 ENCOUNTER — APPOINTMENT (OUTPATIENT)
Dept: RADIATION ONCOLOGY | Age: 68
End: 2023-09-25
Payer: MEDICARE

## 2023-09-25 ENCOUNTER — OFFICE VISIT (OUTPATIENT)
Dept: SURGERY | Age: 68
End: 2023-09-25

## 2023-09-25 ENCOUNTER — HOSPITAL ENCOUNTER (OUTPATIENT)
Dept: RADIATION ONCOLOGY | Age: 68
Discharge: HOME | End: 2023-09-25
Payer: MEDICARE

## 2023-09-25 VITALS
TEMPERATURE: 98.4 F | RESPIRATION RATE: 18 BRPM | HEART RATE: 81 BPM | SYSTOLIC BLOOD PRESSURE: 121 MMHG | BODY MASS INDEX: 30.69 KG/M2 | DIASTOLIC BLOOD PRESSURE: 62 MMHG | OXYGEN SATURATION: 92 % | WEIGHT: 167.8 LBS

## 2023-09-25 VITALS
TEMPERATURE: 98.4 F | WEIGHT: 167 LBS | HEART RATE: 81 BPM | OXYGEN SATURATION: 95 % | SYSTOLIC BLOOD PRESSURE: 121 MMHG | HEIGHT: 62 IN | RESPIRATION RATE: 16 BRPM | DIASTOLIC BLOOD PRESSURE: 62 MMHG | BODY MASS INDEX: 30.73 KG/M2

## 2023-09-25 DIAGNOSIS — D05.12 BREAST NEOPLASM, TIS (DCIS), LEFT: Primary | ICD-10-CM

## 2023-09-25 DIAGNOSIS — E66.9 OBESITY, CLASS I, BMI 30-34.9: ICD-10-CM

## 2023-09-25 PROCEDURE — 99024 POSTOP FOLLOW-UP VISIT: CPT | Performed by: SURGERY

## 2023-09-25 PROCEDURE — 99212 OFFICE O/P EST SF 10 MIN: CPT | Performed by: RADIOLOGY

## 2023-09-25 NOTE — PROGRESS NOTES
NURSING ASSESSMENT     Date: 9/25/2023        Patient Name: Fei Drummond     YOB: 1955      Age:  75 y.o. MRN: [id number]       Chaperone [x] Yes   [] No  Chio-friend    Advance Directives:   Do you currently have completed advance directives (living will)? [] Yes   [x] No         *If yes, please bring us a copy for your records. *If no, would you like info or assistance in completing advance directives (living will)? [] Yes   [x] No    Pain Score:   Pain Score (1-10): 4   Pain Location: left breast   Pain Duration: ongoing since left breast lumpectomy on 8/31/23   Pain Management/Control: none      Is pain affecting your ability to take care of yourself or move throughout your home? [] Yes   [x] No    General: Weakness and Fatigue- appetite is fair  Patient has gained weight [] Yes   [x] No  Patient has lost weight [] Yes   [x] No  How much weight in pounds and over what length of time:     Eyes (Ophthalmic): No Problem     Skin (Dermatological): No Problems     ENT: No Problems     Respiratory: COPD, SOB  with activity. Wears O2 at 3L during and 2L at night     Cardiovascular: No Problems      Device   [] Yes   [x] No   Copy of Card Obtained [] Yes   [x] No    Gastrointestinal:     Genito-Urinary:    No Problems       Breast: S/P seroma aspiration on 9/18/23. Dr. Retia Nyhan examined left breast today. Draining clear serous fluid from left axilla incision with some mild erythema around incision with some mild erythema to left breast as well. Musculoskeletal: No Problems    Neurological: No Problems      Hematological and Lymphatic: No Problems     Endocrine: No Problems     Gyn History:   No issues    A 10-point review of systems  has been conducted and pertinent positives have been   recorded. All other review of systems are negative    Was the patient admitted during the course of treatment OR within 30 days of treatment?      If yes: s/p left breast lumpectomy and

## 2023-09-27 ENCOUNTER — CARE COORDINATION (OUTPATIENT)
Dept: CASE MANAGEMENT | Age: 68
End: 2023-09-27

## 2023-09-27 NOTE — CARE COORDINATION
[facility] Care Transitions Follow Up Call    Patient Current Location:  Home: [address] [place] [address] Dr Fracisco Butler [address]    Care Transition Nurse contacted the patient by telephone to follow up after admission on 9/10/23. Verified name and  with patient as identifiers. Patient: Krystyna Mota  Patient : 1955   MRN: [id number]  Reason for Admission: Wound infection after surgery  Discharge Date: 23 RARS: No data recorded    Needs to be reviewed by the provider   Additional needs identified to be addressed with provider: No  none             Method of communication with provider: none. Spoke with patient today 23 for TCM/hospital discharge follow up sub call. Patient states she continues to have clear drainage from left breast. Confirmed patient had a seroma aspiration per Dr. Vanita Vásquez(Gen SUrg) on . Patient states having ongoing redness, swelling and pain to left breast as she recently underwent a lumpectomy PTA. Patient rates pain 6/10 in severity on pain scale. Patient states she is wearing a sports bra at all times a directed. Patient states having shortness of breath with exertion that is no worse from baseline as she suffers from COPD. Denies being a current smoker. States wearing home oxygen at 2 lpm at night and 3 lpm during day. Denies any chest pain or chest discomfort. Confirmed with patient she has two more doses of Doxycyline that was ordered on hosp discharge. Denies any nausea, vomiting, chills or fever. CTN reiterated to patient s/s of infection and knowing when to seek medical attention. Confirmed with patient she seen Dr. Ena Candelaria (Rad/Onc) earlier this week on 23. Patient states plan is to start radiation in October. Patient states she lives with Alan Copeland and her grandson. States stays in basement (first floor) of home. Denies being active with home care. Declines RPM enrollment. Patient denies any other complaints or needs.  CTN will continue to follow

## 2023-10-04 ENCOUNTER — CARE COORDINATION (OUTPATIENT)
Dept: CASE MANAGEMENT | Age: 68
End: 2023-10-04

## 2023-10-04 NOTE — CARE COORDINATION
Care Transitions Follow Up Call    Patient: Shiela Antonio  Patient : 1955   MRN: [id number]  Reason for Admission: Wound infection after surgery  Discharge Date: 23 RARS: No data recorded    Attempted to contact patient today 10/4/23 for TCM/hospital discharge follow up sub call. Left message on home/mobile number requesting a return call back to CTN and provided contact information.     AMANDA Etsevez

## 2023-10-09 ENCOUNTER — APPOINTMENT (OUTPATIENT)
Dept: RADIATION ONCOLOGY | Age: 68
End: 2023-10-09
Payer: MEDICARE

## 2023-10-09 RX ORDER — SULFAMETHOXAZOLE AND TRIMETHOPRIM 800; 160 MG/1; MG/1
1 TABLET ORAL 2 TIMES DAILY
Qty: 10 TABLET | Refills: 0 | Status: SHIPPED | OUTPATIENT
Start: 2023-10-09 | End: 2023-10-14

## 2023-10-10 ENCOUNTER — HOSPITAL ENCOUNTER (OUTPATIENT)
Dept: RADIATION ONCOLOGY | Age: 68
Discharge: HOME | End: 2023-10-10
Payer: MEDICAID

## 2023-10-10 DIAGNOSIS — D05.12 BREAST NEOPLASM, TIS (DCIS), LEFT: Primary | ICD-10-CM

## 2023-10-10 PROCEDURE — 77470 SPECIAL RADIATION TREATMENT: CPT | Performed by: RADIOLOGY

## 2023-10-10 PROCEDURE — 77290 THER RAD SIMULAJ FIELD CPLX: CPT | Performed by: RADIOLOGY

## 2023-10-10 PROCEDURE — 77370 RADIATION PHYSICS CONSULT: CPT | Performed by: RADIOLOGY

## 2023-10-10 PROCEDURE — 77263 THER RADIOLOGY TX PLNG CPLX: CPT | Performed by: RADIOLOGY

## 2023-10-10 NOTE — PROGRESS NOTES
[facility]  RADIATION ONCOLOGY  CATIE APPLICATOR INSERTION PROCEDURE     Procedural time out     Verified patient name and :    Breast: [] RT [x] LT    Quadrant: []Upper inner []Upper Outer []Lower inner  [] Lower Outer    Physician : Kellen Cardenas  Procedure start time:    Catie Applicator Size:      [] Cassiery Goodness 6-1 mini      [x] Annamary Goodness 6-1, initially with procedure      [x] Annamary Goodness 8-1, after initial CT Scan Annamary Goodness applicator changed to 8-1, pt tolerated well.      [] Catie 10-1  Procedure end time:  PT tolerated procedure: [x]Well  [] Other-  Catie Catheter Dressing:   [x] Insertion site cleansed (Chloro-prep swabs X 3) and allowed the site to air dry. [x] Light coating of ointment applied to the skin at the Annamary Goodness placement site   [x]Applied a split drain sponge around the base and the top of the Tabathaamary Goodness applicator   [x]Wrapped the exposed portion of the [employer] in an ABD pad   [x]Cushion as necessary for comfort and placed bra back on patient to hold dressings in place. PT Vital signs: 68.6-55-/16. Pulse ox 95%  PT given / reviewed Catie Applicator instructions [x]   While your Catie Catheter is in place there a few things that you need to be aware of:  Do not shower. Take sponge baths and wash hair in the sink. Keep the insertion site clean and dry at all times. Wear your bra at all times. Nursing will change your dressing and your bra after your treatments. Alert your doctor for the following:  Fever greater than 100.5 degrees  Redness or red streaks on the treated breast  Sudden or extreme pain, redness, or swelling in the treatment area. Increased drainage or discharge  Yellow, greenish, or foul-smelling wound drainage  Other notes: Pt verbalized understanding of instructions and NOT to remove bra.   She will bring a clean bra tomorrow when she arrives for treatment at 830 am

## 2023-10-11 ENCOUNTER — HOSPITAL ENCOUNTER (OUTPATIENT)
Dept: RADIATION ONCOLOGY | Age: 68
Discharge: HOME | End: 2023-10-11
Payer: MEDICAID

## 2023-10-11 DIAGNOSIS — D05.12 BREAST NEOPLASM, TIS (DCIS), LEFT: Primary | ICD-10-CM

## 2023-10-11 PROCEDURE — 77771 HDR RDNCL NTRSTL/ICAV BRCHTX: CPT | Performed by: RADIOLOGY

## 2023-10-11 PROCEDURE — C1717 BRACHYTX, NON-STR,HDR IR-192: HCPCS | Performed by: RADIOLOGY

## 2023-10-11 PROCEDURE — 77280 THER RAD SIMULAJ FIELD SMPL: CPT | Performed by: RADIOLOGY

## 2023-10-11 PROCEDURE — 77295 3-D RADIOTHERAPY PLAN: CPT | Performed by: RADIOLOGY

## 2023-10-11 NOTE — PROGRESS NOTES
[address] applicator Dressing Change      Sun Catheter Dressing:   [x] Insertion site cleansed (Chloro-prep swabs X 3) and allowed the site to air dry. [x] Light coating of ointment applied to the skin at the [facility] placement site   [x]Applied a split drain sponge around the base and the top of the [facility] applicator   [x]Wrapped the exposed portion of the [employer] in an ABD pad   [x]Cushion as necessary for comfort and placed bra back on patient to hold dressings in place. [x] Insertion site without redness, pain, warmth, or drainage  Other assessment notes: Patient developed some redness at site of tegaderm marker to lateral chest. Tegaderm removed. Applied lidocaine & prilocaine cream followed by Bactroban ointment to this area, and covered with a  3X4 Telfa pad. Patient complains of some discomfort at this site only. Encouraged to take Tylenol as needed.

## 2023-10-11 NOTE — ADDENDUM NOTE
Encounter addended by: Afshan Narvaez MD on: 10/10/2023 9:20 PM   Actions taken: SmartForm saved, Pend clinical note

## 2023-10-11 NOTE — PROGRESS NOTES
[facility]           Radiation Oncology      [address] [facility], [address] [facility]        Ricardo Shorten: [phone]        F: [phone]       [email or web address]                   Dr. Soraya Montoya MD PhD    Art Muscat biopsy performed revealing no evidence of     Date of Service: 10/12/2023  Patient ID: Bhavin Concepcion   : 1955  MRN: [id number]   Acct Number: [de-identified]       Patient Name:  Bhavin Concepcion  1955,  75 y.o., female       Referring Physician: Mejia Carty, [facility],  [address]      PCP: AMANDA Mustafa - [place]       Diagnosis:     Breast neoplasm, Tis (DCIS), left           Recent History: This is a 70-year-old female with initial diagnosis of grade 2 ductal carcinoma in site of the left breast, ER+/NC+ went on to receive a lumpectomy with the finding of invasive ductal carcinoma, pT1a with sentinel lymph node biopsy performed with no evidence of isac metastases. She therefore ultimately stage I invasive carcinoma and went on receive adjuvant APBI with CATIE based brachytherapy per below:    Site Start [facility] ED Fractions Dose Fx Dose Technique   Left Breast 10/11/23 10/12/23 1 3 2250 cGy 750 cGy Intracavitary Brachy               Concurrent therapy:      None    Technique:                 CATIE based intracavitary brachytherapy with 8-1 catheter    Treatment course: The patient tolerated therapy well and only noted a mild skin reaction to Tegaderm near the incision site but otherwise did not require any treatment recs. Plan: I will see her again for a quick check in 3 weeks. In interim patient was encouraged to continue her antibiotics until she completes prescription and to limit heavy lifting.         Soraya Montoya MD PhD  Radiation Oncologist, [address] [place] [address]

## 2023-10-11 NOTE — PROCEDURES
[address] [facility]           Radiation Oncology      [address] [facility], [address] [facility]        [place]: [phone]        F: [phone]       [facility]                   Dr. Deuce Polk MD PhD    PROCEDURE NOTE     Date of Service: 10/10/2023  Patient ID: Matilde Schaumann   : 1955  MRN: [id number]   Acct Number: [de-identified]     Tatum Armenta  75 y.o.   1955    REFERRING PROVIDER: Stacey iRcardo    PCP:  AMANDA Alvarez - CNP    PREOPERATIVE DIAGNOSIS:   1. Breast neoplasm, Tis (DCIS), left      POSTOPERATIVE DIAGNOSIS:   1. Breast neoplasm, Tis (DCIS), left      PROCEDURE PERFORMED: Placement of an expandable catheter into the left breast, using image guidance, to be used for interstitial radioelement application. SURGEON: Deuce Polk MD, PhD        ASSISTANT: Rosales Atkins RN  ANESTHESIA:  Lidocaine 2%         ESTIMATED BLOOD LOSS: 30 cc    STAGING: Cancer Staging   Breast neoplasm, Tis (DCIS), left  Staging form: Breast, AJCC 8th Edition  - Clinical: Stage 0 (cTis (DCIS), cN0, cM0, G2, ER+, MI+) - Signed by Korin Caal MD on 2023  - Pathologic: pT1a, pN0(sn), cM0, ER+, MI+, HER2- - Signed by Deuce Polk MD on 10/10/2023    PROCEDURE: The patient was taken to CT to have a scan of the thorax for initial evaluation of seroma in the breast in order to localize the best path for insertion of catheter into the lumpectomy cavity. The patient was then taken to the procedure room and placed in the supine position, with body rotated slightly away from the side of involvement. A pillow was placed under the ipsilateral side of breast cancer involvement to help facilitate this rotated positioning. Using ultrasound guidance, the size and shape of the lumpectomy cavity was evaluated to verify the appropriate angle of entry for the implantation of the applicator. The patient's involved breast was draped and the overlying was cleansed with betadine solution.  Local

## 2023-10-11 NOTE — PROGRESS NOTES
[address] [facility] applicator Dressing Change      Sun Catheter Dressing:   [x] Insertion site cleansed (Chloro-prep swabs X 3) and allowed the site to air dry. [x] Light coating of ointment applied to the skin at the [facility] placement site   [x]Applied a split drain sponge around the base and the top of the [facility] applicator   [x]Wrapped the exposed portion of the [employer] in an ABD pad   [x]Cushion as necessary for comfort and placed bra back on patient to hold dressings in place. [x] Insertion site without redness, pain, warmth, or drainage  Other assessment notes: Initial dressing removed for mod amt serosang drainage. pt notes itching to the areas with tegaderm, RN noted some redness at the edges of tegadem dressing posterior to catheter. Dr Larry Manrique notified and suggested pt bring hydrocortisone ointment today so RN can apply some with next dressing change. LM on voicemail with patient.

## 2023-10-12 ENCOUNTER — HOSPITAL ENCOUNTER (OUTPATIENT)
Dept: RADIATION ONCOLOGY | Age: 68
Discharge: HOME | End: 2023-10-12
Payer: MEDICAID

## 2023-10-12 VITALS
WEIGHT: 173 LBS | DIASTOLIC BLOOD PRESSURE: 62 MMHG | RESPIRATION RATE: 16 BRPM | OXYGEN SATURATION: 97 % | BODY MASS INDEX: 31.64 KG/M2 | SYSTOLIC BLOOD PRESSURE: 133 MMHG

## 2023-10-12 DIAGNOSIS — D05.12 BREAST NEOPLASM, TIS (DCIS), LEFT: Primary | ICD-10-CM

## 2023-10-12 PROCEDURE — 77280 THER RAD SIMULAJ FIELD SMPL: CPT | Performed by: RADIOLOGY

## 2023-10-12 PROCEDURE — 77771 HDR RDNCL NTRSTL/ICAV BRCHTX: CPT | Performed by: RADIOLOGY

## 2023-10-12 PROCEDURE — C1717 BRACHYTX, NON-STR,HDR IR-192: HCPCS | Performed by: RADIOLOGY

## 2023-10-12 NOTE — ADDENDUM NOTE
Encounter addended by: Jose Raul Lopez RN on: 10/12/2023 1:17 PM   Actions taken: Clinical Note Signed

## 2023-10-12 NOTE — ADDENDUM NOTE
Encounter addended by: Nedra Webber RN on: 10/12/2023 12:16 PM   Actions taken: Patient Education assessment filed

## 2023-10-12 NOTE — PROGRESS NOTES
Discharge instructions provided and reviewed including follow up appointment for 10/30/2023. Patient verbalized understanding.

## 2023-10-12 NOTE — ADDENDUM NOTE
Encounter addended by: Timothy Bedolla MD on: 10/12/2023 9:59 AM   Actions taken: Clinical Note Signed

## 2023-10-13 ENCOUNTER — CARE COORDINATION (OUTPATIENT)
Dept: CASE MANAGEMENT | Age: 68
End: 2023-10-13

## 2023-10-13 NOTE — CARE COORDINATION
Care Transitions Follow Up Call    Patient Current Location:  Home: [address] [place] [address] Dr Artem Dove [id number]    Care Transition Nurse contacted the patient by telephone to follow up after admission on 9/10/23. Verified name and  with patient as identifiers. Patient: Jocelyn Gusman  Patient : 1955   MRN: [id number]  Reason for Admission: Wound infection after surgery  Discharge Date: 23 RARS: No data recorded    Needs to be reviewed by the provider   Additional needs identified to be addressed with provider: No  none             Method of communication with provider: none. Spoke with patient today 10/13/23 for final TO/hospital discharge follow up. Confirmed with patient she started and completed radiation to left breast this week with Dr. Gilson Garza (Rad/Onc) and had expandable catheter removed. Patient states she continues with left breast pain which she rates 5/10 in severity on pain scale. States getting pain relief from taking Ibuprofen. Patient states having dressing to left breast which will be removed today before taking her shower. States having ongoing small amount of clear fluid from left breast. Patient states she is taking Bactrim DS per Dr. Marinus Ganser as she had an \"allergic reaction to tape\" which she is tolerating well. Patient denies any shortness of breath, chest pain, nausea, vomiting, chills or fever. Reiterated s/s of infection and knowing when to seek medical attention. Patient denies any other complaints or needs and voices her excitement about being done with radiation. Patient in agreement to final call. CTN signing off. Addressed changes since last contact:   Started and completed radiation therapy this week with Dr. Marinus Ganser (Rad/Onc)  Discussed follow-up appointments. If no appointment was previously scheduled, appointment scheduling offered: Yes. Is follow up appointment scheduled within 7 days of discharge? Yes.     Follow Up  Future Appointments   Date Time

## 2023-10-16 ENCOUNTER — HOSPITAL ENCOUNTER (OUTPATIENT)
Dept: RADIATION ONCOLOGY | Age: 68
Discharge: HOME | End: 2023-10-16
Payer: MEDICAID

## 2023-10-16 NOTE — PROGRESS NOTES
Made phone follow up call to patient to see how she was doing post CATIE. Pt reports she is doing well. She did report some fatigue. Pt reports the CATIE site is intact, no redness, swelling or drainage, steristrips still in place. Pt continue to use antibiotic ointment and emla cream to the tegaderm sites when she describes as getting better and smaller. She will follow up as scheduled with Dr Monse Davis 10/30/23 and call is any problems or questions prior to that follow up.

## 2023-10-27 NOTE — CONSULTS
NURSING ASSESSMENT     Date: 8/28/2023        Patient Name: Columba Doherty     YOB: 1955      Age:  75 y.o. MRN: [id number]       Chaperone [x] Yes   [] No  BFCATHI- Laura Juarez    Advance Directives:   Do you currently have completed advance directives (living will)? [] Yes   [x] No         *If yes, please bring us a copy for your records. *If no, would you like info or assistance in completing advance directives (living will)? [] Yes   [] No    Pain Score:   Pain Score (1-10): lower  back is moderate to severe. Upper abdomen is mild  Pain Location: Lower back and upper abdomen  Pain Duration: lower back is chronic and daily. Gets injections every 6 months by Dr. Selena Tolbert with pain management. Patient has an abdominal hernia for the last 2 years, and will have intermittent mild discomfort. Pain Management/Control: Injections to back every 6 months and Butrans patch. Nothing for the intermittent abdominal discomfort. Is pain affecting your ability to take care of yourself or move throughout your home? [x] Yes   [] No    General: Weakness and Fatigue-chronic  Patient has gained weight [x] Yes   [] No  Patient has lost weight [] Yes   [] No  How much weight in pounds and over what length of time: Up 10 lbs since quitting smoking 1 year ago. Eyes (Ophthalmic): Wears glasses, has cataracts     Skin (Dermatological): Skin tags that were removed from legs and thorax about 3 weeks ago. ENT: Dysphagia with food in the last 6 months. Drinks plenty of fluids with eating, eats small bites. Has denture     Respiratory: SABA and dry cough. H/O COPD     Cardiovascular: No Problems      Device   [] Yes   [x] No   Copy of Card Obtained [] Yes   [x] No    Gastrointestinal: Abdominal Pain and Nausea on occasion.  Constipation if does not take Miralax    Genito-Urinary:    No Problems       Breast: Left breast DCIS      Musculoskeletal: Immobility and Back Pain    Neurological: Dizziness
STATUS:  1    Physical Exam  Vitals and nursing note reviewed. Constitutional:       Appearance: Normal appearance. Comments: She is accompanied by her best friend   HENT:      Head: Normocephalic and atraumatic. Eyes:      General: No scleral icterus. Extraocular Movements: Extraocular movements intact. Conjunctiva/sclera: Conjunctivae normal.   Cardiovascular:      Rate and Rhythm: Regular rhythm. Heart sounds: Normal heart sounds. Pulmonary:      Comments: Patient has oxygen by nasal cannula. She has distant breath sounds in all lung fields  Chest:      Comments: There are no palpable abnormalities of the right or left breast.  There is small biopsy area that is well-healed on the left breast  Abdominal:      General: Bowel sounds are normal.      Palpations: Abdomen is soft. Musculoskeletal:         General: Normal range of motion. Cervical back: Normal range of motion. No rigidity. Right lower leg: No edema. Left lower leg: No edema. Lymphadenopathy:      Cervical: No cervical adenopathy. Skin:     General: Skin is warm and dry. Neurological:      General: No focal deficit present. Mental Status: She is alert and oriented to person, place, and time.    Psychiatric:         Mood and Affect: Mood normal.         Behavior: Behavior normal.           RADIATION SAFETY AND ONCOLOGIC TREATMENT SUPPORT:    - Previous radiation history: None  - History of autoimmune or connective tissue disease: None  - Nutritional support/ PEG: Not applicable  - Dental evaluation: Not applicable  -  requested:   met with the patient, please refer to the note from that encounter.  - Transportation for daily treatment: No issues    TUMOR MARKERS: No results found for: \"PSA\", \"CEA\", \"\", [id number]", [id number]"    IMAGING REVIEWED: Per HPI    PATHOLOGY REVIEWED: Per HPI    IMPRESSION: This is a 69-year-old female with history of COPD, hypertension, TIA,

## 2023-10-30 ENCOUNTER — HOSPITAL ENCOUNTER (OUTPATIENT)
Dept: RADIATION ONCOLOGY | Age: 68
End: 2023-10-30
Payer: MEDICARE

## 2023-10-30 VITALS
OXYGEN SATURATION: 93 % | HEART RATE: 86 BPM | WEIGHT: 173.6 LBS | SYSTOLIC BLOOD PRESSURE: 129 MMHG | RESPIRATION RATE: 18 BRPM | TEMPERATURE: 97.5 F | DIASTOLIC BLOOD PRESSURE: 61 MMHG | BODY MASS INDEX: 31.75 KG/M2

## 2023-10-30 DIAGNOSIS — D05.12 BREAST NEOPLASM, TIS (DCIS), LEFT: Primary | ICD-10-CM

## 2023-10-30 PROCEDURE — 99212 OFFICE O/P EST SF 10 MIN: CPT | Performed by: RADIOLOGY

## 2023-10-30 NOTE — PROGRESS NOTES
NURSING ASSESSMENT     Date: 10/30/2023        Patient Name: Enoch De Leon     YOB: 1955      Age:  75 y.o. MRN: [id number]       Chaperone [] Yes   [x] No      Advance Directives:   Do you currently have completed advance directives (living will)? [] Yes   [x] No         *If yes, please bring us a copy for your records. *If no, would you like info or assistance in completing advance directives (living will)? [] Yes   [x] No    Pain Score: pt is followed by Dr Neida Castro  Pain Score (1-10): 6, 8 with ambulation  Pain Location: back with walking  Pain Duration: all the time  Pain Management/Control: pain patch      Is pain affecting your ability to take care of yourself or move throughout your home?                         [] Yes   [x] No    General: continues to have fatigue  Patient has gained weight [x] Yes   [] No  Patient has lost weight [] Yes   [x] No  How much weight in pounds and over what length of time: pt has gained 6 lb     Eyes (Ophthalmic): 3 days ago her eyes crossed and she couldn't focus     Skin (Dermatological): see breast assessment     ENT: No Problems     Respiratory: SOB, SABA, and Oxygen baseline, using oxygen at 3l nc continuous     Cardiovascular: No Problems      Device   [] Yes   [x] No   Copy of Card Obtained [] Yes   [x] No    Gastrointestinal: Constipation baseline, takes miralax, last BM yesterday    Genito-Urinary: No Problems     Breast: left breast CATIE site is steri-stripped,  mild to mod erythema with small amt orange-pink drainage from site, area is tender and breast feels heavy, continues to have seroma left axilla, pt has been using lidocaine cream to CATIE site, skin tears have healed     Musculoskeletal: chronic back pain, uses rollator for long distances    Neurological: dizziness 3 days ago with difficulty focusing      Hematological and Lymphatic: No Problems     Endocrine: No Problems     Gyn History: No problems    A 10-point review of systems  has been

## 2023-11-03 ENCOUNTER — OFFICE VISIT (OUTPATIENT)
Dept: FAMILY MEDICINE CLINIC | Age: 68
End: 2023-11-03
Payer: MEDICARE

## 2023-11-03 ENCOUNTER — TELEPHONE (OUTPATIENT)
Dept: FAMILY MEDICINE CLINIC | Age: 68
End: 2023-11-03

## 2023-11-03 VITALS
HEIGHT: 62 IN | DIASTOLIC BLOOD PRESSURE: 68 MMHG | WEIGHT: 173 LBS | BODY MASS INDEX: 31.83 KG/M2 | SYSTOLIC BLOOD PRESSURE: 138 MMHG | HEART RATE: 85 BPM

## 2023-11-03 DIAGNOSIS — J44.9 COPD WITH HYPOXIA (HCC): ICD-10-CM

## 2023-11-03 DIAGNOSIS — J44.9 CHRONIC OBSTRUCTIVE PULMONARY DISEASE, UNSPECIFIED COPD TYPE (HCC): ICD-10-CM

## 2023-11-03 DIAGNOSIS — F90.0 ATTENTION DEFICIT HYPERACTIVITY DISORDER (ADHD), PREDOMINANTLY INATTENTIVE TYPE: Primary | ICD-10-CM

## 2023-11-03 DIAGNOSIS — K43.9 ABDOMINAL WALL HERNIA: ICD-10-CM

## 2023-11-03 DIAGNOSIS — M48.062 SPINAL STENOSIS OF LUMBAR REGION WITH NEUROGENIC CLAUDICATION: ICD-10-CM

## 2023-11-03 DIAGNOSIS — F90.0 ATTENTION DEFICIT HYPERACTIVITY DISORDER (ADHD), PREDOMINANTLY INATTENTIVE TYPE: ICD-10-CM

## 2023-11-03 DIAGNOSIS — F41.1 GAD (GENERALIZED ANXIETY DISORDER): Primary | ICD-10-CM

## 2023-11-03 DIAGNOSIS — R09.02 COPD WITH HYPOXIA (HCC): ICD-10-CM

## 2023-11-03 DIAGNOSIS — G57.93 NEUROPATHY INVOLVING BOTH LOWER EXTREMITIES: ICD-10-CM

## 2023-11-03 PROCEDURE — 3017F COLORECTAL CA SCREEN DOC REV: CPT | Performed by: NURSE PRACTITIONER

## 2023-11-03 PROCEDURE — 1123F ACP DISCUSS/DSCN MKR DOCD: CPT | Performed by: NURSE PRACTITIONER

## 2023-11-03 PROCEDURE — G8428 CUR MEDS NOT DOCUMENT: HCPCS | Performed by: NURSE PRACTITIONER

## 2023-11-03 PROCEDURE — 1090F PRES/ABSN URINE INCON ASSESS: CPT | Performed by: NURSE PRACTITIONER

## 2023-11-03 PROCEDURE — 3023F SPIROM DOC REV: CPT | Performed by: NURSE PRACTITIONER

## 2023-11-03 PROCEDURE — 1036F TOBACCO NON-USER: CPT | Performed by: NURSE PRACTITIONER

## 2023-11-03 PROCEDURE — G8399 PT W/DXA RESULTS DOCUMENT: HCPCS | Performed by: NURSE PRACTITIONER

## 2023-11-03 PROCEDURE — 99214 OFFICE O/P EST MOD 30 MIN: CPT | Performed by: NURSE PRACTITIONER

## 2023-11-03 PROCEDURE — G8484 FLU IMMUNIZE NO ADMIN: HCPCS | Performed by: NURSE PRACTITIONER

## 2023-11-03 PROCEDURE — G8417 CALC BMI ABV UP PARAM F/U: HCPCS | Performed by: NURSE PRACTITIONER

## 2023-11-03 RX ORDER — GABAPENTIN 400 MG/1
CAPSULE ORAL
Qty: 90 CAPSULE | Refills: 2 | Status: SHIPPED | OUTPATIENT
Start: 2023-11-03 | End: 2023-12-02

## 2023-11-03 RX ORDER — FUROSEMIDE 20 MG/1
20 TABLET ORAL DAILY
Qty: 30 TABLET | Refills: 1 | Status: SHIPPED | OUTPATIENT
Start: 2023-11-03

## 2023-11-03 RX ORDER — DEXTROAMPHETAMINE SACCHARATE, AMPHETAMINE ASPARTATE, DEXTROAMPHETAMINE SULFATE AND AMPHETAMINE SULFATE 5; 5; 5; 5 MG/1; MG/1; MG/1; MG/1
20 TABLET ORAL 2 TIMES DAILY
Qty: 60 TABLET | Refills: 0 | Status: SHIPPED | OUTPATIENT
Start: 2023-12-31 | End: 2024-01-30

## 2023-11-03 RX ORDER — ALPRAZOLAM 1 MG/1
1 TABLET ORAL 2 TIMES DAILY PRN
Qty: 60 TABLET | Refills: 2 | Status: SHIPPED | OUTPATIENT
Start: 2023-11-03 | End: 2024-02-01

## 2023-11-03 RX ORDER — ERGOCALCIFEROL 1.25 MG/1
CAPSULE ORAL
Qty: 4 CAPSULE | Refills: 11 | Status: SHIPPED | OUTPATIENT
Start: 2023-11-03

## 2023-11-03 RX ORDER — BUPRENORPHINE 20 UG/H
1 PATCH TRANSDERMAL
Qty: 4 PATCH | Refills: 2 | Status: SHIPPED | OUTPATIENT
Start: 2023-11-03 | End: 2023-12-03

## 2023-11-03 RX ORDER — PREDNISONE 5 MG/1
5 TABLET ORAL DAILY
Qty: 30 TABLET | Refills: 5 | Status: SHIPPED | OUTPATIENT
Start: 2023-11-03

## 2023-11-03 RX ORDER — DEXTROAMPHETAMINE SACCHARATE, AMPHETAMINE ASPARTATE, DEXTROAMPHETAMINE SULFATE AND AMPHETAMINE SULFATE 5; 5; 5; 5 MG/1; MG/1; MG/1; MG/1
20 TABLET ORAL 2 TIMES DAILY
Qty: 60 TABLET | Refills: 0 | Status: SHIPPED | OUTPATIENT
Start: 2023-11-03 | End: 2023-11-06

## 2023-11-03 RX ORDER — DEXTROAMPHETAMINE SACCHARATE, AMPHETAMINE ASPARTATE, DEXTROAMPHETAMINE SULFATE AND AMPHETAMINE SULFATE 5; 5; 5; 5 MG/1; MG/1; MG/1; MG/1
20 TABLET ORAL 2 TIMES DAILY
Qty: 60 TABLET | Refills: 0 | Status: SHIPPED | OUTPATIENT
Start: 2023-12-02 | End: 2023-11-06

## 2023-11-03 NOTE — TELEPHONE ENCOUNTER
Patient called about the script for Adderall 20 mg tablets. [facility] does not have them in stock. They do have the Adderall XR- 30 mg capsules. Patient is asking if a new script for the 30 mg capsules can be sent. Thank you.

## 2023-11-06 RX ORDER — DEXTROAMPHETAMINE SACCHARATE, AMPHETAMINE ASPARTATE MONOHYDRATE, DEXTROAMPHETAMINE SULFATE AND AMPHETAMINE SULFATE 7.5; 7.5; 7.5; 7.5 MG/1; MG/1; MG/1; MG/1
30 CAPSULE, EXTENDED RELEASE ORAL DAILY
Qty: 30 CAPSULE | Refills: 0 | Status: SHIPPED | OUTPATIENT
Start: 2023-11-06 | End: 2023-12-06

## 2023-11-09 ASSESSMENT — ENCOUNTER SYMPTOMS
CONSTIPATION: 0
BACK PAIN: 1
ALLERGIC/IMMUNOLOGIC NEGATIVE: 1
DIARRHEA: 0
BLOOD IN STOOL: 0
ABDOMINAL PAIN: 1
RECTAL PAIN: 0
SORE THROAT: 0
COUGH: 1
ANAL BLEEDING: 0
VOICE CHANGE: 0
HEARTBURN: 0
RHINORRHEA: 0
SHORTNESS OF BREATH: 0
COLOR CHANGE: 0
TROUBLE SWALLOWING: 0
EYES NEGATIVE: 1

## 2023-11-09 ASSESSMENT — COPD QUESTIONNAIRES: COPD: 1

## 2023-11-17 ENCOUNTER — TELEPHONE (OUTPATIENT)
Dept: FAMILY MEDICINE CLINIC | Age: 68
End: 2023-11-17

## 2023-11-17 ENCOUNTER — TELEPHONE (OUTPATIENT)
Dept: SURGERY | Age: 68
End: 2023-11-17

## 2023-11-17 RX ORDER — LIDOCAINE HYDROCHLORIDE 10 MG/ML
8 INJECTION, SOLUTION INFILTRATION; PERINEURAL ONCE
Status: CANCELLED | OUTPATIENT
Start: 2023-11-20

## 2023-11-17 NOTE — TELEPHONE ENCOUNTER
Patient is calling stating she is constipated and is unsure of the spelliing of the capsule she has taken in the past . She believes another doctor ( Gastro doctor) may have prescribed it in the past . She would like a prescription sent to her pharmacy

## 2023-11-17 NOTE — TELEPHONE ENCOUNTER
I called the patient on recommendation from Radiation Oncology. The patient has complaints of swelling in the left axilla with some axillary,\" soreness. \" The patient denies redness, warmth or drainage, fever or chills. I scheduled the patient for 11/20/2023. The patient is aware of the date/time/location of the appointment. The patient has no questions at this time.

## 2023-11-20 ENCOUNTER — OFFICE VISIT (OUTPATIENT)
Dept: SURGERY | Age: 68
End: 2023-11-20

## 2023-11-20 VITALS
OXYGEN SATURATION: 98 % | HEIGHT: 62 IN | TEMPERATURE: 97.9 F | DIASTOLIC BLOOD PRESSURE: 66 MMHG | WEIGHT: 172.4 LBS | HEART RATE: 84 BPM | RESPIRATION RATE: 16 BRPM | SYSTOLIC BLOOD PRESSURE: 148 MMHG | BODY MASS INDEX: 31.73 KG/M2

## 2023-11-20 DIAGNOSIS — E66.9 OBESITY, CLASS I, BMI 30-34.9: ICD-10-CM

## 2023-11-20 DIAGNOSIS — D05.12 BREAST NEOPLASM, TIS (DCIS), LEFT: Primary | ICD-10-CM

## 2023-11-20 DIAGNOSIS — N64.89 SEROMA OF BREAST: ICD-10-CM

## 2023-11-20 PROBLEM — E66.811 OBESITY, CLASS I, BMI 30-34.9: Status: ACTIVE | Noted: 2023-11-20

## 2023-11-20 PROCEDURE — 99024 POSTOP FOLLOW-UP VISIT: CPT | Performed by: SURGERY

## 2023-11-20 NOTE — PATIENT INSTRUCTIONS
Patient Education        Lymphedema After Treatment for Breast Cancer: Care Instructions  Overview     Lymphedema is a buildup of fluid in the soft tissues of the body. It can happen after lymph nodes are removed during surgery or after radiation therapy. Lymph fluid usually moves freely throughout your body. But when lymph nodes have been removed, or the flow of lymph fluid is blocked by scar tissue from radiation, fluid can build up. This can cause swelling in your arm and nearby areas. There isn't any known way to prevent lymphedema. But whether you are at risk of getting lymphedema or already have symptoms, there are things you can do that will help. This includes managing any swelling you may have. Lymphedema can happen soon after breast cancer treatment. Or it may happen many years later. It may affect only part of your arm or hand. In some cases, it affects all of the arm. Make sure to follow these precautions even after you finish treatment. Do not ignore tightness or swelling in or around your arm or hand. You are less likely to have long-term problems if you get these symptoms treated right away. Follow-up care is a key part of your treatment and safety. Be sure to make and go to all appointments, and call your doctor if you are having problems. It's also a good idea to know your test results and keep a list of the medicines you take. How can you care for yourself at home? Skin care    Keep your arm, hand, and armpit clean. Use a mild soap that does not dry out your skin. Moisturize your skin often. Take good care of the skin around your fingernails. Do not bite or cut your cuticles. Ask your doctor how to handle any cuts, scratches, insect bites, or other injuries you may get. Use sunscreen and insect repellent outdoors to protect your skin from sunburn and insect bites. Use an electric razor if you shave your armpit. It's less likely to cut or irritate your skin.    Activity

## 2023-12-05 ENCOUNTER — TRANSCRIBE ORDERS (OUTPATIENT)
Dept: ADMINISTRATIVE | Age: 68
End: 2023-12-05

## 2023-12-05 DIAGNOSIS — Z78.0 MENOPAUSE: Primary | ICD-10-CM

## 2023-12-08 ENCOUNTER — HOSPITAL ENCOUNTER (OUTPATIENT)
Dept: WOMENS IMAGING | Age: 68
End: 2023-12-08
Payer: MEDICARE

## 2023-12-08 DIAGNOSIS — Z78.0 MENOPAUSE: ICD-10-CM

## 2023-12-08 PROCEDURE — 77080 DXA BONE DENSITY AXIAL: CPT

## 2024-01-03 ENCOUNTER — TELEPHONE (OUTPATIENT)
Dept: FAMILY MEDICINE CLINIC | Age: 69
End: 2024-01-03

## 2024-01-03 NOTE — TELEPHONE ENCOUNTER
Patient needs referral for oxygen    Currently using [facility] while in [facility]. They called saying she needs to use [facility].    Patient has machine but needs everything else (hoses, etc)    [facility] phone ([phone])  Fax ([phone])      Patient Anita Armenta phone - [phone]

## 2024-01-23 ENCOUNTER — HOSPITAL ENCOUNTER (OUTPATIENT)
Dept: RADIATION ONCOLOGY | Age: 69
Discharge: HOME | End: 2024-01-23
Payer: COMMERCIAL

## 2024-01-23 VITALS
TEMPERATURE: 98.7 F | OXYGEN SATURATION: 93 % | DIASTOLIC BLOOD PRESSURE: 66 MMHG | RESPIRATION RATE: 18 BRPM | HEART RATE: 82 BPM | BODY MASS INDEX: 30.68 KG/M2 | WEIGHT: 168 LBS | SYSTOLIC BLOOD PRESSURE: 147 MMHG

## 2024-01-23 DIAGNOSIS — D05.12 BREAST NEOPLASM, TIS (DCIS), LEFT: Primary | ICD-10-CM

## 2024-01-23 PROCEDURE — 99214 OFFICE O/P EST MOD 30 MIN: CPT | Performed by: RADIOLOGY

## 2024-01-23 PROCEDURE — 99212 OFFICE O/P EST SF 10 MIN: CPT | Performed by: RADIOLOGY

## 2024-01-23 RX ORDER — BUPRENORPHINE 10 UG/H
1 PATCH TRANSDERMAL WEEKLY
COMMUNITY

## 2024-01-27 RX ORDER — FUROSEMIDE 20 MG/1
20 TABLET ORAL PRN
Qty: 30 TABLET | Refills: 2 | Status: SHIPPED | OUTPATIENT
Start: 2024-01-27

## 2024-02-02 ENCOUNTER — OFFICE VISIT (OUTPATIENT)
Dept: FAMILY MEDICINE CLINIC | Age: 69
End: 2024-02-02
Payer: COMMERCIAL

## 2024-02-02 ENCOUNTER — OFFICE VISIT (OUTPATIENT)
Dept: FAMILY MEDICINE CLINIC | Age: 69
End: 2024-02-02

## 2024-02-02 VITALS — HEIGHT: 62 IN | BODY MASS INDEX: 32.02 KG/M2 | WEIGHT: 174 LBS

## 2024-02-02 VITALS
HEIGHT: 62 IN | WEIGHT: 174 LBS | DIASTOLIC BLOOD PRESSURE: 74 MMHG | SYSTOLIC BLOOD PRESSURE: 134 MMHG | BODY MASS INDEX: 32.02 KG/M2

## 2024-02-02 DIAGNOSIS — F33.1 MODERATE EPISODE OF RECURRENT MAJOR DEPRESSIVE DISORDER (HCC): ICD-10-CM

## 2024-02-02 DIAGNOSIS — F41.1 GAD (GENERALIZED ANXIETY DISORDER): ICD-10-CM

## 2024-02-02 DIAGNOSIS — F11.20 OPIOID DEPENDENCE WITH CURRENT USE (HCC): ICD-10-CM

## 2024-02-02 DIAGNOSIS — J44.9 CHRONIC OBSTRUCTIVE PULMONARY DISEASE, UNSPECIFIED COPD TYPE (HCC): ICD-10-CM

## 2024-02-02 DIAGNOSIS — F90.0 ATTENTION DEFICIT HYPERACTIVITY DISORDER (ADHD), PREDOMINANTLY INATTENTIVE TYPE: ICD-10-CM

## 2024-02-02 DIAGNOSIS — F41.9 ANXIETY: ICD-10-CM

## 2024-02-02 DIAGNOSIS — R73.03 PREDIABETES: ICD-10-CM

## 2024-02-02 DIAGNOSIS — K59.03 DRUG-INDUCED CONSTIPATION: Primary | ICD-10-CM

## 2024-02-02 DIAGNOSIS — G57.93 NEUROPATHY INVOLVING BOTH LOWER EXTREMITIES: ICD-10-CM

## 2024-02-02 DIAGNOSIS — I10 ESSENTIAL HYPERTENSION: ICD-10-CM

## 2024-02-02 DIAGNOSIS — M79.7 FIBROMYALGIA: ICD-10-CM

## 2024-02-02 DIAGNOSIS — Z00.00 MEDICARE ANNUAL WELLNESS VISIT, SUBSEQUENT: Primary | ICD-10-CM

## 2024-02-02 DIAGNOSIS — M46.1 BILATERAL SACROILIITIS (HCC): ICD-10-CM

## 2024-02-02 PROCEDURE — G8484 FLU IMMUNIZE NO ADMIN: HCPCS | Performed by: NURSE PRACTITIONER

## 2024-02-02 PROCEDURE — G0439 PPPS, SUBSEQ VISIT: HCPCS | Performed by: NURSE PRACTITIONER

## 2024-02-02 PROCEDURE — 1123F ACP DISCUSS/DSCN MKR DOCD: CPT | Performed by: NURSE PRACTITIONER

## 2024-02-02 PROCEDURE — 3017F COLORECTAL CA SCREEN DOC REV: CPT | Performed by: NURSE PRACTITIONER

## 2024-02-02 RX ORDER — DEXTROAMPHETAMINE SACCHARATE, AMPHETAMINE ASPARTATE, DEXTROAMPHETAMINE SULFATE AND AMPHETAMINE SULFATE 5; 5; 5; 5 MG/1; MG/1; MG/1; MG/1
20 TABLET ORAL 2 TIMES DAILY
Qty: 60 TABLET | Refills: 0 | Status: SHIPPED | OUTPATIENT
Start: 2024-03-03 | End: 2024-04-02

## 2024-02-02 RX ORDER — DEXTROAMPHETAMINE SACCHARATE, AMPHETAMINE ASPARTATE, DEXTROAMPHETAMINE SULFATE AND AMPHETAMINE SULFATE 5; 5; 5; 5 MG/1; MG/1; MG/1; MG/1
20 TABLET ORAL 2 TIMES DAILY
Qty: 60 TABLET | Refills: 0 | Status: SHIPPED | OUTPATIENT
Start: 2024-04-02 | End: 2024-05-02

## 2024-02-02 RX ORDER — TRAMADOL HYDROCHLORIDE 50 MG/1
50 TABLET ORAL EVERY 6 HOURS PRN
Qty: 12 TABLET | Refills: 0 | Status: SHIPPED | OUTPATIENT
Start: 2024-02-02 | End: 2024-02-05

## 2024-02-02 RX ORDER — DEXTROAMPHETAMINE SACCHARATE, AMPHETAMINE ASPARTATE, DEXTROAMPHETAMINE SULFATE AND AMPHETAMINE SULFATE 5; 5; 5; 5 MG/1; MG/1; MG/1; MG/1
20 TABLET ORAL 2 TIMES DAILY
Qty: 60 TABLET | Refills: 0 | Status: SHIPPED | OUTPATIENT
Start: 2024-02-02 | End: 2024-03-03

## 2024-02-02 RX ORDER — ALPRAZOLAM 1 MG/1
1 TABLET, EXTENDED RELEASE ORAL EVERY MORNING
Qty: 30 TABLET | Refills: 0 | Status: CANCELLED | OUTPATIENT
Start: 2024-02-02 | End: 2024-03-03

## 2024-02-02 RX ORDER — ALPRAZOLAM 1 MG/1
1 TABLET ORAL 2 TIMES DAILY PRN
Qty: 60 TABLET | Refills: 2 | Status: SHIPPED | OUTPATIENT
Start: 2024-02-02 | End: 2024-03-03

## 2024-02-02 RX ORDER — GABAPENTIN 400 MG/1
CAPSULE ORAL
Qty: 90 CAPSULE | Refills: 2 | Status: SHIPPED | OUTPATIENT
Start: 2024-02-02 | End: 2024-05-03

## 2024-02-02 RX ORDER — BUPRENORPHINE 20 UG/H
1 PATCH TRANSDERMAL
Qty: 4 PATCH | Refills: 2 | Status: SHIPPED | OUTPATIENT
Start: 2024-02-02 | End: 2024-03-03

## 2024-02-02 ASSESSMENT — PATIENT HEALTH QUESTIONNAIRE - PHQ9
SUM OF ALL RESPONSES TO PHQ QUESTIONS 1-9: 3
1. LITTLE INTEREST OR PLEASURE IN DOING THINGS: 0
2. FEELING DOWN, DEPRESSED OR HOPELESS: 1
9. THOUGHTS THAT YOU WOULD BE BETTER OFF DEAD, OR OF HURTING YOURSELF: 0
SUM OF ALL RESPONSES TO PHQ QUESTIONS 1-9: 3
SUM OF ALL RESPONSES TO PHQ QUESTIONS 1-9: 3
10. IF YOU CHECKED OFF ANY PROBLEMS, HOW DIFFICULT HAVE THESE PROBLEMS MADE IT FOR YOU TO DO YOUR WORK, TAKE CARE OF THINGS AT HOME, OR GET ALONG WITH OTHER PEOPLE: 0
4. FEELING TIRED OR HAVING LITTLE ENERGY: 1
7. TROUBLE CONCENTRATING ON THINGS, SUCH AS READING THE NEWSPAPER OR WATCHING TELEVISION: 0
3. TROUBLE FALLING OR STAYING ASLEEP: 1
SUM OF ALL RESPONSES TO PHQ QUESTIONS 1-9: 3
6. FEELING BAD ABOUT YOURSELF - OR THAT YOU ARE A FAILURE OR HAVE LET YOURSELF OR YOUR FAMILY DOWN: 0
SUM OF ALL RESPONSES TO PHQ9 QUESTIONS 1 & 2: 1
8. MOVING OR SPEAKING SO SLOWLY THAT OTHER PEOPLE COULD HAVE NOTICED. OR THE OPPOSITE, BEING SO FIGETY OR RESTLESS THAT YOU HAVE BEEN MOVING AROUND A LOT MORE THAN USUAL: 0

## 2024-02-02 NOTE — PROGRESS NOTES
mg  Yamini Buchanan APRN - CNP   furosemide (LASIX) 20 MG tablet TAKE 1 TABLET BY MOUTH AS NEEDED  Yamini Buchanan APRN - CNP   buprenorphine (BUPRENEX) 10 MCG/HR PTWK Place 1 patch onto the skin once a week. To lower back Max Daily Amount: 1 patch  ProviderPaulina MD   linaCLOtide (LINZESS) 72 MCG CAPS capsule Take 1 capsule by mouth every morning (before breakfast)  Yamini Buchanan APRN - CNP   predniSONE (DELTASONE) 5 MG tablet Take 1 tablet by mouth daily  Yamini Buchanan APRN - CNP   vitamin D (ERGOCALCIFEROL) 1.25 MG (53929 UT) CAPS capsule TAKE 1 CAPSULE BY MOUTH once per week AS DIRECTED  Yamini Buchanan APRN - CNP   DULoxetine (CYMBALTA) 60 MG extended release capsule Take 1 capsule by mouth daily  Yamini Buchanan APRN - CNP   rosuvastatin (CRESTOR) 10 MG tablet TAKE 1 TABLET BY MOUTH EVERY DAY  Yamini Buchanan APRN - CNP   ARIPiprazole (ABILIFY) 15 MG tablet Take 1 tablet by mouth every morning  ProviderPaulina MD   ipratropium 0.5 mg-albuterol 2.5 mg (DUONEB) 0.5-2.5 (3) MG/3ML SOLN nebulizer solution Inhale 3 mLs into the lungs every 4 hours as needed for Shortness of Breath  Yamini Buchanan APRN - CNP   albuterol sulfate HFA (PROVENTIL;VENTOLIN;PROAIR) 108 (90 Base) MCG/ACT inhaler Inhale 2 puffs into the lungs every 6 hours as needed for Wheezing  Yamini Buchanan APRN - CNP   Misc. Devices (ROLLER WALKER) MISC 1 each by Does not apply route daily  Yamini Buchanan APRN - CNP   Handicap Placard MISC by Does not apply route Duration 5 years  Yamini Buchanan APRN - CNP   polyethylene glycol (GLYCOLAX) 17 GM/SCOOP powder Take 17 g by mouth as needed  ProviderPaulina MD   mirtazapine (REMERON) 15 MG tablet Take 3 tablets by mouth nightly  ProviderPaulina MD       CareTeam (Including outside providers/suppliers regularly involved in providing care):   Patient Care Team:  Yamini Buchanan APRN - CNP as PCP - General (Nurse Practitioner  11-Jan-2017 15:34

## 2024-02-02 NOTE — PATIENT INSTRUCTIONS
2/2/2024  Medicare offers a range of preventive health benefits. Some of the tests and screenings are paid in full while other may be subject to a deductible, co-insurance, and/or copay.    Some of these benefits include a comprehensive review of your medical history including lifestyle, illnesses that may run in your family, and various assessments and screenings as appropriate.    After reviewing your medical record and screening and assessments performed today your provider may have ordered immunizations, labs, imaging, and/or referrals for you.  A list of these orders (if applicable) as well as your Preventive Care list are included within your After Visit Summary for your review.    Other Preventive Recommendations:    A preventive eye exam performed by an eye specialist is recommended every 1-2 years to screen for glaucoma; cataracts, macular degeneration, and other eye disorders.  A preventive dental visit is recommended every 6 months.  Try to get at least 150 minutes of exercise per week or 10,000 steps per day on a pedometer .  Order or download the FREE \"Exercise & Physical Activity: Your Everyday Guide\" from [employer]. Call 1-100.670.1430 or search [employer] [employer] online.  You need 0590-0847 mg of calcium and 3791-3877 IU of vitamin D per day. It is possible to meet your calcium requirement with diet alone, but a vitamin D supplement is usually necessary to meet this goal.  When exposed to the sun, use a sunscreen that protects against both UVA and UVB radiation with an SPF of 30 or greater. Reapply every 2 to 3 hours or after sweating, drying off with a towel, or swimming.  Always wear a seat belt when traveling in a car. Always wear a helmet when riding a bicycle or motorcycle.

## 2024-02-09 ENCOUNTER — TELEPHONE (OUTPATIENT)
Dept: FAMILY MEDICINE CLINIC | Age: 69
End: 2024-02-09

## 2024-03-14 ENCOUNTER — TELEPHONE (OUTPATIENT)
Dept: FAMILY MEDICINE CLINIC | Age: 69
End: 2024-03-14

## 2024-03-14 NOTE — TELEPHONE ENCOUNTER
Pt called and stated her pharmacy [facility] needs a PA done for her medication Buprenorphine 20MCG/HR PTWK

## 2024-03-20 ENCOUNTER — OFFICE VISIT (OUTPATIENT)
Dept: PAIN MANAGEMENT | Age: 69
End: 2024-03-20
Payer: COMMERCIAL

## 2024-03-20 VITALS
DIASTOLIC BLOOD PRESSURE: 70 MMHG | HEIGHT: 62 IN | BODY MASS INDEX: 31.47 KG/M2 | WEIGHT: 171 LBS | SYSTOLIC BLOOD PRESSURE: 130 MMHG | TEMPERATURE: 97.6 F

## 2024-03-20 DIAGNOSIS — M47.817 LUMBOSACRAL SPONDYLOSIS WITHOUT MYELOPATHY: Primary | ICD-10-CM

## 2024-03-20 DIAGNOSIS — M51.36 DDD (DEGENERATIVE DISC DISEASE), LUMBAR: ICD-10-CM

## 2024-03-20 PROCEDURE — G8427 DOCREV CUR MEDS BY ELIG CLIN: HCPCS | Performed by: PAIN MEDICINE

## 2024-03-20 PROCEDURE — 3017F COLORECTAL CA SCREEN DOC REV: CPT | Performed by: PAIN MEDICINE

## 2024-03-20 PROCEDURE — 99214 OFFICE O/P EST MOD 30 MIN: CPT | Performed by: PAIN MEDICINE

## 2024-03-20 PROCEDURE — 1123F ACP DISCUSS/DSCN MKR DOCD: CPT | Performed by: PAIN MEDICINE

## 2024-03-20 PROCEDURE — 1036F TOBACCO NON-USER: CPT | Performed by: PAIN MEDICINE

## 2024-03-20 PROCEDURE — 1090F PRES/ABSN URINE INCON ASSESS: CPT | Performed by: PAIN MEDICINE

## 2024-03-20 PROCEDURE — G8417 CALC BMI ABV UP PARAM F/U: HCPCS | Performed by: PAIN MEDICINE

## 2024-03-20 PROCEDURE — G8399 PT W/DXA RESULTS DOCUMENT: HCPCS | Performed by: PAIN MEDICINE

## 2024-03-20 PROCEDURE — G8484 FLU IMMUNIZE NO ADMIN: HCPCS | Performed by: PAIN MEDICINE

## 2024-03-20 ASSESSMENT — ENCOUNTER SYMPTOMS
SHORTNESS OF BREATH: 1
BACK PAIN: 1
NAUSEA: 0
CONSTIPATION: 0
DIARRHEA: 0
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
## 2024-03-21 ENCOUNTER — TELEPHONE (OUTPATIENT)
Dept: PAIN MANAGEMENT | Age: 69
End: 2024-03-21

## 2024-03-21 NOTE — TELEPHONE ENCOUNTER
PER PATIENT SHE HAD 80% OR MORE OF RELIEF FROM THE LAST BILATERAL L3,4,5 RFA DONE 3/15/23, THAT LASTED OVER 6 MONTHS.

## 2024-03-21 NOTE — TELEPHONE ENCOUNTER
Pt is asking if she should have another MRI before she has an RFA done since it's been quite some time   Also wants to know if it's possible to find out who diagnosed her spinal stenosis   Wants to receive a call back with for both answers

## 2024-03-22 ENCOUNTER — TELEPHONE (OUTPATIENT)
Dept: PAIN MANAGEMENT | Age: 69
End: 2024-03-22

## 2024-03-22 NOTE — TELEPHONE ENCOUNTER
BILATERAL L3,4,5 RFA     AUTH #: [id number]     DATE RANGE: 3/21/24-6/19/24    OK to schedule procedure approved as above.   Please note sides/levels approved and date range.   (If applicable, sides/levels approved may differ from those ordered)    TO BE SCHEDULED WITH DR. DICKEY

## 2024-03-27 DIAGNOSIS — M51.36 DDD (DEGENERATIVE DISC DISEASE), LUMBAR: Primary | ICD-10-CM

## 2024-03-27 DIAGNOSIS — M51.26 LUMBAR HERNIATED DISC: ICD-10-CM

## 2024-03-27 NOTE — TELEPHONE ENCOUNTER
Patient aware and understands. Would like to to go to [facility] for MRI. Will wait for call to schedule

## 2024-03-27 NOTE — TELEPHONE ENCOUNTER
Any narrowing in the spinal canal is diagnosed or labeled as spinal stenosis. This can be caused by a herniated disc.   As for the MRI, I put the order in, but it needs to be authorized by insurance.

## 2024-04-02 ENCOUNTER — OFFICE VISIT (OUTPATIENT)
Dept: PRIMARY CARE CLINIC | Age: 69
End: 2024-04-02
Payer: COMMERCIAL

## 2024-04-02 VITALS
TEMPERATURE: 97.2 F | SYSTOLIC BLOOD PRESSURE: 130 MMHG | OXYGEN SATURATION: 95 % | HEIGHT: 63 IN | HEART RATE: 88 BPM | BODY MASS INDEX: 30.12 KG/M2 | DIASTOLIC BLOOD PRESSURE: 72 MMHG | WEIGHT: 170 LBS

## 2024-04-02 DIAGNOSIS — R05.9 COUGH, UNSPECIFIED TYPE: ICD-10-CM

## 2024-04-02 DIAGNOSIS — J02.9 SORE THROAT: ICD-10-CM

## 2024-04-02 DIAGNOSIS — U07.1 COVID-19: Primary | ICD-10-CM

## 2024-04-02 PROBLEM — S06.9X0A TRAUMATIC BRAIN INJURY, WITHOUT LOSS OF CONSCIOUSNESS, INITIAL ENCOUNTER (HCC): Status: ACTIVE | Noted: 2024-04-02

## 2024-04-02 LAB
INFLUENZA A ANTIBODY: NEGATIVE
INFLUENZA B ANTIBODY: NEGATIVE
Lab: ABNORMAL
PERFORMING INSTRUMENT: ABNORMAL
QC PASS/FAIL: ABNORMAL
S PYO AG THROAT QL: NORMAL
SARS-COV-2, POC: DETECTED

## 2024-04-02 PROCEDURE — 87426 SARSCOV CORONAVIRUS AG IA: CPT | Performed by: STUDENT IN AN ORGANIZED HEALTH CARE EDUCATION/TRAINING PROGRAM

## 2024-04-02 PROCEDURE — 87804 INFLUENZA ASSAY W/OPTIC: CPT | Performed by: STUDENT IN AN ORGANIZED HEALTH CARE EDUCATION/TRAINING PROGRAM

## 2024-04-02 PROCEDURE — 3017F COLORECTAL CA SCREEN DOC REV: CPT | Performed by: STUDENT IN AN ORGANIZED HEALTH CARE EDUCATION/TRAINING PROGRAM

## 2024-04-02 PROCEDURE — 87880 STREP A ASSAY W/OPTIC: CPT | Performed by: STUDENT IN AN ORGANIZED HEALTH CARE EDUCATION/TRAINING PROGRAM

## 2024-04-02 PROCEDURE — G8417 CALC BMI ABV UP PARAM F/U: HCPCS | Performed by: STUDENT IN AN ORGANIZED HEALTH CARE EDUCATION/TRAINING PROGRAM

## 2024-04-02 PROCEDURE — 1123F ACP DISCUSS/DSCN MKR DOCD: CPT | Performed by: STUDENT IN AN ORGANIZED HEALTH CARE EDUCATION/TRAINING PROGRAM

## 2024-04-02 PROCEDURE — 1036F TOBACCO NON-USER: CPT | Performed by: STUDENT IN AN ORGANIZED HEALTH CARE EDUCATION/TRAINING PROGRAM

## 2024-04-02 PROCEDURE — G8399 PT W/DXA RESULTS DOCUMENT: HCPCS | Performed by: STUDENT IN AN ORGANIZED HEALTH CARE EDUCATION/TRAINING PROGRAM

## 2024-04-02 PROCEDURE — 99214 OFFICE O/P EST MOD 30 MIN: CPT | Performed by: STUDENT IN AN ORGANIZED HEALTH CARE EDUCATION/TRAINING PROGRAM

## 2024-04-02 PROCEDURE — 1090F PRES/ABSN URINE INCON ASSESS: CPT | Performed by: STUDENT IN AN ORGANIZED HEALTH CARE EDUCATION/TRAINING PROGRAM

## 2024-04-02 PROCEDURE — G8427 DOCREV CUR MEDS BY ELIG CLIN: HCPCS | Performed by: STUDENT IN AN ORGANIZED HEALTH CARE EDUCATION/TRAINING PROGRAM

## 2024-04-02 RX ORDER — BENZONATATE 200 MG/1
200 CAPSULE ORAL 3 TIMES DAILY PRN
Qty: 30 CAPSULE | Refills: 0 | Status: SHIPPED | OUTPATIENT
Start: 2024-04-02 | End: 2024-04-12

## 2024-04-02 RX ORDER — OMEPRAZOLE 20 MG/1
40 CAPSULE, DELAYED RELEASE ORAL DAILY
COMMUNITY
Start: 2024-03-27

## 2024-04-02 RX ORDER — ALPRAZOLAM 1 MG/1
TABLET ORAL
COMMUNITY
Start: 2024-04-01

## 2024-04-02 RX ORDER — TIZANIDINE 2 MG/1
2 TABLET ORAL 3 TIMES DAILY PRN
Qty: 30 TABLET | Refills: 0 | Status: SHIPPED | OUTPATIENT
Start: 2024-04-02

## 2024-04-02 SDOH — ECONOMIC STABILITY: INCOME INSECURITY: HOW HARD IS IT FOR YOU TO PAY FOR THE VERY BASICS LIKE FOOD, HOUSING, MEDICAL CARE, AND HEATING?: SOMEWHAT HARD

## 2024-04-02 SDOH — ECONOMIC STABILITY: FOOD INSECURITY: WITHIN THE PAST 12 MONTHS, THE FOOD YOU BOUGHT JUST DIDN'T LAST AND YOU DIDN'T HAVE MONEY TO GET MORE.: NEVER TRUE

## 2024-04-02 SDOH — ECONOMIC STABILITY: FOOD INSECURITY: WITHIN THE PAST 12 MONTHS, YOU WORRIED THAT YOUR FOOD WOULD RUN OUT BEFORE YOU GOT MONEY TO BUY MORE.: NEVER TRUE

## 2024-04-02 NOTE — PATIENT INSTRUCTIONS
COVID-positive, I recommended OTC treatment and quarantine for 5 days.  Gave instructions to go to the ER if very short of breath or temperature above 104 °F for 24 hours not responding to Tylenol.  Recommended to wear a mask for 2 weeks.  Gave instructions for warm air humidifier mask if cough is persisting for 2 weeks.    The following below are what I recommend for your symptoms:  -Vitamin C 1000 mg daily for 3 to 5 days  -Tylenol or ibuprofen for aches and chills and fever  -Nicki pot/saline nasal rinses/Flonase for nasal congestion, sinus pressure, nasal drainage  -Cepacol throat lozenges, Vicks vapocool, or Chloraseptic throat spray for throat pain  -Benzonatate         [employer]*  (Call 211 if need more resources.)      Medical:      [facility]   What they offer: [employer] discounts fees for qualifying insured and uninsured persons.  We can assist you in signing up for Medicaid, Medicare, and Marketplace Exchange insurance plans.  They offer 4 locations in [place], 2 locations in [place] and 1 in [facility].    Phone Number: [phone]  Website: [email or web address]    [facility]:  What they offer: We provide health care services to the uninsured and underinsured. From providing quality care to connecting patients to critical community resources, our patients and their needs are our highest priority.  Phone number: [phone]  Website: https://[employer]      Utility:         211:  What they offer: Help find lower cost options for phone or internet as well as help paying utility bills.   Phone Number: 211  Website: https://[email or web address].org/get-help/utilities-expenses     [facility]  What they offer:  Assistance with utilities  Phone:  [phone]  Website: [email or web address]                       [facility] [employer]   What they Offer: Assistance with utilities  Phone: [phone]  Website:

## 2024-04-08 ENCOUNTER — APPOINTMENT (OUTPATIENT)
Dept: RADIOLOGY | Facility: HOSPITAL | Age: 69
End: 2024-04-08
Payer: COMMERCIAL

## 2024-04-08 ENCOUNTER — HOSPITAL ENCOUNTER (OUTPATIENT)
Facility: HOSPITAL | Age: 69
Setting detail: OBSERVATION
Discharge: HOME HEALTH | End: 2024-04-10
Attending: STUDENT IN AN ORGANIZED HEALTH CARE EDUCATION/TRAINING PROGRAM | Admitting: INTERNAL MEDICINE
Payer: COMMERCIAL

## 2024-04-08 ENCOUNTER — APPOINTMENT (OUTPATIENT)
Dept: CARDIOLOGY | Facility: HOSPITAL | Age: 69
End: 2024-04-08
Payer: COMMERCIAL

## 2024-04-08 DIAGNOSIS — I26.99 PULMONARY EMBOLISM WITHOUT ACUTE COR PULMONALE, UNSPECIFIED CHRONICITY, UNSPECIFIED PULMONARY EMBOLISM TYPE (MULTI): ICD-10-CM

## 2024-04-08 DIAGNOSIS — U07.1 COVID-19: ICD-10-CM

## 2024-04-08 DIAGNOSIS — R06.02 SHORTNESS OF BREATH: ICD-10-CM

## 2024-04-08 DIAGNOSIS — I26.99 ACUTE PULMONARY EMBOLISM WITHOUT ACUTE COR PULMONALE, UNSPECIFIED PULMONARY EMBOLISM TYPE (MULTI): Primary | ICD-10-CM

## 2024-04-08 LAB
ALBUMIN SERPL BCP-MCNC: 3.5 G/DL (ref 3.4–5)
ALP SERPL-CCNC: 53 U/L (ref 33–136)
ALT SERPL W P-5'-P-CCNC: 13 U/L (ref 7–45)
ANION GAP SERPL CALC-SCNC: 7 MMOL/L (ref 10–20)
AST SERPL W P-5'-P-CCNC: 16 U/L (ref 9–39)
ATRIAL RATE: 84 BPM
BASOPHILS # BLD AUTO: 0.02 X10*3/UL (ref 0–0.1)
BASOPHILS NFR BLD AUTO: 0.3 %
BILIRUB SERPL-MCNC: 0.4 MG/DL (ref 0–1.2)
BNP SERPL-MCNC: 103 PG/ML (ref 0–99)
BUN SERPL-MCNC: 8 MG/DL (ref 6–23)
CALCIUM SERPL-MCNC: 8.3 MG/DL (ref 8.6–10.3)
CARDIAC TROPONIN I PNL SERPL HS: 6 NG/L (ref 0–13)
CHLORIDE SERPL-SCNC: 108 MMOL/L (ref 98–107)
CO2 SERPL-SCNC: 30 MMOL/L (ref 21–32)
CREAT SERPL-MCNC: 0.81 MG/DL (ref 0.5–1.05)
D DIMER PPP FEU-MCNC: 839 NG/ML FEU
EGFRCR SERPLBLD CKD-EPI 2021: 79 ML/MIN/1.73M*2
EOSINOPHIL # BLD AUTO: 0.03 X10*3/UL (ref 0–0.7)
EOSINOPHIL NFR BLD AUTO: 0.4 %
ERYTHROCYTE [DISTWIDTH] IN BLOOD BY AUTOMATED COUNT: 12.6 % (ref 11.5–14.5)
GLUCOSE SERPL-MCNC: 99 MG/DL (ref 74–99)
HCT VFR BLD AUTO: 34.9 % (ref 36–46)
HGB BLD-MCNC: 11.4 G/DL (ref 12–16)
HOLD SPECIMEN: NORMAL
IMM GRANULOCYTES # BLD AUTO: 0.03 X10*3/UL (ref 0–0.7)
IMM GRANULOCYTES NFR BLD AUTO: 0.4 % (ref 0–0.9)
INR PPP: 1 (ref 0.9–1.1)
LACTATE SERPL-SCNC: 1.6 MMOL/L (ref 0.4–2)
LYMPHOCYTES # BLD AUTO: 1.47 X10*3/UL (ref 1.2–4.8)
LYMPHOCYTES NFR BLD AUTO: 19.9 %
MAGNESIUM SERPL-MCNC: 2.15 MG/DL (ref 1.6–2.4)
MCH RBC QN AUTO: 31.5 PG (ref 26–34)
MCHC RBC AUTO-ENTMCNC: 32.7 G/DL (ref 32–36)
MCV RBC AUTO: 96 FL (ref 80–100)
MONOCYTES # BLD AUTO: 0.58 X10*3/UL (ref 0.1–1)
MONOCYTES NFR BLD AUTO: 7.9 %
NEUTROPHILS # BLD AUTO: 5.24 X10*3/UL (ref 1.2–7.7)
NEUTROPHILS NFR BLD AUTO: 71.1 %
NRBC BLD-RTO: 0 /100 WBCS (ref 0–0)
P AXIS: 52 DEGREES
P OFFSET: 206 MS
P ONSET: 159 MS
PLATELET # BLD AUTO: 234 X10*3/UL (ref 150–450)
POTASSIUM SERPL-SCNC: 3.4 MMOL/L (ref 3.5–5.3)
PR INTERVAL: 130 MS
PROT SERPL-MCNC: 6.3 G/DL (ref 6.4–8.2)
PROTHROMBIN TIME: 11.5 SECONDS (ref 9.8–12.8)
Q ONSET: 224 MS
QRS COUNT: 14 BEATS
QRS DURATION: 76 MS
QT INTERVAL: 396 MS
QTC CALCULATION(BAZETT): 467 MS
QTC FREDERICIA: 442 MS
R AXIS: 52 DEGREES
RBC # BLD AUTO: 3.62 X10*6/UL (ref 4–5.2)
SARS-COV-2 RNA RESP QL NAA+PROBE: DETECTED
SODIUM SERPL-SCNC: 142 MMOL/L (ref 136–145)
T AXIS: 57 DEGREES
T OFFSET: 422 MS
UFH PPP CHRO-ACNC: 0.8 IU/ML
VENTRICULAR RATE: 84 BPM
WBC # BLD AUTO: 7.4 X10*3/UL (ref 4.4–11.3)

## 2024-04-08 PROCEDURE — G0378 HOSPITAL OBSERVATION PER HR: HCPCS

## 2024-04-08 PROCEDURE — 99223 1ST HOSP IP/OBS HIGH 75: CPT | Performed by: INTERNAL MEDICINE

## 2024-04-08 PROCEDURE — 84484 ASSAY OF TROPONIN QUANT: CPT | Performed by: STUDENT IN AN ORGANIZED HEALTH CARE EDUCATION/TRAINING PROGRAM

## 2024-04-08 PROCEDURE — 96375 TX/PRO/DX INJ NEW DRUG ADDON: CPT

## 2024-04-08 PROCEDURE — 71275 CT ANGIOGRAPHY CHEST: CPT | Performed by: STUDENT IN AN ORGANIZED HEALTH CARE EDUCATION/TRAINING PROGRAM

## 2024-04-08 PROCEDURE — 96365 THER/PROPH/DIAG IV INF INIT: CPT

## 2024-04-08 PROCEDURE — 99291 CRITICAL CARE FIRST HOUR: CPT | Mod: 25 | Performed by: STUDENT IN AN ORGANIZED HEALTH CARE EDUCATION/TRAINING PROGRAM

## 2024-04-08 PROCEDURE — 93005 ELECTROCARDIOGRAM TRACING: CPT

## 2024-04-08 PROCEDURE — 71275 CT ANGIOGRAPHY CHEST: CPT

## 2024-04-08 PROCEDURE — 70450 CT HEAD/BRAIN W/O DYE: CPT | Performed by: RADIOLOGY

## 2024-04-08 PROCEDURE — 2500000004 HC RX 250 GENERAL PHARMACY W/ HCPCS (ALT 636 FOR OP/ED): Performed by: INTERNAL MEDICINE

## 2024-04-08 PROCEDURE — 2550000001 HC RX 255 CONTRASTS: Performed by: STUDENT IN AN ORGANIZED HEALTH CARE EDUCATION/TRAINING PROGRAM

## 2024-04-08 PROCEDURE — 96366 THER/PROPH/DIAG IV INF ADDON: CPT

## 2024-04-08 PROCEDURE — 83880 ASSAY OF NATRIURETIC PEPTIDE: CPT | Performed by: STUDENT IN AN ORGANIZED HEALTH CARE EDUCATION/TRAINING PROGRAM

## 2024-04-08 PROCEDURE — 85025 COMPLETE CBC W/AUTO DIFF WBC: CPT | Performed by: STUDENT IN AN ORGANIZED HEALTH CARE EDUCATION/TRAINING PROGRAM

## 2024-04-08 PROCEDURE — 2500000004 HC RX 250 GENERAL PHARMACY W/ HCPCS (ALT 636 FOR OP/ED): Performed by: STUDENT IN AN ORGANIZED HEALTH CARE EDUCATION/TRAINING PROGRAM

## 2024-04-08 PROCEDURE — 71045 X-RAY EXAM CHEST 1 VIEW: CPT

## 2024-04-08 PROCEDURE — 36415 COLL VENOUS BLD VENIPUNCTURE: CPT | Performed by: STUDENT IN AN ORGANIZED HEALTH CARE EDUCATION/TRAINING PROGRAM

## 2024-04-08 PROCEDURE — 87635 SARS-COV-2 COVID-19 AMP PRB: CPT | Performed by: STUDENT IN AN ORGANIZED HEALTH CARE EDUCATION/TRAINING PROGRAM

## 2024-04-08 PROCEDURE — 83735 ASSAY OF MAGNESIUM: CPT | Performed by: STUDENT IN AN ORGANIZED HEALTH CARE EDUCATION/TRAINING PROGRAM

## 2024-04-08 PROCEDURE — 70450 CT HEAD/BRAIN W/O DYE: CPT

## 2024-04-08 PROCEDURE — 80053 COMPREHEN METABOLIC PANEL: CPT | Performed by: STUDENT IN AN ORGANIZED HEALTH CARE EDUCATION/TRAINING PROGRAM

## 2024-04-08 PROCEDURE — 85610 PROTHROMBIN TIME: CPT | Performed by: STUDENT IN AN ORGANIZED HEALTH CARE EDUCATION/TRAINING PROGRAM

## 2024-04-08 PROCEDURE — 84145 PROCALCITONIN (PCT): CPT | Mod: ELYLAB | Performed by: STUDENT IN AN ORGANIZED HEALTH CARE EDUCATION/TRAINING PROGRAM

## 2024-04-08 PROCEDURE — 36415 COLL VENOUS BLD VENIPUNCTURE: CPT | Performed by: INTERNAL MEDICINE

## 2024-04-08 PROCEDURE — 83605 ASSAY OF LACTIC ACID: CPT | Performed by: STUDENT IN AN ORGANIZED HEALTH CARE EDUCATION/TRAINING PROGRAM

## 2024-04-08 PROCEDURE — 85379 FIBRIN DEGRADATION QUANT: CPT | Performed by: STUDENT IN AN ORGANIZED HEALTH CARE EDUCATION/TRAINING PROGRAM

## 2024-04-08 PROCEDURE — 71045 X-RAY EXAM CHEST 1 VIEW: CPT | Performed by: RADIOLOGY

## 2024-04-08 PROCEDURE — 2500000001 HC RX 250 WO HCPCS SELF ADMINISTERED DRUGS (ALT 637 FOR MEDICARE OP): Performed by: INTERNAL MEDICINE

## 2024-04-08 PROCEDURE — 85520 HEPARIN ASSAY: CPT | Performed by: INTERNAL MEDICINE

## 2024-04-08 RX ORDER — ACETAMINOPHEN 650 MG/1
650 SUPPOSITORY RECTAL EVERY 4 HOURS PRN
Status: DISCONTINUED | OUTPATIENT
Start: 2024-04-08 | End: 2024-04-10 | Stop reason: HOSPADM

## 2024-04-08 RX ORDER — ATORVASTATIN CALCIUM 20 MG/1
40 TABLET, FILM COATED ORAL NIGHTLY
Status: DISCONTINUED | OUTPATIENT
Start: 2024-04-08 | End: 2024-04-10 | Stop reason: HOSPADM

## 2024-04-08 RX ORDER — DOXYCYCLINE HYCLATE 100 MG
100 TABLET ORAL EVERY 12 HOURS SCHEDULED
Status: DISCONTINUED | OUTPATIENT
Start: 2024-04-08 | End: 2024-04-10 | Stop reason: HOSPADM

## 2024-04-08 RX ORDER — ONDANSETRON HYDROCHLORIDE 2 MG/ML
4 INJECTION, SOLUTION INTRAVENOUS EVERY 8 HOURS PRN
Status: DISCONTINUED | OUTPATIENT
Start: 2024-04-08 | End: 2024-04-10 | Stop reason: HOSPADM

## 2024-04-08 RX ORDER — HEPARIN SODIUM 5000 [USP'U]/ML
3000-6000 INJECTION, SOLUTION INTRAVENOUS; SUBCUTANEOUS EVERY 4 HOURS PRN
Status: DISCONTINUED | OUTPATIENT
Start: 2024-04-08 | End: 2024-04-10 | Stop reason: HOSPADM

## 2024-04-08 RX ORDER — SENNOSIDES 8.6 MG/1
2 TABLET ORAL 2 TIMES DAILY
Status: DISCONTINUED | OUTPATIENT
Start: 2024-04-08 | End: 2024-04-10 | Stop reason: HOSPADM

## 2024-04-08 RX ORDER — ACETAMINOPHEN 325 MG/1
650 TABLET ORAL EVERY 4 HOURS PRN
Status: DISCONTINUED | OUTPATIENT
Start: 2024-04-08 | End: 2024-04-10 | Stop reason: HOSPADM

## 2024-04-08 RX ORDER — ASPIRIN 81 MG/1
81 TABLET ORAL DAILY
Status: DISCONTINUED | OUTPATIENT
Start: 2024-04-08 | End: 2024-04-10 | Stop reason: HOSPADM

## 2024-04-08 RX ORDER — OXYCODONE HYDROCHLORIDE 5 MG/1
5 TABLET ORAL EVERY 4 HOURS PRN
Status: DISCONTINUED | OUTPATIENT
Start: 2024-04-08 | End: 2024-04-10 | Stop reason: HOSPADM

## 2024-04-08 RX ORDER — ALPRAZOLAM 1 MG/1
1 TABLET, EXTENDED RELEASE ORAL 2 TIMES DAILY PRN
COMMUNITY

## 2024-04-08 RX ORDER — IPRATROPIUM BROMIDE AND ALBUTEROL SULFATE 2.5; .5 MG/3ML; MG/3ML
3 SOLUTION RESPIRATORY (INHALATION) EVERY 4 HOURS PRN
Status: DISCONTINUED | OUTPATIENT
Start: 2024-04-08 | End: 2024-04-10 | Stop reason: HOSPADM

## 2024-04-08 RX ORDER — ONDANSETRON 4 MG/1
4 TABLET, ORALLY DISINTEGRATING ORAL EVERY 8 HOURS PRN
Status: DISCONTINUED | OUTPATIENT
Start: 2024-04-08 | End: 2024-04-10 | Stop reason: HOSPADM

## 2024-04-08 RX ORDER — ACETAMINOPHEN 160 MG/5ML
650 SOLUTION ORAL EVERY 4 HOURS PRN
Status: DISCONTINUED | OUTPATIENT
Start: 2024-04-08 | End: 2024-04-10 | Stop reason: HOSPADM

## 2024-04-08 RX ORDER — HEPARIN SODIUM 10000 [USP'U]/100ML
0-4500 INJECTION, SOLUTION INTRAVENOUS CONTINUOUS
Status: DISCONTINUED | OUTPATIENT
Start: 2024-04-08 | End: 2024-04-10

## 2024-04-08 RX ORDER — HEPARIN SODIUM 5000 [USP'U]/ML
80 INJECTION, SOLUTION INTRAVENOUS; SUBCUTANEOUS ONCE
Status: COMPLETED | OUTPATIENT
Start: 2024-04-08 | End: 2024-04-08

## 2024-04-08 RX ORDER — HEPARIN SODIUM 5000 [USP'U]/ML
3000-6000 INJECTION, SOLUTION INTRAVENOUS; SUBCUTANEOUS EVERY 4 HOURS PRN
Status: DISCONTINUED | OUTPATIENT
Start: 2024-04-08 | End: 2024-04-08

## 2024-04-08 RX ORDER — HEPARIN SODIUM 10000 [USP'U]/100ML
0-4500 INJECTION, SOLUTION INTRAVENOUS CONTINUOUS
Status: DISCONTINUED | OUTPATIENT
Start: 2024-04-08 | End: 2024-04-08

## 2024-04-08 RX ORDER — DEXAMETHASONE 6 MG/1
6 TABLET ORAL DAILY
Status: DISCONTINUED | OUTPATIENT
Start: 2024-04-08 | End: 2024-04-10 | Stop reason: HOSPADM

## 2024-04-08 RX ORDER — KETOROLAC TROMETHAMINE 30 MG/ML
15 INJECTION, SOLUTION INTRAMUSCULAR; INTRAVENOUS ONCE
Status: COMPLETED | OUTPATIENT
Start: 2024-04-08 | End: 2024-04-08

## 2024-04-08 RX ADMIN — IOHEXOL 75 ML: 350 INJECTION, SOLUTION INTRAVENOUS at 12:51

## 2024-04-08 RX ADMIN — OXYCODONE HYDROCHLORIDE 5 MG: 5 TABLET ORAL at 17:07

## 2024-04-08 RX ADMIN — ATORVASTATIN CALCIUM 40 MG: 20 TABLET, FILM COATED ORAL at 22:59

## 2024-04-08 RX ADMIN — DEXAMETHASONE 6 MG: 6 TABLET ORAL at 22:59

## 2024-04-08 RX ADMIN — DOXYCYCLINE HYCLATE 100 MG: 100 TABLET, FILM COATED ORAL at 22:59

## 2024-04-08 RX ADMIN — ASPIRIN 81 MG: 81 TABLET, COATED ORAL at 22:58

## 2024-04-08 RX ADMIN — STANDARDIZED SENNA CONCENTRATE 17.2 MG: 8.6 TABLET ORAL at 22:58

## 2024-04-08 RX ADMIN — SODIUM CHLORIDE 1000 ML: 9 INJECTION, SOLUTION INTRAVENOUS at 12:03

## 2024-04-08 RX ADMIN — HEPARIN SODIUM 6250 UNITS: 5000 INJECTION INTRAVENOUS; SUBCUTANEOUS at 14:46

## 2024-04-08 RX ADMIN — KETOROLAC TROMETHAMINE 15 MG: 30 INJECTION, SOLUTION INTRAMUSCULAR at 12:03

## 2024-04-08 RX ADMIN — HEPARIN SODIUM 1400 UNITS/HR: 10000 INJECTION, SOLUTION INTRAVENOUS at 14:45

## 2024-04-08 SDOH — SOCIAL STABILITY: SOCIAL INSECURITY: HAVE YOU HAD THOUGHTS OF HARMING ANYONE ELSE?: NO

## 2024-04-08 SDOH — SOCIAL STABILITY: SOCIAL INSECURITY: ABUSE: ADULT

## 2024-04-08 SDOH — SOCIAL STABILITY: SOCIAL INSECURITY: ARE YOU OR HAVE YOU BEEN THREATENED OR ABUSED PHYSICALLY, EMOTIONALLY, OR SEXUALLY BY ANYONE?: NO

## 2024-04-08 SDOH — ECONOMIC STABILITY: HOUSING INSECURITY: IN THE LAST 12 MONTHS, HOW MANY PLACES HAVE YOU LIVED?: 1

## 2024-04-08 SDOH — SOCIAL STABILITY: SOCIAL INSECURITY: WERE YOU ABLE TO COMPLETE ALL THE BEHAVIORAL HEALTH SCREENINGS?: YES

## 2024-04-08 SDOH — HEALTH STABILITY: PHYSICAL HEALTH: ON AVERAGE, HOW MANY DAYS PER WEEK DO YOU ENGAGE IN MODERATE TO STRENUOUS EXERCISE (LIKE A BRISK WALK)?: 0 DAYS

## 2024-04-08 SDOH — ECONOMIC STABILITY: INCOME INSECURITY: IN THE LAST 12 MONTHS, WAS THERE A TIME WHEN YOU WERE NOT ABLE TO PAY THE MORTGAGE OR RENT ON TIME?: NO

## 2024-04-08 SDOH — HEALTH STABILITY: MENTAL HEALTH: HOW OFTEN DO YOU HAVE 6 OR MORE DRINKS ON ONE OCCASION?: NEVER

## 2024-04-08 SDOH — HEALTH STABILITY: MENTAL HEALTH: HOW OFTEN DO YOU HAVE A DRINK CONTAINING ALCOHOL?: NEVER

## 2024-04-08 SDOH — SOCIAL STABILITY: SOCIAL INSECURITY: HAS ANYONE EVER THREATENED TO HURT YOUR FAMILY OR YOUR PETS?: NO

## 2024-04-08 SDOH — ECONOMIC STABILITY: INCOME INSECURITY: IN THE PAST 12 MONTHS, HAS THE ELECTRIC, GAS, OIL, OR WATER COMPANY THREATENED TO SHUT OFF SERVICE IN YOUR HOME?: NO

## 2024-04-08 SDOH — SOCIAL STABILITY: SOCIAL INSECURITY: DO YOU FEEL ANYONE HAS EXPLOITED OR TAKEN ADVANTAGE OF YOU FINANCIALLY OR OF YOUR PERSONAL PROPERTY?: NO

## 2024-04-08 SDOH — SOCIAL STABILITY: SOCIAL INSECURITY: ARE THERE ANY APPARENT SIGNS OF INJURIES/BEHAVIORS THAT COULD BE RELATED TO ABUSE/NEGLECT?: NO

## 2024-04-08 SDOH — SOCIAL STABILITY: SOCIAL INSECURITY: DOES ANYONE TRY TO KEEP YOU FROM HAVING/CONTACTING OTHER FRIENDS OR DOING THINGS OUTSIDE YOUR HOME?: NO

## 2024-04-08 SDOH — SOCIAL STABILITY: SOCIAL INSECURITY: DO YOU FEEL UNSAFE GOING BACK TO THE PLACE WHERE YOU ARE LIVING?: NO

## 2024-04-08 SDOH — HEALTH STABILITY: PHYSICAL HEALTH: ON AVERAGE, HOW MANY MINUTES DO YOU ENGAGE IN EXERCISE AT THIS LEVEL?: 0 MIN

## 2024-04-08 SDOH — ECONOMIC STABILITY: INCOME INSECURITY: HOW HARD IS IT FOR YOU TO PAY FOR THE VERY BASICS LIKE FOOD, HOUSING, MEDICAL CARE, AND HEATING?: NOT HARD AT ALL

## 2024-04-08 SDOH — HEALTH STABILITY: MENTAL HEALTH: HOW MANY STANDARD DRINKS CONTAINING ALCOHOL DO YOU HAVE ON A TYPICAL DAY?: PATIENT DOES NOT DRINK

## 2024-04-08 ASSESSMENT — ACTIVITIES OF DAILY LIVING (ADL)
HEARING - LEFT EAR: FUNCTIONAL
ADEQUATE_TO_COMPLETE_ADL: YES
WALKS IN HOME: INDEPENDENT
DRESSING YOURSELF: INDEPENDENT
LACK_OF_TRANSPORTATION: NO
JUDGMENT_ADEQUATE_SAFELY_COMPLETE_DAILY_ACTIVITIES: YES
FEEDING YOURSELF: INDEPENDENT
TOILETING: INDEPENDENT
PATIENT'S MEMORY ADEQUATE TO SAFELY COMPLETE DAILY ACTIVITIES?: YES
HEARING - RIGHT EAR: FUNCTIONAL
BATHING: INDEPENDENT
GROOMING: INDEPENDENT

## 2024-04-08 ASSESSMENT — COGNITIVE AND FUNCTIONAL STATUS - GENERAL
DAILY ACTIVITIY SCORE: 24
PATIENT BASELINE BEDBOUND: NO
MOBILITY SCORE: 24

## 2024-04-08 ASSESSMENT — LIFESTYLE VARIABLES
EVER HAD A DRINK FIRST THING IN THE MORNING TO STEADY YOUR NERVES TO GET RID OF A HANGOVER: NO
EVER FELT BAD OR GUILTY ABOUT YOUR DRINKING: NO
AUDIT-C TOTAL SCORE: 0
AUDIT-C TOTAL SCORE: 0
HOW MANY STANDARD DRINKS CONTAINING ALCOHOL DO YOU HAVE ON A TYPICAL DAY: PATIENT DOES NOT DRINK
SKIP TO QUESTIONS 9-10: 1
HOW OFTEN DO YOU HAVE A DRINK CONTAINING ALCOHOL: NEVER
HOW OFTEN DO YOU HAVE 6 OR MORE DRINKS ON ONE OCCASION: NEVER
HAVE PEOPLE ANNOYED YOU BY CRITICIZING YOUR DRINKING: NO
TOTAL SCORE: 0
SKIP TO QUESTIONS 9-10: 1
HAVE YOU EVER FELT YOU SHOULD CUT DOWN ON YOUR DRINKING: NO
AUDIT-C TOTAL SCORE: 0

## 2024-04-08 ASSESSMENT — PAIN SCALES - GENERAL
PAINLEVEL_OUTOF10: 5 - MODERATE PAIN
PAINLEVEL_OUTOF10: 9
PAINLEVEL_OUTOF10: 9
PAINLEVEL_OUTOF10: 5 - MODERATE PAIN
PAINLEVEL_OUTOF10: 9
PAINLEVEL_OUTOF10: 0 - NO PAIN

## 2024-04-08 ASSESSMENT — PAIN DESCRIPTION - LOCATION
LOCATION: BACK
LOCATION: BACK

## 2024-04-08 ASSESSMENT — COLUMBIA-SUICIDE SEVERITY RATING SCALE - C-SSRS
6. HAVE YOU EVER DONE ANYTHING, STARTED TO DO ANYTHING, OR PREPARED TO DO ANYTHING TO END YOUR LIFE?: NO
1. IN THE PAST MONTH, HAVE YOU WISHED YOU WERE DEAD OR WISHED YOU COULD GO TO SLEEP AND NOT WAKE UP?: NO
2. HAVE YOU ACTUALLY HAD ANY THOUGHTS OF KILLING YOURSELF?: NO
1. IN THE PAST MONTH, HAVE YOU WISHED YOU WERE DEAD OR WISHED YOU COULD GO TO SLEEP AND NOT WAKE UP?: NO
6. HAVE YOU EVER DONE ANYTHING, STARTED TO DO ANYTHING, OR PREPARED TO DO ANYTHING TO END YOUR LIFE?: NO
2. HAVE YOU ACTUALLY HAD ANY THOUGHTS OF KILLING YOURSELF?: NO

## 2024-04-08 ASSESSMENT — PATIENT HEALTH QUESTIONNAIRE - PHQ9
SUM OF ALL RESPONSES TO PHQ9 QUESTIONS 1 & 2: 0
2. FEELING DOWN, DEPRESSED OR HOPELESS: NOT AT ALL
1. LITTLE INTEREST OR PLEASURE IN DOING THINGS: NOT AT ALL

## 2024-04-08 ASSESSMENT — PAIN - FUNCTIONAL ASSESSMENT
PAIN_FUNCTIONAL_ASSESSMENT: 0-10

## 2024-04-08 ASSESSMENT — CHA2DS2 SCORE
SEX: FEMALE
AGE IN YEARS: 65-74

## 2024-04-08 ASSESSMENT — PAIN DESCRIPTION - PROGRESSION
CLINICAL_PROGRESSION: GRADUALLY IMPROVING
CLINICAL_PROGRESSION: GRADUALLY IMPROVING

## 2024-04-08 NOTE — ED PROVIDER NOTES
"HPI   Chief Complaint   Patient presents with    Shortness of Breath     + covid over a week, increased SOB, weakness, confusion       68-year-old female with a history of CAD, hypertension, COPD with chronic respiratory failure on nasal cannula at baseline presenting with worsening shortness of breath due to recent COVID infection.  Patient reports she was diagnosed last week and continues to feel more short of breath over the last few days.  Patient continues to have on and off fevers and chills.  Denies any significant lower leg swelling.  Has some persistent nausea.  No significant chest pain.  Feels significantly fatigued and \"rundown\".      History provided by:  Patient                      No data recorded                   Patient History   No past medical history on file.  No past surgical history on file.  No family history on file.  Social History     Tobacco Use    Smoking status: Not on file    Smokeless tobacco: Not on file   Substance Use Topics    Alcohol use: Not on file    Drug use: Not on file       Physical Exam   ED Triage Vitals [04/08/24 1039]   Temperature Heart Rate Respirations BP   37.4 °C (99.3 °F) 86 20 147/60      Pulse Ox Temp Source Heart Rate Source Patient Position   97 % Temporal -- --      BP Location FiO2 (%)     Right arm --       Physical Exam  Vitals and nursing note reviewed.   Constitutional:       General: She is not in acute distress.  HENT:      Head: Atraumatic.      Mouth/Throat:      Mouth: Mucous membranes are moist.      Pharynx: Oropharynx is clear.   Eyes:      Conjunctiva/sclera: Conjunctivae normal.   Cardiovascular:      Rate and Rhythm: Normal rate and regular rhythm.      Pulses: Normal pulses.   Pulmonary:      Effort: Pulmonary effort is normal. No respiratory distress.      Breath sounds: Normal breath sounds.   Abdominal:      General: There is no distension.      Palpations: Abdomen is soft.      Tenderness: There is no abdominal tenderness. There is no " guarding or rebound.   Musculoskeletal:         General: No deformity.      Cervical back: Neck supple.   Skin:     General: Skin is warm and dry.   Neurological:      Mental Status: She is alert and oriented to person, place, and time. Mental status is at baseline.      Cranial Nerves: No cranial nerve deficit.      Sensory: No sensory deficit.      Motor: No weakness.   Psychiatric:         Mood and Affect: Mood normal.         Behavior: Behavior normal.         ED Course & MDM   Diagnoses as of 04/08/24 1522   Acute pulmonary embolism without acute cor pulmonale, unspecified pulmonary embolism type (CMS/HCC)   Shortness of breath     Labs Reviewed   CBC WITH AUTO DIFFERENTIAL - Abnormal       Result Value    WBC 7.4      nRBC 0.0      RBC 3.62 (*)     Hemoglobin 11.4 (*)     Hematocrit 34.9 (*)     MCV 96      MCH 31.5      MCHC 32.7      RDW 12.6      Platelets 234      Neutrophils % 71.1      Immature Granulocytes %, Automated 0.4      Lymphocytes % 19.9      Monocytes % 7.9      Eosinophils % 0.4      Basophils % 0.3      Neutrophils Absolute 5.24      Immature Granulocytes Absolute, Automated 0.03      Lymphocytes Absolute 1.47      Monocytes Absolute 0.58      Eosinophils Absolute 0.03      Basophils Absolute 0.02     COMPREHENSIVE METABOLIC PANEL - Abnormal    Glucose 99      Sodium 142      Potassium 3.4 (*)     Chloride 108 (*)     Bicarbonate 30      Anion Gap 7 (*)     Urea Nitrogen 8      Creatinine 0.81      eGFR 79      Calcium 8.3 (*)     Albumin 3.5      Alkaline Phosphatase 53      Total Protein 6.3 (*)     AST 16      Bilirubin, Total 0.4      ALT 13     B-TYPE NATRIURETIC PEPTIDE - Abnormal     (*)     Narrative:        <100 pg/mL - Heart failure unlikely  100-299 pg/mL - Intermediate probability of acute heart                  failure exacerbation. Correlate with clinical                  context and patient history.    >=300 pg/mL - Heart Failure likely. Correlate with clinical                   context and patient history.    BNP testing is performed using different testing methodology at [facility] than at other system hospitals. Direct result comparisons should only be made within the same method.      D-DIMER, VTE EXCLUSION - Abnormal    D-Dimer, Quantitative VTE Exclusion 839 (*)     Narrative:     The VTE Exclusion D-Dimer assay is reported in ng/mL Fibrinogen Equivalent Units (FEU).    Per 's instructions for use, a value of less than 500 ng/mL (FEU) may help to exclude DVT or PE in outpatients when the assay is used with a clinical pretest probability assessment.(AE must utilize and document eCalc 'Wells Score Deep Vein Thrombosis Risk' for DVT exclusion only. Emergency Department should utilize  Guidelines for Emergency Department Use of the VTE Exclusion D-Dimer and Clinical Pretest probability assessment model for DVT or PE exclusion.)   MAGNESIUM - Normal    Magnesium 2.15     TROPONIN I, HIGH SENSITIVITY - Normal    Troponin I, High Sensitivity 6      Narrative:     Less than 99th percentile of normal range cutoff-  Female and children under 18 years old <14 ng/L; Male <21 ng/L: Negative  Repeat testing should be performed if clinically indicated.     Female and children under 18 years old 14-50 ng/L; Male 21-50 ng/L:  Consistent with possible cardiac damage and possible increased clinical   risk. Serial measurements may help to assess extent of myocardial damage.     >50 ng/L: Consistent with cardiac damage, increased clinical risk and  myocardial infarction. Serial measurements may help assess extent of   myocardial damage.      NOTE: Children less than 1 year old may have higher baseline troponin   levels and results should be interpreted in conjunction with the overall   clinical context.     NOTE: Troponin I testing is performed using a different   testing methodology at [facility] than at other   [facility] hospitals. Direct result  comparisons should only   be made within the same method.   PROTIME-INR - Normal    Protime 11.5      INR 1.0     LACTATE - Normal    Lactate 1.6      Narrative:     Venipuncture immediately after or during the administration of Metamizole may lead to falsely low results. Testing should be performed immediately  prior to Metamizole dosing.   SARS-COV-2 PCR   PROCALCITONIN     CT angio chest for pulmonary embolism   Final Result   Acute segmental and subsegmental PE in the lingula and left lower   lobe; small clot burden, no evidence of right heart strain.        New multifocal ground-glass and nodular opacities, probably   infectious/inflammatory. Follow-up to resolution in 3-6 months.        Nonspecific new right hilar lymphadenopathy, possibly reactive.   Attention on follow-up examination.        Partially imaged left breast fluid collection and fluid densities   surrounding surgical clips. Correlate with recent procedures.        MICHAEL Farias discussed the significance and urgency of this   critical finding by Epic secure chat with  ABDIAZIZ ROMAN on   4/8/2024 at 1:14 pm.  (**-RCF-**) Findings:  See findings.        Signed by: Paty Farias 4/8/2024 1:17 PM   Dictation workstation:   [id number]      XR chest 1 view   Final Result   No acute cardiopulmonary disease.        MACRO:   none        Signed by: Gosia Oneill 4/8/2024 12:40 PM   Dictation workstation:   [id number]            Medical Decision Making  68-year-old female with history of COPD and chronic respiratory failure on baseline nasal cannula presenting with worsening shortness of breath related to recent COVID infection.  On exam patient is afebrile, hemodynamically stable without significant tachycardia.  Equal pulses.  Lungs are clear on exam without significant wheezing.  No significant peripheral edema.  Diagnostic workup performed in the ED to rule out secondary bacterial pneumonia, PE.    Patient's lab work reveals elevated  D-dimer level prompting further evaluation of PE with CT angiogram.  No leukocytosis.  Other lab work is mostly unremarkable.  CT PE study demonstrates acute segmental and subsegmental PE on the left.  No evidence of right heart strain.  Groundglass opacities likely related to COVID.  No leukocytosis or current fever to suggest superimposed bacterial pneumonia.  Will send procalcitonin level and hold off on antibiotics at this time.  Patient was started on heparin drip following heparin bolus.  No contraindications for anticoagulation.  Plan to admit for continued observation and treatment.    Amount and/or Complexity of Data Reviewed  Labs: ordered. Decision-making details documented in ED Course.     Details: CBC without leukocytosis.  Metabolic panel with normal renal function.  BNP mildly elevated at 103.  D-dimer elevated 839.  Troponin within normal limits.  Normal serum lactate.  Radiology: ordered. Decision-making details documented in ED Course.     Details: Chest x-ray without pneumothorax, infiltrate or edema  CT PE study demonstrates acute segmental and subsegmental PE in the left lower lobe and lingula.  No evidence of right heart strain on CT imaging.  Also with groundglass opacities.  ECG/medicine tests: ordered and independent interpretation performed. Decision-making details documented in ED Course.     Details: Twelve-lead ECG obtained at 1108 by my interpretation demonstrates normal sinus rhythm with a rate of 84, no acute ST elevation or depression, no other acute ischemic changes.  Normal intervals.  Discussion of management or test interpretation with external provider(s): Case discussed with hospitalist for admission.        Procedure  Critical Care    Performed by: Zay Marroquin MD  Authorized by: Zay Marroquin MD    Critical care provider statement:     Critical care time (minutes):  33    Critical care time was exclusive of:  Separately billable procedures and treating other  patients    Critical care was necessary to treat or prevent imminent or life-threatening deterioration of the following conditions:  Respiratory failure    Critical care was time spent personally by me on the following activities:  Ordering and performing treatments and interventions, ordering and review of laboratory studies, ordering and review of radiographic studies, pulse oximetry, re-evaluation of patient's condition, examination of patient and obtaining history from patient or surrogate    Care discussed with: admitting provider         Zay Marroquin MD  04/08/24 3341

## 2024-04-08 NOTE — H&P
Medical Group History and Physical    ASSESSMENT/PLAN:     Acute pulmonary embolism  COVID-19  Chronic respiratory failure with hypoxemia  COPD possible mild exacerbation  -CTA showing acute segmental and subsegmental PE in the lingula and left lower lobe without evidence of right heart strain; also showing multifocal groundglass and nodular opacities as well as right hilar lymphadenopathy  -heparin drip, plan to transition to DOAC prior to discharge  -suspect GGO related to COVID; no clear evidence of ongoing superimposed bacterial PNA  -check procal  -ok with doxycycline for now  -cont with PRN nebs  -dexamethasone 6mg daily; she was on paxlovid as outpatient    Metabolic encephalopathy  -Having confusion and some word finding difficulty  -She does have a history of TIA and stroke will get a CT head to begin assessment  -Would consider MRI for further evaluation if her symptoms persist and CT is otherwise negative  -cont asa/statin    L breast fluid collection  -CTA demonstrated left breast fluid collection and fluid densities; patient reports that she has had continued pain since her lumpectomy a year ago  -will get an ultrasound for further evaluation; she does have an appointment with her surgeon coming up in the next week or 2  -No superficial evidence of cellulitis or ongoing infection on exam    Depression  -resume home meds once verified      VTE Prophylaxis: heparin drip      HISTORY OF PRESENT ILLNESS:   Chief Complaint: shortness of breath    History Of Present Illness:    Gisela Infante is a 68 y.o. female with a significant past medical history of chronic respiratory failure with hypoxia, COPD, depression, breast cancer, prior CVA, hypertension, who is presenting to the emergency department with shortness of breath.  Symptoms began last week and she was diagnosed with COVID on Tuesday.  She has had cough and increasing shortness of breath since that time.  Cough productive of yellowish sputum.  She  "denies any fevers or chills.  She has been having pleuritic type chest pain and lower right chest wall and left posterior chest wall worse with cough.  No abdominal pain, nausea vomiting, diarrhea.  She has had decreased p.o. intake.  She has been feeling confused for the past few days and like she is having trouble finding her words.  She denies any sensory changes or focal weakness.  She does feel weak generally.  No vision changes.  She does report that she has had continued intermittent pain in her left breast since her lumpectomy over a year ago.  Denies any drainage or rash.  Denies any prior history of thromboembolism.       Review of systems: 10 point review of systems is otherwise negative except as mentioned above.    PAST HISTORIES:       Past Medical History:  She has no past medical history on file.    Past Surgical History:  She has no past surgical history on file.      Social History:  She has no history on file for tobacco use, alcohol use, and drug use.    Family History:  No family history on file.     Allergies:  Patient has no known allergies.      OBJECTIVE:       Last Recorded Vitals:  Vitals:    04/08/24 1039 04/08/24 1203 04/08/24 1330 04/08/24 1514   BP: 147/60 139/52 140/61 128/70   BP Location: Right arm Right arm     Pulse: 86 82 76 82   Resp: 20 19 18 18   Temp: 37.4 °C (99.3 °F)      TempSrc: Temporal      SpO2: 97% 95% 98% 94%   Weight: 77.1 kg (170 lb)      Height: 1.575 m (5' 2\")          Last I/O:  No intake/output data recorded.    Physical Exam  GEN: ill appearing; resting in bed  HEENT: NCAT, PERRLA, EOMI, moist mucous membranes  NECK: supple, no masses  CV: RRR, no m/r/g, no LE edema  LUNGS: bilateral expiratory wheeze, scattered rhonchi  ABD: soft, NT, ND, NBS  SKIN: no rashes  MSK; no gross deformities, normal joints  Breast: L breast scars; no erythema or warmth; lump/collection felt laterally about 3 oclock  NEURO: A+Ox3, mild confusion; strength and light touch sensation " intact all extremities; no cranial nerve deficits; no dysmetria  PSYCH: appropriate mood, affect      Inpatient Medications:     PRN medications: heparin, oxyCODONE    Outpatient Medications:  Prior to Admission medications    Not on File       LABS AND IMAGING:     Last Labs:  CBC - 4/8/2024: 11:56 AM  7.4 11.4 234    34.9      CMP - 4/8/2024: 11:56 AM  8.3 6.3 16 --- 0.4   _ 3.5 13 53      PTT - No results in last year.  1.0   11.5 _     Troponin I, High Sensitivity   Date/Time Value Ref Range Status   04/08/2024 11:56 AM 6 0 - 13 ng/L Final     BNP   Date/Time Value Ref Range Status   04/08/2024 11:56  (H) 0 - 99 pg/mL Final   07/26/2019 04:24 AM 36 0 - 99 pg/mL Final     Comment:     .  <100 pg/mL - Heart failure unlikely  100-299 pg/mL - Intermediate probability of acute heart  .               failure exacerbation. Correlate with clinical  .               context and patient history.    >=300 pg/mL - Heart Failure likely. Correlate with clinical  .               context and patient history.  BNP testing is performed using different testing   methodology at [facility] than at other   [facility] hospitals. Direct result comparisons should   only be made within the same method.       Hemoglobin A1C   Date/Time Value Ref Range Status   07/26/2019 04:23 AM 5.1 % Final     Comment:          Diagnosis of Diabetes-Adults   Non-Diabetic: < or = 5.6%   Increased risk for developing diabetes: 5.7-6.4%   Diagnostic of diabetes: > or = 6.5%  .       Monitoring of Diabetes                Age (y)     Therapeutic Goal (%)   Adults:          >18           <7.0   Pediatrics:    13-18           <7.5                   7-12           <8.0                   0- 6            7.5-8.5   American Diabetes Association. Diabetes Care 33(S1), Jan 2010.       VLDL   Date/Time Value Ref Range Status   07/26/2019 04:24 AM 35 0 - 40 mg/dL Final

## 2024-04-09 ENCOUNTER — APPOINTMENT (OUTPATIENT)
Dept: CARDIOLOGY | Facility: HOSPITAL | Age: 69
End: 2024-04-09
Payer: COMMERCIAL

## 2024-04-09 LAB
AORTIC VALVE MEAN GRADIENT: 3 MMHG
AORTIC VALVE PEAK VELOCITY: 1.32 M/S
AV PEAK GRADIENT: 7 MMHG
AVA (PEAK VEL): 2.62 CM2
AVA (VTI): 2.4 CM2
EJECTION FRACTION APICAL 4 CHAMBER: 66.3
HOLD SPECIMEN: NORMAL
LEFT ATRIUM VOLUME AREA LENGTH INDEX BSA: 17.5 ML/M2
LEFT VENTRICLE INTERNAL DIMENSION DIASTOLE: 4.01 CM (ref 3.5–6)
LEFT VENTRICULAR OUTFLOW TRACT DIAMETER: 1.8 CM
LV EJECTION FRACTION BIPLANE: 66 %
MITRAL VALVE E/A RATIO: 1.29
PROCALCITONIN SERPL-MCNC: 0.05 NG/ML
RIGHT VENTRICLE FREE WALL PEAK S': 16 CM/S
RIGHT VENTRICLE PEAK SYSTOLIC PRESSURE: 33.9 MMHG
TRICUSPID ANNULAR PLANE SYSTOLIC EXCURSION: 2.6 CM
UFH PPP CHRO-ACNC: 0.5 IU/ML
UFH PPP CHRO-ACNC: 0.5 IU/ML

## 2024-04-09 PROCEDURE — 93306 TTE W/DOPPLER COMPLETE: CPT

## 2024-04-09 PROCEDURE — 2500000001 HC RX 250 WO HCPCS SELF ADMINISTERED DRUGS (ALT 637 FOR MEDICARE OP): Performed by: NURSE PRACTITIONER

## 2024-04-09 PROCEDURE — 2500000002 HC RX 250 W HCPCS SELF ADMINISTERED DRUGS (ALT 637 FOR MEDICARE OP, ALT 636 FOR OP/ED): Performed by: STUDENT IN AN ORGANIZED HEALTH CARE EDUCATION/TRAINING PROGRAM

## 2024-04-09 PROCEDURE — 99232 SBSQ HOSP IP/OBS MODERATE 35: CPT | Performed by: STUDENT IN AN ORGANIZED HEALTH CARE EDUCATION/TRAINING PROGRAM

## 2024-04-09 PROCEDURE — 96376 TX/PRO/DX INJ SAME DRUG ADON: CPT

## 2024-04-09 PROCEDURE — G0378 HOSPITAL OBSERVATION PER HR: HCPCS

## 2024-04-09 PROCEDURE — 2500000001 HC RX 250 WO HCPCS SELF ADMINISTERED DRUGS (ALT 637 FOR MEDICARE OP): Performed by: INTERNAL MEDICINE

## 2024-04-09 PROCEDURE — 85520 HEPARIN ASSAY: CPT | Performed by: INTERNAL MEDICINE

## 2024-04-09 PROCEDURE — 2500000001 HC RX 250 WO HCPCS SELF ADMINISTERED DRUGS (ALT 637 FOR MEDICARE OP): Performed by: STUDENT IN AN ORGANIZED HEALTH CARE EDUCATION/TRAINING PROGRAM

## 2024-04-09 PROCEDURE — 2500000004 HC RX 250 GENERAL PHARMACY W/ HCPCS (ALT 636 FOR OP/ED): Performed by: INTERNAL MEDICINE

## 2024-04-09 PROCEDURE — 36415 COLL VENOUS BLD VENIPUNCTURE: CPT | Performed by: INTERNAL MEDICINE

## 2024-04-09 PROCEDURE — 2500000002 HC RX 250 W HCPCS SELF ADMINISTERED DRUGS (ALT 637 FOR MEDICARE OP, ALT 636 FOR OP/ED): Performed by: INTERNAL MEDICINE

## 2024-04-09 PROCEDURE — 93306 TTE W/DOPPLER COMPLETE: CPT | Performed by: INTERNAL MEDICINE

## 2024-04-09 RX ORDER — OMEPRAZOLE 20 MG/1
20 CAPSULE, DELAYED RELEASE ORAL
COMMUNITY
Start: 2024-03-27 | End: 2024-06-25

## 2024-04-09 RX ORDER — ROSUVASTATIN CALCIUM 10 MG/1
10 TABLET, COATED ORAL NIGHTLY
Status: DISCONTINUED | OUTPATIENT
Start: 2024-04-09 | End: 2024-04-10 | Stop reason: HOSPADM

## 2024-04-09 RX ORDER — MIRTAZAPINE 15 MG/1
15 TABLET, FILM COATED ORAL NIGHTLY
Status: DISCONTINUED | OUTPATIENT
Start: 2024-04-09 | End: 2024-04-09

## 2024-04-09 RX ORDER — MIRTAZAPINE 15 MG/1
60 TABLET, FILM COATED ORAL NIGHTLY
Status: DISCONTINUED | OUTPATIENT
Start: 2024-04-09 | End: 2024-04-10 | Stop reason: HOSPADM

## 2024-04-09 RX ORDER — ARIPIPRAZOLE 10 MG/1
15 TABLET ORAL
Status: DISCONTINUED | OUTPATIENT
Start: 2024-04-10 | End: 2024-04-10 | Stop reason: HOSPADM

## 2024-04-09 RX ORDER — SUCRALFATE 1 G/1
1 TABLET ORAL 3 TIMES DAILY
Status: DISCONTINUED | OUTPATIENT
Start: 2024-04-09 | End: 2024-04-10 | Stop reason: HOSPADM

## 2024-04-09 RX ORDER — SUCRALFATE 1 G/1
1 TABLET ORAL 3 TIMES DAILY
COMMUNITY
Start: 2024-03-08

## 2024-04-09 RX ORDER — MIRTAZAPINE 15 MG/1
4 TABLET, FILM COATED ORAL NIGHTLY
COMMUNITY
Start: 2024-03-03

## 2024-04-09 RX ORDER — ARIPIPRAZOLE 15 MG/1
15 TABLET ORAL
COMMUNITY
Start: 2024-02-15

## 2024-04-09 RX ORDER — ROSUVASTATIN CALCIUM 10 MG/1
10 TABLET, COATED ORAL
COMMUNITY
Start: 2024-03-10

## 2024-04-09 RX ORDER — GABAPENTIN 400 MG/1
2 CAPSULE ORAL NIGHTLY
COMMUNITY
Start: 2024-01-26

## 2024-04-09 RX ORDER — ERGOCALCIFEROL 1.25 MG/1
50000 CAPSULE ORAL
COMMUNITY
Start: 2021-09-28

## 2024-04-09 RX ORDER — GUAIFENESIN 100 MG/5ML
200 SOLUTION ORAL EVERY 6 HOURS PRN
Status: DISCONTINUED | OUTPATIENT
Start: 2024-04-09 | End: 2024-04-10 | Stop reason: HOSPADM

## 2024-04-09 RX ORDER — FUROSEMIDE 20 MG/1
20 TABLET ORAL DAILY
COMMUNITY
Start: 2012-09-18

## 2024-04-09 RX ORDER — DEXTROAMPHETAMINE SACCHARATE, AMPHETAMINE ASPARTATE, DEXTROAMPHETAMINE SULFATE AND AMPHETAMINE SULFATE 5; 5; 5; 5 MG/1; MG/1; MG/1; MG/1
20 TABLET ORAL 2 TIMES DAILY
COMMUNITY
Start: 2024-03-07

## 2024-04-09 RX ORDER — PREDNISONE 5 MG/1
5 TABLET ORAL DAILY
COMMUNITY
Start: 2024-03-17 | End: 2024-04-10 | Stop reason: HOSPADM

## 2024-04-09 RX ORDER — DULOXETIN HYDROCHLORIDE 60 MG/1
60 CAPSULE, DELAYED RELEASE ORAL DAILY
COMMUNITY
End: 2024-04-10 | Stop reason: HOSPADM

## 2024-04-09 RX ORDER — PANTOPRAZOLE SODIUM 40 MG/1
40 TABLET, DELAYED RELEASE ORAL
Status: DISCONTINUED | OUTPATIENT
Start: 2024-04-10 | End: 2024-04-10 | Stop reason: HOSPADM

## 2024-04-09 RX ADMIN — ACETAMINOPHEN 650 MG: 325 TABLET ORAL at 23:28

## 2024-04-09 RX ADMIN — DOXYCYCLINE HYCLATE 100 MG: 100 TABLET, FILM COATED ORAL at 21:40

## 2024-04-09 RX ADMIN — DOXYCYCLINE HYCLATE 100 MG: 100 TABLET, FILM COATED ORAL at 08:21

## 2024-04-09 RX ADMIN — MIRTAZAPINE 60 MG: 15 TABLET, FILM COATED ORAL at 21:40

## 2024-04-09 RX ADMIN — OXYCODONE HYDROCHLORIDE 5 MG: 5 TABLET ORAL at 21:40

## 2024-04-09 RX ADMIN — ROSUVASTATIN CALCIUM 10 MG: 10 TABLET, FILM COATED ORAL at 21:40

## 2024-04-09 RX ADMIN — GUAIFENESIN 200 MG: 200 SOLUTION ORAL at 16:08

## 2024-04-09 RX ADMIN — HEPARIN SODIUM 1200 UNITS/HR: 10000 INJECTION, SOLUTION INTRAVENOUS at 11:31

## 2024-04-09 RX ADMIN — OXYCODONE HYDROCHLORIDE 5 MG: 5 TABLET ORAL at 16:08

## 2024-04-09 RX ADMIN — GUAIFENESIN 200 MG: 200 SOLUTION ORAL at 21:40

## 2024-04-09 RX ADMIN — ATORVASTATIN CALCIUM 40 MG: 20 TABLET, FILM COATED ORAL at 21:40

## 2024-04-09 RX ADMIN — STANDARDIZED SENNA CONCENTRATE 17.2 MG: 8.6 TABLET ORAL at 08:21

## 2024-04-09 RX ADMIN — IPRATROPIUM BROMIDE AND ALBUTEROL SULFATE 3 ML: 2.5; .5 SOLUTION RESPIRATORY (INHALATION) at 21:42

## 2024-04-09 RX ADMIN — SUCRALFATE 1 G: 1 TABLET ORAL at 21:40

## 2024-04-09 RX ADMIN — DEXAMETHASONE 6 MG: 6 TABLET ORAL at 08:22

## 2024-04-09 RX ADMIN — GUAIFENESIN 200 MG: 200 SOLUTION ORAL at 02:42

## 2024-04-09 RX ADMIN — MIRTAZAPINE 15 MG: 15 TABLET, FILM COATED ORAL at 02:00

## 2024-04-09 RX ADMIN — OXYCODONE HYDROCHLORIDE 5 MG: 5 TABLET ORAL at 02:42

## 2024-04-09 RX ADMIN — OXYCODONE HYDROCHLORIDE 5 MG: 5 TABLET ORAL at 08:31

## 2024-04-09 RX ADMIN — ACETAMINOPHEN 650 MG: 325 TABLET ORAL at 08:31

## 2024-04-09 RX ADMIN — ACETAMINOPHEN 650 MG: 325 TABLET ORAL at 16:08

## 2024-04-09 RX ADMIN — ASPIRIN 81 MG: 81 TABLET, COATED ORAL at 08:22

## 2024-04-09 RX ADMIN — STANDARDIZED SENNA CONCENTRATE 17.2 MG: 8.6 TABLET ORAL at 21:41

## 2024-04-09 ASSESSMENT — COGNITIVE AND FUNCTIONAL STATUS - GENERAL
DAILY ACTIVITIY SCORE: 24
MOBILITY SCORE: 24

## 2024-04-09 ASSESSMENT — PAIN - FUNCTIONAL ASSESSMENT
PAIN_FUNCTIONAL_ASSESSMENT: 0-10

## 2024-04-09 ASSESSMENT — PAIN SCALES - GENERAL
PAINLEVEL_OUTOF10: 6
PAINLEVEL_OUTOF10: 5 - MODERATE PAIN
PAINLEVEL_OUTOF10: 2
PAINLEVEL_OUTOF10: 0 - NO PAIN
PAINLEVEL_OUTOF10: 2
PAINLEVEL_OUTOF10: 6
PAINLEVEL_OUTOF10: 6

## 2024-04-09 ASSESSMENT — PAIN DESCRIPTION - LOCATION
LOCATION: BACK
LOCATION: HEAD

## 2024-04-09 ASSESSMENT — ACTIVITIES OF DAILY LIVING (ADL): LACK_OF_TRANSPORTATION: NO

## 2024-04-09 NOTE — PROGRESS NOTES
Medical Group Progress Note  ASSESSMENT & PLAN:     Acute pulmonary embolism  COVID-19  Chronic respiratory failure with hypoxemia  COPD possible mild exacerbation  -CTA showing acute segmental and subsegmental PE in the lingula and left lower lobe without evidence of right heart strain; also showing multifocal groundglass and nodular opacities as well as right hilar lymphadenopathy  -heparin drip, plan to transition to DOAC prior to discharge  -suspect GGO related to COVID; no clear evidence of ongoing superimposed bacterial PNA  -check procal  -ok with doxycycline for now  -cont with PRN nebs  -dexamethasone 6mg daily; she was on paxlovid as outpatient     Metabolic encephalopathy  -Having confusion and some word finding difficulty  -She does have a history of TIA and stroke will get a CT head to begin assessment  -Would consider MRI for further evaluation if her symptoms persist and CT is otherwise negative  -cont asa/statin     L breast fluid collection  -CTA demonstrated left breast fluid collection and fluid densities; patient reports that she has had continued pain since her lumpectomy a year ago  -will get an ultrasound for further evaluation; she does have an appointment with her surgeon coming up in the next week or 2  -No superficial evidence of cellulitis or ongoing infection on exam     Depression  -resume home meds once verified        04/09/24  - patient in no acute distress.  Oxygen requirements currently stable.  -  Patient continues to be in atrial fibrillation without RVR.  Will continue heparin.  Plan to switch to Eliquis in the morning.  -  Patient on 3 L nasal cannula oxygen at home.  -  Will continue with doxycycline as well as dexamethasone for now.  -  Resumed home antidepressants.    Total time >35 minutes; > 50% spent counseling/coordinating care       VTE Prophylaxis: heparin drip      Jaylan Hamilton MD    SUBJECTIVE       Patient in no acute distress.  Denies any fevers chills  nausea or vomiting.  Denies any worsening shortness of breath.  Does report intermittent cough.    OBJECTIVE:     Last Recorded Vitals:  Vitals:    04/08/24 1835 04/08/24 2100 04/09/24 0144 04/09/24 0540   BP: 121/61 146/65 136/65 128/64   BP Location:  Right arm Right arm Right arm   Patient Position:  Lying Lying Lying   Pulse: 80 79 71 63   Resp:  18 17 17   Temp: 37.3 °C (99.1 °F) 36.9 °C (98.4 °F) 37.2 °C (99 °F) 36.7 °C (98.1 °F)   TempSrc:  Temporal Temporal Temporal   SpO2: 98% 95% 95% 96%   Weight:       Height:         Last I/O:  I/O last 3 completed shifts:  In: 110 (1.4 mL/kg) [I.V.:110 (1.4 mL/kg)]  Out: - (0 mL/kg)   Weight: 77.1 kg     Physical Exam      GEN: ill appearing; resting in bed  HEENT: NCAT, PERRLA, EOMI, moist mucous membranes  NECK: supple, no masses  CV: RRR, no m/r/g, no LE edema  LUNGS: bilateral expiratory wheeze, scattered rhonchi  ABD: soft, NT, ND, NBS  SKIN: no rashes  MSK; no gross deformities, normal joints  Breast: L breast scars; no erythema or warmth; lump/collection felt laterally about 3 oclock  NEURO: A+Ox3, mild confusion; strength and light touch sensation intact all extremities; no cranial nerve deficits; no dysmetria  PSYCH: appropriate mood, affect    Inpatient Medications:  aspirin, 81 mg, oral, Daily  atorvastatin, 40 mg, oral, Nightly  dexAMETHasone, 6 mg, oral, Daily  doxycycline, 100 mg, oral, q12h MICHELLE  mirtazapine, 15 mg, oral, Nightly  pneumococcal conjugate, 0.5 mL, intramuscular, During hospitalization  sennosides, 2 tablet, oral, BID    PRN Medications  PRN medications: acetaminophen **OR** acetaminophen **OR** acetaminophen, benzocaine-menthol, guaiFENesin, heparin, ipratropium-albuteroL, ondansetron ODT **OR** ondansetron, oxyCODONE  Continuous Medications:  heparin, 0-4,500 Units/hr, Last Rate: 1,200 Units/hr (04/09/24 0610)      LABS AND IMAGING:     Labs:  Results from last 7 days   Lab Units 04/08/24  1156   WBC AUTO x10*3/uL 7.4   RBC AUTO x10*6/uL  3.62*   HEMOGLOBIN g/dL 11.4*   HEMATOCRIT % 34.9*   MCV fL 96   MCH pg 31.5   MCHC g/dL 32.7   RDW % 12.6   PLATELETS AUTO x10*3/uL 234     Results from last 7 days   Lab Units 04/08/24  1156   SODIUM mmol/L 142   POTASSIUM mmol/L 3.4*   CHLORIDE mmol/L 108*   CO2 mmol/L 30   BUN mg/dL 8   CREATININE mg/dL 0.81   GLUCOSE mg/dL 99   PROTEIN TOTAL g/dL 6.3*   CALCIUM mg/dL 8.3*   BILIRUBIN TOTAL mg/dL 0.4   ALK PHOS U/L 53   AST U/L 16   ALT U/L 13     Results from last 7 days   Lab Units 04/08/24  1156   MAGNESIUM mg/dL 2.15     Results from last 7 days   Lab Units 04/08/24  1156   TROPHS ng/L 6     Imaging:  CT head wo IV contrast  Narrative: Interpreted By:  Maureen Saunders,   STUDY:  CT HEAD WO IV CONTRAST  4/8/2024 8:13 pm      INDICATION:  Signs/Symptoms:confusion, word finding difficulty      COMPARISON:  MRI brain 07/26/2019. CT head 07/25/2019.      ACCESSION NUMBER(S):  [id number]      ORDERING CLINICIAN:  GODWIN QUIÑONES      TECHNIQUE:  Serial, axial CT images of the brain were obtained without IV  contrast. Coronal and sagittal reformatted images were performed.      FINDINGS:  The ventricles, cisterns and sulci are mildly prominent, consistent  with diffuse volume loss.  There are areas of nonspecific white  matter hypodensity, which are probably age-related or microvascular  in nature. The gray-white matter differentiation is intact and there  is no evidence of acute territorial infarct.  No mass effect or  midline shift is seen.  There is no hemorrhage.  No extraaxial fluid  collection. Air-fluid level at the left sphenoid sinus. No air-fluid  levels at the remainder of the visualized paranasal sinuses. The  mastoid air cells are clear. No depressed calvarial fracture.      Impression: 1.  No acute intracranial hemorrhage or acute territorial infarct.  2. Air-fluid level at the left sphenoid sinus, please correlate for  sinusitis.              MACRO:  None.      Signed by: Maureen Saunders 4/8/2024  8:58 PM  Dictation workstation:   [id number]  ECG 12 lead  Normal sinus rhythm  Normal ECG  When compared with ECG of 25-JUL-2019 13:11,  No significant change was found  See ED provider note for full interpretation and clinical correlation  Confirmed by Traci Reese ([id number]) on 4/8/2024 8:50:45 PM  CT angio chest for pulmonary embolism  Narrative: Interpreted By:  Paty Farias,   STUDY:  CT ANGIO CHEST FOR PULMONARY EMBOLISM;  4/8/2024 12:56 pm      INDICATION:  Signs/Symptoms:covid +, SOB, elevated dimer.      COMPARISON:  CT chest 01/23/2020.      ACCESSION NUMBER(S):  [id number]      ORDERING CLINICIAN:  ABDIAZIZ ROMAN      TECHNIQUE:  Helical data acquisition of the chest was obtained following  intravenous administration of 75 ml of Omnipaque 350. Images were  reformatted in coronal and sagittal planes. Axial and coronal MIP  images were created and reviewed.      FINDINGS:  POTENTIAL LIMITATIONS OF THE STUDY: None      HEART AND VESSELS:  Filling defects in the lingular segmental and left lower lobe  subsegmental pulmonary arteries.      Main pulmonary artery is normal in caliber.      The thoracic aorta is of normal course and caliber with mild vascular  calcifications.      No coronary artery calcifications are seen.The study is not optimized  for evaluation of coronary arteries.      The cardiac chambers are not enlarged. No flattening or deviation of  interventricular septum.      No evidence of pericardial effusion.      MEDIASTINUM AND RICHIE, LOWER NECK AND AXILLA:  The visualized thyroid gland is within normal limits.      New, right hilar lymphadenopathy, for example 1.2 cm right hilar  lymph node (series 401, image 101).      Esophagus appears within normal limits.      LUNGS AND AIRWAYS:  The trachea and central airways are patent. No endobronchial lesion.      Bilateral lower lobe bronchial wall thickening. Moderate  centrilobular emphysema. Scattered ground-glass and  nodular  opacities, for example 1.4 cm anterior right upper lobe ground-glass  opacity (series 403, image 103; 1.1 cm right lower lobe nodular  opacity (series 403, image 188). No pleural effusion or pneumothorax.      UPPER ABDOMEN:  No acute abnormalities.      CHEST WALL AND OSSEOUS STRUCTURES:  Partially imaged approximately 4 cm fluid collection in the upper  outer quadrant of left breast. Multiple surgical clips in the left  subareolar region with adjacent fluid densities.      Impression: Acute segmental and subsegmental PE in the lingula and left lower  lobe; small clot burden, no evidence of right heart strain.      New multifocal ground-glass and nodular opacities, probably  infectious/inflammatory. Follow-up to resolution in 3-6 months.      Nonspecific new right hilar lymphadenopathy, possibly reactive.  Attention on follow-up examination.      Partially imaged left breast fluid collection and fluid densities  surrounding surgical clips. Correlate with recent procedures.      MICHAEL Farias discussed the significance and urgency of this  critical finding by Epic secure chat with  ABDIAZIZ ROMAN on  4/8/2024 at 1:14 pm.  (**-RCF-**) Findings:  See findings.      Signed by: Paty Farias 4/8/2024 1:17 PM  Dictation workstation:   [id number]  XR chest 1 view  Narrative: Interpreted By:  Gosia Oneill,   STUDY:  XR CHEST 1 VIEW;  4/8/2024 12:25 pm      INDICATION:  Signs/Symptoms:covid+, SOB.      COMPARISON:  06/11/2015; CT chest dated 01/23/2020      ACCESSION NUMBER(S):  [id number]      ORDERING CLINICIAN:  ABDIAZIZ ROMAN      FINDINGS:  Heart is normal size.      Lungs appear hyperinflated.      Resolution is somewhat limited.      There is no discrete consolidation or pleural fluid. The lung  markings are coarse.      There are degenerative changes of the spine.      COMPARISON OF FINDING:  The chest is similar.      Impression: No acute cardiopulmonary disease.      MACRO:  none       Signed by: Gosia Oneill 4/8/2024 12:40 PM  Dictation workstation:   [id number]

## 2024-04-09 NOTE — PROGRESS NOTES
04/09/24 1501   Discharge Planning   Living Arrangements Children   Support Systems Children   Assistance Needed none, pt states PTA ind ADLS and IADLS with rollator, drives, 1 fall 2 weeks ago when slipped out of bed, states hurt shoulder and arm but ok now, pt uses 3 LPM continous home O2 from [employer], has concentrator and portable   Type of Residence Private residence  (split level home, pt resides on lower level with bedroom and 1/2 bath, full bath on upstairs level)   Number of Stairs to Enter Residence 0   Number of Stairs Within Residence 12   Do you have animals or pets at home? Yes   Type of Animals or Pets 1 dog and 1 cat   Home or Post Acute Services In home services   Type of Home Care Services Home nursing visits;Home PT   Patient expects to be discharged to: Home with HHC   Does the patient need discharge transport arranged? No   Financial Resource Strain   How hard is it for you to pay for the very basics like food, housing, medical care, and heating? Not hard   Housing Stability   In the last 12 months, was there a time when you were not able to pay the mortgage or rent on time? N   In the last 12 months, how many places have you lived? 1   In the last 12 months, was there a time when you did not have a steady place to sleep or slept in a shelter (including now)? N   Transportation Needs   In the past 12 months, has lack of transportation kept you from medical appointments or from getting medications? no   In the past 12 months, has lack of transportation kept you from meetings, work, or from getting things needed for daily living? No   Patient Choice   Provider Choice list and CMS website (https://medicare.gov/care-compare#search) for post-acute Quality and Resource Measure Data were provided and reviewed with: Patient   Patient / Family choosing to utilize agency / facility established prior to hospitalization No     Pt currently in OBS status with DX pulmonary embolism without acute cor pulmonale  and Covid positive, RN Navigator consulted. Pt interested in having HHC and Agency of choice is [facility]. Pts PCP is [facility] ARNOLDO Peralta, called PCP office and confirmed last office visit was 2 months ago on 2/22/24, and will follow for home care, works under Dr. Clark. Referral built and sent to [facility] to confirm willingness to accept. Attending Dr. Hamilton notified of this information for External home care orders upon DC.

## 2024-04-10 ENCOUNTER — PHARMACY VISIT (OUTPATIENT)
Dept: PHARMACY | Facility: CLINIC | Age: 69
End: 2024-04-10
Payer: MEDICARE

## 2024-04-10 VITALS
HEIGHT: 62 IN | RESPIRATION RATE: 18 BRPM | WEIGHT: 170 LBS | TEMPERATURE: 99 F | HEART RATE: 78 BPM | DIASTOLIC BLOOD PRESSURE: 65 MMHG | SYSTOLIC BLOOD PRESSURE: 151 MMHG | OXYGEN SATURATION: 99 % | BODY MASS INDEX: 31.28 KG/M2

## 2024-04-10 PROBLEM — I26.99 PULMONARY EMBOLISM WITHOUT ACUTE COR PULMONALE, UNSPECIFIED CHRONICITY, UNSPECIFIED PULMONARY EMBOLISM TYPE (MULTI): Status: RESOLVED | Noted: 2024-04-08 | Resolved: 2024-04-10

## 2024-04-10 LAB
ERYTHROCYTE [DISTWIDTH] IN BLOOD BY AUTOMATED COUNT: 12.1 % (ref 11.5–14.5)
HCT VFR BLD AUTO: 30.8 % (ref 36–46)
HGB BLD-MCNC: 10.3 G/DL (ref 12–16)
HOLD SPECIMEN: NORMAL
MCH RBC QN AUTO: 31.6 PG (ref 26–34)
MCHC RBC AUTO-ENTMCNC: 33.4 G/DL (ref 32–36)
MCV RBC AUTO: 95 FL (ref 80–100)
NRBC BLD-RTO: 0 /100 WBCS (ref 0–0)
PLATELET # BLD AUTO: 244 X10*3/UL (ref 150–450)
RBC # BLD AUTO: 3.26 X10*6/UL (ref 4–5.2)
UFH PPP CHRO-ACNC: 0.6 IU/ML
WBC # BLD AUTO: 4.5 X10*3/UL (ref 4.4–11.3)

## 2024-04-10 PROCEDURE — G0378 HOSPITAL OBSERVATION PER HR: HCPCS

## 2024-04-10 PROCEDURE — 36415 COLL VENOUS BLD VENIPUNCTURE: CPT | Performed by: INTERNAL MEDICINE

## 2024-04-10 PROCEDURE — 2500000001 HC RX 250 WO HCPCS SELF ADMINISTERED DRUGS (ALT 637 FOR MEDICARE OP): Performed by: INTERNAL MEDICINE

## 2024-04-10 PROCEDURE — 85027 COMPLETE CBC AUTOMATED: CPT | Performed by: INTERNAL MEDICINE

## 2024-04-10 PROCEDURE — RXMED WILLOW AMBULATORY MEDICATION CHARGE

## 2024-04-10 PROCEDURE — 2500000001 HC RX 250 WO HCPCS SELF ADMINISTERED DRUGS (ALT 637 FOR MEDICARE OP): Performed by: STUDENT IN AN ORGANIZED HEALTH CARE EDUCATION/TRAINING PROGRAM

## 2024-04-10 PROCEDURE — 96376 TX/PRO/DX INJ SAME DRUG ADON: CPT

## 2024-04-10 PROCEDURE — 85520 HEPARIN ASSAY: CPT | Performed by: INTERNAL MEDICINE

## 2024-04-10 PROCEDURE — 2500000001 HC RX 250 WO HCPCS SELF ADMINISTERED DRUGS (ALT 637 FOR MEDICARE OP): Performed by: NURSE PRACTITIONER

## 2024-04-10 PROCEDURE — 99239 HOSP IP/OBS DSCHRG MGMT >30: CPT | Performed by: STUDENT IN AN ORGANIZED HEALTH CARE EDUCATION/TRAINING PROGRAM

## 2024-04-10 PROCEDURE — 2500000004 HC RX 250 GENERAL PHARMACY W/ HCPCS (ALT 636 FOR OP/ED): Performed by: INTERNAL MEDICINE

## 2024-04-10 RX ORDER — IPRATROPIUM BROMIDE AND ALBUTEROL SULFATE 2.5; .5 MG/3ML; MG/3ML
3 SOLUTION RESPIRATORY (INHALATION) EVERY 4 HOURS PRN
Qty: 180 ML | Refills: 11 | Status: SHIPPED | OUTPATIENT
Start: 2024-04-10

## 2024-04-10 RX ORDER — DEXAMETHASONE 6 MG/1
6 TABLET ORAL DAILY
Qty: 4 TABLET | Refills: 0 | Status: SHIPPED | OUTPATIENT
Start: 2024-04-11 | End: 2024-04-15

## 2024-04-10 RX ORDER — DOXYCYCLINE 100 MG/1
100 CAPSULE ORAL EVERY 12 HOURS SCHEDULED
Qty: 10 CAPSULE | Refills: 0 | Status: SHIPPED | OUTPATIENT
Start: 2024-04-10 | End: 2024-04-15

## 2024-04-10 RX ORDER — HEPARIN SODIUM 10000 [USP'U]/100ML
0-4500 INJECTION, SOLUTION INTRAVENOUS CONTINUOUS
Status: DISCONTINUED | OUTPATIENT
Start: 2024-04-10 | End: 2024-04-10 | Stop reason: HOSPADM

## 2024-04-10 RX ORDER — GUAIFENESIN 100 MG/5ML
200 SOLUTION ORAL EVERY 6 HOURS PRN
Qty: 120 ML | Refills: 0 | Status: SHIPPED | OUTPATIENT
Start: 2024-04-10

## 2024-04-10 RX ADMIN — ARIPIPRAZOLE 15 MG: 10 TABLET ORAL at 06:16

## 2024-04-10 RX ADMIN — PANTOPRAZOLE SODIUM 40 MG: 40 TABLET, DELAYED RELEASE ORAL at 06:17

## 2024-04-10 RX ADMIN — ASPIRIN 81 MG: 81 TABLET, COATED ORAL at 09:17

## 2024-04-10 RX ADMIN — LINACLOTIDE 72 MCG: 72 CAPSULE, GELATIN COATED ORAL at 09:17

## 2024-04-10 RX ADMIN — DEXAMETHASONE 6 MG: 6 TABLET ORAL at 09:17

## 2024-04-10 RX ADMIN — DOXYCYCLINE HYCLATE 100 MG: 100 TABLET, FILM COATED ORAL at 09:17

## 2024-04-10 RX ADMIN — GUAIFENESIN 200 MG: 200 SOLUTION ORAL at 14:24

## 2024-04-10 RX ADMIN — STANDARDIZED SENNA CONCENTRATE 17.2 MG: 8.6 TABLET ORAL at 09:17

## 2024-04-10 RX ADMIN — APIXABAN 10 MG: 5 TABLET, FILM COATED ORAL at 14:24

## 2024-04-10 RX ADMIN — OXYCODONE HYDROCHLORIDE 5 MG: 5 TABLET ORAL at 12:30

## 2024-04-10 RX ADMIN — SUCRALFATE 1 G: 1 TABLET ORAL at 14:24

## 2024-04-10 RX ADMIN — HEPARIN SODIUM 1200 UNITS/HR: 10000 INJECTION, SOLUTION INTRAVENOUS at 09:26

## 2024-04-10 RX ADMIN — GUAIFENESIN 200 MG: 200 SOLUTION ORAL at 09:24

## 2024-04-10 RX ADMIN — SUCRALFATE 1 G: 1 TABLET ORAL at 09:17

## 2024-04-10 ASSESSMENT — COGNITIVE AND FUNCTIONAL STATUS - GENERAL
DAILY ACTIVITIY SCORE: 24
MOBILITY SCORE: 24

## 2024-04-10 ASSESSMENT — PAIN - FUNCTIONAL ASSESSMENT: PAIN_FUNCTIONAL_ASSESSMENT: 0-10

## 2024-04-10 ASSESSMENT — PAIN SCALES - GENERAL
PAINLEVEL_OUTOF10: 6
PAINLEVEL_OUTOF10: 0 - NO PAIN
PAINLEVEL_OUTOF10: 0 - NO PAIN

## 2024-04-10 NOTE — PROGRESS NOTES
Eliquis Sp delivered to bedside with dosing calendar. RN Navigator had patient practice opening the starter pack x3. For ease of use pack left open. Patient able to verbalize were to take tonight’s dose from on pack “Day 1 PM”. RN navigator gave patient office # if she needs assistance tomorrow am before start of home care.

## 2024-04-10 NOTE — PROGRESS NOTES
Pd home w/ hhc. [facility]co rec'd. [facility] declines the case.  Pt notified. Pt given several options for preference accepting united dual plans. . [facility] referral sent to juan kuamr and [facility]. Awaiting acceptance.  Luci bob unable to accept.      [facility] accepted the case.  Pt contacted.

## 2024-04-10 NOTE — DISCHARGE SUMMARY
Discharge Diagnosis  Pulmonary embolism without acute cor pulmonale, unspecified chronicity, unspecified pulmonary embolism type (CMS/HCC)    Issues Requiring Follow-Up  ***    Discharge Meds     Your medication list        START taking these medications        Instructions Last Dose Given Next Dose Due   dexAMETHasone 6 mg tablet  Commonly known as: Decadron  Start taking on: April 11, 2024      Take 1 tablet (6 mg) by mouth once daily for 4 days.       doxycycline 100 mg capsule  Commonly known as: Vibramycin      Take 1 capsule (100 mg) by mouth every 12 hours for 5 days. Take with a full glass of water and do not lie down for at least 30 minutes after.       Eliquis DVT-PE Treat 30D Start 5 mg (74 tabs) tablet  Generic drug: apixaban  Start taking on: April 10, 2024      Take 2 tablets (10 mg) by mouth 2 times a day for 7 days, THEN 1 tablet (5 mg) 2 times a day thereafter       guaiFENesin 100 mg/5 mL syrup  Commonly known as: Robitussin      Take 10 mL (200 mg) by mouth every 6 hours if needed for congestion.       ipratropium-albuteroL 0.5-2.5 mg/3 mL nebulizer solution  Commonly known as: Duo-Neb      Take 3 mL by nebulization every 4 hours if needed for wheezing or shortness of breath.              CONTINUE taking these medications        Instructions Last Dose Given Next Dose Due   ARIPiprazole 15 mg tablet  Commonly known as: Abilify           Linzess 290 mcg capsule  Generic drug: linaCLOtide           omeprazole 20 mg DR capsule  Commonly known as: PriLOSEC           Remeron 15 mg tablet  Generic drug: mirtazapine           rosuvastatin 10 mg tablet  Commonly known as: Crestor           sucralfate 1 gram tablet  Commonly known as: Carafate                  ASK your doctor about these medications        Instructions Last Dose Given Next Dose Due   ALPRAZolam XR 1 mg 24 hr tablet  Commonly known as: Xanax XR           amphetamine-dextroamphetamine 20 mg tablet  Commonly known as: Adderall            DULoxetine 60 mg DR capsule  Commonly known as: Cymbalta           ergocalciferol 1.25 MG (07253 UT) capsule  Commonly known as: Vitamin D-2           furosemide 20 mg tablet  Commonly known as: Lasix           gabapentin 400 mg capsule  Commonly known as: Neurontin           predniSONE 5 mg tablet  Commonly known as: Deltasone                     Where to Get Your Medications        These medications were sent to [id number] - [place], [place] - [address] [address]      Phone: [phone]   dexAMETHasone 6 mg tablet  doxycycline 100 mg capsule  guaiFENesin 100 mg/5 mL syrup  ipratropium-albuteroL 0.5-2.5 mg/3 mL nebulizer solution       These medications were sent to [facility]  [facility] [address] [address]      Hours: 9 AM to 5:30 PM Mon-Fri, 9 AM to 1 PM Saturday Phone: [phone]   Eliquis DVT-PE Treat 30D Start 5 mg (74 tabs) tablet         Test Results Pending At Discharge  Pending Labs       No current pending labs.            Hospital Course   ***    Pertinent Physical Exam At Time of Discharge  Physical Exam    Outpatient Follow-Up  No future appointments.      Jaylan Hamilton MD

## 2024-04-12 ENCOUNTER — HOSPITAL ENCOUNTER (EMERGENCY)
Facility: HOSPITAL | Age: 69
Discharge: HOME | End: 2024-04-12
Attending: STUDENT IN AN ORGANIZED HEALTH CARE EDUCATION/TRAINING PROGRAM
Payer: COMMERCIAL

## 2024-04-12 ENCOUNTER — DOCUMENTATION (OUTPATIENT)
Dept: INPATIENT UNIT | Facility: HOSPITAL | Age: 69
End: 2024-04-12

## 2024-04-12 ENCOUNTER — TELEPHONE (OUTPATIENT)
Dept: FAMILY MEDICINE CLINIC | Age: 69
End: 2024-04-12

## 2024-04-12 ENCOUNTER — APPOINTMENT (OUTPATIENT)
Dept: RADIOLOGY | Facility: HOSPITAL | Age: 69
End: 2024-04-12
Payer: COMMERCIAL

## 2024-04-12 VITALS
RESPIRATION RATE: 24 BRPM | OXYGEN SATURATION: 98 % | WEIGHT: 165 LBS | TEMPERATURE: 99.7 F | SYSTOLIC BLOOD PRESSURE: 150 MMHG | BODY MASS INDEX: 30.36 KG/M2 | DIASTOLIC BLOOD PRESSURE: 70 MMHG | HEIGHT: 62 IN | HEART RATE: 81 BPM

## 2024-04-12 DIAGNOSIS — R04.2 HEMOPTYSIS: Primary | ICD-10-CM

## 2024-04-12 LAB
ANION GAP SERPL CALC-SCNC: 10 MMOL/L (ref 10–20)
APTT PPP: 35 SECONDS (ref 27–38)
BASOPHILS # BLD AUTO: 0 X10*3/UL (ref 0–0.1)
BASOPHILS NFR BLD AUTO: 0 %
BUN SERPL-MCNC: 23 MG/DL (ref 6–23)
CALCIUM SERPL-MCNC: 9 MG/DL (ref 8.6–10.3)
CHLORIDE SERPL-SCNC: 105 MMOL/L (ref 98–107)
CO2 SERPL-SCNC: 28 MMOL/L (ref 21–32)
CREAT SERPL-MCNC: 0.99 MG/DL (ref 0.5–1.05)
EGFRCR SERPLBLD CKD-EPI 2021: 62 ML/MIN/1.73M*2
EOSINOPHIL # BLD AUTO: 0 X10*3/UL (ref 0–0.7)
EOSINOPHIL NFR BLD AUTO: 0 %
ERYTHROCYTE [DISTWIDTH] IN BLOOD BY AUTOMATED COUNT: 12.3 % (ref 11.5–14.5)
GLUCOSE SERPL-MCNC: 122 MG/DL (ref 74–99)
HCT VFR BLD AUTO: 35.1 % (ref 36–46)
HGB BLD-MCNC: 12 G/DL (ref 12–16)
HOLD SPECIMEN: NORMAL
IMM GRANULOCYTES # BLD AUTO: 0.05 X10*3/UL (ref 0–0.7)
IMM GRANULOCYTES NFR BLD AUTO: 0.8 % (ref 0–0.9)
INR PPP: 1.8 (ref 0.9–1.1)
LYMPHOCYTES # BLD AUTO: 0.77 X10*3/UL (ref 1.2–4.8)
LYMPHOCYTES NFR BLD AUTO: 13 %
MCH RBC QN AUTO: 31.3 PG (ref 26–34)
MCHC RBC AUTO-ENTMCNC: 34.2 G/DL (ref 32–36)
MCV RBC AUTO: 92 FL (ref 80–100)
MONOCYTES # BLD AUTO: 0.2 X10*3/UL (ref 0.1–1)
MONOCYTES NFR BLD AUTO: 3.4 %
NEUTROPHILS # BLD AUTO: 4.89 X10*3/UL (ref 1.2–7.7)
NEUTROPHILS NFR BLD AUTO: 82.8 %
NRBC BLD-RTO: 0 /100 WBCS (ref 0–0)
PLATELET # BLD AUTO: 338 X10*3/UL (ref 150–450)
POTASSIUM SERPL-SCNC: 3.2 MMOL/L (ref 3.5–5.3)
PROTHROMBIN TIME: 19.9 SECONDS (ref 9.8–12.8)
RBC # BLD AUTO: 3.83 X10*6/UL (ref 4–5.2)
SODIUM SERPL-SCNC: 140 MMOL/L (ref 136–145)
WBC # BLD AUTO: 5.9 X10*3/UL (ref 4.4–11.3)

## 2024-04-12 PROCEDURE — 85610 PROTHROMBIN TIME: CPT | Performed by: STUDENT IN AN ORGANIZED HEALTH CARE EDUCATION/TRAINING PROGRAM

## 2024-04-12 PROCEDURE — 36415 COLL VENOUS BLD VENIPUNCTURE: CPT | Performed by: STUDENT IN AN ORGANIZED HEALTH CARE EDUCATION/TRAINING PROGRAM

## 2024-04-12 PROCEDURE — 85025 COMPLETE CBC W/AUTO DIFF WBC: CPT | Performed by: STUDENT IN AN ORGANIZED HEALTH CARE EDUCATION/TRAINING PROGRAM

## 2024-04-12 PROCEDURE — 85730 THROMBOPLASTIN TIME PARTIAL: CPT | Performed by: STUDENT IN AN ORGANIZED HEALTH CARE EDUCATION/TRAINING PROGRAM

## 2024-04-12 PROCEDURE — 71045 X-RAY EXAM CHEST 1 VIEW: CPT | Performed by: RADIOLOGY

## 2024-04-12 PROCEDURE — 71045 X-RAY EXAM CHEST 1 VIEW: CPT

## 2024-04-12 PROCEDURE — 2500000001 HC RX 250 WO HCPCS SELF ADMINISTERED DRUGS (ALT 637 FOR MEDICARE OP): Performed by: STUDENT IN AN ORGANIZED HEALTH CARE EDUCATION/TRAINING PROGRAM

## 2024-04-12 PROCEDURE — 80048 BASIC METABOLIC PNL TOTAL CA: CPT | Performed by: STUDENT IN AN ORGANIZED HEALTH CARE EDUCATION/TRAINING PROGRAM

## 2024-04-12 PROCEDURE — 99283 EMERGENCY DEPT VISIT LOW MDM: CPT | Mod: 25

## 2024-04-12 RX ORDER — OXYCODONE AND ACETAMINOPHEN 5; 325 MG/1; MG/1
1 TABLET ORAL ONCE
Status: COMPLETED | OUTPATIENT
Start: 2024-04-12 | End: 2024-04-12

## 2024-04-12 RX ADMIN — OXYCODONE HYDROCHLORIDE AND ACETAMINOPHEN 1 TABLET: 5; 325 TABLET ORAL at 19:11

## 2024-04-12 ASSESSMENT — PAIN DESCRIPTION - PAIN TYPE
TYPE: ACUTE PAIN
TYPE: ACUTE PAIN

## 2024-04-12 ASSESSMENT — PAIN SCALES - GENERAL
PAINLEVEL_OUTOF10: 10 - WORST POSSIBLE PAIN

## 2024-04-12 ASSESSMENT — COLUMBIA-SUICIDE SEVERITY RATING SCALE - C-SSRS
2. HAVE YOU ACTUALLY HAD ANY THOUGHTS OF KILLING YOURSELF?: NO
1. IN THE PAST MONTH, HAVE YOU WISHED YOU WERE DEAD OR WISHED YOU COULD GO TO SLEEP AND NOT WAKE UP?: NO
6. HAVE YOU EVER DONE ANYTHING, STARTED TO DO ANYTHING, OR PREPARED TO DO ANYTHING TO END YOUR LIFE?: NO

## 2024-04-12 ASSESSMENT — LIFESTYLE VARIABLES
EVER FELT BAD OR GUILTY ABOUT YOUR DRINKING: NO
HAVE YOU EVER FELT YOU SHOULD CUT DOWN ON YOUR DRINKING: NO
EVER HAD A DRINK FIRST THING IN THE MORNING TO STEADY YOUR NERVES TO GET RID OF A HANGOVER: NO
TOTAL SCORE: 0
HAVE PEOPLE ANNOYED YOU BY CRITICIZING YOUR DRINKING: NO

## 2024-04-12 ASSESSMENT — PAIN DESCRIPTION - LOCATION
LOCATION: CHEST
LOCATION: CHEST

## 2024-04-12 ASSESSMENT — PAIN - FUNCTIONAL ASSESSMENT
PAIN_FUNCTIONAL_ASSESSMENT: 0-10

## 2024-04-12 NOTE — ED PROVIDER NOTES
HPI   Chief Complaint   Patient presents with    Coughing Up Blood     Pt states coughing up blood. Pt states she was in hospital due to pneumonia, blood clot and COVID. Pt was recently started on blood thinners (eliquis). Pt always on O2, wears 3 lpm.        68-year-old female with a history of COPD and chronic respiratory failure on 3 L nasal cannula at baseline who was recently admitted for PE in the setting of COVID.  Patient was recently discharged on Eliquis for treatment of her PE.  Patient remains on 3 L nasal cannula.  Patient presenting with increasing hemoptysis.  Patient reports that she has had small amounts of hemoptysis over the last several weeks prior to starting anticoagulation.  Patient reports that she has been coughing up teaspoon to tablespoon size bloody sputum.  No massive hemoptysis.  No increased shortness of breath.      History provided by:  Patient                      Naveen Coma Scale Score: 15                     Patient History   Past Medical History:   Diagnosis Date    COPD (chronic obstructive pulmonary disease) (Multi)     Hypertension     Stroke (Multi)      History reviewed. No pertinent surgical history.  No family history on file.  Social History     Tobacco Use    Smoking status: Former     Types: Cigarettes    Smokeless tobacco: Never   Substance Use Topics    Alcohol use: Not Currently    Drug use: Yes     Types: Amphetamines       Physical Exam   ED Triage Vitals [04/12/24 1720]   Temperature Heart Rate Respirations BP   37.6 °C (99.7 °F) 81 (!) 24 150/70      Pulse Ox Temp Source Heart Rate Source Patient Position   98 % Temporal Monitor --      BP Location FiO2 (%)     -- --       Physical Exam  Vitals and nursing note reviewed.   Constitutional:       General: She is not in acute distress.  HENT:      Head: Atraumatic.      Mouth/Throat:      Mouth: Mucous membranes are moist.      Pharynx: Oropharynx is clear.   Eyes:      Conjunctiva/sclera: Conjunctivae normal.    Cardiovascular:      Rate and Rhythm: Normal rate and regular rhythm.      Pulses: Normal pulses.   Pulmonary:      Effort: Pulmonary effort is normal. No respiratory distress.      Breath sounds: Normal breath sounds.   Musculoskeletal:         General: No deformity.      Cervical back: Neck supple.   Skin:     General: Skin is warm and dry.   Neurological:      Mental Status: She is alert and oriented to person, place, and time. Mental status is at baseline.   Psychiatric:         Mood and Affect: Mood normal.         Behavior: Behavior normal.         ED Course & MDM   Diagnoses as of 04/12/24 1951   Hemoptysis     Labs Reviewed   CBC WITH AUTO DIFFERENTIAL - Abnormal       Result Value    WBC 5.9      nRBC 0.0      RBC 3.83 (*)     Hemoglobin 12.0      Hematocrit 35.1 (*)     MCV 92      MCH 31.3      MCHC 34.2      RDW 12.3      Platelets 338      Neutrophils % 82.8      Immature Granulocytes %, Automated 0.8      Lymphocytes % 13.0      Monocytes % 3.4      Eosinophils % 0.0      Basophils % 0.0      Neutrophils Absolute 4.89      Immature Granulocytes Absolute, Automated 0.05      Lymphocytes Absolute 0.77 (*)     Monocytes Absolute 0.20      Eosinophils Absolute 0.00      Basophils Absolute 0.00     BASIC METABOLIC PANEL - Abnormal    Glucose 122 (*)     Sodium 140      Potassium 3.2 (*)     Chloride 105      Bicarbonate 28      Anion Gap 10      Urea Nitrogen 23      Creatinine 0.99      eGFR 62      Calcium 9.0     PROTIME-INR - Abnormal    Protime 19.9 (*)     INR 1.8 (*)    APTT - Normal    aPTT 35      Narrative:     The APTT is no longer used for monitoring Unfractionated Heparin Therapy. For monitoring Heparin Therapy, use the Heparin Assay.     XR chest 1 view   Final Result   No evidence of acute intrathoracic abnormality.        Signed by: Polo Oneill 4/12/2024 6:27 PM   Dictation workstation:   [id number]          Medical Decision Making  68-year-old female who was recently diagnosed with PE  in the setting of COVID currently on Eliquis presenting with mild hemoptysis.  No active hemorrhage.  Patient is awake and alert, no acute distress on exam.  Lungs are clear.  Patient on baseline 3 L nasal cannula with SpO2 of 98%.  Diagnostic workup performed in the ED including chest x-ray and lab work to assess H&H and coags.  Patient's hemoptysis could be due to underlying PE, infection or due to current anticoagulation.  Patient does not currently have a pulmonary doctor.    Patient's chest x-ray reviewed.  No acute findings.  No new infiltrate.  H&H is uptrending and hemoglobin has now normalized.  Normal platelets.  No leukocytosis.    Patient remains hemodynamically stable.  No episodes of hemoptysis while in the ED.  Given patient's stable H&H and small-volume hemoptysis that seems to be more subacute to chronic in nature, will send patient home with strict return precautions for large-volume hemoptysis.  Patient given referral for pulmonology for continued follow-up as outpatient.        Procedure  Procedures     Zay Marroquin MD  04/12/24 1951

## 2024-04-12 NOTE — NURSING NOTE
"RN Navigator received message from patient that she coughing \"lots of blood but sure if it's to little or to much\" RN Navigator returned call  and instructed patient to come to ER. Assessed is patient was light headed or dizzy. Stated she was not and inquired if she felt safe to drive. Reported felt safe to drive.   "

## 2024-04-12 NOTE — TELEPHONE ENCOUNTER
Mel from [facility] called and is asking for a verbal order for skilled nursing.  She states the patient is very confused with medications.

## 2024-04-14 ENCOUNTER — HOSPITAL ENCOUNTER (OUTPATIENT)
Dept: CARDIOLOGY | Facility: HOSPITAL | Age: 69
Discharge: HOME | End: 2024-04-14
Payer: COMMERCIAL

## 2024-04-14 PROCEDURE — 93005 ELECTROCARDIOGRAM TRACING: CPT

## 2024-04-15 LAB
ATRIAL RATE: 81 BPM
P AXIS: 23 DEGREES
P OFFSET: 205 MS
P ONSET: 169 MS
PR INTERVAL: 108 MS
Q ONSET: 223 MS
QRS COUNT: 13 BEATS
QRS DURATION: 72 MS
QT INTERVAL: 404 MS
QTC CALCULATION(BAZETT): 469 MS
QTC FREDERICIA: 446 MS
R AXIS: 34 DEGREES
T AXIS: 55 DEGREES
T OFFSET: 425 MS
VENTRICULAR RATE: 81 BPM

## 2024-04-15 NOTE — TELEPHONE ENCOUNTER
Yes I give a verbal order for Skilled nursing.    Here are the medications I know she is taking:     SIG   ALPRAZolam (XANAX) 1 MG tablet Take 1 tablet by mouth 2 times daily as needed for Sleep or Anxiety for up to 30 days. Max Daily Amount 2 mg   omeprazole (PRILOSEC) 20 MG delayed release capsule Take 2 capsules by mouth daily   gabapentin (NEURONTIN) 400 MG capsule Take 1 PO in the morning and 2 PO at bedtime   buprenorphine (BUPRENEX) 10 MCG/HR PTWK Place 1 patch onto the skin once a week. To lower back   predniSONE (DELTASONE) 5 MG tablet Take 1 tablet by mouth daily   vitamin D (ERGOCALCIFEROL) 1.25 MG (82324 UT) CAPS capsule TAKE 1 CAPSULE BY MOUTH once per week AS DIRECTED   DULoxetine (CYMBALTA) 60 MG extended release capsule Take 1 capsule by mouth daily   rosuvastatin (CRESTOR) 10 MG tablet TAKE 1 TABLET BY MOUTH EVERY DAY   ipratropium 0.5 mg-albuterol 2.5 mg (DUONEB) 0.5-2.5 (3) MG/3ML SOLN nebulizer solution Inhale 3 mLs into the lungs every 4 hours as needed for Shortness of Breath

## 2024-04-16 ENCOUNTER — TELEPHONE (OUTPATIENT)
Dept: PAIN MANAGEMENT | Age: 69
End: 2024-04-16

## 2024-04-16 ENCOUNTER — OFFICE VISIT (OUTPATIENT)
Dept: FAMILY MEDICINE CLINIC | Age: 69
End: 2024-04-16
Payer: COMMERCIAL

## 2024-04-16 VITALS
SYSTOLIC BLOOD PRESSURE: 130 MMHG | HEIGHT: 63 IN | TEMPERATURE: 97.6 F | DIASTOLIC BLOOD PRESSURE: 62 MMHG | WEIGHT: 168.6 LBS | BODY MASS INDEX: 29.88 KG/M2 | HEART RATE: 72 BPM | OXYGEN SATURATION: 97 %

## 2024-04-16 DIAGNOSIS — J12.82 PNEUMONIA DUE TO COVID-19 VIRUS: ICD-10-CM

## 2024-04-16 DIAGNOSIS — U07.1 PNEUMONIA DUE TO COVID-19 VIRUS: ICD-10-CM

## 2024-04-16 DIAGNOSIS — R93.89 ABNORMAL CT OF THE CHEST: ICD-10-CM

## 2024-04-16 DIAGNOSIS — R53.1 GENERALIZED WEAKNESS: ICD-10-CM

## 2024-04-16 DIAGNOSIS — R07.89 OTHER CHEST PAIN: ICD-10-CM

## 2024-04-16 DIAGNOSIS — I26.94 MULTIPLE SUBSEGMENTAL PULMONARY EMBOLI WITHOUT ACUTE COR PULMONALE (HCC): ICD-10-CM

## 2024-04-16 DIAGNOSIS — Z09 HOSPITAL DISCHARGE FOLLOW-UP: Primary | ICD-10-CM

## 2024-04-16 DIAGNOSIS — Z86.73 HISTORY OF STROKE: ICD-10-CM

## 2024-04-16 DIAGNOSIS — J96.01 ACUTE RESPIRATORY FAILURE WITH HYPOXIA (HCC): ICD-10-CM

## 2024-04-16 PROBLEM — G45.9 TIA (TRANSIENT ISCHEMIC ATTACK): Status: RESOLVED | Noted: 2022-11-04 | Resolved: 2024-04-16

## 2024-04-16 PROBLEM — S06.9X0A TRAUMATIC BRAIN INJURY, WITHOUT LOSS OF CONSCIOUSNESS, INITIAL ENCOUNTER (HCC): Status: RESOLVED | Noted: 2024-04-02 | Resolved: 2024-04-16

## 2024-04-16 PROCEDURE — 1111F DSCHRG MED/CURRENT MED MERGE: CPT | Performed by: FAMILY MEDICINE

## 2024-04-16 PROCEDURE — 1123F ACP DISCUSS/DSCN MKR DOCD: CPT | Performed by: FAMILY MEDICINE

## 2024-04-16 PROCEDURE — G8427 DOCREV CUR MEDS BY ELIG CLIN: HCPCS | Performed by: FAMILY MEDICINE

## 2024-04-16 PROCEDURE — G8417 CALC BMI ABV UP PARAM F/U: HCPCS | Performed by: FAMILY MEDICINE

## 2024-04-16 PROCEDURE — G8399 PT W/DXA RESULTS DOCUMENT: HCPCS | Performed by: FAMILY MEDICINE

## 2024-04-16 PROCEDURE — 99214 OFFICE O/P EST MOD 30 MIN: CPT | Performed by: FAMILY MEDICINE

## 2024-04-16 PROCEDURE — 1090F PRES/ABSN URINE INCON ASSESS: CPT | Performed by: FAMILY MEDICINE

## 2024-04-16 PROCEDURE — 3017F COLORECTAL CA SCREEN DOC REV: CPT | Performed by: FAMILY MEDICINE

## 2024-04-16 PROCEDURE — 1036F TOBACCO NON-USER: CPT | Performed by: FAMILY MEDICINE

## 2024-04-16 RX ORDER — TRAMADOL HYDROCHLORIDE 50 MG/1
50 TABLET ORAL EVERY 6 HOURS PRN
Qty: 28 TABLET | Refills: 0 | Status: SHIPPED | OUTPATIENT
Start: 2024-04-16 | End: 2024-04-23

## 2024-04-16 RX ORDER — GUAIFENESIN 100 MG/5ML
LIQUID ORAL
COMMUNITY
Start: 2024-04-10

## 2024-04-16 RX ORDER — TAMOXIFEN CITRATE 20 MG/1
TABLET ORAL
COMMUNITY
Start: 2024-04-14

## 2024-04-16 NOTE — PROGRESS NOTES
Subjective  Anita Armenta 1955 is a 68 y.o. female who presents today with:  Chief Complaint   Patient presents with    ER Follow Up     Treated at  ED on 04/12/24 for mild hemoptysis. History of COPD and chronic respiratory failure on 3 L nasal cannula who was recently admitted (4/8/24) for PE in the setting of COVID. Patient was discharged (4/11/24) on Eliquis for treatment of her PE. ER workup including chest x-ray and bloodwork resulted WNL. Discharged with a referral for home health care and pulmonology. She is scheduled on 5/3/24.     Continued     Today, patient remains on 3 L nasal cannula. O2 97%. C/O severe fatigue and SOB. Denies fever, chills, night sweats, cough or wheezing. She would like orders for physical therapy.        HPI  I have reviewed HPI and agree with above.  Her O2 sats continue to be quite good on her current level of oxygen.  She has not checked her sats off the oxygen.  She feels better when its own.  She is having a significant amount of pain in her chest from the multiple segmental and subsegmental pulmonary emboli.  She does have a buprenorphine patch for back but it is not really helping the pain in the chest and Tylenol is not effective.  Her labs and imaging were reviewed.  Her chest CT does show groundglass opacities and recommendations are for repeat scan in 3 months.    Review of Systems   All other systems reviewed and are negative.      Past Medical History:   Diagnosis Date    ADHD (attention deficit hyperactivity disorder)     Anxiety     Cancer (HCC)     Cerebral artery occlusion with cerebral infarction (HCC)     TIA Nov 2022    COPD (chronic obstructive pulmonary disease) (HCC)     Depression     Fibromyalgia     GERD (gastroesophageal reflux disease)     Hyperlipidemia     Hypertension     Irritable bowel syndrome      Past Surgical History:   Procedure Laterality Date    ABDOMINAL EXPLORATION SURGERY Right 1992    Ruptured cyst to right ovary    APPENDECTOMY

## 2024-04-16 NOTE — TELEPHONE ENCOUNTER
Patient called in to inform office that she just got out of the hospital and was prescribed Elequis and was not sure if she could still get the procedure done with the new prescription. Asked for someone to call her with an update

## 2024-04-17 ENCOUNTER — OFFICE VISIT (OUTPATIENT)
Dept: PAIN MANAGEMENT | Age: 69
End: 2024-04-17
Payer: COMMERCIAL

## 2024-04-17 ENCOUNTER — TELEPHONE (OUTPATIENT)
Dept: FAMILY MEDICINE CLINIC | Age: 69
End: 2024-04-17

## 2024-04-17 DIAGNOSIS — R53.1 GENERALIZED WEAKNESS: Primary | ICD-10-CM

## 2024-04-17 DIAGNOSIS — M79.7 FIBROMYALGIA: ICD-10-CM

## 2024-04-17 DIAGNOSIS — M47.817 LUMBOSACRAL SPONDYLOSIS WITHOUT MYELOPATHY: ICD-10-CM

## 2024-04-17 PROCEDURE — 64636 DESTROY L/S FACET JNT ADDL: CPT | Performed by: PAIN MEDICINE

## 2024-04-17 PROCEDURE — 64635 DESTROY LUMB/SAC FACET JNT: CPT | Performed by: PAIN MEDICINE

## 2024-04-17 RX ORDER — LIDOCAINE HYDROCHLORIDE 20 MG/ML
3 INJECTION, SOLUTION INFILTRATION; PERINEURAL ONCE
Status: SHIPPED | OUTPATIENT
Start: 2024-04-17

## 2024-04-17 RX ORDER — BETAMETHASONE SODIUM PHOSPHATE AND BETAMETHASONE ACETATE 3; 3 MG/ML; MG/ML
6 INJECTION, SUSPENSION INTRA-ARTICULAR; INTRALESIONAL; INTRAMUSCULAR; SOFT TISSUE ONCE
Status: COMPLETED | OUTPATIENT
Start: 2024-04-17 | End: 2024-04-17

## 2024-04-17 RX ORDER — LIDOCAINE HYDROCHLORIDE 10 MG/ML
3 INJECTION, SOLUTION EPIDURAL; INFILTRATION; INTRACAUDAL; PERINEURAL ONCE
Status: COMPLETED | OUTPATIENT
Start: 2024-04-17 | End: 2024-04-17

## 2024-04-17 RX ADMIN — LIDOCAINE HYDROCHLORIDE 3 ML: 10 INJECTION, SOLUTION EPIDURAL; INFILTRATION; INTRACAUDAL; PERINEURAL at 11:46

## 2024-04-17 RX ADMIN — BETAMETHASONE SODIUM PHOSPHATE AND BETAMETHASONE ACETATE 6 MG: 3; 3 INJECTION, SUSPENSION INTRA-ARTICULAR; INTRALESIONAL; INTRAMUSCULAR; SOFT TISSUE at 11:45

## 2024-04-17 NOTE — TELEPHONE ENCOUNTER
[facility] calling and asking for OCC to be ordered in patients home    Patient was also asking for a new rollator    Per Kylah from [facility]

## 2024-04-17 NOTE — PROGRESS NOTES
[facility]  [facility]  [address], [address]  [place], [place]  P: [phone]  F: [phone]             Provider: GEORGI DICKEY MD          Patient Name: Anita Armenta : 1955        Date: 2024         PROCEDURE:  Bilateral L3, L4, L5 medial branch ablation by radiofrequency.    Discussed the risks including but not limited to bleeding, infection, worsened pain, damage to surrounding structures, side effects, toxicity, allergic reactions to medications used, need for surgery, pneumothorax, abdominal and visceral injury, as well as catastrophic injury such as vision loss, paralysis, spinal cord or plexus injury, stroke, bowel or bladder incontinence, chest tube insertion, ventilator dependence, and death. Discussed the risks, benefits, and alternatives to the procedure including no procedure at all. Discussed that we cannot undo any permanent neurologic or orthopaedic damage or change the course of any underlying disease. After thorough discussion, patient expressed understanding and willingness to proceed. Written consent was obtained and is in the chart. Verbal consent to proceed was obtained.    Description of Procedure:  The procedure and potential complications were explained to the patient and a voluntary informed, signed consent was obtained.  The patient was placed prone on the x-ray table and the mid back was prepped with Betadine solution.  Under fluoroscopic guidance the skin was infiltrated with 1% preservative free lidocaine. A 20-gauge, 3.5 inch long curved cannula with a 10 mm active curved tip was inserted to place the cannula tip on the junction of the superior articular process and transverse process where the medial branches are located.  After motor tests were done 0.5 mL of 1% preservative free lidocaine was injected at each level.  Ablation was carried out at 80 degrees Celsius for 95 seconds and it

## 2024-04-18 NOTE — TELEPHONE ENCOUNTER
What is OCC?  If it is occupational therapy,  yes she can have it in her home,  can they take a verbal?

## 2024-04-18 NOTE — TELEPHONE ENCOUNTER
Sorry,  Yes it is for occupational therapy to be done in her home.  Call and gave Mel the verbal ok to start.  She asked that a RX get faxed over to her pharmacy for the rollator.  Thank you  (Jaspreet can you write a rx and fax?)

## 2024-04-18 NOTE — PROGRESS NOTES
NURSING ASSESSMENT     Date: 1/23/2024        Patient Name: Anita Armenta     YOB: 1955      Age:  68 y.o.      MRN: [id number]       Chaperone [] Yes   [x] No      Advance Directives:   Do you currently have completed advance directives (living will)? [] Yes   [x] No         *If yes, please bring us a copy for your records.  *If no, would you like info or assistance in completing advance directives (living will)?   [] Yes   [x] No    Pain Score: spinal stenosis  Pain Score (1-10): 7   Pain Location: lower back, worse when standing or walking  Pain Duration: Constant   Pain Management/Control: Buprenorphine patch, weekly,and ibuprofen      Is pain affecting your ability to take care of yourself or move throughout your home?                        [] Yes   [x] No    General: Weight Loss  Patient has gained weight [] Yes   [x] No  Patient has lost weight [x] Yes   [] No  How much weight in pounds and over what length of time: 5 lb in 3 months. Reports trying to loose weight    Eyes (Ophthalmic): Blurred Vision to right eye. From recent cataract surgery     Skin (Dermatological): scabbed area to left breast with thin border of erythema surrounding scab     ENT: No Problems     Respiratory: Baseline SOB with Oxygen use from COPD.     Cardiovascular: No Problems      Device   [] Yes   [x] No   Copy of Card Obtained [] Yes   [x] No    Gastrointestinal: Constipation, takes Mirilax daily. Reports every other day bowel movement.    Genito-Urinary: No Problems     Breast: small open area to left breast with scab. Reports tenderness to former CATIE site.      Musculoskeletal: Back Pain, requires a Rolator for walking distances. patient is following with orthopedic surgeon for possible spinal surgery    Neurological: chronic neuropathy to bilateral feet.       Hematological and Lymphatic: patient was started on anastrozole 11/2023. It was placed on hold last week due to reported side effect of severe headaches. She 
stage I invasive carcinoma and went on receive adjuvant APBI with CATIE based brachytherapy.  She returns for a routine follow-up approximately 3 months after completing radiation therapy.    I discussed with her that she has very good cosmesis and appears to be fully healed.  She has no persistent other sequela from radiation therapy and has baseline respiratory symptoms.  I did encourage her to discuss an alternative ER directed therapy regimen with medical oncology to maximize local regional control.  She will be seeing them on 2/8/2024.  Her next mammogram is scheduled for March of this year and I will see her again in routine follow-up in April to review the imaging and to see how she is doing.  In the interim she knows that we remain available should she have any additional questions or concerns.    A combined total of 40 minutes was spent in preparing this encounter as well as in meeting with the patient in person.  Please excuse any typos in this consultation note as voice dictation was used.    ORDERS: None    FOLLOW-UP: Return for routine follow-up on 4/24/2024.    Po Maynard MD PhD  Radiation Oncologist, [facility]    Department of Radiation Oncology  [facility]

## 2024-04-19 RX ORDER — CALCIUM CARBONATE 160(400)MG
1 TABLET,CHEWABLE ORAL DAILY
Qty: 1 EACH | Refills: 0 | Status: SHIPPED | OUTPATIENT
Start: 2024-04-19

## 2024-04-23 ENCOUNTER — HOSPITAL ENCOUNTER (OUTPATIENT)
Dept: ULTRASOUND IMAGING | Age: 69
Discharge: HOME | End: 2024-04-25
Payer: COMMERCIAL

## 2024-04-23 ENCOUNTER — HOSPITAL ENCOUNTER (OUTPATIENT)
Dept: WOMENS IMAGING | Age: 69
Discharge: HOME | End: 2024-04-25
Payer: COMMERCIAL

## 2024-04-23 VITALS — HEIGHT: 63 IN | BODY MASS INDEX: 30.33 KG/M2

## 2024-04-23 DIAGNOSIS — N64.9 BREAST DISORDER: ICD-10-CM

## 2024-04-23 DIAGNOSIS — D05.12 BREAST NEOPLASM, TIS (DCIS), LEFT: ICD-10-CM

## 2024-04-23 DIAGNOSIS — D49.3 BREAST NEOPLASM: ICD-10-CM

## 2024-04-23 PROCEDURE — 76642 ULTRASOUND BREAST LIMITED: CPT

## 2024-04-23 PROCEDURE — G0279 TOMOSYNTHESIS, MAMMO: HCPCS

## 2024-04-24 ENCOUNTER — HOSPITAL ENCOUNTER (OUTPATIENT)
Dept: RADIATION ONCOLOGY | Age: 69
Discharge: HOME | End: 2024-04-24
Payer: COMMERCIAL

## 2024-04-24 VITALS
HEART RATE: 75 BPM | RESPIRATION RATE: 18 BRPM | DIASTOLIC BLOOD PRESSURE: 66 MMHG | SYSTOLIC BLOOD PRESSURE: 144 MMHG | OXYGEN SATURATION: 94 % | BODY MASS INDEX: 29.86 KG/M2 | TEMPERATURE: 96.3 F | WEIGHT: 166 LBS

## 2024-04-24 DIAGNOSIS — D05.12 BREAST NEOPLASM, TIS (DCIS), LEFT: Primary | ICD-10-CM

## 2024-04-24 PROCEDURE — 99212 OFFICE O/P EST SF 10 MIN: CPT | Performed by: RADIOLOGY

## 2024-04-24 NOTE — PROGRESS NOTES
NURSING ASSESSMENT     Date: 4/24/2024        Patient Name: Anita Armenta     YOB: 1955      Age:  68 y.o.      MRN: [id number]       Chaperone [x] Yes   [] No      Advance Directives:   Do you currently have completed advance directives (living will)? [] Yes   [x] No         *If yes, please bring us a copy for your records.  *If no, would you like info or assistance in completing advance directives (living will)?   [] Yes   [x] No    Pain Score:   Pain Score (1-10): 6  Pain Location: back, entire  Pain Duration: constant  Pain Management/Control: buprenorphine patch and tylenol prn      Is pain affecting your ability to take care of yourself or move throughout your home?                        [x] Yes   [] No has difficulty being on feet for any long period of time    General: Fatigue since having COVID and hospitalized 4/9/24  Patient has gained weight [] Yes   [x] No  Patient has lost weight [] Yes   [x] No  How much weight in pounds and over what length of time:     Eyes (Ophthalmic): cataract surgery done Jan and Feb 2023     Skin (Dermatological): Bruising     ENT: No Problems     Respiratory: Oxygen 3l nc continuously, SABA moderate exertion     Cardiovascular: No Problems      Device   [] Yes   [x] No   Copy of Card Obtained [] Yes   [x] No    Gastrointestinal: Constipation takes Linzess, has ventral hernia    Genito-Urinary: No Problems     Breast: Lumps  under left axilla since she had an infection after lumpectomy has gotten smaller, left breast sore after mammogram     Musculoskeletal: Back Pain, chronic    Neurological: Dizziness and Headaches about 1x a week, dizziness when first standing, numbness and tingling in feet takes gabapentin, has fibromyalgia, some \"COVID brain\" but is getting better      Hematological and Lymphatic: recent PE, takes eliquis     Endocrine: No Problems     Gyn History: No problems      A 10-point review of systems  has been conducted and pertinent positives have

## 2024-05-02 ENCOUNTER — OFFICE VISIT (OUTPATIENT)
Dept: FAMILY MEDICINE CLINIC | Age: 69
End: 2024-05-02
Payer: COMMERCIAL

## 2024-05-02 VITALS
WEIGHT: 167 LBS | DIASTOLIC BLOOD PRESSURE: 64 MMHG | BODY MASS INDEX: 30.73 KG/M2 | SYSTOLIC BLOOD PRESSURE: 130 MMHG | HEIGHT: 62 IN

## 2024-05-02 DIAGNOSIS — J44.9 CHRONIC OBSTRUCTIVE PULMONARY DISEASE, UNSPECIFIED COPD TYPE (HCC): ICD-10-CM

## 2024-05-02 DIAGNOSIS — I69.30 LATE EFFECT OF ISCHEMIC CEREBRAL STROKE: ICD-10-CM

## 2024-05-02 DIAGNOSIS — E78.2 MIXED HYPERLIPIDEMIA: ICD-10-CM

## 2024-05-02 DIAGNOSIS — R53.1 GENERALIZED WEAKNESS: ICD-10-CM

## 2024-05-02 DIAGNOSIS — M46.1 BILATERAL SACROILIITIS (HCC): ICD-10-CM

## 2024-05-02 DIAGNOSIS — F41.1 GAD (GENERALIZED ANXIETY DISORDER): ICD-10-CM

## 2024-05-02 DIAGNOSIS — F33.1 MODERATE EPISODE OF RECURRENT MAJOR DEPRESSIVE DISORDER (HCC): ICD-10-CM

## 2024-05-02 DIAGNOSIS — F90.2 ATTENTION DEFICIT HYPERACTIVITY DISORDER (ADHD), COMBINED TYPE: ICD-10-CM

## 2024-05-02 DIAGNOSIS — G57.93 NEUROPATHY INVOLVING BOTH LOWER EXTREMITIES: Primary | ICD-10-CM

## 2024-05-02 DIAGNOSIS — M79.7 FIBROMYALGIA: ICD-10-CM

## 2024-05-02 DIAGNOSIS — I26.99 PE (PULMONARY THROMBOEMBOLISM) (HCC): ICD-10-CM

## 2024-05-02 PROCEDURE — 99215 OFFICE O/P EST HI 40 MIN: CPT | Performed by: NURSE PRACTITIONER

## 2024-05-02 PROCEDURE — G8417 CALC BMI ABV UP PARAM F/U: HCPCS | Performed by: NURSE PRACTITIONER

## 2024-05-02 PROCEDURE — G8427 DOCREV CUR MEDS BY ELIG CLIN: HCPCS | Performed by: NURSE PRACTITIONER

## 2024-05-02 PROCEDURE — 3023F SPIROM DOC REV: CPT | Performed by: NURSE PRACTITIONER

## 2024-05-02 PROCEDURE — G8399 PT W/DXA RESULTS DOCUMENT: HCPCS | Performed by: NURSE PRACTITIONER

## 2024-05-02 PROCEDURE — 1090F PRES/ABSN URINE INCON ASSESS: CPT | Performed by: NURSE PRACTITIONER

## 2024-05-02 PROCEDURE — 3017F COLORECTAL CA SCREEN DOC REV: CPT | Performed by: NURSE PRACTITIONER

## 2024-05-02 PROCEDURE — 1036F TOBACCO NON-USER: CPT | Performed by: NURSE PRACTITIONER

## 2024-05-02 PROCEDURE — 1123F ACP DISCUSS/DSCN MKR DOCD: CPT | Performed by: NURSE PRACTITIONER

## 2024-05-02 RX ORDER — BUPRENORPHINE 10 UG/H
1 PATCH TRANSDERMAL WEEKLY
Qty: 4 PATCH | Refills: 2 | Status: SHIPPED | OUTPATIENT
Start: 2024-05-02 | End: 2024-05-30

## 2024-05-02 RX ORDER — ALPRAZOLAM 1 MG/1
TABLET ORAL
Qty: 60 TABLET | Refills: 2 | Status: SHIPPED | OUTPATIENT
Start: 2024-05-02 | End: 2024-06-01

## 2024-05-02 RX ORDER — ROSUVASTATIN CALCIUM 10 MG/1
10 TABLET, COATED ORAL DAILY
Qty: 30 TABLET | Refills: 5 | Status: SHIPPED | OUTPATIENT
Start: 2024-05-02

## 2024-05-02 RX ORDER — PREDNISONE 5 MG/1
5 TABLET ORAL DAILY
Qty: 30 TABLET | Refills: 5 | Status: SHIPPED | OUTPATIENT
Start: 2024-05-02

## 2024-05-02 RX ORDER — DULOXETIN HYDROCHLORIDE 60 MG/1
60 CAPSULE, DELAYED RELEASE ORAL DAILY
Qty: 30 CAPSULE | Refills: 5 | Status: SHIPPED | OUTPATIENT
Start: 2024-05-02

## 2024-05-02 RX ORDER — GABAPENTIN 400 MG/1
CAPSULE ORAL
Qty: 90 CAPSULE | Refills: 2 | Status: SHIPPED | OUTPATIENT
Start: 2024-05-02 | End: 2024-08-01

## 2024-05-02 RX ORDER — DEXTROAMPHETAMINE SACCHARATE, AMPHETAMINE ASPARTATE, DEXTROAMPHETAMINE SULFATE AND AMPHETAMINE SULFATE 5; 5; 5; 5 MG/1; MG/1; MG/1; MG/1
20 TABLET ORAL 2 TIMES DAILY
COMMUNITY
End: 2024-05-02 | Stop reason: SDUPTHER

## 2024-05-02 RX ORDER — DEXTROAMPHETAMINE SACCHARATE, AMPHETAMINE ASPARTATE, DEXTROAMPHETAMINE SULFATE AND AMPHETAMINE SULFATE 5; 5; 5; 5 MG/1; MG/1; MG/1; MG/1
20 TABLET ORAL 2 TIMES DAILY
Qty: 60 TABLET | Refills: 0 | Status: SHIPPED | OUTPATIENT
Start: 2024-05-02 | End: 2024-06-01

## 2024-05-09 ASSESSMENT — ENCOUNTER SYMPTOMS
SORE THROAT: 0
ANAL BLEEDING: 0
BLOOD IN STOOL: 0
CONSTIPATION: 0
ABDOMINAL PAIN: 1
COLOR CHANGE: 0
ALLERGIC/IMMUNOLOGIC NEGATIVE: 1
RHINORRHEA: 0
EYES NEGATIVE: 1
RECTAL PAIN: 0
DIARRHEA: 0
COUGH: 1
TROUBLE SWALLOWING: 0
BACK PAIN: 1
HEARTBURN: 0
SHORTNESS OF BREATH: 0
VOICE CHANGE: 0

## 2024-05-09 ASSESSMENT — COPD QUESTIONNAIRES: COPD: 1

## 2024-05-10 NOTE — PROGRESS NOTES
Subjective  Anita Armenta, 68 y.o. female presents today with:  Chief Complaint   Patient presents with    3 Month Follow-Up    Depression    COPD        In hospital for PE and pnuemonia following covid infection getting  hhc and home therapies now.    On home O2    Still being treated for breast CA    Chronic pain, Fibromyalgia, back problems-  Butrans patches-  patient tolerating- is seeing pain management and they added percocet for her chronic pain-  she is getting an ablation in her back in a couple weeks  ADHD-  20mg xr daily with effectiveness  Anxiety-  Tried to 1 MG TID PRN-  She does not like the ER,  Did not feel it helped her anxiety.-  She is willing transition her to back to her XANAX 1mg that is not extended release but BID PRN not TID PRN.        COPD  She complains of cough. There is no shortness of breath. This is a chronic problem. The problem occurs daily. The cough is non-productive. Pertinent negatives include no appetite change, chest pain, dyspnea on exertion, ear congestion, ear pain, fever, headaches, heartburn, malaise/fatigue, myalgias, nasal congestion, orthopnea, PND, postnasal drip, rhinorrhea, sneezing, sore throat, sweats, trouble swallowing or weight loss. Her symptoms are aggravated by change in weather. Risk factors for lung disease include smoking/tobacco exposure. Her past medical history is significant for COPD.   Anxiety  Patient reports no chest pain, dizziness, nervous/anxious behavior, palpitations or shortness of breath.       Abdominal Pain  This is a chronic (already seeing Khallafi) problem. Pertinent negatives include no constipation, diarrhea, fever, headaches, myalgias or weight loss.   Back Pain  Associated symptoms include abdominal pain. Pertinent negatives include no chest pain, fever, headaches, weakness or weight loss.   ADHD  Associated symptoms include abdominal pain and coughing. Pertinent negatives include no chest pain, fatigue, fever, headaches, myalgias,

## 2024-05-20 ENCOUNTER — OFFICE VISIT (OUTPATIENT)
Dept: SURGERY | Age: 69
End: 2024-05-20
Payer: COMMERCIAL

## 2024-05-20 VITALS
SYSTOLIC BLOOD PRESSURE: 146 MMHG | HEIGHT: 62 IN | DIASTOLIC BLOOD PRESSURE: 62 MMHG | WEIGHT: 171 LBS | HEART RATE: 83 BPM | BODY MASS INDEX: 31.47 KG/M2 | TEMPERATURE: 97.3 F | OXYGEN SATURATION: 99 % | RESPIRATION RATE: 16 BRPM

## 2024-05-20 DIAGNOSIS — D05.12 BREAST NEOPLASM, TIS (DCIS), LEFT: Primary | ICD-10-CM

## 2024-05-20 DIAGNOSIS — E66.9 OBESITY, CLASS I, BMI 30-34.9: ICD-10-CM

## 2024-05-20 DIAGNOSIS — Z12.31 ENCOUNTER FOR SCREENING MAMMOGRAM FOR BREAST CANCER: ICD-10-CM

## 2024-05-20 PROCEDURE — 1123F ACP DISCUSS/DSCN MKR DOCD: CPT | Performed by: SURGERY

## 2024-05-20 PROCEDURE — G8427 DOCREV CUR MEDS BY ELIG CLIN: HCPCS | Performed by: SURGERY

## 2024-05-20 PROCEDURE — G8399 PT W/DXA RESULTS DOCUMENT: HCPCS | Performed by: SURGERY

## 2024-05-20 PROCEDURE — 3017F COLORECTAL CA SCREEN DOC REV: CPT | Performed by: SURGERY

## 2024-05-20 PROCEDURE — 99215 OFFICE O/P EST HI 40 MIN: CPT | Performed by: SURGERY

## 2024-05-20 PROCEDURE — G8417 CALC BMI ABV UP PARAM F/U: HCPCS | Performed by: SURGERY

## 2024-05-20 PROCEDURE — 1036F TOBACCO NON-USER: CPT | Performed by: SURGERY

## 2024-05-20 PROCEDURE — 1090F PRES/ABSN URINE INCON ASSESS: CPT | Performed by: SURGERY

## 2024-05-20 RX ORDER — FUROSEMIDE 20 MG/1
20 TABLET ORAL DAILY
COMMUNITY
Start: 2024-05-18

## 2024-05-20 RX ORDER — ARIPIPRAZOLE 15 MG/1
15 TABLET ORAL DAILY
COMMUNITY
Start: 2024-05-18

## 2024-05-20 RX ORDER — MIRTAZAPINE 15 MG/1
15 TABLET, FILM COATED ORAL NIGHTLY
COMMUNITY
Start: 2024-05-07

## 2024-05-20 RX ORDER — ALBUTEROL SULFATE 90 UG/1
2 AEROSOL, METERED RESPIRATORY (INHALATION) EVERY 4 HOURS PRN
COMMUNITY
Start: 2024-05-06

## 2024-05-20 NOTE — PROGRESS NOTES
General Information   Patient Name: Anita Armenta  Patient : 1955    Patient phone:[phone]  Email: [email or web address]    Health Care Providers (Including Names, Institution)   Primary Care Provider: Yamini Buchanan APRN - CNP    Surgeon: Eugenie Vásquez MD [facility]   Radiation Oncologist: Dr. Po Maynard    Medical Oncologist: [facility]       Treatment Summary   Diagnosis   Cancer Staging   Breast neoplasm, Tis (DCIS), left  Staging form: Breast, AJCC 8th Edition  - Clinical: Stage 0 (cTis (DCIS), cN0, cM0, G2, ER+, KY+) - Signed by Eugenie Vásquez MD on 2023  - Pathologic: pT1a, pN0(sn), cM0, ER+, KY+, HER2- - Signed by Po Maynard MD on 10/10/2023       Diagnosis Year:    Treatment Completed   Surgery: [x] Yes   []No Surgery Date(s) (year): 2023   Surgical procedure/findings: Left Lumpectomy and SLNB    Lymph node removal: []Axillary Dissection [x] [facility] Biopsy   Radiation: Yes Body area treated: CATIE breast End Date (year):2023   Systemic Therapy (chemotherapy, hormonal therapy, other): Yes  [] Before surgery [x] After surgery   Treatment Ongoing   Additional treatment name Planned duration Possible Side effects   [x] Tamoxifen 5-10 Years Hot flashes and vaginal discharge (common); endometrial cancer, serious blood clots and eye problems (all very rare). Other rare side effects may occur.   [] Aromatase Inhibitors (anastrozole, exemestane and letrozole) 5 Years Hot flashes, joint/muscle aches, vaginal dryness and bone loss (common); hair thinning (rare) Other rare side effects may occur.   [] GnRH agonist (Zoladex, Lupron) for ovarian suppression  Hot flashes and vaginal dryness (common); other rare side effects may occur.   Other:     Persistent symptoms or side effects at completion of treatment:  Fatigue: Yes                                                                Menopausal symptoms: No      Numbness:  Yes                                                    
reviewed  Recommend an active lifestyle, healthy diet, limited alcohol intake, achieve and maintain a healthy BMI to optimize breast cancer outcomes / decrease risk of breast cancer.  Survivorship Document Reviewed  Gave patient the survivorship booklet  Referral to or Follow up with Medical Oncology  Referral to or Follow up with Radiation Oncology  Follow up with PCP regarding all medical problems          I spent a total of 40 minutes on the date of the service which included preparing to see the patient, face-to-face patient care, completing clinical documentation, obtaining and/or reviewing separately obtained history, performing a medically appropriate examination, counseling and educating the patient/family/caregiver, ordering medications, tests, or procedures, communicating with other HCPs (not separately reported), independently interpreting results (not separately reported), communicating results to the patient/family/caregiver and care coordination (not separately reported). Discussion regarding natural history and potential progression of breast changes, timing of surveillance visits, timing and type of surveillance imaging, life-style modification, exercise, and limiting alcohol intake were performed today.     I personally and independently saw and examined patient and reviewed all pertinent laboratory data and imaging studies. I have reviewed and agree with the history and review of systems as documented in the above note.     This document is generated, in part, by voice recognition software and thus syntax and grammatical errors are possible.     Dictated by KARINA GROSS MD 5/22/2024 8:05 AM       This note was partially generated using Dragon voice recognition system, and there may be some incorrect words, spellings, punctuation that were not noticed in checking the note before saving.

## 2024-05-21 ENCOUNTER — APPOINTMENT (OUTPATIENT)
Dept: RESPIRATORY THERAPY | Facility: HOSPITAL | Age: 69
End: 2024-05-21
Payer: COMMERCIAL

## 2024-05-22 ENCOUNTER — OFFICE VISIT (OUTPATIENT)
Dept: PAIN MANAGEMENT | Age: 69
End: 2024-05-22
Payer: COMMERCIAL

## 2024-05-22 VITALS
SYSTOLIC BLOOD PRESSURE: 130 MMHG | HEIGHT: 62 IN | DIASTOLIC BLOOD PRESSURE: 66 MMHG | BODY MASS INDEX: 31.28 KG/M2 | TEMPERATURE: 98 F | WEIGHT: 170 LBS

## 2024-05-22 DIAGNOSIS — M54.50 CHRONIC LOW BACK PAIN, UNSPECIFIED BACK PAIN LATERALITY, UNSPECIFIED WHETHER SCIATICA PRESENT: ICD-10-CM

## 2024-05-22 DIAGNOSIS — M51.36 DDD (DEGENERATIVE DISC DISEASE), LUMBAR: ICD-10-CM

## 2024-05-22 DIAGNOSIS — M47.817 LUMBOSACRAL SPONDYLOSIS WITHOUT MYELOPATHY: Primary | ICD-10-CM

## 2024-05-22 DIAGNOSIS — G89.29 CHRONIC LOW BACK PAIN, UNSPECIFIED BACK PAIN LATERALITY, UNSPECIFIED WHETHER SCIATICA PRESENT: ICD-10-CM

## 2024-05-22 DIAGNOSIS — M47.816 FACET SYNDROME, LUMBAR: ICD-10-CM

## 2024-05-22 PROCEDURE — G8399 PT W/DXA RESULTS DOCUMENT: HCPCS | Performed by: PAIN MEDICINE

## 2024-05-22 PROCEDURE — G8427 DOCREV CUR MEDS BY ELIG CLIN: HCPCS | Performed by: PAIN MEDICINE

## 2024-05-22 PROCEDURE — G8417 CALC BMI ABV UP PARAM F/U: HCPCS | Performed by: PAIN MEDICINE

## 2024-05-22 PROCEDURE — 3017F COLORECTAL CA SCREEN DOC REV: CPT | Performed by: PAIN MEDICINE

## 2024-05-22 PROCEDURE — 1036F TOBACCO NON-USER: CPT | Performed by: PAIN MEDICINE

## 2024-05-22 PROCEDURE — 99213 OFFICE O/P EST LOW 20 MIN: CPT | Performed by: PAIN MEDICINE

## 2024-05-22 PROCEDURE — 1090F PRES/ABSN URINE INCON ASSESS: CPT | Performed by: PAIN MEDICINE

## 2024-05-22 PROCEDURE — 1123F ACP DISCUSS/DSCN MKR DOCD: CPT | Performed by: PAIN MEDICINE

## 2024-05-22 ASSESSMENT — ENCOUNTER SYMPTOMS
CHEST TIGHTNESS: 0
VOMITING: 0
SHORTNESS OF BREATH: 0
COLOR CHANGE: 0
ABDOMINAL PAIN: 0
SORE THROAT: 0
COUGH: 0
DIARRHEA: 0
BACK PAIN: 1
CONSTIPATION: 0
NAUSEA: 0
NAUSEA: 0
SHORTNESS OF BREATH: 1

## 2024-05-22 NOTE — PROGRESS NOTES
Plan:          No orders of the defined types were placed in this encounter.      No orders of the defined types were placed in this encounter.    1. Continue Lyrica, Cymbalta, Xanax per her PCP.     2. Continue Buprenorphine patch.    3. Follow with GI for abdominal issues. Monitor for constipation - continue Miralax.     Follow up:  No follow-ups on file.  The patient will follow up for reevaluation of her pain.  The patient is aware of the treatment plan and in agreement.  All of her questions were answered.     GEORGI DICKEY MD

## 2024-05-23 ENCOUNTER — APPOINTMENT (OUTPATIENT)
Dept: RADIOLOGY | Facility: HOSPITAL | Age: 69
End: 2024-05-23
Payer: COMMERCIAL

## 2024-05-23 NOTE — DISCHARGE SUMMARY
Discharge Diagnosis  Pulmonary embolism without acute cor pulmonale, unspecified chronicity, unspecified pulmonary embolism type (Multi)    Issues Requiring Follow-Up    Follow-up with pulmonology and cardiology/EP    Discharge Meds     Your medication list        START taking these medications        Instructions Last Dose Given Next Dose Due   dexAMETHasone 6 mg tablet  Commonly known as: Decadron  Start taking on: April 11, 2024      Take 1 tablet (6 mg) by mouth once daily for 4 days.       doxycycline 100 mg capsule  Commonly known as: Vibramycin      Take 1 capsule (100 mg) by mouth every 12 hours for 5 days. Take with a full glass of water and do not lie down for at least 30 minutes after.       Eliquis DVT-PE Treat 30D Start 5 mg (74 tabs) tablet  Generic drug: apixaban  Start taking on: April 10, 2024      Take 2 tablets (10 mg) by mouth 2 times a day for 7 days, THEN 1 tablet (5 mg) 2 times a day thereafter       guaiFENesin 100 mg/5 mL syrup  Commonly known as: Robitussin      Take 10 mL (200 mg) by mouth every 6 hours if needed for congestion.       ipratropium-albuteroL 0.5-2.5 mg/3 mL nebulizer solution  Commonly known as: Duo-Neb      Take 3 mL by nebulization every 4 hours if needed for wheezing or shortness of breath.              CONTINUE taking these medications        Instructions Last Dose Given Next Dose Due   ALPRAZolam XR 1 mg 24 hr tablet  Commonly known as: Xanax XR           amphetamine-dextroamphetamine 20 mg tablet  Commonly known as: Adderall           ARIPiprazole 15 mg tablet  Commonly known as: Abilify           ergocalciferol 1.25 MG (14119 UT) capsule  Commonly known as: Vitamin D-2           furosemide 20 mg tablet  Commonly known as: Lasix           gabapentin 400 mg capsule  Commonly known as: Neurontin           Linzess 290 mcg capsule  Generic drug: linaCLOtide           omeprazole 20 mg DR capsule  Commonly known as: PriLOSEC           Remeron 15 mg tablet  Generic drug:  mirtazapine           rosuvastatin 10 mg tablet  Commonly known as: Crestor           sucralfate 1 gram tablet  Commonly known as: Carafate                  STOP taking these medications      DULoxetine 60 mg DR capsule  Commonly known as: Cymbalta        predniSONE 5 mg tablet  Commonly known as: Deltasone                  Where to Get Your Medications        These medications were sent to [facility] - [place], [place] - [address]  [address] [address]      Phone: [phone]   dexAMETHasone 6 mg tablet  doxycycline 100 mg capsule  guaiFENesin 100 mg/5 mL syrup  ipratropium-albuteroL 0.5-2.5 mg/3 mL nebulizer solution       These medications were sent to [facility]  [address], [address], [place] [address]      Hours: 9 AM to 5:30 PM Mon-Fri, 9 AM to 1 PM Saturday Phone: [phone]   Eliquis DVT-PE Treat 30D Start 5 mg (74 tabs) tablet         Test Results Pending At Discharge  Pending Labs       No current pending labs.            Hospital Course   Acute pulmonary embolism  COVID-19  Chronic respiratory failure with hypoxemia  COPD possible mild exacerbation  -CTA showing acute segmental and subsegmental PE in the lingula and left lower lobe without evidence of right heart strain; also showing multifocal groundglass and nodular opacities as well as right hilar lymphadenopathy  -heparin drip, plan to transition to DOAC prior to discharge  -suspect GGO related to COVID; no clear evidence of ongoing superimposed bacterial PNA  -check procal  -ok with doxycycline for now  -cont with PRN nebs  -dexamethasone 6mg daily; she was on paxlovid as outpatient     Metabolic encephalopathy  -Having confusion and some word finding difficulty  -She does have a history of TIA and stroke will get a CT head to begin assessment  -Would consider MRI for further evaluation if her symptoms persist and CT is otherwise negative  -cont asa/statin     L breast fluid collection  -CTA  demonstrated left breast fluid collection and fluid densities; patient reports that she has had continued pain since her lumpectomy a year ago  -will get an ultrasound for further evaluation; she does have an appointment with her surgeon coming up in the next week or 2  -No superficial evidence of cellulitis or ongoing infection on exam     Depression  -resume home meds once verified        04/09/24  - patient in no acute distress.  Oxygen requirements currently stable.  -  Patient continues to be in atrial fibrillation without RVR.  Will continue heparin.  Plan to switch to Eliquis in the morning.  -  Patient on 3 L nasal cannula oxygen at home.  -  Will continue with doxycycline as well as dexamethasone for now.  -  Resumed home antidepressants.        discharged home with Eliquis as well is on home 3 L nasal cannula and finishing up doxycycline and and steroids    Pertinent Physical Exam At Time of Discharge  Physical Exam    GEN: ill appearing; resting in bed  HEENT: NCAT, PERRLA, EOMI, moist mucous membranes  NECK: supple, no masses  CV: RRR, no m/r/g, no LE edema  LUNGS: bilateral expiratory wheeze, scattered rhonchi  ABD: soft, NT, ND, NBS  SKIN: no rashes  MSK; no gross deformities, normal joints  Breast: L breast scars; no erythema or warmth; lump/collection felt laterally about 3 oclock  NEURO: A+Ox3, mild confusion; strength and light touch sensation intact all extremities; no cranial nerve deficits; no dysmetria  PSYCH: appropriate mood, affect    Outpatient Follow-Up  Future Appointments   Date Time Provider Department Center   5/28/2024  2:30 PM [facility] ULTRASOUND 3 [facility]   6/25/2024  1:00 PM [facility] RESPTHER1 PFT [facility]1 [facility] [place]   6/25/2024  3:00 PM [facility] RESPTHER1 PFT RM1 EL[facility]RES1 Gurvinder Hamilton MD

## 2024-06-03 DIAGNOSIS — F90.2 ATTENTION DEFICIT HYPERACTIVITY DISORDER (ADHD), COMBINED TYPE: ICD-10-CM

## 2024-06-03 RX ORDER — DEXTROAMPHETAMINE SACCHARATE, AMPHETAMINE ASPARTATE, DEXTROAMPHETAMINE SULFATE AND AMPHETAMINE SULFATE 5; 5; 5; 5 MG/1; MG/1; MG/1; MG/1
20 TABLET ORAL 2 TIMES DAILY
Qty: 60 TABLET | Refills: 0 | Status: SHIPPED | OUTPATIENT
Start: 2024-06-03 | End: 2024-07-03

## 2024-06-03 NOTE — TELEPHONE ENCOUNTER
Patient requesting medication refill. Please approve or deny this request.    Rx requested:  Requested Prescriptions     Pending Prescriptions Disp Refills    amphetamine-dextroamphetamine (ADDERALL) 20 MG tablet 60 tablet 0     Sig: Take 1 tablet by mouth 2 times daily for 30 days. Max Daily Amount: 40 mg         Last Office Visit:   5/2/2024      Next Visit Date:  Future Appointments   Date Time Provider Department Center   6/4/2024  2:30 PM Spencer Silveira DO [facility]  [facility]   7/29/2024 11:00 AM [facility] ROOM [address] [place] [address]   8/2/2024 11:00 AM Yamini Buchanan, APRN - CNP [facility]  [facility]   10/22/2024  2:00 PM SCHEDULE, [facility] RAD ONC NURSE [facility]   11/18/2024 12:45 PM Eugenie Vásquez MD [facility]  [facility]

## 2024-06-04 ENCOUNTER — OFFICE VISIT (OUTPATIENT)
Dept: FAMILY MEDICINE CLINIC | Age: 69
End: 2024-06-04
Payer: COMMERCIAL

## 2024-06-04 VITALS
TEMPERATURE: 98 F | OXYGEN SATURATION: 97 % | HEIGHT: 62 IN | WEIGHT: 170 LBS | SYSTOLIC BLOOD PRESSURE: 130 MMHG | DIASTOLIC BLOOD PRESSURE: 88 MMHG | HEART RATE: 82 BPM | BODY MASS INDEX: 31.28 KG/M2

## 2024-06-04 DIAGNOSIS — Z09 HOSPITAL DISCHARGE FOLLOW-UP: Primary | ICD-10-CM

## 2024-06-04 DIAGNOSIS — R29.90 STROKE-LIKE SYMPTOMS: ICD-10-CM

## 2024-06-04 PROCEDURE — 1036F TOBACCO NON-USER: CPT | Performed by: FAMILY MEDICINE

## 2024-06-04 PROCEDURE — G8417 CALC BMI ABV UP PARAM F/U: HCPCS | Performed by: FAMILY MEDICINE

## 2024-06-04 PROCEDURE — 1123F ACP DISCUSS/DSCN MKR DOCD: CPT | Performed by: FAMILY MEDICINE

## 2024-06-04 PROCEDURE — 3017F COLORECTAL CA SCREEN DOC REV: CPT | Performed by: FAMILY MEDICINE

## 2024-06-04 PROCEDURE — G8427 DOCREV CUR MEDS BY ELIG CLIN: HCPCS | Performed by: FAMILY MEDICINE

## 2024-06-04 PROCEDURE — 1111F DSCHRG MED/CURRENT MED MERGE: CPT | Performed by: FAMILY MEDICINE

## 2024-06-04 PROCEDURE — 99213 OFFICE O/P EST LOW 20 MIN: CPT | Performed by: FAMILY MEDICINE

## 2024-06-04 PROCEDURE — G8399 PT W/DXA RESULTS DOCUMENT: HCPCS | Performed by: FAMILY MEDICINE

## 2024-06-04 PROCEDURE — 1090F PRES/ABSN URINE INCON ASSESS: CPT | Performed by: FAMILY MEDICINE

## 2024-06-04 RX ORDER — ALPRAZOLAM 1 MG/1
TABLET ORAL
COMMUNITY
Start: 2024-06-01

## 2024-06-04 RX ORDER — BUPRENORPHINE 10 UG/H
PATCH TRANSDERMAL
COMMUNITY
Start: 2024-06-03

## 2024-06-04 SDOH — ECONOMIC STABILITY: FOOD INSECURITY: WITHIN THE PAST 12 MONTHS, THE FOOD YOU BOUGHT JUST DIDN'T LAST AND YOU DIDN'T HAVE MONEY TO GET MORE.: NEVER TRUE

## 2024-06-04 SDOH — ECONOMIC STABILITY: INCOME INSECURITY: HOW HARD IS IT FOR YOU TO PAY FOR THE VERY BASICS LIKE FOOD, HOUSING, MEDICAL CARE, AND HEATING?: NOT HARD AT ALL

## 2024-06-04 SDOH — ECONOMIC STABILITY: FOOD INSECURITY: WITHIN THE PAST 12 MONTHS, YOU WORRIED THAT YOUR FOOD WOULD RUN OUT BEFORE YOU GOT MONEY TO BUY MORE.: NEVER TRUE

## 2024-06-04 NOTE — PROGRESS NOTES
Subjective  Anita Armenta 1955 is a 68 y.o. female who presents today with:  Chief Complaint   Patient presents with    ER follow up     Evaluated and treated at [facility] from  24 - 24 due to stroke-like symptoms. MRI of the brain was negative for stroke. Repeat MRI of the brain and C-spine with contrast did not show any significant findings. Neurology recommended echocardiogram, which she had during her admission.  Today, she reports she is feeling well. She has not had any numbness, tingling or loss of function of her extremities since discharge. No other stroke-like symptoms.        HPI  I have reviewed HPI and agree with above. Reviewed labs and images.  Echo normal.    Review of Systems   All other systems reviewed and are negative.      Past Medical History:   Diagnosis Date    ADHD (attention deficit hyperactivity disorder)     Anxiety     Breast cancer (HCC)     Cancer (HCC)     Cerebral artery occlusion with cerebral infarction (HCC)     TIA 2022    COPD (chronic obstructive pulmonary disease) (HCC)     Depression     Fibromyalgia     GERD (gastroesophageal reflux disease)     History of therapeutic radiation     Hyperlipidemia     Hypertension     Irritable bowel syndrome      Past Surgical History:   Procedure Laterality Date    ABDOMINAL EXPLORATION SURGERY Right     Ruptured cyst to right ovary    APPENDECTOMY      BREAST BIOPSY      left     BREAST BIOPSY Left 2023    Left Breast Lumpectomy w/ [place] Lymph Node Biopsy performed by Eugenie Vásquez MD at [facility] OR     SECTION      COLONOSCOPY      N GAUDENCIO ASCENCIO MD    DILATION AND CURETTAGE OF UTERUS N/A 2020    DIAGNOSTIC LAPAROSCOPY, LYSIS OF ADHESIONS, HYSTEROSCOPY WITH MYOSURE, CYSTOSCOPY performed by Dev Mcdermott MD at [facility] OR    SKIN BIOPSY      left leg, right leg-negative    TONSILLECTOMY       Social History     Socioeconomic History    Marital status:      Spouse name: Not on file

## 2024-06-25 ENCOUNTER — TELEPHONE (OUTPATIENT)
Dept: FAMILY MEDICINE CLINIC | Age: 69
End: 2024-06-25

## 2024-06-25 NOTE — TELEPHONE ENCOUNTER
Patient calling in regarding buprenorphine. Patient stating she was taking 20 MCG and this was changed to 10 and is not working.    Please call patient with advice, and if 10 is the correct dosage?    Noticed the provider was Bette Kevin 6/3/24 but patient is saying the bottle has Chanel on it as the prescriber (from 5/2/24).         Patient phone [phone]

## 2024-07-01 DIAGNOSIS — F90.2 ATTENTION DEFICIT HYPERACTIVITY DISORDER (ADHD), COMBINED TYPE: ICD-10-CM

## 2024-07-01 RX ORDER — DEXTROAMPHETAMINE SACCHARATE, AMPHETAMINE ASPARTATE, DEXTROAMPHETAMINE SULFATE AND AMPHETAMINE SULFATE 5; 5; 5; 5 MG/1; MG/1; MG/1; MG/1
20 TABLET ORAL 2 TIMES DAILY
Qty: 60 TABLET | Refills: 0 | Status: SHIPPED | OUTPATIENT
Start: 2024-07-01 | End: 2024-07-31

## 2024-07-01 NOTE — TELEPHONE ENCOUNTER
Patient requesting medication refill. Please approve or deny this request.    Rx requested:  Requested Prescriptions     Pending Prescriptions Disp Refills    amphetamine-dextroamphetamine (ADDERALL) 20 MG tablet 60 tablet 0     Sig: Take 1 tablet by mouth 2 times daily for 30 days. Max Daily Amount: 40 mg         Last Office Visit:   5/2/2024      Next Visit Date:  Future Appointments   Date Time Provider Department Center   7/29/2024 11:00 AM [facility] ROOM 1 [facility]   8/2/2024 11:00 AM Yamini Buchanan, APRN - CNP [facility]   10/22/2024  2:00 PM SCHEDULE, ASIA RAD ONC NURSE [facility] [place] [place] [facility]   11/18/2024 12:45 PM Eugenie Vásquez MD [facility]X [facility]  [facility]

## 2024-07-09 NOTE — TELEPHONE ENCOUNTER
MEDICATION REFILL REQUEST     Rx Requested    Requested Prescriptions     Pending Prescriptions Disp Refills    furosemide (LASIX) 20 MG tablet 60 tablet 5     Sig: Take 1 tablet by mouth daily         Patient's Last Office Visit   5/2/2024      Patient's Next Office Visit   Future Appointments   Date Time Provider Department Center   7/29/2024 11:00 AM [facility] MAMMOGRAPHY ROOM 1 [facility]   8/2/2024 11:00 AM Yamini Buchanan, APRN - CNP [facility]   10/22/2024  2:00 PM SCHEDULE, ASIA RAD ONC NURSE [facility] RAD ONC [facility]   11/18/2024 12:45 PM Eugenie Vásquez MD [facility]X [facility]         Other comments     Patient requesting refill - I let her know Yamini Buchanan is out of the office until 7/11/24    [facility] confirmed pharmacy    Patient phone [phone]

## 2024-07-10 RX ORDER — FUROSEMIDE 20 MG/1
20 TABLET ORAL DAILY
Qty: 60 TABLET | Refills: 0 | Status: SHIPPED | OUTPATIENT
Start: 2024-07-10

## 2024-07-20 DIAGNOSIS — F41.1 GENERALIZED ANXIETY DISORDER: ICD-10-CM

## 2024-07-20 RX ORDER — ALPRAZOLAM 1 MG
TABLET ORAL
Qty: 60 TABLET | Refills: 0 | OUTPATIENT
Start: 2024-07-20 | End: 2024-08-19

## 2024-08-02 ENCOUNTER — OFFICE VISIT (OUTPATIENT)
Dept: FAMILY MEDICINE CLINIC | Age: 69
End: 2024-08-02
Payer: COMMERCIAL

## 2024-08-02 VITALS
HEART RATE: 88 BPM | OXYGEN SATURATION: 94 % | DIASTOLIC BLOOD PRESSURE: 60 MMHG | TEMPERATURE: 98.1 F | WEIGHT: 170 LBS | RESPIRATION RATE: 16 BRPM | SYSTOLIC BLOOD PRESSURE: 136 MMHG | BODY MASS INDEX: 31.28 KG/M2 | HEIGHT: 62 IN

## 2024-08-02 DIAGNOSIS — M46.1 BILATERAL SACROILIITIS (HCC): ICD-10-CM

## 2024-08-02 DIAGNOSIS — M48.062 SPINAL STENOSIS OF LUMBAR REGION WITH NEUROGENIC CLAUDICATION: ICD-10-CM

## 2024-08-02 DIAGNOSIS — F41.1 GENERALIZED ANXIETY DISORDER: Primary | ICD-10-CM

## 2024-08-02 DIAGNOSIS — F90.2 ATTENTION DEFICIT HYPERACTIVITY DISORDER (ADHD), COMBINED TYPE: ICD-10-CM

## 2024-08-02 DIAGNOSIS — J44.9 CHRONIC OBSTRUCTIVE PULMONARY DISEASE, UNSPECIFIED COPD TYPE (HCC): ICD-10-CM

## 2024-08-02 DIAGNOSIS — M79.7 FIBROMYALGIA: ICD-10-CM

## 2024-08-02 DIAGNOSIS — G57.93 NEUROPATHY INVOLVING BOTH LOWER EXTREMITIES: ICD-10-CM

## 2024-08-02 PROCEDURE — 1036F TOBACCO NON-USER: CPT | Performed by: NURSE PRACTITIONER

## 2024-08-02 PROCEDURE — 3017F COLORECTAL CA SCREEN DOC REV: CPT | Performed by: NURSE PRACTITIONER

## 2024-08-02 PROCEDURE — G8417 CALC BMI ABV UP PARAM F/U: HCPCS | Performed by: NURSE PRACTITIONER

## 2024-08-02 PROCEDURE — G8427 DOCREV CUR MEDS BY ELIG CLIN: HCPCS | Performed by: NURSE PRACTITIONER

## 2024-08-02 PROCEDURE — G8399 PT W/DXA RESULTS DOCUMENT: HCPCS | Performed by: NURSE PRACTITIONER

## 2024-08-02 PROCEDURE — 99214 OFFICE O/P EST MOD 30 MIN: CPT | Performed by: NURSE PRACTITIONER

## 2024-08-02 PROCEDURE — 3023F SPIROM DOC REV: CPT | Performed by: NURSE PRACTITIONER

## 2024-08-02 PROCEDURE — 1090F PRES/ABSN URINE INCON ASSESS: CPT | Performed by: NURSE PRACTITIONER

## 2024-08-02 PROCEDURE — 1123F ACP DISCUSS/DSCN MKR DOCD: CPT | Performed by: NURSE PRACTITIONER

## 2024-08-02 RX ORDER — DEXTROAMPHETAMINE SACCHARATE, AMPHETAMINE ASPARTATE, DEXTROAMPHETAMINE SULFATE AND AMPHETAMINE SULFATE 5; 5; 5; 5 MG/1; MG/1; MG/1; MG/1
20 TABLET ORAL 2 TIMES DAILY
Qty: 60 TABLET | Refills: 0 | Status: SHIPPED | OUTPATIENT
Start: 2024-10-01 | End: 2024-10-31

## 2024-08-02 RX ORDER — LINACLOTIDE 290 UG/1
CAPSULE, GELATIN COATED ORAL
COMMUNITY
Start: 2024-07-08

## 2024-08-02 RX ORDER — ALPRAZOLAM 1 MG/1
TABLET ORAL
Qty: 60 TABLET | Refills: 2 | Status: SHIPPED | OUTPATIENT
Start: 2024-08-02 | End: 2024-11-02

## 2024-08-02 RX ORDER — DEXTROAMPHETAMINE SACCHARATE, AMPHETAMINE ASPARTATE, DEXTROAMPHETAMINE SULFATE AND AMPHETAMINE SULFATE 5; 5; 5; 5 MG/1; MG/1; MG/1; MG/1
20 TABLET ORAL 2 TIMES DAILY
Qty: 60 TABLET | Refills: 0 | Status: SHIPPED | OUTPATIENT
Start: 2024-09-01 | End: 2024-10-01

## 2024-08-02 RX ORDER — TIOTROPIUM BROMIDE AND OLODATEROL 3.124; 2.736 UG/1; UG/1
SPRAY, METERED RESPIRATORY (INHALATION)
COMMUNITY
Start: 2024-06-13

## 2024-08-02 RX ORDER — GABAPENTIN 800 MG/1
800 TABLET ORAL 2 TIMES DAILY
Qty: 60 TABLET | Refills: 2 | Status: SHIPPED | OUTPATIENT
Start: 2024-08-02 | End: 2024-09-01

## 2024-08-02 RX ORDER — DEXTROAMPHETAMINE SACCHARATE, AMPHETAMINE ASPARTATE, DEXTROAMPHETAMINE SULFATE AND AMPHETAMINE SULFATE 5; 5; 5; 5 MG/1; MG/1; MG/1; MG/1
20 TABLET ORAL 2 TIMES DAILY
Qty: 60 TABLET | Refills: 0 | Status: SHIPPED | OUTPATIENT
Start: 2024-08-02 | End: 2024-09-01

## 2024-08-02 RX ORDER — BUPRENORPHINE 20 UG/H
1 PATCH TRANSDERMAL
Qty: 4 PATCH | Refills: 2 | Status: SHIPPED | OUTPATIENT
Start: 2024-08-02 | End: 2024-09-01

## 2024-08-11 DIAGNOSIS — G57.93 NEUROPATHY INVOLVING BOTH LOWER EXTREMITIES: ICD-10-CM

## 2024-08-11 DIAGNOSIS — M79.7 FIBROMYALGIA: ICD-10-CM

## 2024-08-12 ENCOUNTER — HOSPITAL ENCOUNTER (OUTPATIENT)
Dept: WOMENS IMAGING | Age: 69
Discharge: HOME | End: 2024-08-14
Payer: COMMERCIAL

## 2024-08-12 VITALS — BODY MASS INDEX: 30.58 KG/M2 | HEIGHT: 63 IN

## 2024-08-12 DIAGNOSIS — Z12.31 ENCOUNTER FOR SCREENING MAMMOGRAM FOR BREAST CANCER: ICD-10-CM

## 2024-08-12 PROCEDURE — 77063 BREAST TOMOSYNTHESIS BI: CPT

## 2024-08-12 RX ORDER — GABAPENTIN 400 MG/1
CAPSULE ORAL
Qty: 90 CAPSULE | Refills: 2 | OUTPATIENT
Start: 2024-08-12

## 2024-08-12 ASSESSMENT — ENCOUNTER SYMPTOMS
SHORTNESS OF BREATH: 0
RHINORRHEA: 0
BACK PAIN: 1
TROUBLE SWALLOWING: 0
SORE THROAT: 0
RECTAL PAIN: 0
CONSTIPATION: 0
HEARTBURN: 0
VOICE CHANGE: 0
ANAL BLEEDING: 0
DIARRHEA: 0
EYES NEGATIVE: 1
COLOR CHANGE: 0
COUGH: 1
ALLERGIC/IMMUNOLOGIC NEGATIVE: 1
BLOOD IN STOOL: 0
ABDOMINAL PAIN: 1

## 2024-08-12 ASSESSMENT — COPD QUESTIONNAIRES: COPD: 1

## 2024-08-13 ENCOUNTER — TELEPHONE (OUTPATIENT)
Dept: PAIN MANAGEMENT | Age: 69
End: 2024-08-13

## 2024-08-13 NOTE — TELEPHONE ENCOUNTER
Pt has appointment on 11/18 for 6 month follow up. She just had a mammogram done. Pt was wondering if Dr. Vásquez wants to wait til November to do the follow up. Please advise.

## 2024-08-13 NOTE — PROGRESS NOTES
buprenorphine 20 MCG/HR PTWK; Place 1 patch onto the skin every 7 days for 30 days. Max Daily Amount: 1 patch    Neuropathy involving both lower extremities  -     gabapentin (NEURONTIN) 800 MG tablet; Take 1 tablet by mouth 2 times daily for 30 days.    Fibromyalgia  -     gabapentin (NEURONTIN) 800 MG tablet; Take 1 tablet by mouth 2 times daily for 30 days.    Chronic obstructive pulmonary disease, unspecified COPD type (HCC)      No orders of the defined types were placed in this encounter.        Orders Placed This Encounter   Medications   • ALPRAZolam (XANAX) 1 MG tablet     Sig: Take 1 tablet by mouth 2 times daily as needed for Sleep or Anxiety for up to 30 days. Max Daily Amount 2 mg     Dispense:  60 tablet     Refill:  2   • amphetamine-dextroamphetamine (ADDERALL) 20 MG tablet     Sig: Take 1 tablet by mouth 2 times daily for 30 days. Max Daily Amount: 40 mg     Dispense:  60 tablet     Refill:  0   • buprenorphine 20 MCG/HR PTWK     Sig: Place 1 patch onto the skin every 7 days for 30 days. Max Daily Amount: 1 patch     Dispense:  4 patch     Refill:  2   • amphetamine-dextroamphetamine (ADDERALL) 20 MG tablet     Sig: Take 1 tablet by mouth 2 times daily for 30 days. Max Daily Amount: 40 mg     Dispense:  60 tablet     Refill:  0   • amphetamine-dextroamphetamine (ADDERALL) 20 MG tablet     Sig: Take 1 tablet by mouth 2 times daily for 30 days. Max Daily Amount: 40 mg     Dispense:  60 tablet     Refill:  0   • gabapentin (NEURONTIN) 800 MG tablet     Sig: Take 1 tablet by mouth 2 times daily for 30 days.     Dispense:  60 tablet     Refill:  2     Medications Discontinued During This Encounter   Medication Reason   • linaCLOtide (LINZESS PO)    • ALPRAZolam (XANAX) 1 MG tablet REORDER   • amphetamine-dextroamphetamine (ADDERALL) 20 MG tablet REORDER   • buprenorphine 20 MCG/HR PTWK REORDER   • gabapentin (NEURONTIN) 400 MG capsule Therapy completed     No follow-ups on file.      Reviewed with the

## 2024-08-14 NOTE — TELEPHONE ENCOUNTER
I spoke to the patient regarding her concern. I reviewed the patient's negative screening mammogram results with the patient.I informed the patient per her last appointment note, it was recommended she see Dr. Vásquez in 6 months. The patient is aware that keeping her appointment as scheduled in November is appropriate.The patient has no new breast concerns. I verified the patient's follow up appointment date/time/location.The patient verbalized understanding of her negative screening mammogram and her follow up appointment information. The patient has no further questions at this time.

## 2024-08-30 DIAGNOSIS — F90.2 ATTENTION DEFICIT HYPERACTIVITY DISORDER (ADHD), COMBINED TYPE: ICD-10-CM

## 2024-08-30 NOTE — TELEPHONE ENCOUNTER
Patient requesting medication refill. Please approve or deny this request.    Rx requested:  Requested Prescriptions     Pending Prescriptions Disp Refills    amphetamine-dextroamphetamine (ADDERALL) 20 MG tablet 60 tablet 0     Sig: Take 1 tablet by mouth 2 times daily for 30 days. Max Daily Amount: 40 mg         Last Office Visit:   8/2/2024      Next Visit Date:  Future Appointments   Date Time Provider Department Center   10/22/2024  2:00 PM SCHEDULE, ASIA RAD ONC NURSE [facility]   11/4/2024 11:30 AM Yamini Buchanan, APRN - CNP [facility] DEP   11/18/2024 12:45 PM Eugenie Vásquez MD [facility]

## 2024-09-03 RX ORDER — DEXTROAMPHETAMINE SACCHARATE, AMPHETAMINE ASPARTATE, DEXTROAMPHETAMINE SULFATE AND AMPHETAMINE SULFATE 5; 5; 5; 5 MG/1; MG/1; MG/1; MG/1
20 TABLET ORAL 2 TIMES DAILY
Qty: 60 TABLET | Refills: 0 | Status: SHIPPED | OUTPATIENT
Start: 2024-09-03 | End: 2024-10-03

## 2024-09-27 RX ORDER — FUROSEMIDE 20 MG
20 TABLET ORAL DAILY
Qty: 60 TABLET | Refills: 5 | Status: SHIPPED | OUTPATIENT
Start: 2024-09-27

## 2024-10-10 NOTE — TELEPHONE ENCOUNTER
Future Appointments    Encounter Information   Provider Department Appt Notes   10/22/2024 Po Maynard MD; SCHEDULE, MLYOUNG RAD ONC NURSE [facility] 6 month follow-up   11/4/2024 Yamini Buchanan APRN - CNP [facility] 3 Month Follow Up   11/18/2024 Eugenie Vásquez MD [facility] Breast Surg 6 month cancer f/u     Past Visits    Date Provider Specialty Visit Type Primary Dx   08/02/2024 Yamini Buchanan APRN - CNP Family Medicine Office Visit Generalized anxiety disorder

## 2024-10-11 RX ORDER — ERGOCALCIFEROL 1.25 MG/1
CAPSULE, LIQUID FILLED ORAL
Qty: 4 CAPSULE | Refills: 11 | Status: SHIPPED | OUTPATIENT
Start: 2024-10-11

## 2024-10-15 ENCOUNTER — TELEPHONE (OUTPATIENT)
Dept: FAMILY MEDICINE CLINIC | Age: 69
End: 2024-10-15

## 2024-10-15 DIAGNOSIS — M46.1 BILATERAL SACROILIITIS (HCC): ICD-10-CM

## 2024-10-15 DIAGNOSIS — F90.2 ATTENTION DEFICIT HYPERACTIVITY DISORDER (ADHD), COMBINED TYPE: ICD-10-CM

## 2024-10-15 DIAGNOSIS — G57.93 NEUROPATHY INVOLVING BOTH LOWER EXTREMITIES: ICD-10-CM

## 2024-10-15 DIAGNOSIS — Z87.891 QUIT SMOKING WITHIN PAST YEAR: Primary | ICD-10-CM

## 2024-10-15 DIAGNOSIS — F41.1 GENERALIZED ANXIETY DISORDER: ICD-10-CM

## 2024-10-15 DIAGNOSIS — M79.7 FIBROMYALGIA: ICD-10-CM

## 2024-10-15 NOTE — TELEPHONE ENCOUNTER
Patient requesting medication refill. Please approve or deny this request.  Patient has appointment 11/4/24, but will run out of medications on 10/30/24.     Rx requested:  Requested Prescriptions     Pending Prescriptions Disp Refills    amphetamine-dextroamphetamine (ADDERALL) 20 MG tablet 60 tablet 0     Sig: Take 1 tablet by mouth 2 times daily for 30 days. Max Daily Amount: 40 mg    ALPRAZolam (XANAX) 1 MG tablet 60 tablet 2     Sig: Take 1 tablet by mouth 2 times daily as needed for Sleep or Anxiety for up to 30 days. Max Daily Amount 2 mg    buprenorphine (BUPRENEX) 10 MCG/HR PTWK 4 patch 2     Sig: Place 1 patch onto the skin once a week for 28 days. To lower back Max Daily Amount: 1 patch         Last Office Visit:   8/2/2024      Next Visit Date:  Future Appointments   Date Time Provider Department Center   10/22/2024  2:00 PM SCHEDULE, ASIA NGUYEN ONC NURSE [facility] RAD [facility]   11/4/2024 11:30 AM Yamini Buchanan, APRN - CNP [facility] DEP   11/18/2024 12:45 PM Eugenie Vásquez MD [facility]

## 2024-10-15 NOTE — TELEPHONE ENCOUNTER
Patient received a letter in the mail stating she is due for her CT Lung Screening. Patient requesting Yamini Buchanan put in the orders to have that done.

## 2024-10-17 RX ORDER — DEXTROAMPHETAMINE SACCHARATE, AMPHETAMINE ASPARTATE, DEXTROAMPHETAMINE SULFATE AND AMPHETAMINE SULFATE 5; 5; 5; 5 MG/1; MG/1; MG/1; MG/1
20 TABLET ORAL 2 TIMES DAILY
Qty: 60 TABLET | Refills: 0 | Status: SHIPPED | OUTPATIENT
Start: 2024-10-17 | End: 2024-11-16

## 2024-10-17 RX ORDER — BUPRENORPHINE 10 UG/H
1 PATCH TRANSDERMAL WEEKLY
Qty: 4 PATCH | Refills: 2 | Status: SHIPPED | OUTPATIENT
Start: 2024-10-17 | End: 2024-11-14

## 2024-10-17 RX ORDER — ALPRAZOLAM 1 MG
TABLET ORAL
Qty: 60 TABLET | Refills: 2 | Status: SHIPPED | OUTPATIENT
Start: 2024-10-17 | End: 2025-01-15

## 2024-10-20 DIAGNOSIS — G57.93 NEUROPATHY INVOLVING BOTH LOWER EXTREMITIES: ICD-10-CM

## 2024-10-20 DIAGNOSIS — M79.7 FIBROMYALGIA: ICD-10-CM

## 2024-10-22 ENCOUNTER — HOSPITAL ENCOUNTER (OUTPATIENT)
Dept: RADIATION ONCOLOGY | Age: 69
Discharge: HOME | End: 2024-10-22
Payer: COMMERCIAL

## 2024-10-22 VITALS
WEIGHT: 171 LBS | HEART RATE: 87 BPM | SYSTOLIC BLOOD PRESSURE: 122 MMHG | TEMPERATURE: 97.5 F | BODY MASS INDEX: 30.76 KG/M2 | OXYGEN SATURATION: 90 % | DIASTOLIC BLOOD PRESSURE: 64 MMHG | RESPIRATION RATE: 18 BRPM

## 2024-10-22 DIAGNOSIS — D05.12 BREAST NEOPLASM, TIS (DCIS), LEFT: ICD-10-CM

## 2024-10-22 DIAGNOSIS — N64.4 BREAST PAIN: Primary | ICD-10-CM

## 2024-10-22 PROCEDURE — 99214 OFFICE O/P EST MOD 30 MIN: CPT | Performed by: RADIOLOGY

## 2024-10-22 PROCEDURE — 99212 OFFICE O/P EST SF 10 MIN: CPT | Performed by: RADIOLOGY

## 2024-10-22 RX ORDER — OXYCODONE AND ACETAMINOPHEN 5; 325 MG/1; MG/1
1 TABLET ORAL EVERY 6 HOURS PRN
Qty: 20 TABLET | Refills: 0 | Status: SHIPPED | OUTPATIENT
Start: 2024-10-22 | End: 2024-10-27

## 2024-10-24 RX ORDER — GABAPENTIN 800 MG/1
800 TABLET ORAL 2 TIMES DAILY
Qty: 60 TABLET | Refills: 2 | Status: SHIPPED | OUTPATIENT
Start: 2024-10-24 | End: 2025-04-22

## 2024-10-27 DIAGNOSIS — G57.93 NEUROPATHY INVOLVING BOTH LOWER EXTREMITIES: ICD-10-CM

## 2024-10-27 DIAGNOSIS — I26.99 PE (PULMONARY THROMBOEMBOLISM) (HCC): ICD-10-CM

## 2024-10-27 DIAGNOSIS — M79.7 FIBROMYALGIA: ICD-10-CM

## 2024-10-27 DIAGNOSIS — F41.1 GAD (GENERALIZED ANXIETY DISORDER): ICD-10-CM

## 2024-10-27 DIAGNOSIS — F33.1 MODERATE EPISODE OF RECURRENT MAJOR DEPRESSIVE DISORDER (HCC): ICD-10-CM

## 2024-10-28 RX ORDER — DULOXETIN HYDROCHLORIDE 60 MG/1
60 CAPSULE, DELAYED RELEASE ORAL DAILY
Qty: 30 CAPSULE | Refills: 0 | Status: SHIPPED | OUTPATIENT
Start: 2024-10-28

## 2024-10-28 RX ORDER — APIXABAN 5 MG/1
5 TABLET, FILM COATED ORAL 2 TIMES DAILY
Qty: 60 TABLET | Refills: 0 | Status: SHIPPED | OUTPATIENT
Start: 2024-10-28

## 2024-10-28 NOTE — TELEPHONE ENCOUNTER
Patient is requesting medication refill. Please approve or deny this request.    Rx requested:  Requested Prescriptions     Pending Prescriptions Disp Refills    DULoxetine (CYMBALTA) 60 MG extended release capsule [Pharmacy Med Name: duloxetine 60 mg capsule,delayed release] 30 capsule 0     Sig: Take 1 capsule by mouth daily    ELIQUIS 5 MG TABS tablet [Pharmacy Med Name: Eliquis 5 mg tablet] 60 tablet 0     Sig: Take 1 tablet by mouth 2 times daily         Last Office Visit:   8/2/2024      Next Visit Date:  Future Appointments   Date Time Provider Department Center   10/30/2024  9:45 AM Eugenie Vásquez MD [facility]   11/1/2024 11:30 AM [facility] X[facility]RAY ROOM 1 [facility]   11/4/2024 11:30 AM Yamini Buchanan, APRN - CNP [facility]   11/18/2024 12:45 PM Eugenie Vásquez MD M[facility]X [facility]   4/22/2025  1:30 PM SCHEDULE, MLOZ RAD ONC NURSE ASIA RAD ONC [place] [facility]       
2. The status of comorbities. (See ED/admit documents)

## 2024-10-30 ENCOUNTER — OFFICE VISIT (OUTPATIENT)
Dept: SURGERY | Age: 69
End: 2024-10-30

## 2024-10-30 VITALS
OXYGEN SATURATION: 95 % | HEART RATE: 78 BPM | RESPIRATION RATE: 13 BRPM | BODY MASS INDEX: 32.02 KG/M2 | DIASTOLIC BLOOD PRESSURE: 60 MMHG | HEIGHT: 62 IN | TEMPERATURE: 97.5 F | SYSTOLIC BLOOD PRESSURE: 139 MMHG | WEIGHT: 174 LBS

## 2024-10-30 DIAGNOSIS — M48.062 SPINAL STENOSIS OF LUMBAR REGION WITH NEUROGENIC CLAUDICATION: ICD-10-CM

## 2024-10-30 DIAGNOSIS — N64.89 SECONDARY SEROMA OF BREAST: ICD-10-CM

## 2024-10-30 DIAGNOSIS — D05.12 BREAST NEOPLASM, TIS (DCIS), LEFT: Primary | ICD-10-CM

## 2024-10-30 DIAGNOSIS — M46.1 BILATERAL SACROILIITIS (HCC): ICD-10-CM

## 2024-10-30 DIAGNOSIS — Z12.31 ENCOUNTER FOR SCREENING MAMMOGRAM FOR BREAST CANCER: ICD-10-CM

## 2024-10-30 RX ORDER — BUPRENORPHINE 20 UG/H
PATCH TRANSDERMAL
COMMUNITY
Start: 2024-10-12

## 2024-10-30 NOTE — PROGRESS NOTES
FOLLOW UP NOTE    PATIENT:   Anita Armenta    DATE:      10/30/24    HISTORY AND CHIEF COMPLAINT:    Anita Armenta  is a 68 y.o.  year old  female who presents for a follow up of left breast seroma post CATIE radiation. She has tenderness over the area       PHYSICAL EXAMINATION:    Vitals:    10/30/24 1013   BP: 139/60   Site: Right Upper Arm   Position: Sitting   Cuff Size: Large Adult   Pulse: 78   Resp: 13   Temp: 97.5 °F (36.4 °C)   TempSrc: Temporal   SpO2: 95%   Weight: 78.9 kg (174 lb)   Height: 1.575 m (5' 2\")        Physical Exam  Vitals reviewed.   Constitutional:       Appearance: Normal appearance. She is well-developed.   HENT:      Head: Normocephalic and atraumatic.      Nose: Nose normal.   Eyes:      Conjunctiva/sclera: Conjunctivae normal.      Right eye: No hemorrhage.     Left eye: No hemorrhage.  Cardiovascular:      Rate and Rhythm: Normal rate.   Pulmonary:      Effort: Pulmonary effort is normal. No respiratory distress.   Chest:   Breasts:     Breasts are asymmetrical (s/p left lumpectomy).      Right: No inverted nipple, mass, nipple discharge, skin change or tenderness.      Left: Mass and tenderness present. No inverted nipple, nipple discharge or skin change.       Musculoskeletal:         General: Normal range of motion.      Cervical back: Normal range of motion and neck supple.      Comments: Normal Range of motion in upper and lower extremities.   Lymphadenopathy:      Cervical: No cervical adenopathy.      Right cervical: No superficial, deep or posterior cervical adenopathy.     Left cervical: No superficial, deep or posterior cervical adenopathy.      Upper Body:      Right upper body: No supraclavicular, axillary or pectoral adenopathy.      Left upper body: No supraclavicular, axillary or pectoral adenopathy.   Skin:     General: Skin is warm and dry.      Findings: No abrasion, bruising, erythema or lesion.   Neurological:      Mental Status: She is alert and oriented to

## 2024-11-01 ENCOUNTER — HOSPITAL ENCOUNTER (OUTPATIENT)
Dept: CT IMAGING | Age: 69
Discharge: HOME | End: 2024-11-03
Payer: COMMERCIAL

## 2024-11-01 DIAGNOSIS — Z87.891 QUIT SMOKING WITHIN PAST YEAR: ICD-10-CM

## 2024-11-01 PROCEDURE — 71271 CT THORAX LUNG CANCER SCR C-: CPT

## 2024-11-04 ENCOUNTER — OFFICE VISIT (OUTPATIENT)
Dept: FAMILY MEDICINE CLINIC | Age: 69
End: 2024-11-04

## 2024-11-04 VITALS
HEIGHT: 62 IN | SYSTOLIC BLOOD PRESSURE: 136 MMHG | DIASTOLIC BLOOD PRESSURE: 84 MMHG | BODY MASS INDEX: 32.76 KG/M2 | WEIGHT: 178 LBS

## 2024-11-04 DIAGNOSIS — M46.1 BILATERAL SACROILIITIS (HCC): ICD-10-CM

## 2024-11-04 DIAGNOSIS — E78.2 MIXED HYPERLIPIDEMIA: ICD-10-CM

## 2024-11-04 DIAGNOSIS — M79.7 FIBROMYALGIA: ICD-10-CM

## 2024-11-04 DIAGNOSIS — J44.9 CHRONIC OBSTRUCTIVE PULMONARY DISEASE, UNSPECIFIED COPD TYPE (HCC): Primary | ICD-10-CM

## 2024-11-04 DIAGNOSIS — F41.1 GENERALIZED ANXIETY DISORDER: ICD-10-CM

## 2024-11-04 DIAGNOSIS — I10 ESSENTIAL HYPERTENSION: ICD-10-CM

## 2024-11-04 DIAGNOSIS — F33.1 MODERATE EPISODE OF RECURRENT MAJOR DEPRESSIVE DISORDER (HCC): ICD-10-CM

## 2024-11-04 DIAGNOSIS — R73.03 PREDIABETES: ICD-10-CM

## 2024-11-04 DIAGNOSIS — I26.94 MULTIPLE SUBSEGMENTAL PULMONARY EMBOLI WITHOUT ACUTE COR PULMONALE (HCC): ICD-10-CM

## 2024-11-04 DIAGNOSIS — G57.93 NEUROPATHY INVOLVING BOTH LOWER EXTREMITIES: ICD-10-CM

## 2024-11-04 DIAGNOSIS — D05.12 BREAST NEOPLASM, TIS (DCIS), LEFT: ICD-10-CM

## 2024-11-04 DIAGNOSIS — N64.89 SECONDARY SEROMA OF BREAST: ICD-10-CM

## 2024-11-04 DIAGNOSIS — F90.2 ATTENTION DEFICIT HYPERACTIVITY DISORDER (ADHD), COMBINED TYPE: ICD-10-CM

## 2024-11-04 LAB
ALBUMIN SERPL-MCNC: 4 G/DL (ref 3.5–4.6)
ALP SERPL-CCNC: 83 U/L (ref 40–130)
ALT SERPL-CCNC: 15 U/L (ref 0–33)
ANION GAP SERPL CALCULATED.3IONS-SCNC: 17 MEQ/L (ref 9–15)
AST SERPL-CCNC: 20 U/L (ref 0–35)
BASOPHILS # BLD: 0 K/UL (ref 0–0.2)
BASOPHILS NFR BLD: 0.4 %
BILIRUB SERPL-MCNC: <0.2 MG/DL (ref 0.2–0.7)
BUN SERPL-MCNC: 12 MG/DL (ref 8–23)
CALCIUM SERPL-MCNC: 9 MG/DL (ref 8.5–9.9)
CHLORIDE SERPL-SCNC: 103 MEQ/L (ref 95–107)
CHOLEST SERPL-MCNC: 115 MG/DL (ref 0–199)
CO2 SERPL-SCNC: 24 MEQ/L (ref 20–31)
CREAT SERPL-MCNC: 0.93 MG/DL (ref 0.5–0.9)
EOSINOPHIL # BLD: 0.2 K/UL (ref 0–0.7)
EOSINOPHIL NFR BLD: 3.3 %
ERYTHROCYTE [DISTWIDTH] IN BLOOD BY AUTOMATED COUNT: 13.4 % (ref 11.5–14.5)
GLOBULIN SER CALC-MCNC: 2.6 G/DL (ref 2.3–3.5)
GLUCOSE SERPL-MCNC: 92 MG/DL (ref 70–99)
HCT VFR BLD AUTO: 37.9 % (ref 37–47)
HDLC SERPL-MCNC: 63 MG/DL (ref 40–59)
HGB BLD-MCNC: 12.3 G/DL (ref 12–16)
LDLC SERPL CALC-MCNC: 18 MG/DL (ref 0–129)
LYMPHOCYTES # BLD: 2.5 K/UL (ref 1–4.8)
LYMPHOCYTES NFR BLD: 37.9 %
MCH RBC QN AUTO: 30.8 PG (ref 27–31.3)
MCHC RBC AUTO-ENTMCNC: 32.5 % (ref 33–37)
MCV RBC AUTO: 95 FL (ref 79.4–94.8)
MONOCYTES # BLD: 0.5 K/UL (ref 0.2–0.8)
MONOCYTES NFR BLD: 7.3 %
NEUTROPHILS # BLD: 3.4 K/UL (ref 1.4–6.5)
NEUTS SEG NFR BLD: 51 %
PLATELET # BLD AUTO: 279 K/UL (ref 130–400)
POTASSIUM SERPL-SCNC: 4 MEQ/L (ref 3.4–4.9)
PROT SERPL-MCNC: 6.6 G/DL (ref 6.3–8)
RBC # BLD AUTO: 3.99 M/UL (ref 4.2–5.4)
SODIUM SERPL-SCNC: 144 MEQ/L (ref 135–144)
TRIGL SERPL-MCNC: 168 MG/DL (ref 0–150)
WBC # BLD AUTO: 6.7 K/UL (ref 4.8–10.8)

## 2024-11-04 RX ORDER — DEXTROAMPHETAMINE SACCHARATE, AMPHETAMINE ASPARTATE, DEXTROAMPHETAMINE SULFATE AND AMPHETAMINE SULFATE 5; 5; 5; 5 MG/1; MG/1; MG/1; MG/1
20 TABLET ORAL 2 TIMES DAILY
Qty: 60 TABLET | Refills: 0 | Status: SHIPPED | OUTPATIENT
Start: 2024-12-04 | End: 2025-01-03

## 2024-11-04 RX ORDER — BUPRENORPHINE 20 UG/H
PATCH TRANSDERMAL
Qty: 4 PATCH | Refills: 2 | OUTPATIENT
Start: 2024-11-04

## 2024-11-04 RX ORDER — ZOSTER VACCINE RECOMBINANT, ADJUVANTED 50 MCG/0.5
0.5 KIT INTRAMUSCULAR SEE ADMIN INSTRUCTIONS
Qty: 0.5 ML | Refills: 0 | Status: SHIPPED | OUTPATIENT
Start: 2024-11-04 | End: 2025-05-03

## 2024-11-04 RX ORDER — GABAPENTIN 800 MG/1
800 TABLET ORAL 2 TIMES DAILY
Qty: 60 TABLET | Refills: 2 | Status: SHIPPED | OUTPATIENT
Start: 2024-11-04 | End: 2025-05-03

## 2024-11-04 RX ORDER — DEXTROAMPHETAMINE SACCHARATE, AMPHETAMINE ASPARTATE, DEXTROAMPHETAMINE SULFATE AND AMPHETAMINE SULFATE 5; 5; 5; 5 MG/1; MG/1; MG/1; MG/1
20 TABLET ORAL 2 TIMES DAILY
Qty: 60 TABLET | Refills: 0 | Status: CANCELLED | OUTPATIENT
Start: 2024-11-04 | End: 2024-12-04

## 2024-11-04 RX ORDER — OXYCODONE AND ACETAMINOPHEN 5; 325 MG/1; MG/1
1 TABLET ORAL EVERY 6 HOURS PRN
Qty: 12 TABLET | Refills: 0 | Status: SHIPPED | OUTPATIENT
Start: 2024-11-04 | End: 2024-11-07

## 2024-11-04 RX ORDER — DULOXETIN HYDROCHLORIDE 60 MG/1
60 CAPSULE, DELAYED RELEASE ORAL DAILY
Qty: 30 CAPSULE | Refills: 5 | Status: SHIPPED | OUTPATIENT
Start: 2024-11-04

## 2024-11-04 RX ORDER — BUPRENORPHINE 10 UG/H
1 PATCH TRANSDERMAL WEEKLY
Qty: 4 PATCH | Refills: 2 | Status: SHIPPED | OUTPATIENT
Start: 2024-11-04 | End: 2024-11-10

## 2024-11-04 RX ORDER — ALPRAZOLAM 1 MG/1
TABLET ORAL
Qty: 60 TABLET | Refills: 2 | Status: SHIPPED | OUTPATIENT
Start: 2024-11-04 | End: 2025-02-02

## 2024-11-04 RX ORDER — ERGOCALCIFEROL 1.25 MG/1
CAPSULE, LIQUID FILLED ORAL
Qty: 4 CAPSULE | Refills: 11 | Status: SHIPPED | OUTPATIENT
Start: 2024-11-04

## 2024-11-04 RX ORDER — DEXTROAMPHETAMINE SACCHARATE, AMPHETAMINE ASPARTATE, DEXTROAMPHETAMINE SULFATE AND AMPHETAMINE SULFATE 5; 5; 5; 5 MG/1; MG/1; MG/1; MG/1
20 TABLET ORAL 2 TIMES DAILY
Qty: 60 TABLET | Refills: 0 | Status: SHIPPED | OUTPATIENT
Start: 2024-11-04 | End: 2024-12-04

## 2024-11-04 RX ORDER — DEXTROAMPHETAMINE SACCHARATE, AMPHETAMINE ASPARTATE, DEXTROAMPHETAMINE SULFATE AND AMPHETAMINE SULFATE 5; 5; 5; 5 MG/1; MG/1; MG/1; MG/1
20 TABLET ORAL 2 TIMES DAILY
Qty: 60 TABLET | Refills: 0 | Status: SHIPPED | OUTPATIENT
Start: 2025-01-03 | End: 2025-02-02

## 2024-11-05 LAB
ESTIMATED AVERAGE GLUCOSE: 114 MG/DL
HBA1C MFR BLD: 5.6 % (ref 4–6)

## 2024-11-06 DIAGNOSIS — M46.1 BILATERAL SACROILIITIS (HCC): ICD-10-CM

## 2024-11-06 DIAGNOSIS — M48.062 SPINAL STENOSIS OF LUMBAR REGION WITH NEUROGENIC CLAUDICATION: ICD-10-CM

## 2024-11-06 LAB
6MAM UR QL: NOT DETECTED
7-AMINOCLONAZEPAM: NOT DETECTED
ALPHA-OH-ALPRAZOLAM: PRESENT
ALPHA-OH-MIDAZOLAM, URINE: NOT DETECTED
ALPRAZOLAM: PRESENT
AMPHET UR QL SCN: PRESENT
BARBITURATES: NEGATIVE
BENZOYLECGONINE: NEGATIVE
BUPRENORPHINE: PRESENT
CARISOPRODOL UR QL: NEGATIVE
CLONAZEPAM UR QL: NOT DETECTED
CODEINE: NOT DETECTED
CREAT UR-MCNC: 95.1 MG/DL (ref 20–400)
DIAZEPAM: NOT DETECTED
ETHYL GLUCURONIDE: NEGATIVE
FENTANYL UR QL: NOT DETECTED
GABAPENTIN: PRESENT
HYDROCODONE UR QL: NOT DETECTED
HYDROMORPHONE: NOT DETECTED
LORAZEPAM UR QL: NOT DETECTED
MARIJUANA METABOLITE: NORMAL
MDA: NOT DETECTED
MDEA: NOT DETECTED
MDMA UR QL: NOT DETECTED
MEPERIDINE: NOT DETECTED
METHADONE: NEGATIVE
METHAMPHETAMINE: NOT DETECTED
METHYLPHENIDATE: NOT DETECTED
MIDAZOLAM UR QL SCN: NOT DETECTED
MORPHINE: NOT DETECTED
NALOXONE: NOT DETECTED
NORBUPRENORPHINE, FREE: PRESENT
NORDIAZEPAM: NOT DETECTED
NORFENTANYL: NOT DETECTED
NORHYDROCODONE, URINE: NOT DETECTED
NOROXYCODONE: PRESENT
NOROXYMORPHONE, URINE: PRESENT
OXAZEPAM UR QL: NOT DETECTED
OXYCODONE UR QL: PRESENT
OXYMORPHONE UR QL: PRESENT
PAIN MANAGEMENT DRUG PANEL: NORMAL
PATHOLOGY STUDY: NORMAL
PCP: NEGATIVE
PHENTERMINE: NOT DETECTED
PREGABALIN: NOT DETECTED
TAPENTADOL, URINE: NOT DETECTED
TAPENTADOL-O-SULFATE, URINE: NOT DETECTED
TEMAZEPAM: NOT DETECTED
TRAMADOL: NEGATIVE
ZOLPIDEM: NOT DETECTED

## 2024-11-06 NOTE — TELEPHONE ENCOUNTER
Patient called in stating she picked up the Buprenorphine patches but, they are the wrong micogram. Stated the patches are supposed to be 20mcg not 10mcg.    Patient would like a new Rx sent over to [facility] in [place] on [address].     Packaging is not open and patient is wondering if she can take the incorrect medication back to the pharmacy or is there somewhere else she needs to send it.

## 2024-11-10 RX ORDER — BUPRENORPHINE 20 UG/H
1 PATCH TRANSDERMAL
Qty: 4 PATCH | Refills: 2 | Status: SHIPPED | OUTPATIENT
Start: 2024-11-10 | End: 2024-12-10

## 2024-11-12 ASSESSMENT — ENCOUNTER SYMPTOMS
VOICE CHANGE: 0
HEARTBURN: 0
RHINORRHEA: 0
COUGH: 1
TROUBLE SWALLOWING: 0
CONSTIPATION: 0
EYES NEGATIVE: 1
RECTAL PAIN: 0
BLOOD IN STOOL: 0
ABDOMINAL PAIN: 1
DIARRHEA: 0
SORE THROAT: 0
BACK PAIN: 1
ALLERGIC/IMMUNOLOGIC NEGATIVE: 1
SHORTNESS OF BREATH: 0
ANAL BLEEDING: 0
COLOR CHANGE: 0

## 2024-11-12 ASSESSMENT — COPD QUESTIONNAIRES: COPD: 1

## 2024-11-13 NOTE — PROGRESS NOTES
mouth 2 times daily for 30 days. Max Daily Amount: 40 mg     Dispense:  60 tablet     Refill:  0   • tiZANidine (ZANAFLEX) 4 MG tablet     Sig: Take 1 tablet by mouth 2 times daily as needed (back pain and muscle spasm)     Dispense:  40 tablet     Refill:  2   • oxyCODONE-acetaminophen (PERCOCET) 5-325 MG per tablet     Sig: Take 1 tablet by mouth every 6 hours as needed for Pain for up to 3 days. Intended supply: 3 days. Take lowest dose possible to manage pain Max Daily Amount: 4 tablets     Dispense:  12 tablet     Refill:  0     Reduce doses taken as pain becomes manageable   • zoster recombinant adjuvanted vaccine (SHINGRIX) 50 MCG/0.5ML SUSR injection     Sig: Inject 0.5 mLs into the muscle See Admin Instructions 1 dose now and repeat in 2-6 months     Dispense:  0.5 mL     Refill:  0     Medications Discontinued During This Encounter   Medication Reason   • amphetamine-dextroamphetamine (ADDERALL) 20 MG tablet LIST CLEANUP   • amphetamine-dextroamphetamine (ADDERALL) 20 MG tablet LIST CLEANUP   • buprenorphine 20 MCG/HR PTWK LIST CLEANUP   • amphetamine-dextroamphetamine (ADDERALL) 20 MG tablet LIST CLEANUP   • vitamin D (ERGOCALCIFEROL) 1.25 MG (90549 UT) CAPS capsule REORDER   • ALPRAZolam (XANAX) 1 MG tablet REORDER   • buprenorphine (BUPRENEX) 10 MCG/HR PTWK REORDER   • gabapentin (NEURONTIN) 800 MG tablet REORDER   • DULoxetine (CYMBALTA) 60 MG extended release capsule REORDER     Return in about 3 months (around 2/4/2025).      Reviewed with the patient: current clinical status, medications, activities and diet.     Side effects, adverse effects of the medication prescribed today, as well as treatment plan/ rationale and result expectations have been discussed with the patient who expresses understanding and desires to proceed.    Close follow up to evaluate treatment results and for coordination of care.  I have reviewed the patient's medical history in detail and updated the computerized patient

## 2024-11-19 ENCOUNTER — HOSPITAL ENCOUNTER (OUTPATIENT)
Dept: RADIOLOGY | Facility: HOSPITAL | Age: 69
Discharge: HOME | End: 2024-11-19
Payer: COMMERCIAL

## 2024-11-19 DIAGNOSIS — M79.604 PAIN IN RIGHT LEG: ICD-10-CM

## 2024-11-20 DIAGNOSIS — E78.2 MIXED HYPERLIPIDEMIA: ICD-10-CM

## 2024-11-25 RX ORDER — ROSUVASTATIN CALCIUM 10 MG/1
10 TABLET, COATED ORAL DAILY
Qty: 30 TABLET | Refills: 5 | Status: SHIPPED | OUTPATIENT
Start: 2024-11-25

## 2024-11-27 DIAGNOSIS — F90.2 ATTENTION DEFICIT HYPERACTIVITY DISORDER (ADHD), COMBINED TYPE: ICD-10-CM

## 2024-11-27 RX ORDER — DEXTROAMPHETAMINE SACCHARATE, AMPHETAMINE ASPARTATE, DEXTROAMPHETAMINE SULFATE AND AMPHETAMINE SULFATE 5; 5; 5; 5 MG/1; MG/1; MG/1; MG/1
20 TABLET ORAL 2 TIMES DAILY
Qty: 60 TABLET | Refills: 0 | Status: SHIPPED | OUTPATIENT
Start: 2024-12-04 | End: 2025-01-03

## 2024-11-29 NOTE — PROGRESS NOTES
NURSING ASSESSMENT     Date: 10/22/2024        Patient Name: Anita Armenta     YOB: 1955      Age:  68 y.o.      MRN: [id number]       Chaperone [x] Yes   [] No  Friend, Chio    Advance Directives:   Do you currently have completed advance directives (living will)? [] Yes   [x] No         *If yes, please bring us a copy for your records.  *If no, would you like info or assistance in completing advance directives (living will)?   [] Yes   [x] No    Pain Score:   Pain Score (1-10): Severe   Pain Location: Left Breast   Pain Duration: Daily for the last couple months   Pain Management/Control: Tylenol with no relief      Is pain affecting your ability to take care of yourself or move throughout your home?                        [x] Yes   [] No    General: Ongoing fatigue since had COVID in 4/2024  Patient has gained weight [x] Yes   [] No  Patient has lost weight [] Yes   [x] No  How much weight in pounds and over what length of time: Up 5 lbs since last visit on 4/24/24    Eyes (Ophthalmic): No Problem     Skin (Dermatological): Bruising     ENT: No Problems     Respiratory: Wears O2 at 3 L continuously, gets SOB with minimal exertion     Cardiovascular: No Problems      Device   [] Yes   [x] No   Copy of Card Obtained [] Yes   [x] No    Gastrointestinal: takes miralax and linzess for constipation with good results    Genito-Urinary: No Problems       Breast: c/o pain to left breast for the couple months. Had felt a knot to the area where her CATIE catheter for the last 2 weeks.     Musculoskeletal: Back Pain at baseline, uses buprenex patches    Neurological: Ongoing dizziness and headaches. Feels her equilibrium is off, uses walker as needed.      Hematological and Lymphatic: Easy Bruising-takes Eliquis     Endocrine: No Problems     Gyn History: Denies any issues.    A 10-point review of systems  has been conducted and pertinent positives have been   recorded. All other review of systems are 
to hear this and all her questions were answered.  I will have her return for routine follow-up in 6 months and she knows to reach out to us should she have any additional questions or concerns.  In the interim she will be seeing breast surgery in follow-up on 11/18/2024.  I will coordinate with them regarding potential ultrasound and drainage of an area in the left breast that appears to be consistent with a recurrent seroma.    A combined total of 40 minutes was spent in preparing this encounter as well as in meeting with the patient in person.  Please excuse any typos in this consultation note as voice dictation was used.    ORDERS: None    FOLLOW-UP: Return for routine follow-up on 10/22/2024.    Po Maynard MD PhD  Radiation Oncologist, [facility]    Department of Radiation Oncology  [facility]

## 2024-12-04 ENCOUNTER — HOSPITAL ENCOUNTER (OUTPATIENT)
Dept: RESPIRATORY THERAPY | Facility: HOSPITAL | Age: 69
Discharge: HOME | End: 2024-12-04
Payer: COMMERCIAL

## 2024-12-04 DIAGNOSIS — R06.00 DYSPNEA, UNSPECIFIED TYPE: ICD-10-CM

## 2024-12-04 DIAGNOSIS — J44.9 CHRONIC OBSTRUCTIVE PULMONARY DISEASE, UNSPECIFIED COPD TYPE (MULTI): ICD-10-CM

## 2024-12-04 LAB
ANION GAP BLDA CALCULATED.4IONS-SCNC: 6 MMO/L (ref 10–25)
ARTERIAL PATENCY WRIST A: POSITIVE
BASE EXCESS BLDA CALC-SCNC: 5.4 MMOL/L (ref -2–3)
BODY TEMPERATURE: ABNORMAL
CA-I BLDA-SCNC: 1.23 MMOL/L (ref 1.1–1.33)
CHLORIDE BLDA-SCNC: 103 MMOL/L (ref 98–107)
GLUCOSE BLDA-MCNC: 109 MG/DL (ref 74–99)
HCO3 BLDA-SCNC: 31.1 MMOL/L (ref 22–26)
HCT VFR BLD EST: 37 % (ref 36–46)
HGB BLDA-MCNC: 12.4 G/DL (ref 12–16)
INHALED O2 CONCENTRATION: 32 %
LACTATE BLDA-SCNC: 2.2 MMOL/L (ref 0.4–2)
OXYHGB MFR BLDA: 96.1 % (ref 94–98)
PCO2 BLDA: 49 MM HG (ref 38–42)
PH BLDA: 7.41 PH (ref 7.38–7.42)
PO2 BLDA: 84 MM HG (ref 85–95)
POTASSIUM BLDA-SCNC: 3.6 MMOL/L (ref 3.5–5.3)
SAO2 % BLDA: 98 % (ref 94–100)
SODIUM BLDA-SCNC: 136 MMOL/L (ref 136–145)
SPECIMEN DRAWN FROM PATIENT: ABNORMAL

## 2024-12-04 PROCEDURE — 94618 PULMONARY STRESS TESTING: CPT

## 2024-12-04 PROCEDURE — 36600 WITHDRAWAL OF ARTERIAL BLOOD: CPT

## 2024-12-04 PROCEDURE — 94726 PLETHYSMOGRAPHY LUNG VOLUMES: CPT

## 2024-12-04 PROCEDURE — 84132 ASSAY OF SERUM POTASSIUM: CPT | Performed by: INTERNAL MEDICINE

## 2024-12-05 DIAGNOSIS — J44.9 CHRONIC OBSTRUCTIVE PULMONARY DISEASE, UNSPECIFIED COPD TYPE (HCC): ICD-10-CM

## 2024-12-05 LAB
MGC ASCENT PFT - FEV1 - POST: 1.98
MGC ASCENT PFT - FEV1 - POST: 1.98
MGC ASCENT PFT - FEV1 - PRE: 1.88
MGC ASCENT PFT - FEV1 - PRE: 1.88
MGC ASCENT PFT - FEV1 - PREDICTED: 1.99
MGC ASCENT PFT - FEV1 - PREDICTED: 1.99
MGC ASCENT PFT - FVC - POST: 2.73
MGC ASCENT PFT - FVC - POST: 2.73
MGC ASCENT PFT - FVC - PRE: 2.66
MGC ASCENT PFT - FVC - PRE: 2.66
MGC ASCENT PFT - FVC - PREDICTED: 2.53
MGC ASCENT PFT - FVC - PREDICTED: 2.53

## 2024-12-05 NOTE — TELEPHONE ENCOUNTER
MEDICATION REFILL REQUEST     Rx Requested    Requested Prescriptions     Pending Prescriptions Disp Refills    predniSONE (DELTASONE) 5 MG tablet 30 tablet 5     Sig: Take 1 tablet by mouth daily         Patient's Last Office Visit   11/4/2024      Patient's Next Office Visit   Future Appointments   Date Time Provider Department Center   2/4/2025 11:30 AM Yamini Buchanan, APRN - CNP [facility]   4/22/2025  1:30 PM SCHEDULE, MLOZ RAD ONC NURSE [facility] RAD ONC [facility]   4/28/2025  1:15 PM Eugenie Vásquez MD [facility]         Other comments   Accidental threw last bottle in trash   Is completely out of medication

## 2024-12-06 RX ORDER — PREDNISONE 5 MG/1
5 TABLET ORAL DAILY
Qty: 30 TABLET | Refills: 5 | Status: SHIPPED | OUTPATIENT
Start: 2024-12-06

## 2024-12-09 ENCOUNTER — HOSPITAL ENCOUNTER (OUTPATIENT)
Dept: CARDIOLOGY | Facility: HOSPITAL | Age: 69
Discharge: HOME | End: 2024-12-09
Payer: COMMERCIAL

## 2024-12-09 DIAGNOSIS — R06.02 SHORTNESS OF BREATH: ICD-10-CM

## 2024-12-09 DIAGNOSIS — R06.00 DYSPNEA, UNSPECIFIED TYPE: ICD-10-CM

## 2024-12-09 LAB
AORTIC VALVE MEAN GRADIENT: 5 MMHG
AORTIC VALVE PEAK VELOCITY: 1.54 M/S
AV PEAK GRADIENT: 9 MMHG
AVA (PEAK VEL): 2.06 CM2
AVA (VTI): 2.05 CM2
EJECTION FRACTION APICAL 4 CHAMBER: 58.5
EJECTION FRACTION: 58 %
LEFT ATRIUM VOLUME AREA LENGTH INDEX BSA: 22.8 ML/M2
LEFT VENTRICLE INTERNAL DIMENSION DIASTOLE: 4.29 CM (ref 3.5–6)
LEFT VENTRICULAR OUTFLOW TRACT DIAMETER: 2 CM
LV EJECTION FRACTION BIPLANE: 56 %
MITRAL VALVE E/A RATIO: 0.91
RIGHT VENTRICLE FREE WALL PEAK S': 12.9 CM/S
RIGHT VENTRICLE PEAK SYSTOLIC PRESSURE: 38.5 MMHG
TRICUSPID ANNULAR PLANE SYSTOLIC EXCURSION: 2.2 CM

## 2024-12-09 PROCEDURE — 93306 TTE W/DOPPLER COMPLETE: CPT

## 2024-12-09 PROCEDURE — 93306 TTE W/DOPPLER COMPLETE: CPT | Performed by: INTERNAL MEDICINE

## 2024-12-14 ENCOUNTER — HOSPITAL ENCOUNTER (EMERGENCY)
Facility: HOSPITAL | Age: 69
Discharge: HOME | End: 2024-12-14
Attending: EMERGENCY MEDICINE
Payer: COMMERCIAL

## 2024-12-14 ENCOUNTER — APPOINTMENT (OUTPATIENT)
Dept: CARDIOLOGY | Facility: HOSPITAL | Age: 69
End: 2024-12-14
Payer: COMMERCIAL

## 2024-12-14 ENCOUNTER — APPOINTMENT (OUTPATIENT)
Dept: RADIOLOGY | Facility: HOSPITAL | Age: 69
End: 2024-12-14
Payer: COMMERCIAL

## 2024-12-14 VITALS
OXYGEN SATURATION: 94 % | HEART RATE: 76 BPM | SYSTOLIC BLOOD PRESSURE: 130 MMHG | WEIGHT: 174 LBS | TEMPERATURE: 97.2 F | DIASTOLIC BLOOD PRESSURE: 70 MMHG | HEIGHT: 62 IN | RESPIRATION RATE: 16 BRPM | BODY MASS INDEX: 32.02 KG/M2

## 2024-12-14 DIAGNOSIS — J44.1 COPD EXACERBATION (MULTI): Primary | ICD-10-CM

## 2024-12-14 LAB
ALBUMIN SERPL BCP-MCNC: 4.2 G/DL (ref 3.4–5)
ALP SERPL-CCNC: 58 U/L (ref 33–136)
ALT SERPL W P-5'-P-CCNC: 16 U/L (ref 7–45)
ANION GAP SERPL CALC-SCNC: 13 MMOL/L (ref 10–20)
AST SERPL W P-5'-P-CCNC: 19 U/L (ref 9–39)
BASOPHILS # BLD AUTO: 0.03 X10*3/UL (ref 0–0.1)
BASOPHILS NFR BLD AUTO: 0.6 %
BILIRUB SERPL-MCNC: 0.4 MG/DL (ref 0–1.2)
BNP SERPL-MCNC: 22 PG/ML (ref 0–99)
BUN SERPL-MCNC: 12 MG/DL (ref 6–23)
CALCIUM SERPL-MCNC: 8.7 MG/DL (ref 8.6–10.3)
CARDIAC TROPONIN I PNL SERPL HS: 4 NG/L (ref 0–13)
CARDIAC TROPONIN I PNL SERPL HS: 4 NG/L (ref 0–13)
CHLORIDE SERPL-SCNC: 102 MMOL/L (ref 98–107)
CO2 SERPL-SCNC: 27 MMOL/L (ref 21–32)
CREAT SERPL-MCNC: 0.96 MG/DL (ref 0.5–1.05)
EGFRCR SERPLBLD CKD-EPI 2021: 64 ML/MIN/1.73M*2
EOSINOPHIL # BLD AUTO: 0.05 X10*3/UL (ref 0–0.7)
EOSINOPHIL NFR BLD AUTO: 1 %
ERYTHROCYTE [DISTWIDTH] IN BLOOD BY AUTOMATED COUNT: 13.2 % (ref 11.5–14.5)
FLUAV RNA RESP QL NAA+PROBE: NOT DETECTED
FLUBV RNA RESP QL NAA+PROBE: NOT DETECTED
GLUCOSE SERPL-MCNC: 129 MG/DL (ref 74–99)
HCT VFR BLD AUTO: 37.3 % (ref 36–46)
HGB BLD-MCNC: 12.3 G/DL (ref 12–16)
IMM GRANULOCYTES # BLD AUTO: 0.02 X10*3/UL (ref 0–0.7)
IMM GRANULOCYTES NFR BLD AUTO: 0.4 % (ref 0–0.9)
INR PPP: 1.3 (ref 0.9–1.1)
LACTATE SERPL-SCNC: 1 MMOL/L (ref 0.4–2)
LACTATE SERPL-SCNC: 2.3 MMOL/L (ref 0.4–2)
LYMPHOCYTES # BLD AUTO: 1.05 X10*3/UL (ref 1.2–4.8)
LYMPHOCYTES NFR BLD AUTO: 21.9 %
MAGNESIUM SERPL-MCNC: 1.94 MG/DL (ref 1.6–2.4)
MCH RBC QN AUTO: 30.4 PG (ref 26–34)
MCHC RBC AUTO-ENTMCNC: 33 G/DL (ref 32–36)
MCV RBC AUTO: 92 FL (ref 80–100)
MONOCYTES # BLD AUTO: 0.25 X10*3/UL (ref 0.1–1)
MONOCYTES NFR BLD AUTO: 5.2 %
NEUTROPHILS # BLD AUTO: 3.4 X10*3/UL (ref 1.2–7.7)
NEUTROPHILS NFR BLD AUTO: 70.9 %
NRBC BLD-RTO: 0 /100 WBCS (ref 0–0)
PLATELET # BLD AUTO: 247 X10*3/UL (ref 150–450)
POTASSIUM SERPL-SCNC: 3.9 MMOL/L (ref 3.5–5.3)
PROT SERPL-MCNC: 6.8 G/DL (ref 6.4–8.2)
PROTHROMBIN TIME: 15 SECONDS (ref 9.8–12.8)
RBC # BLD AUTO: 4.04 X10*6/UL (ref 4–5.2)
SARS-COV-2 RNA RESP QL NAA+PROBE: NOT DETECTED
SODIUM SERPL-SCNC: 138 MMOL/L (ref 136–145)
WBC # BLD AUTO: 4.8 X10*3/UL (ref 4.4–11.3)

## 2024-12-14 PROCEDURE — 96365 THER/PROPH/DIAG IV INF INIT: CPT

## 2024-12-14 PROCEDURE — 36415 COLL VENOUS BLD VENIPUNCTURE: CPT | Performed by: EMERGENCY MEDICINE

## 2024-12-14 PROCEDURE — 96366 THER/PROPH/DIAG IV INF ADDON: CPT

## 2024-12-14 PROCEDURE — 93005 ELECTROCARDIOGRAM TRACING: CPT

## 2024-12-14 PROCEDURE — 87636 SARSCOV2 & INF A&B AMP PRB: CPT | Performed by: EMERGENCY MEDICINE

## 2024-12-14 PROCEDURE — 85610 PROTHROMBIN TIME: CPT | Performed by: EMERGENCY MEDICINE

## 2024-12-14 PROCEDURE — 71045 X-RAY EXAM CHEST 1 VIEW: CPT | Performed by: RADIOLOGY

## 2024-12-14 PROCEDURE — 96375 TX/PRO/DX INJ NEW DRUG ADDON: CPT

## 2024-12-14 PROCEDURE — 83880 ASSAY OF NATRIURETIC PEPTIDE: CPT | Performed by: EMERGENCY MEDICINE

## 2024-12-14 PROCEDURE — 85025 COMPLETE CBC W/AUTO DIFF WBC: CPT | Performed by: EMERGENCY MEDICINE

## 2024-12-14 PROCEDURE — 83735 ASSAY OF MAGNESIUM: CPT | Performed by: EMERGENCY MEDICINE

## 2024-12-14 PROCEDURE — 99285 EMERGENCY DEPT VISIT HI MDM: CPT | Mod: 25 | Performed by: EMERGENCY MEDICINE

## 2024-12-14 PROCEDURE — 84484 ASSAY OF TROPONIN QUANT: CPT | Performed by: EMERGENCY MEDICINE

## 2024-12-14 PROCEDURE — 71045 X-RAY EXAM CHEST 1 VIEW: CPT

## 2024-12-14 PROCEDURE — 94640 AIRWAY INHALATION TREATMENT: CPT

## 2024-12-14 PROCEDURE — 2500000004 HC RX 250 GENERAL PHARMACY W/ HCPCS (ALT 636 FOR OP/ED): Performed by: EMERGENCY MEDICINE

## 2024-12-14 PROCEDURE — 2500000002 HC RX 250 W HCPCS SELF ADMINISTERED DRUGS (ALT 637 FOR MEDICARE OP, ALT 636 FOR OP/ED): Mod: MUE | Performed by: EMERGENCY MEDICINE

## 2024-12-14 PROCEDURE — 83605 ASSAY OF LACTIC ACID: CPT | Performed by: EMERGENCY MEDICINE

## 2024-12-14 PROCEDURE — 80053 COMPREHEN METABOLIC PANEL: CPT | Performed by: EMERGENCY MEDICINE

## 2024-12-14 RX ORDER — DOXYCYCLINE 100 MG/1
100 CAPSULE ORAL 2 TIMES DAILY
Qty: 20 CAPSULE | Refills: 0 | Status: SHIPPED | OUTPATIENT
Start: 2024-12-14 | End: 2024-12-24

## 2024-12-14 RX ORDER — MAGNESIUM SULFATE HEPTAHYDRATE 40 MG/ML
2 INJECTION, SOLUTION INTRAVENOUS ONCE
Status: COMPLETED | OUTPATIENT
Start: 2024-12-14 | End: 2024-12-14

## 2024-12-14 RX ORDER — ALBUTEROL SULFATE 90 UG/1
1-2 INHALANT RESPIRATORY (INHALATION) EVERY 6 HOURS PRN
Qty: 18 G | Refills: 0 | Status: SHIPPED | OUTPATIENT
Start: 2024-12-14 | End: 2025-01-13

## 2024-12-14 RX ORDER — IPRATROPIUM BROMIDE AND ALBUTEROL SULFATE 2.5; .5 MG/3ML; MG/3ML
3 SOLUTION RESPIRATORY (INHALATION) EVERY 20 MIN
Status: COMPLETED | OUTPATIENT
Start: 2024-12-14 | End: 2024-12-14

## 2024-12-14 RX ORDER — PREDNISONE 20 MG/1
60 TABLET ORAL DAILY
Qty: 15 TABLET | Refills: 0 | Status: SHIPPED | OUTPATIENT
Start: 2024-12-14 | End: 2024-12-19

## 2024-12-14 RX ADMIN — SODIUM CHLORIDE 500 ML: 9 INJECTION, SOLUTION INTRAVENOUS at 15:06

## 2024-12-14 RX ADMIN — IPRATROPIUM BROMIDE AND ALBUTEROL SULFATE 3 ML: .5; 3 SOLUTION RESPIRATORY (INHALATION) at 14:48

## 2024-12-14 RX ADMIN — IPRATROPIUM BROMIDE AND ALBUTEROL SULFATE 3 ML: .5; 3 SOLUTION RESPIRATORY (INHALATION) at 14:29

## 2024-12-14 RX ADMIN — IPRATROPIUM BROMIDE AND ALBUTEROL SULFATE 3 ML: .5; 3 SOLUTION RESPIRATORY (INHALATION) at 14:56

## 2024-12-14 RX ADMIN — MAGNESIUM SULFATE HEPTAHYDRATE 2 G: 40 INJECTION, SOLUTION INTRAVENOUS at 15:06

## 2024-12-14 RX ADMIN — METHYLPREDNISOLONE SODIUM SUCCINATE 125 MG: 125 INJECTION, POWDER, FOR SOLUTION INTRAMUSCULAR; INTRAVENOUS at 15:06

## 2024-12-14 ASSESSMENT — PAIN SCALES - GENERAL: PAINLEVEL_OUTOF10: 8

## 2024-12-14 ASSESSMENT — LIFESTYLE VARIABLES
HAVE PEOPLE ANNOYED YOU BY CRITICIZING YOUR DRINKING: NO
EVER FELT BAD OR GUILTY ABOUT YOUR DRINKING: NO
EVER HAD A DRINK FIRST THING IN THE MORNING TO STEADY YOUR NERVES TO GET RID OF A HANGOVER: NO
TOTAL SCORE: 0
HAVE YOU EVER FELT YOU SHOULD CUT DOWN ON YOUR DRINKING: NO

## 2024-12-14 ASSESSMENT — PAIN - FUNCTIONAL ASSESSMENT: PAIN_FUNCTIONAL_ASSESSMENT: 0-10

## 2024-12-14 ASSESSMENT — PAIN DESCRIPTION - LOCATION: LOCATION: RIB CAGE

## 2024-12-14 ASSESSMENT — PAIN DESCRIPTION - PAIN TYPE: TYPE: ACUTE PAIN

## 2024-12-14 NOTE — ED PROVIDER NOTES
HPI   Chief Complaint   Patient presents with    Shortness of Breath     SOB that started 2 nights ago, on 3L of O2 from home. Hx of PE in april         History provided by:  Patient  History of Present Illness:  69-year-old female presents with 2-day history of worsening shortness of breath.  She has a known history of COPD and is normally on 3 L of oxygen by nasal cannula daily.  She does have a history in April of having pneumonia, COVID and PEs.  She is still on Eliquis.  She has not missed any dosages.  No fever or chills.  She does have a mild nonproductive cough.  No chest pain.  No back or flank pain.  No leg swelling.  Worse with exertion.  No sick contacts.  No trauma or travel.      PMFSH:   As per HPI, otherwise nurses notes reviewed in EMR.    Past Medical History:   Active Ambulatory Problems     Diagnosis Date Noted    No Active Ambulatory Problems     Resolved Ambulatory Problems     Diagnosis Date Noted    Pulmonary embolism without acute cor pulmonale, unspecified chronicity, unspecified pulmonary embolism type (Multi) 04/08/2024     Past Medical History:   Diagnosis Date    COPD (chronic obstructive pulmonary disease) (Multi)     Hypertension     Stroke (Multi)       Past Surgical History: History reviewed. No pertinent surgical history.   Family History: No family history on file.   Social History:    Social History     Socioeconomic History    Marital status:      Spouse name: Not on file    Number of children: Not on file    Years of education: Not on file    Highest education level: Not on file   Occupational History    Not on file   Tobacco Use    Smoking status: Former     Types: Cigarettes    Smokeless tobacco: Never   Substance and Sexual Activity    Alcohol use: Not Currently    Drug use: Yes     Types: Amphetamines    Sexual activity: Not Currently   Other Topics Concern    Not on file   Social History Narrative    Not on file     Social Drivers of Health     Financial Resource  Strain: Low Risk  (4/9/2024)    Overall Financial Resource Strain (CARDIA)     Difficulty of Paying Living Expenses: Not hard at all   Food Insecurity: No Food Insecurity (5/28/2024)    Received from [facility]    Hunger Vital Sign     Worried About Running Out of Food in the Last Year: Never true     Ran Out of Food in the Last Year: Never true   Transportation Needs: No Transportation Needs (5/28/2024)    Received from [facility]    PRAPARE - Transportation     Lack of Transportation (Medical): No     Lack of Transportation (Non-Medical): No   Physical Activity: Inactive (4/8/2024)    Exercise Vital Sign     Days of Exercise per Week: 0 days     Minutes of Exercise per Session: 0 min   Stress: Not on file   Social Connections: Not on file   Intimate Partner Violence: Not on file   Housing Stability: Unknown (5/28/2024)    Received from [facility]    Housing Stability Vital Sign     Unable to Pay for Housing in the Last Year: No     Number of Places Lived in the Last Year: Not on file     Unstable Housing in the Last Year: No              Patient History   Past Medical History:   Diagnosis Date    COPD (chronic obstructive pulmonary disease) (Multi)     Hypertension     Stroke (Multi)      History reviewed. No pertinent surgical history.  No family history on file.  Social History     Tobacco Use    Smoking status: Former     Types: Cigarettes    Smokeless tobacco: Never   Substance Use Topics    Alcohol use: Not Currently    Drug use: Yes     Types: Amphetamines       Physical Exam   ED Triage Vitals [12/14/24 1409]   Temperature Heart Rate Respirations BP   36.2 °C (97.2 °F) 80 18 162/71      Pulse Ox Temp Source Heart Rate Source Patient Position   95 % Temporal Monitor Sitting      BP Location FiO2 (%)     Right arm --       Physical Exam  Physical Exam:    ED Triage Vitals [12/14/24 1409]   Temperature Heart Rate Respirations BP   36.2 °C (97.2 °F) 80 18 162/71      Pulse Ox Temp Source Heart  Rate Source Patient Position   95 % Temporal Monitor Sitting      BP Location FiO2 (%)     Right arm --         Constitutional: Vital signs per nursing notes.  Well developed, well nourished.  Mild acute distress.    Psychiatric: alert and oriented to person, place, and time; no abnormalities of mood or affect; memory intact  Eyes: PERRL; conjunctivae and lids normal; EOMI  ENT: otoscopic exam of external canal and TM´s normal; nasal mucosa, turbinates, and septum normal; mouth, tongue, and pharynx normal; pharynx without edema, exudate, or injection  Respiratory: normal respiratory effort and excursion; no rales, rhonchi, or wheezes; equal but diminished air entry  Cardiovascular: regular rate and rhythm; no murmurs, rubs or gallops; symmetric pulses; no edema; normal capillary refill; distal pulses present  Neurological: normal speech; CN II-XII grossly intact; DTRs normal, no pathologic reflexes; normal motor and sensory function; no nystagmus; no pronator drift; normal finger to nose and heel to shin tests  GI: no masses, tenderness, rebound or guarding; no palpable, pulsatile mass; no organomegaly; no hernia; normal bowel sounds; (-) De Jesus´s sign; (-) McBurney´s sign; (-) CVA tenderness  Lymphatic: no adenopathy of neck  Musculoskeletal: normal gait and station; normal digits and nails; no gross tendon or ligament injury; normal to palpation; normal strength/tone; neurovascular status intact; (-) Марина´s sign  Skin: normal to inspection; normal to palpation; no rash  GCS: 15      ED Course & MDM   Diagnoses as of 12/14/24 1741   COPD exacerbation (Multi)                 No data recorded                                 Medical Decision Making  Medical Decision Making:    Differential Diagnoses Considered: ACS, arrhythmia, COPD exacerbation, pneumonia, viral syndrome, PE     EKG: My interpretation of EKG is normal sinus rhythm at 77 bpm with no acute ST-T changes             My interpretation of second EKG is  normal sinus rhythm at 73 bpm with no acute ST-T changes    Labs Reviewed   CBC WITH AUTO DIFFERENTIAL - Abnormal       Result Value    WBC 4.8      nRBC 0.0      RBC 4.04      Hemoglobin 12.3      Hematocrit 37.3      MCV 92      MCH 30.4      MCHC 33.0      RDW 13.2      Platelets 247      Neutrophils % 70.9      Immature Granulocytes %, Automated 0.4      Lymphocytes % 21.9      Monocytes % 5.2      Eosinophils % 1.0      Basophils % 0.6      Neutrophils Absolute 3.40      Immature Granulocytes Absolute, Automated 0.02      Lymphocytes Absolute 1.05 (*)     Monocytes Absolute 0.25      Eosinophils Absolute 0.05      Basophils Absolute 0.03     COMPREHENSIVE METABOLIC PANEL - Abnormal    Glucose 129 (*)     Sodium 138      Potassium 3.9      Chloride 102      Bicarbonate 27      Anion Gap 13      Urea Nitrogen 12      Creatinine 0.96      eGFR 64      Calcium 8.7      Albumin 4.2      Alkaline Phosphatase 58      Total Protein 6.8      AST 19      Bilirubin, Total 0.4      ALT 16     LACTATE - Abnormal    Lactate 2.3 (*)     Narrative:     Venipuncture immediately after or during the administration of Metamizole may lead to falsely low results. Testing should be performed immediately prior to Metamizole dosing.   PROTIME-INR - Abnormal    Protime 15.0 (*)     INR 1.3 (*)    MAGNESIUM - Normal    Magnesium 1.94     B-TYPE NATRIURETIC PEPTIDE - Normal    BNP 22      Narrative:        <100 pg/mL - Heart failure unlikely  100-299 pg/mL - Intermediate probability of acute heart                  failure exacerbation. Correlate with clinical                  context and patient history.    >=300 pg/mL - Heart Failure likely. Correlate with clinical                  context and patient history.    BNP testing is performed using different testing methodology at [facility] than at other [facility]. Direct result comparisons should only be made within the same method.      SARS-COV-2 AND INFLUENZA A/B PCR  - Normal    Flu A Result Not Detected      Flu B Result Not Detected      Coronavirus 2019, PCR Not Detected      Narrative:     This assay has received FDA Emergency Use Authorization (EUA) and  is only authorized for the duration of time that circumstances exist to justify the authorization of the emergency use of in vitro diagnostic tests for the detection of SARS-CoV-2 virus and/or diagnosis of COVID-19 infection under section 564(b)(1) of the Act, 21 U.S.C. 360bbb-3(b)(1). Testing for SARS-CoV-2 is only recommended for patients who meet current clinical and/or epidemiological criteria as defined by federal, state, or local public health directives. This assay is an in vitro diagnostic nucleic acid amplification test for the qualitative detection of SARS-CoV-2, Influenza A, and Influenza B from nasopharyngeal specimens and has been validated for use at [facility]. Negative results do not preclude COVID-19 infections or Influenza A/B infections, and should not be used as the sole basis for diagnosis, treatment, or other management decisions. If Influenza A/B and RSV PCR results are negative, testing for Parainfluenza virus, Adenovirus and Metapneumovirus is routinely performed for [facility] pediatric oncology and intensive care inpatients, and is available on other patients by placing an add-on request.    SERIAL TROPONIN-INITIAL - Normal    Troponin I, High Sensitivity 4      Narrative:     Less than 99th percentile of normal range cutoff-  Female and children under 18 years old <14 ng/L; Male <21 ng/L: Negative  Repeat testing should be performed if clinically indicated.     Female and children under 18 years old 14-50 ng/L; Male 21-50 ng/L:  Consistent with possible cardiac damage and possible increased clinical   risk. Serial measurements may help to assess extent of myocardial damage.     >50 ng/L: Consistent with cardiac damage, increased clinical risk and  myocardial infarction. Serial  measurements may help assess extent of   myocardial damage.      NOTE: Children less than 1 year old may have higher baseline troponin   levels and results should be interpreted in conjunction with the overall   clinical context.     NOTE: Troponin I testing is performed using a different   testing methodology at [facility] than at other   [facility]. Direct result comparisons should only   be made within the same method.   SERIAL TROPONIN, 1 HOUR - Normal    Troponin I, High Sensitivity 4      Narrative:     Less than 99th percentile of normal range cutoff-  Female and children under 18 years old <14 ng/L; Male <21 ng/L: Negative  Repeat testing should be performed if clinically indicated.     Female and children under 18 years old 14-50 ng/L; Male 21-50 ng/L:  Consistent with possible cardiac damage and possible increased clinical   risk. Serial measurements may help to assess extent of myocardial damage.     >50 ng/L: Consistent with cardiac damage, increased clinical risk and  myocardial infarction. Serial measurements may help assess extent of   myocardial damage.      NOTE: Children less than 1 year old may have higher baseline troponin   levels and results should be interpreted in conjunction with the overall   clinical context.     NOTE: Troponin I testing is performed using a different   testing methodology at [facility] than at other   [facility]. Direct result comparisons should only   be made within the same method.   LACTATE - Normal    Lactate 1.0      Narrative:     Venipuncture immediately after or during the administration of Metamizole may lead to falsely low results. Testing should be performed immediately prior to Metamizole dosing.   TROPONIN SERIES- (INITIAL, 1 HR)    Narrative:     The following orders were created for panel order Troponin I Series, High Sensitivity (0, 1 HR).  Procedure                               Abnormality         Status                      ---------                               -----------         ------                     Troponin I, High Sensiti...[[id number]]  Normal              Final result               Troponin, High Sensitivi...[[id number]]  Normal              Final result                 Please view results for these tests on the individual orders.       XR chest 1 view   Final Result   Scant scarring at the lateral left lung base.        Remainder of the exam was negative.        MACRO:   None        Signed by: Flash Nowak 12/14/2024 3:32 PM   Dictation workstation:   [id number]          Diagnostic testing considered:         Review of recent and relevant records:    ED Medication Administration:   ED Medication Administration from 12/14/2024 1403 to 12/14/2024 1741         Date/Time Order Dose Route Action Action by     12/14/2024 1429 EST ipratropium-albuteroL (Duo-Neb) 0.5-2.5 mg/3 mL nebulizer solution 3 mL 3 mL nebulization Given KATHIE De La Torre     12/14/2024 1448 EST ipratropium-albuteroL (Duo-Neb) 0.5-2.5 mg/3 mL nebulizer solution 3 mL 3 mL nebulization Given KATHIE De La Torre     12/14/2024 1456 EST ipratropium-albuteroL (Duo-Neb) 0.5-2.5 mg/3 mL nebulizer solution 3 mL 3 mL nebulization Given KATHIE De La Torre     12/14/2024 1506 EST magnesium sulfate 2 g in sterile water for injection 50 mL 2 g intravenous New Bag SERGIO Cuevas     12/14/2024 1506 EST methylPREDNISolone sod succinate (SOLU-Medrol) injection 125 mg 125 mg intravenous Given SERGIO Cuevas     12/14/2024 1506 EST sodium chloride 0.9 % bolus 500 mL 500 mL intravenous New Bag SERGIO Cuevas     12/14/2024 1649 EST magnesium sulfate 2 g in sterile water for injection 50 mL 0 g intravenous Stopped SERGIO Cuevas     12/14/2024 1649 EST sodium chloride 0.9 % bolus 500 mL 0 mL intravenous Stopped SERGIO Cuevas            Prescription Medication Consideration/Given:     Social Determinants of Health Significantly Affecting Care:      Summary:    BP      Temp      Pulse     Resp      SpO2        Abnormal Labs  Reviewed   CBC WITH AUTO DIFFERENTIAL - Abnormal; Notable for the following components:       Result Value    Lymphocytes Absolute 1.05 (*)     All other components within normal limits   COMPREHENSIVE METABOLIC PANEL - Abnormal; Notable for the following components:    Glucose 129 (*)     All other components within normal limits   LACTATE - Abnormal; Notable for the following components:    Lactate 2.3 (*)     All other components within normal limits    Narrative:     Venipuncture immediately after or during the administration of Metamizole may lead to falsely low results. Testing should be performed immediately prior to Metamizole dosing.   PROTIME-INR - Abnormal; Notable for the following components:    Protime 15.0 (*)     INR 1.3 (*)     All other components within normal limits     Diagnostic evaluation was completed.  BNP is in the normal range so do not suspect CHF.  Initial troponin is in the normal range.  Second troponin was also in the normal range.  Therefore I do not suspect acute coronary syndrome.  Metabolic panel shows an elevated glucose at 129.  Sodium and potassium in the normal range.  Renal and liver function are in the normal range.  CBC shows a normal white blood cell count and no evidence of anemia.  Lactate is elevated at 2.3.  Repeat lactate trended down to 1.0.  INR is 1.3.  Chest x-ray shows scant scarring at the lateral left lung base.  Remainder the exam was negative.    Patient has been treated with multiple breathing treatments, IV Solu-Medrol and magnesium.    I suspect the patient is likely suffering from an acute COPD exacerbation.  I did consider the possibility of PE but since the patient is continue to take her Eliquis as prescribed and has not missed any doses the likelihood of this is very small.  I do not feel she requires a CT scan of the chest at this time.    Repeat evaluation of the patient at 5:39 PM shows the patient is feeling much better.  Her vital signs are  stable.  She does not require any additional oxygen than her usual baseline.    Patient will be discharged home with short-term follow-up with her primary care provider.  I have recommended that she utilize breathing treatments as well as steroids at home.  She will also be prescribed antibiotics.    I discussed the results and discharge plan with the patient and/or family/friend if present.  I emphasized the importance of follow up with the physician I referred them to in the timeframe recommended.  I explained reasons for the patient to return to the Emergency Department.  Questions were addressed.  The patient and/or family/friend expressed understanding.                    Diagnoses as of 12/14/24 1741   COPD exacerbation (Multi)         Procedure  Procedures     Dayton MORALES MD  12/14/24 8051

## 2024-12-15 DIAGNOSIS — I26.99 PE (PULMONARY THROMBOEMBOLISM) (HCC): ICD-10-CM

## 2024-12-15 LAB
ATRIAL RATE: 73 BPM
ATRIAL RATE: 77 BPM
P AXIS: 48 DEGREES
P AXIS: 57 DEGREES
P OFFSET: 203 MS
P OFFSET: 203 MS
P ONSET: 155 MS
P ONSET: 160 MS
PR INTERVAL: 128 MS
PR INTERVAL: 136 MS
Q ONSET: 223 MS
Q ONSET: 224 MS
QRS COUNT: 12 BEATS
QRS COUNT: 13 BEATS
QRS DURATION: 74 MS
QRS DURATION: 74 MS
QT INTERVAL: 402 MS
QT INTERVAL: 408 MS
QTC CALCULATION(BAZETT): 449 MS
QTC CALCULATION(BAZETT): 454 MS
QTC FREDERICIA: 435 MS
QTC FREDERICIA: 436 MS
R AXIS: 37 DEGREES
R AXIS: 39 DEGREES
T AXIS: 68 DEGREES
T AXIS: 69 DEGREES
T OFFSET: 425 MS
T OFFSET: 427 MS
VENTRICULAR RATE: 73 BPM
VENTRICULAR RATE: 77 BPM

## 2024-12-15 NOTE — TELEPHONE ENCOUNTER
Future Appointments    Encounter Information   Provider Department Appt Notes   2/4/2025 Yamini Buchanan APRN - CNP [facility] Primary and Specialty Care 3 Month Follow Up   4/22/2025 Po Maynard MD; SCHEDULE, [facility] NURSE [facility] 6 month follow-up   4/28/2025 Eugenie Vásquez MD [facility] Breast Surg 6 month follow up     Past Visits    Date Provider Specialty Visit Type Primary Dx   11/04/2024 Yamini Buchanan APRN - CNP Family Medicine Office Visit Chronic obstructive pulmonary disease, unspecified COPD type (HCC)   10/30/2024 Eugenie Vásquez MD General Surgery Office Visit Breast neoplasm, Tis (DCIS), left   08/02/2024 Yamini Buchanan APRN - CNP Family Medicine Office Visit Generalized anxiety disorder   06/04/2024 Spencer Silveria DO Family Medicine Office Visit Hospital discharge follow-up   05/22/2024 Bette Kevin MD Pain Management Office Visit Lumbosacral spondylosis without myelopathy

## 2024-12-17 RX ORDER — APIXABAN 5 MG/1
5 TABLET, FILM COATED ORAL 2 TIMES DAILY
Qty: 60 TABLET | Refills: 0 | Status: SHIPPED | OUTPATIENT
Start: 2024-12-17 | End: 2024-12-20 | Stop reason: SDUPTHER

## 2024-12-20 DIAGNOSIS — I26.99 PE (PULMONARY THROMBOEMBOLISM) (HCC): ICD-10-CM

## 2025-01-14 ENCOUNTER — OFFICE VISIT (OUTPATIENT)
Dept: PRIMARY CARE CLINIC | Age: 70
End: 2025-01-14
Payer: COMMERCIAL

## 2025-01-14 VITALS
SYSTOLIC BLOOD PRESSURE: 136 MMHG | OXYGEN SATURATION: 95 % | WEIGHT: 177.2 LBS | BODY MASS INDEX: 32.61 KG/M2 | HEART RATE: 86 BPM | HEIGHT: 62 IN | TEMPERATURE: 98.1 F | DIASTOLIC BLOOD PRESSURE: 64 MMHG

## 2025-01-14 DIAGNOSIS — R05.9 COUGH, UNSPECIFIED TYPE: ICD-10-CM

## 2025-01-14 DIAGNOSIS — J02.9 SORE THROAT: ICD-10-CM

## 2025-01-14 DIAGNOSIS — J06.9 UPPER RESPIRATORY TRACT INFECTION, UNSPECIFIED TYPE: ICD-10-CM

## 2025-01-14 DIAGNOSIS — J44.1 COPD EXACERBATION (HCC): Primary | ICD-10-CM

## 2025-01-14 LAB
Lab: NORMAL
PERFORMING INSTRUMENT: NORMAL
QC PASS/FAIL: NORMAL
RSV RAPID ANTIGEN: NORMAL
S PYO AG THROAT QL: NORMAL
SARS-COV-2, POC: NORMAL

## 2025-01-14 PROCEDURE — G8417 CALC BMI ABV UP PARAM F/U: HCPCS | Performed by: STUDENT IN AN ORGANIZED HEALTH CARE EDUCATION/TRAINING PROGRAM

## 2025-01-14 PROCEDURE — 87807 RSV ASSAY W/OPTIC: CPT | Performed by: STUDENT IN AN ORGANIZED HEALTH CARE EDUCATION/TRAINING PROGRAM

## 2025-01-14 PROCEDURE — G8399 PT W/DXA RESULTS DOCUMENT: HCPCS | Performed by: STUDENT IN AN ORGANIZED HEALTH CARE EDUCATION/TRAINING PROGRAM

## 2025-01-14 PROCEDURE — 87426 SARSCOV CORONAVIRUS AG IA: CPT | Performed by: STUDENT IN AN ORGANIZED HEALTH CARE EDUCATION/TRAINING PROGRAM

## 2025-01-14 PROCEDURE — G8427 DOCREV CUR MEDS BY ELIG CLIN: HCPCS | Performed by: STUDENT IN AN ORGANIZED HEALTH CARE EDUCATION/TRAINING PROGRAM

## 2025-01-14 PROCEDURE — 1160F RVW MEDS BY RX/DR IN RCRD: CPT | Performed by: STUDENT IN AN ORGANIZED HEALTH CARE EDUCATION/TRAINING PROGRAM

## 2025-01-14 PROCEDURE — 87880 STREP A ASSAY W/OPTIC: CPT | Performed by: STUDENT IN AN ORGANIZED HEALTH CARE EDUCATION/TRAINING PROGRAM

## 2025-01-14 PROCEDURE — 1159F MED LIST DOCD IN RCRD: CPT | Performed by: STUDENT IN AN ORGANIZED HEALTH CARE EDUCATION/TRAINING PROGRAM

## 2025-01-14 PROCEDURE — 99214 OFFICE O/P EST MOD 30 MIN: CPT | Performed by: STUDENT IN AN ORGANIZED HEALTH CARE EDUCATION/TRAINING PROGRAM

## 2025-01-14 PROCEDURE — 1036F TOBACCO NON-USER: CPT | Performed by: STUDENT IN AN ORGANIZED HEALTH CARE EDUCATION/TRAINING PROGRAM

## 2025-01-14 PROCEDURE — 1123F ACP DISCUSS/DSCN MKR DOCD: CPT | Performed by: STUDENT IN AN ORGANIZED HEALTH CARE EDUCATION/TRAINING PROGRAM

## 2025-01-14 PROCEDURE — 1090F PRES/ABSN URINE INCON ASSESS: CPT | Performed by: STUDENT IN AN ORGANIZED HEALTH CARE EDUCATION/TRAINING PROGRAM

## 2025-01-14 PROCEDURE — 3023F SPIROM DOC REV: CPT | Performed by: STUDENT IN AN ORGANIZED HEALTH CARE EDUCATION/TRAINING PROGRAM

## 2025-01-14 PROCEDURE — 3017F COLORECTAL CA SCREEN DOC REV: CPT | Performed by: STUDENT IN AN ORGANIZED HEALTH CARE EDUCATION/TRAINING PROGRAM

## 2025-01-14 RX ORDER — LEVOFLOXACIN 500 MG/1
500 TABLET, FILM COATED ORAL DAILY
Qty: 7 TABLET | Refills: 0 | Status: SHIPPED | OUTPATIENT
Start: 2025-01-14 | End: 2025-01-21

## 2025-01-14 RX ORDER — BUDESONIDE, GLYCOPYRROLATE, AND FORMOTEROL FUMARATE 160; 9; 4.8 UG/1; UG/1; UG/1
2 AEROSOL, METERED RESPIRATORY (INHALATION) 2 TIMES DAILY
COMMUNITY
Start: 2024-12-11

## 2025-01-14 RX ORDER — METHYLPREDNISOLONE 4 MG/1
TABLET ORAL
Qty: 1 KIT | Refills: 0 | Status: SHIPPED | OUTPATIENT
Start: 2025-01-14 | End: 2025-01-20

## 2025-01-14 SDOH — ECONOMIC STABILITY: FOOD INSECURITY: WITHIN THE PAST 12 MONTHS, THE FOOD YOU BOUGHT JUST DIDN'T LAST AND YOU DIDN'T HAVE MONEY TO GET MORE.: NEVER TRUE

## 2025-01-14 SDOH — ECONOMIC STABILITY: FOOD INSECURITY: WITHIN THE PAST 12 MONTHS, YOU WORRIED THAT YOUR FOOD WOULD RUN OUT BEFORE YOU GOT MONEY TO BUY MORE.: NEVER TRUE

## 2025-01-14 ASSESSMENT — PATIENT HEALTH QUESTIONNAIRE - PHQ9
SUM OF ALL RESPONSES TO PHQ QUESTIONS 1-9: 16
8. MOVING OR SPEAKING SO SLOWLY THAT OTHER PEOPLE COULD HAVE NOTICED. OR THE OPPOSITE, BEING SO FIGETY OR RESTLESS THAT YOU HAVE BEEN MOVING AROUND A LOT MORE THAN USUAL: NOT AT ALL
1. LITTLE INTEREST OR PLEASURE IN DOING THINGS: MORE THAN HALF THE DAYS
6. FEELING BAD ABOUT YOURSELF - OR THAT YOU ARE A FAILURE OR HAVE LET YOURSELF OR YOUR FAMILY DOWN: NEARLY EVERY DAY
2. FEELING DOWN, DEPRESSED OR HOPELESS: MORE THAN HALF THE DAYS
4. FEELING TIRED OR HAVING LITTLE ENERGY: MORE THAN HALF THE DAYS
9. THOUGHTS THAT YOU WOULD BE BETTER OFF DEAD, OR OF HURTING YOURSELF: NOT AT ALL
SUM OF ALL RESPONSES TO PHQ QUESTIONS 1-9: 16
5. POOR APPETITE OR OVEREATING: MORE THAN HALF THE DAYS
7. TROUBLE CONCENTRATING ON THINGS, SUCH AS READING THE NEWSPAPER OR WATCHING TELEVISION: NEARLY EVERY DAY
SUM OF ALL RESPONSES TO PHQ9 QUESTIONS 1 & 2: 4
SUM OF ALL RESPONSES TO PHQ QUESTIONS 1-9: 16
SUM OF ALL RESPONSES TO PHQ QUESTIONS 1-9: 16
10. IF YOU CHECKED OFF ANY PROBLEMS, HOW DIFFICULT HAVE THESE PROBLEMS MADE IT FOR YOU TO DO YOUR WORK, TAKE CARE OF THINGS AT HOME, OR GET ALONG WITH OTHER PEOPLE: SOMEWHAT DIFFICULT
3. TROUBLE FALLING OR STAYING ASLEEP: MORE THAN HALF THE DAYS

## 2025-01-14 NOTE — PROGRESS NOTES
2022    COPD (chronic obstructive pulmonary disease) (HCC)     Depression     Fibromyalgia     GERD (gastroesophageal reflux disease)     History of therapeutic radiation     Hyperlipidemia     Hypertension     Irritable bowel syndrome      Past Surgical History:   Procedure Laterality Date    ABDOMINAL EXPLORATION SURGERY Right     Ruptured cyst to right ovary    APPENDECTOMY      BREAST BIOPSY      left     BREAST BIOPSY Left 2023    Left Breast Lumpectomy w/ [place] Lymph Node Biopsy performed by Eugenie Vásquez MD at [facility]    BREAST LUMPECTOMY       SECTION      COLONOSCOPY      N GAUDENCIO ASCENCIO MD    DILATION AND CURETTAGE OF UTERUS N/A 2020    DIAGNOSTIC LAPAROSCOPY, LYSIS OF ADHESIONS, HYSTEROSCOPY WITH MYOSURE, CYSTOSCOPY performed by Dev Mcdermott MD at [facility]    SKIN BIOPSY      left leg, right leg-negative    TONSILLECTOMY      US BREAST FINE NEEDLE ASPIRATION       Family History   Problem Relation Age of Onset    Heart Disease Mother     Heart Disease Father     Cirrhosis Sister     Other Sister         \"breathing issues\"    Diabetes Brother     Breast Cancer Maternal Aunt 29    Crohn's Disease Son     Thyroid Disease Daughter      Social History     Tobacco Use    Smoking status: Former     Current packs/day: 0.00     Average packs/day: 2.0 packs/day for 50.0 years (100.0 ttl pk-yrs)     Types: Cigarettes     Start date: 1972     Quit date: 2022     Years since quittin.1    Smokeless tobacco: Never   Substance Use Topics    Alcohol use: Never       Attestation    # PMH, Surgical Hx, Family Hx, Social Hx, allergies and current medications reviewed and updated the computerized patient record.  # Health Maintenance reviewed.  # 3+ Prior external notes from unique source reviewed  # Reviewed with the patient: current clinical status, activities and diet.  # Previous labs and imaging reviewed.   # Side effects, adverse effects of the medication

## 2025-01-20 DIAGNOSIS — F90.2 ATTENTION DEFICIT HYPERACTIVITY DISORDER (ADHD), COMBINED TYPE: ICD-10-CM

## 2025-01-20 DIAGNOSIS — M48.062 SPINAL STENOSIS OF LUMBAR REGION WITH NEUROGENIC CLAUDICATION: ICD-10-CM

## 2025-01-20 DIAGNOSIS — F41.1 GENERALIZED ANXIETY DISORDER: ICD-10-CM

## 2025-01-20 DIAGNOSIS — M46.1 BILATERAL SACROILIITIS (HCC): ICD-10-CM

## 2025-01-20 DIAGNOSIS — M79.7 FIBROMYALGIA: ICD-10-CM

## 2025-01-20 DIAGNOSIS — G57.93 NEUROPATHY INVOLVING BOTH LOWER EXTREMITIES: ICD-10-CM

## 2025-01-20 NOTE — TELEPHONE ENCOUNTER
Patient called requesting medication refill. Patient has appt on 01/24/2025. Please approve or deny this request.    Rx requested:  Requested Prescriptions     Pending Prescriptions Disp Refills    ARIPiprazole (ABILIFY) 15 MG tablet 30 tablet      Sig: Take 1 tablet by mouth daily    gabapentin (NEURONTIN) 800 MG tablet 60 tablet 2     Sig: Take 1 tablet by mouth 2 times daily for 180 days.    amphetamine-dextroamphetamine (ADDERALL) 20 MG tablet 60 tablet 0     Sig: Take 1 tablet by mouth 2 times daily for 30 days. Max Daily Amount: 40 mg    ALPRAZolam (XANAX) 1 MG tablet 60 tablet 2     Sig: Take 1 tablet by mouth 2 times daily as needed for Sleep or Anxiety for up to 30 days. Max Daily Amount 2 mg    buprenorphine 20 MCG/HR PTWK 4 patch 2     Sig: Place 1 patch onto the skin every 7 days for 30 days. Max Daily Amount: 1 patch         Last Office Visit:   11/4/2024      Next Visit Date:  Future Appointments   Date Time Provider Department Center   1/24/2025  1:00 PM Cris Taveras DO [facility] DEP   2/4/2025 11:30 AM Yamini Buchanan, AMANDA - CNP [facility] DEP   4/22/2025  1:30 PM SCHEDULE, [facility] ONC NURSE [facility]   4/28/2025  1:15 PM Eugenie Vásquez MD [facility]

## 2025-01-21 RX ORDER — ALPRAZOLAM 1 MG/1
TABLET ORAL
Qty: 60 TABLET | Refills: 2 | Status: SHIPPED | OUTPATIENT
Start: 2025-01-21 | End: 2025-04-20

## 2025-01-21 RX ORDER — DEXTROAMPHETAMINE SACCHARATE, AMPHETAMINE ASPARTATE, DEXTROAMPHETAMINE SULFATE AND AMPHETAMINE SULFATE 5; 5; 5; 5 MG/1; MG/1; MG/1; MG/1
20 TABLET ORAL 2 TIMES DAILY
Qty: 60 TABLET | Refills: 0 | Status: SHIPPED | OUTPATIENT
Start: 2025-01-21 | End: 2025-02-20

## 2025-01-21 RX ORDER — ARIPIPRAZOLE 15 MG/1
15 TABLET ORAL DAILY
Qty: 30 TABLET | Refills: 5 | Status: SHIPPED | OUTPATIENT
Start: 2025-01-21

## 2025-01-21 RX ORDER — DEXTROMETHORPHAN HYDROBROMIDE AND PROMETHAZINE HYDROCHLORIDE 15; 6.25 MG/5ML; MG/5ML
5 SYRUP ORAL 4 TIMES DAILY PRN
Qty: 200 ML | Refills: 0 | Status: SHIPPED | OUTPATIENT
Start: 2025-01-21

## 2025-01-21 RX ORDER — BUPRENORPHINE 20 UG/H
1 PATCH TRANSDERMAL
Qty: 4 PATCH | Refills: 2 | Status: SHIPPED | OUTPATIENT
Start: 2025-01-21 | End: 2025-02-20

## 2025-01-21 RX ORDER — GABAPENTIN 800 MG/1
800 TABLET ORAL 2 TIMES DAILY
Qty: 60 TABLET | Refills: 2 | Status: SHIPPED | OUTPATIENT
Start: 2025-01-21 | End: 2025-07-20

## 2025-02-04 ENCOUNTER — OFFICE VISIT (OUTPATIENT)
Age: 70
End: 2025-02-04

## 2025-02-04 VITALS
HEIGHT: 62 IN | DIASTOLIC BLOOD PRESSURE: 74 MMHG | WEIGHT: 174 LBS | SYSTOLIC BLOOD PRESSURE: 136 MMHG | BODY MASS INDEX: 32.02 KG/M2

## 2025-02-04 DIAGNOSIS — F90.2 ATTENTION DEFICIT HYPERACTIVITY DISORDER (ADHD), COMBINED TYPE: ICD-10-CM

## 2025-02-04 DIAGNOSIS — M48.062 SPINAL STENOSIS OF LUMBAR REGION WITH NEUROGENIC CLAUDICATION: ICD-10-CM

## 2025-02-04 DIAGNOSIS — F33.1 MODERATE EPISODE OF RECURRENT MAJOR DEPRESSIVE DISORDER (HCC): ICD-10-CM

## 2025-02-04 DIAGNOSIS — M54.42 CHRONIC MIDLINE LOW BACK PAIN WITH BILATERAL SCIATICA: ICD-10-CM

## 2025-02-04 DIAGNOSIS — I36.1 NONRHEUMATIC TRICUSPID VALVE REGURGITATION: ICD-10-CM

## 2025-02-04 DIAGNOSIS — M54.41 CHRONIC MIDLINE LOW BACK PAIN WITH BILATERAL SCIATICA: ICD-10-CM

## 2025-02-04 DIAGNOSIS — F41.1 GAD (GENERALIZED ANXIETY DISORDER): ICD-10-CM

## 2025-02-04 DIAGNOSIS — G89.29 CHRONIC MIDLINE LOW BACK PAIN WITH BILATERAL SCIATICA: ICD-10-CM

## 2025-02-04 DIAGNOSIS — R06.09 DOE (DYSPNEA ON EXERTION): ICD-10-CM

## 2025-02-04 DIAGNOSIS — M46.1 BILATERAL SACROILIITIS (HCC): ICD-10-CM

## 2025-02-04 DIAGNOSIS — Z00.00 MEDICARE ANNUAL WELLNESS VISIT, SUBSEQUENT: Primary | ICD-10-CM

## 2025-02-04 DIAGNOSIS — E78.2 MIXED HYPERLIPIDEMIA: ICD-10-CM

## 2025-02-04 DIAGNOSIS — I34.0 NONRHEUMATIC MITRAL VALVE REGURGITATION: ICD-10-CM

## 2025-02-04 DIAGNOSIS — J44.9 CHRONIC OBSTRUCTIVE PULMONARY DISEASE, UNSPECIFIED COPD TYPE (HCC): ICD-10-CM

## 2025-02-04 DIAGNOSIS — R73.03 PREDIABETES: ICD-10-CM

## 2025-02-04 DIAGNOSIS — I51.89 DIASTOLIC DYSFUNCTION: ICD-10-CM

## 2025-02-04 DIAGNOSIS — F41.1 GENERALIZED ANXIETY DISORDER: ICD-10-CM

## 2025-02-04 DIAGNOSIS — I10 ESSENTIAL HYPERTENSION: ICD-10-CM

## 2025-02-04 DIAGNOSIS — M79.7 FIBROMYALGIA: ICD-10-CM

## 2025-02-04 RX ORDER — GABAPENTIN 800 MG/1
800 TABLET ORAL 2 TIMES DAILY
Qty: 60 TABLET | Refills: 2 | Status: SHIPPED | OUTPATIENT
Start: 2025-02-04 | End: 2025-03-06

## 2025-02-04 RX ORDER — DEXTROAMPHETAMINE SACCHARATE, AMPHETAMINE ASPARTATE, DEXTROAMPHETAMINE SULFATE AND AMPHETAMINE SULFATE 5; 5; 5; 5 MG/1; MG/1; MG/1; MG/1
20 TABLET ORAL 2 TIMES DAILY
Qty: 60 TABLET | Refills: 0 | Status: SHIPPED | OUTPATIENT
Start: 2025-03-06 | End: 2025-04-05

## 2025-02-04 RX ORDER — BUPRENORPHINE 20 UG/H
1 PATCH TRANSDERMAL
Qty: 4 PATCH | Refills: 2 | Status: SHIPPED | OUTPATIENT
Start: 2025-02-04 | End: 2025-03-06

## 2025-02-04 RX ORDER — ALPRAZOLAM 1 MG/1
1 TABLET ORAL 2 TIMES DAILY PRN
Qty: 60 TABLET | Refills: 2 | Status: SHIPPED | OUTPATIENT
Start: 2025-02-04 | End: 2025-03-06

## 2025-02-04 RX ORDER — ZOSTER VACCINE RECOMBINANT, ADJUVANTED 50 MCG/0.5
0.5 KIT INTRAMUSCULAR SEE ADMIN INSTRUCTIONS
Qty: 0.5 ML | Refills: 0 | Status: SHIPPED | OUTPATIENT
Start: 2025-02-04 | End: 2025-08-03

## 2025-02-04 RX ORDER — DEXTROAMPHETAMINE SACCHARATE, AMPHETAMINE ASPARTATE, DEXTROAMPHETAMINE SULFATE AND AMPHETAMINE SULFATE 5; 5; 5; 5 MG/1; MG/1; MG/1; MG/1
20 TABLET ORAL 2 TIMES DAILY
Qty: 60 TABLET | Refills: 0 | Status: SHIPPED | OUTPATIENT
Start: 2025-02-04 | End: 2025-03-06

## 2025-02-04 RX ORDER — BUPROPION HYDROCHLORIDE 150 MG/1
150 TABLET ORAL EVERY MORNING
COMMUNITY
Start: 2025-02-03

## 2025-02-04 RX ORDER — DEXTROAMPHETAMINE SACCHARATE, AMPHETAMINE ASPARTATE, DEXTROAMPHETAMINE SULFATE AND AMPHETAMINE SULFATE 5; 5; 5; 5 MG/1; MG/1; MG/1; MG/1
20 TABLET ORAL 2 TIMES DAILY
Qty: 60 TABLET | Refills: 0 | Status: CANCELLED | OUTPATIENT
Start: 2025-02-04 | End: 2025-03-06

## 2025-02-04 RX ORDER — DEXTROAMPHETAMINE SACCHARATE, AMPHETAMINE ASPARTATE, DEXTROAMPHETAMINE SULFATE AND AMPHETAMINE SULFATE 5; 5; 5; 5 MG/1; MG/1; MG/1; MG/1
20 TABLET ORAL 2 TIMES DAILY
Qty: 60 TABLET | Refills: 0 | Status: SHIPPED | OUTPATIENT
Start: 2025-04-05 | End: 2025-05-05

## 2025-02-04 ASSESSMENT — PATIENT HEALTH QUESTIONNAIRE - PHQ9
SUM OF ALL RESPONSES TO PHQ QUESTIONS 1-9: 5
4. FEELING TIRED OR HAVING LITTLE ENERGY: SEVERAL DAYS
2. FEELING DOWN, DEPRESSED OR HOPELESS: SEVERAL DAYS
6. FEELING BAD ABOUT YOURSELF - OR THAT YOU ARE A FAILURE OR HAVE LET YOURSELF OR YOUR FAMILY DOWN: SEVERAL DAYS
5. POOR APPETITE OR OVEREATING: NOT AT ALL
SUM OF ALL RESPONSES TO PHQ QUESTIONS 1-9: 5
8. MOVING OR SPEAKING SO SLOWLY THAT OTHER PEOPLE COULD HAVE NOTICED. OR THE OPPOSITE, BEING SO FIGETY OR RESTLESS THAT YOU HAVE BEEN MOVING AROUND A LOT MORE THAN USUAL: NOT AT ALL
SUM OF ALL RESPONSES TO PHQ9 QUESTIONS 1 & 2: 2
9. THOUGHTS THAT YOU WOULD BE BETTER OFF DEAD, OR OF HURTING YOURSELF: NOT AT ALL
3. TROUBLE FALLING OR STAYING ASLEEP: SEVERAL DAYS
1. LITTLE INTEREST OR PLEASURE IN DOING THINGS: SEVERAL DAYS
7. TROUBLE CONCENTRATING ON THINGS, SUCH AS READING THE NEWSPAPER OR WATCHING TELEVISION: NOT AT ALL
10. IF YOU CHECKED OFF ANY PROBLEMS, HOW DIFFICULT HAVE THESE PROBLEMS MADE IT FOR YOU TO DO YOUR WORK, TAKE CARE OF THINGS AT HOME, OR GET ALONG WITH OTHER PEOPLE: NOT DIFFICULT AT ALL
SUM OF ALL RESPONSES TO PHQ QUESTIONS 1-9: 5
SUM OF ALL RESPONSES TO PHQ QUESTIONS 1-9: 5

## 2025-02-12 NOTE — PROGRESS NOTES
Medicare Annual Wellness Visit    Anita Armenta is here for 3 Month Follow-Up, COPD, ADHD, Hyperlipidemia, Hypertension, Chronic Pain, Referral - General (PT, orthopedic spine specialist, and vascular surgeon ), and Medicare AWV    Assessment & Plan  Medicare annual wellness visit, subsequent  Chronic obstructive pulmonary disease, unspecified COPD type (HCC)  Chronic Obstructive Pulmonary Disease (COPD).  She is currently on 2 liters of oxygen at home but should be on 3 liters. She is using Trelegy and albuterol as needed. She is advised to continue her current medications and increase her oxygen to 3 liters as needed.  Generalized anxiety disorder  -     ALPRAZolam (XANAX) 1 MG tablet; Take 1 tablet by mouth 2 times daily as needed for Sleep for up to 30 days. Max Daily Amount: 2 mg, Disp-60 tablet, R-2Normal  Attention deficit hyperactivity disorder (ADHD), combined type  -     amphetamine-dextroamphetamine (ADDERALL) 20 MG tablet; Take 1 tablet by mouth 2 times daily for 30 days. Max Daily Amount: 40 mg, Disp-60 tablet, R-0Normal  -     amphetamine-dextroamphetamine (ADDERALL) 20 MG tablet; Take 1 tablet by mouth 2 times daily for 30 days. Max Daily Amount: 40 mg, Disp-60 tablet, R-0Normal  -     amphetamine-dextroamphetamine (ADDERALL) 20 MG tablet; Take 1 tablet by mouth 2 times daily for 30 days. Max Daily Amount: 40 mg, Disp-60 tablet, R-0Normal  Bilateral sacroiliitis (HCC)  -     buprenorphine 20 MCG/HR PTWK; Place 1 patch onto the skin every 7 days for 30 days. Max Daily Amount: 1 patch, Disp-4 patch, R-2Normal  -     Ambulatory referral to Pain Medicine  Spinal stenosis of lumbar region with neurogenic claudication  -     buprenorphine 20 MCG/HR PTWK; Place 1 patch onto the skin every 7 days for 30 days. Max Daily Amount: 1 patch, Disp-4 patch, R-2Normal  -     gabapentin (NEURONTIN) 800 MG tablet; Take 1 tablet by mouth 2 times daily for 30 days., Disp-60 tablet, R-2Normal  -     Ambulatory referral to

## 2025-02-12 NOTE — PATIENT INSTRUCTIONS
when you apply for a job where recognizing different colors is important, such as , electronics, or the .  How are vision tests done?  Visual acuity test   You cover one eye at a time.  You read aloud from a wall chart across the room.  You read aloud from a small card that you hold in your hand.  Refraction   You look into a special device.  The device puts lenses of different strengths in front of each eye to see how strong your glasses or contact lenses need to be.  Visual field tests   Your doctor may have you look through special machines.  Or your doctor may simply have you stare straight ahead while they move a finger into and out of your field of vision.  Color vision test   You look at pieces of printed test patterns in various colors. You say what number or symbol you see.  Your doctor may have you trace the number or symbol using a pointer.  How do these tests feel?  There is very little chance of having a problem from this test. If dilating drops are used for a vision test, they may make the eyes sting and cause a medicine taste in the mouth.  Follow-up care is a key part of your treatment and safety. Be sure to make and go to all appointments, and call your doctor if you are having problems. It's also a good idea to know your test results and keep a list of the medicines you take.  Where can you learn more?  Go to https://[email or web address].net/patientEd and enter G551 to learn more about \"Learning About Vision Tests.\"  Current as of: July 31, 2024  Content Version: 14.3  © 2024 [employer].   Care instructions adapted under license by [employer]. If you have questions about a medical condition or this instruction, always ask your healthcare professional. [employer] disclaims any warranty or liability for your use of this information.         Learning About Activities of Daily Living  What are activities of daily living?     Activities of daily living (ADLs)

## 2025-02-25 ENCOUNTER — TELEPHONE (OUTPATIENT)
Age: 70
End: 2025-02-25

## 2025-02-25 NOTE — TELEPHONE ENCOUNTER
Vincent Ward called checking status of a form that was sent on 2/18. Patient is scheduled for a colonoscopy and they need a form signed for her to hold her eliquis 24 hours prior.  She is going to fax it again today.      Thank you.

## 2025-02-25 NOTE — TELEPHONE ENCOUNTER
Patient is having a colonoscopy 3/7/25. Dr. De Leon is requiring a letter from Yamini Buchanan for Patient to stop Eliquis 2 days before procedure. Please fax letter to [phone].

## 2025-02-27 NOTE — TELEPHONE ENCOUNTER
[facility] gastro calling in on status  Procedure is next week 3/7/25    Confirmed fax [phone]  Can this please be faxed over

## 2025-02-28 ENCOUNTER — TELEPHONE (OUTPATIENT)
Age: 70
End: 2025-02-28

## 2025-02-28 NOTE — TELEPHONE ENCOUNTER
Winifred from Dr De Leon office called- a form for medical clearance was faxed to office on  2/25-  I did advise that provider was out of office and will return Monday 3/3.    PT has procedure scheduled  Friday 3/7-     Please complete form and fax to Dr De Leon office. If form was not rec'd please contact the office so that they can fax another one  [phone]

## 2025-03-03 NOTE — TELEPHONE ENCOUNTER
As of 3/3/25, I still have not receive any form. After the first telephone message, Carolyn called their office and they were supposed to refax the form and we still have not received it. Carolyn has been waiting for the form. We have not receive it, we sill sign it when we get the form.

## 2025-03-03 NOTE — TELEPHONE ENCOUNTER
They can email the form if the fax is not coming through.  If you do not want to give THEM your email give a mind I do not really mind.

## 2025-03-11 ENCOUNTER — HOSPITAL ENCOUNTER (OUTPATIENT)
Dept: MRI IMAGING | Age: 70
Discharge: HOME | End: 2025-03-13
Payer: COMMERCIAL

## 2025-03-11 DIAGNOSIS — G89.29 CHRONIC MIDLINE LOW BACK PAIN WITH BILATERAL SCIATICA: ICD-10-CM

## 2025-03-11 DIAGNOSIS — M54.41 CHRONIC MIDLINE LOW BACK PAIN WITH BILATERAL SCIATICA: ICD-10-CM

## 2025-03-11 DIAGNOSIS — M54.42 CHRONIC MIDLINE LOW BACK PAIN WITH BILATERAL SCIATICA: ICD-10-CM

## 2025-03-11 DIAGNOSIS — M48.062 SPINAL STENOSIS OF LUMBAR REGION WITH NEUROGENIC CLAUDICATION: ICD-10-CM

## 2025-03-11 PROCEDURE — 72148 MRI LUMBAR SPINE W/O DYE: CPT

## 2025-03-12 NOTE — TELEPHONE ENCOUNTER
MEDICATION REFILL REQUEST     Rx Requested    Requested Prescriptions     Pending Prescriptions Disp Refills    prochlorperazine (COMPAZINE) 10 MG tablet 120 tablet 3     Sig: Take 1 tablet by mouth every 8 hours as needed (NAUSEA)         Patient's Last Office Visit   2/4/2025      Patient's Next Office Visit   Future Appointments   Date Time Provider Department Center   4/22/2025  1:30 PM SCHEDULE, [facility] RAD ONC NURSE [facility]   4/28/2025  1:15 PM Eugenie Vásquez MD [facility]   5/5/2025 11:00 AM Yamini Buchanan, APRN - CNP [facility]         Other comments   PT states that Kady sometimes makes her nauseated

## 2025-03-13 RX ORDER — PROCHLORPERAZINE MALEATE 5 MG/1
5 TABLET ORAL 2 TIMES DAILY PRN
Qty: 60 TABLET | Refills: 0 | Status: SHIPPED | OUTPATIENT
Start: 2025-03-13

## 2025-03-14 ENCOUNTER — HOSPITAL ENCOUNTER (OUTPATIENT)
Dept: GENERAL RADIOLOGY | Age: 70
Discharge: HOME | End: 2025-03-16
Payer: COMMERCIAL

## 2025-03-14 ENCOUNTER — TELEPHONE (OUTPATIENT)
Age: 70
End: 2025-03-14

## 2025-03-14 DIAGNOSIS — M81.8 IDIOPATHIC OSTEOPOROSIS: ICD-10-CM

## 2025-03-14 DIAGNOSIS — C50.912 DUCTAL CARCINOMA OF LEFT BREAST: ICD-10-CM

## 2025-03-14 DIAGNOSIS — Z79.811 USE OF AROMATASE INHIBITORS: ICD-10-CM

## 2025-03-14 DIAGNOSIS — I26.99 PULMONARY INFARCTION (HCC): ICD-10-CM

## 2025-03-14 DIAGNOSIS — G44.40 REBOUND HEADACHE: ICD-10-CM

## 2025-03-14 PROCEDURE — 73030 X-RAY EXAM OF SHOULDER: CPT

## 2025-03-18 NOTE — TELEPHONE ENCOUNTER
Shows arthritis at multiple levels. Recommend follow up in office for going over results and treatment options.

## 2025-03-25 ENCOUNTER — OFFICE VISIT (OUTPATIENT)
Age: 70
End: 2025-03-25
Payer: COMMERCIAL

## 2025-03-25 VITALS
WEIGHT: 170 LBS | TEMPERATURE: 97.5 F | HEIGHT: 62 IN | DIASTOLIC BLOOD PRESSURE: 60 MMHG | BODY MASS INDEX: 31.28 KG/M2 | SYSTOLIC BLOOD PRESSURE: 122 MMHG

## 2025-03-25 DIAGNOSIS — M47.816 FACET SYNDROME, LUMBAR: ICD-10-CM

## 2025-03-25 DIAGNOSIS — G89.29 CHRONIC LOW BACK PAIN, UNSPECIFIED BACK PAIN LATERALITY, UNSPECIFIED WHETHER SCIATICA PRESENT: Primary | ICD-10-CM

## 2025-03-25 DIAGNOSIS — M54.50 CHRONIC LOW BACK PAIN, UNSPECIFIED BACK PAIN LATERALITY, UNSPECIFIED WHETHER SCIATICA PRESENT: Primary | ICD-10-CM

## 2025-03-25 DIAGNOSIS — M47.817 LUMBOSACRAL SPONDYLOSIS WITHOUT MYELOPATHY: ICD-10-CM

## 2025-03-25 PROCEDURE — 3017F COLORECTAL CA SCREEN DOC REV: CPT | Performed by: PAIN MEDICINE

## 2025-03-25 PROCEDURE — G8399 PT W/DXA RESULTS DOCUMENT: HCPCS | Performed by: PAIN MEDICINE

## 2025-03-25 PROCEDURE — G8417 CALC BMI ABV UP PARAM F/U: HCPCS | Performed by: PAIN MEDICINE

## 2025-03-25 PROCEDURE — 99214 OFFICE O/P EST MOD 30 MIN: CPT | Performed by: PAIN MEDICINE

## 2025-03-25 PROCEDURE — 1090F PRES/ABSN URINE INCON ASSESS: CPT | Performed by: PAIN MEDICINE

## 2025-03-25 PROCEDURE — 1123F ACP DISCUSS/DSCN MKR DOCD: CPT | Performed by: PAIN MEDICINE

## 2025-03-25 PROCEDURE — 1125F AMNT PAIN NOTED PAIN PRSNT: CPT | Performed by: PAIN MEDICINE

## 2025-03-25 PROCEDURE — 1036F TOBACCO NON-USER: CPT | Performed by: PAIN MEDICINE

## 2025-03-25 PROCEDURE — 1159F MED LIST DOCD IN RCRD: CPT | Performed by: PAIN MEDICINE

## 2025-03-25 PROCEDURE — G8427 DOCREV CUR MEDS BY ELIG CLIN: HCPCS | Performed by: PAIN MEDICINE

## 2025-03-25 RX ORDER — BUPRENORPHINE 20 UG/H
PATCH TRANSDERMAL
COMMUNITY
Start: 2025-03-11

## 2025-03-25 RX ORDER — LETROZOLE 2.5 MG/1
2.5 TABLET, FILM COATED ORAL DAILY
COMMUNITY

## 2025-03-25 RX ORDER — ARIPIPRAZOLE 10 MG/1
10 TABLET ORAL EVERY MORNING
COMMUNITY
Start: 2025-03-21

## 2025-03-25 ASSESSMENT — ENCOUNTER SYMPTOMS
DIARRHEA: 0
NAUSEA: 0
CONSTIPATION: 0
BACK PAIN: 1
SHORTNESS OF BREATH: 1

## 2025-03-25 NOTE — PROGRESS NOTES
Hematological:  Does not bruise/bleed easily.   Psychiatric/Behavioral:  Positive for sleep disturbance.          Objective:     Vitals:  /60 (BP Site: Right Upper Arm, Patient Position: Sitting, BP Cuff Size: Medium Adult)   Temp 97.5 °F (36.4 °C) (Temporal)   Ht 1.575 m (5' 2\")   Wt 77.1 kg (170 lb)   BMI 31.09 kg/m² Pain Score:   8      Physical Exam  General Appearance: NAD and Conversant.  Eyes: EOM intact.  HENT: Atraumatic.  Neck: Neck is supple and Trachea midline.  Lymph: No supraclavicular lymphadenopathy.  Lungs: Normal respiratory effort and No significant respiratory distress.  Cardiovascular: Capillary refill is less than 2 seconds.  Skin: Visualized skin is intact.  Psych: Mood and affect within normal limits, Insight and judgement within normal limits and Alert and oriented.  Inspection of the spine reveals no gross abnormalities in terms of curvature.   Muscle Tone is within normal limits in all four extremities.  Strength: Functional strength noted throughout.  Neurologic:  Coordination is within normal limits.  Gait is not within normal limits.  Difficulty with heel walking, toe walking and tandem walking.    TTP over the bilateral lumbar paraspinal musculature.  Positive facet loading noted bilaterally.  SLR is negative bilaterally.       TTP noted over the bilateral sacroiliac joints.  SPENCER maneuver reproduces symptoms over bilateral sacroiliac joints.  SI Joint Compression Test is positive.   Gaenslen's Test is positive.   Thigh Thrust is positive.       DATA REVIEW:     MRI LUMBAR SPINE 3/11/2025:  MRI LUMBAR SPINE WO CONTRAST [[id number]]  Status: Final result     PACS Images     Show images for MRI LUMBAR SPINE WO CONTRAST  MRI LUMBAR SPINE WO CONTRAST  Order: [id number]   Status: Final result       Next appt: 04/22/2025 at 01:30 PM in Radiation Oncology (SCHEDULE, [facility] RAD ONC NURSE)       Dx: Spinal stenosis of lumbar region with...    Test Result Released: Yes (not seen)    0

## 2025-03-27 ENCOUNTER — TELEPHONE (OUTPATIENT)
Age: 70
End: 2025-03-27

## 2025-03-27 NOTE — TELEPHONE ENCOUNTER
REFERRAL # [id number]    BILAT L345 RFA     AUTH FROM 4/2/25-7/2/25    OK to schedule procedure approved as above.   Please note sides/levels approved and date range.   (If applicable, sides/levels approved may differ from those ordered)    TO BE SCHEDULED WITH DR DICKEY

## 2025-03-28 NOTE — TELEPHONE ENCOUNTER
Call placed to patient she did not have her calendar with her so she stated she would have to call us back to schedule when she has her calendar.

## 2025-03-31 NOTE — TELEPHONE ENCOUNTER
3rd attempt.   LMOM for a return call to schedule RFA.  BILAT L345 RFA      AUTH FROM 4/2/25-7/2/25         Imaging Studies

## 2025-04-15 ENCOUNTER — TELEPHONE (OUTPATIENT)
Age: 70
End: 2025-04-15

## 2025-04-15 NOTE — TELEPHONE ENCOUNTER
Patient calling about the buprenorphine patches. The pharmacy told her a PA is required. Has this been started? Patient is leaving for vacation  on Sunday, 4/20.

## 2025-04-16 NOTE — TELEPHONE ENCOUNTER
I did not get any notice for a PA, bit did start one today 4/16/25. It can take from 1-14 business days

## 2025-04-28 ENCOUNTER — OFFICE VISIT (OUTPATIENT)
Age: 70
End: 2025-04-28

## 2025-04-28 VITALS
BODY MASS INDEX: 27.46 KG/M2 | SYSTOLIC BLOOD PRESSURE: 151 MMHG | HEIGHT: 65 IN | DIASTOLIC BLOOD PRESSURE: 60 MMHG | WEIGHT: 164.8 LBS | TEMPERATURE: 97.2 F | OXYGEN SATURATION: 94 % | HEART RATE: 76 BPM

## 2025-04-28 DIAGNOSIS — E66.3 OVERWEIGHT (BMI 25.0-29.9): ICD-10-CM

## 2025-04-28 DIAGNOSIS — D05.12 BREAST NEOPLASM, TIS (DCIS), LEFT: Primary | ICD-10-CM

## 2025-04-28 NOTE — PROGRESS NOTES
Reason for today's visit:  Cancer Follow-Up        Palpates a breast change? no  Nipple discharge: no  Breast Pain: no  Breast Mass: no  Swelling in either upper extremity? no    Wellness:    Self breast self exams: yes   Regular exercise routine: yes   Following Lymphedema awareness/precautions: yes   Managing stress: yes   Stress level on a scale of 1 - 10: 7    Instruction:       Follow up per provider  Imaging per provider  Monthly self breast exams  Healthy diet  Exercise program  Lymphedema precautions/awareness    Other:    Requisitions needed:no  Bras/prosthesis: no  Compression Sleeve/glove: no  Therapy: no  PT/OT/Lymphedema: no  Follow up as advised per Dr Vásquez            5/20/2024     2:29 PM   Lymphedema Screening   LEFT 10 cm (Hand) 19   LEFT 20 cm (Wrist) 16.5   LEFT 30 cm (Forearm) 21   LEFT 60 cm (Upper Arm) 27

## 2025-04-28 NOTE — PROGRESS NOTES
PATIENT:    Anita Armenta     DATE:      4/28/2025     TIME: 2:09 PM     Subjective:     Anita Armenta presents for follow-up of breast cancer. She has fullness near scar. She is on blood thinners.    The breast cancer is  Cancer Staging   Breast neoplasm, Tis (DCIS), left  Staging form: Breast, AJCC 8th Edition  - Clinical: Stage 0 (cTis (DCIS), cN0, cM0, G2, ER+, NH+) - Signed by Eugenie Vásquez MD on 8/17/2023  - Pathologic: pT1a, pN0(sn), cM0, ER+, NH+, HER2- - Signed by Po Maynard MD on 10/10/2023        Patient's medications, allergies, past medical, surgical, social and family histories were reviewed and updated as appropriate.    Current Outpatient Medications on File Prior to Visit   Medication Sig Dispense Refill    melatonin 5 MG TABS tablet Take 1 tablet by mouth nightly at bedtime.      letrozole (FEMARA) 2.5 MG tablet Take 1 tablet by mouth daily      buprenorphine 20 MCG/HR PTWK Place 1 patch onto the skin every 7 days for 30 days. Max Daily Amount 1 patch      ARIPiprazole (ABILIFY) 10 MG tablet Take 1 tablet by mouth every morning      prochlorperazine (COMPAZINE) 5 MG tablet Take 1 tablet by mouth 2 times daily as needed (NAUSEA) 60 tablet 0    buPROPion (WELLBUTRIN XL) 150 MG extended release tablet Take 1 tablet by mouth every morning      amphetamine-dextroamphetamine (ADDERALL) 20 MG tablet Take 1 tablet by mouth 2 times daily for 30 days. Max Daily Amount: 40 mg 60 tablet 0    amphetamine-dextroamphetamine (ADDERALL) 20 MG tablet Take 1 tablet by mouth 2 times daily for 30 days. Max Daily Amount: 40 mg 60 tablet 0    amphetamine-dextroamphetamine (ADDERALL) 20 MG tablet Take 1 tablet by mouth 2 times daily for 30 days. Max Daily Amount: 40 mg 60 tablet 0    gabapentin (NEURONTIN) 800 MG tablet Take 1 tablet by mouth 2 times daily for 30 days. 60 tablet 2    Tirzepatide 2.5 MG/0.5ML SOAJ Inject 2.5 mg into the skin every 7 days 2 mL 5    zoster recombinant adjuvanted vaccine (SHINGRIX) 50

## 2025-05-01 ENCOUNTER — HOSPITAL ENCOUNTER (OUTPATIENT)
Dept: RADIATION ONCOLOGY | Age: 70
Discharge: HOME | End: 2025-05-01
Payer: COMMERCIAL

## 2025-05-01 VITALS
DIASTOLIC BLOOD PRESSURE: 65 MMHG | BODY MASS INDEX: 27.59 KG/M2 | RESPIRATION RATE: 18 BRPM | WEIGHT: 165.8 LBS | OXYGEN SATURATION: 93 % | TEMPERATURE: 97.5 F | HEART RATE: 74 BPM | SYSTOLIC BLOOD PRESSURE: 151 MMHG

## 2025-05-01 DIAGNOSIS — D05.12 BREAST NEOPLASM, TIS (DCIS), LEFT: Primary | ICD-10-CM

## 2025-05-01 PROCEDURE — 99212 OFFICE O/P EST SF 10 MIN: CPT | Performed by: RADIOLOGY

## 2025-05-01 PROCEDURE — G2211 COMPLEX E/M VISIT ADD ON: HCPCS | Performed by: RADIOLOGY

## 2025-05-01 PROCEDURE — 99214 OFFICE O/P EST MOD 30 MIN: CPT | Performed by: RADIOLOGY

## 2025-05-01 NOTE — PROGRESS NOTES
NURSING ASSESSMENT     Date: 5/1/2025        Patient Name: Anita Armenta     YOB: 1955      Age:  69 y.o.      MRN: [id number]       Chaperone [] Yes   [x] No      Advance Directives:   Do you currently have completed advance directives (living will)? [] Yes   [x] No         *If yes, please bring us a copy for your records.  *If no, would you like info or assistance in completing advance directives (living will)?   [] Yes   [x] No    Pain Score:   Pain Score (1-10): 7  Pain Location: lower back for years and behind right knee for several months  Pain Duration: daily  Pain Management/Control: buprenorphine patch takes the edge off      Is pain affecting your ability to take care of yourself or move throughout your home?                        [] Yes   [x] No    General: Fatigue  Patient has gained weight [] Yes   [] No  Patient has lost weight [] Yes   [] No  How much weight in pounds and over what length of time:     Eyes (Ophthalmic): No Problem     Skin (Dermatological): No Problems     ENT: No Problems     Respiratory: SABA with activity, wears oxygen at 2-3 liters nc continuously     Cardiovascular: No Problems      Device   [] Yes   [x] No   Copy of Card Obtained [] Yes   [x] No    Gastrointestinal: Constipation, takes linzess and takes miralax prn    Genito-Urinary: No Problems     Breast: has firm area to left outer breast, Dr Vásquez drained seroma 4/28/25 for a \"syringe full\" of serosang fluid, has healing tape burns to right tail of breast. Pt applying bacitracin     Musculoskeletal: Back Pain, chronic and posterior knee pain past several months    Neurological: Headaches top of head x1 week, ibuprofen works for about an hour      Hematological and Lymphatic: No Problems     Endocrine: No Problems , takes letrozole with few hot flashes    Gyn History: none      A 10-point review of systems  has been conducted and pertinent positives have been   recorded. All other review of systems are

## 2025-05-05 ENCOUNTER — OFFICE VISIT (OUTPATIENT)
Age: 70
End: 2025-05-05
Payer: COMMERCIAL

## 2025-05-05 ENCOUNTER — TELEPHONE (OUTPATIENT)
Age: 70
End: 2025-05-05

## 2025-05-05 VITALS
HEIGHT: 65 IN | DIASTOLIC BLOOD PRESSURE: 64 MMHG | SYSTOLIC BLOOD PRESSURE: 126 MMHG | WEIGHT: 166 LBS | BODY MASS INDEX: 27.66 KG/M2

## 2025-05-05 DIAGNOSIS — M48.062 SPINAL STENOSIS OF LUMBAR REGION WITH NEUROGENIC CLAUDICATION: ICD-10-CM

## 2025-05-05 DIAGNOSIS — M54.42 CHRONIC MIDLINE LOW BACK PAIN WITH BILATERAL SCIATICA: ICD-10-CM

## 2025-05-05 DIAGNOSIS — M79.7 FIBROMYALGIA: ICD-10-CM

## 2025-05-05 DIAGNOSIS — F51.01 PRIMARY INSOMNIA: ICD-10-CM

## 2025-05-05 DIAGNOSIS — G89.29 CHRONIC MIDLINE LOW BACK PAIN WITH BILATERAL SCIATICA: ICD-10-CM

## 2025-05-05 DIAGNOSIS — F41.1 GENERALIZED ANXIETY DISORDER: Primary | ICD-10-CM

## 2025-05-05 DIAGNOSIS — M54.41 CHRONIC MIDLINE LOW BACK PAIN WITH BILATERAL SCIATICA: ICD-10-CM

## 2025-05-05 DIAGNOSIS — L57.0 ACTINIC KERATOSIS: ICD-10-CM

## 2025-05-05 DIAGNOSIS — F90.2 ATTENTION DEFICIT HYPERACTIVITY DISORDER (ADHD), COMBINED TYPE: ICD-10-CM

## 2025-05-05 DIAGNOSIS — M79.7 FIBROMYALGIA: Primary | ICD-10-CM

## 2025-05-05 DIAGNOSIS — L21.9 SEBORRHEIC DERMATITIS: ICD-10-CM

## 2025-05-05 PROCEDURE — 99214 OFFICE O/P EST MOD 30 MIN: CPT | Performed by: NURSE PRACTITIONER

## 2025-05-05 PROCEDURE — 1123F ACP DISCUSS/DSCN MKR DOCD: CPT | Performed by: NURSE PRACTITIONER

## 2025-05-05 PROCEDURE — G8427 DOCREV CUR MEDS BY ELIG CLIN: HCPCS | Performed by: NURSE PRACTITIONER

## 2025-05-05 PROCEDURE — 1090F PRES/ABSN URINE INCON ASSESS: CPT | Performed by: NURSE PRACTITIONER

## 2025-05-05 PROCEDURE — 1036F TOBACCO NON-USER: CPT | Performed by: NURSE PRACTITIONER

## 2025-05-05 PROCEDURE — G8399 PT W/DXA RESULTS DOCUMENT: HCPCS | Performed by: NURSE PRACTITIONER

## 2025-05-05 PROCEDURE — 3017F COLORECTAL CA SCREEN DOC REV: CPT | Performed by: NURSE PRACTITIONER

## 2025-05-05 PROCEDURE — 1159F MED LIST DOCD IN RCRD: CPT | Performed by: NURSE PRACTITIONER

## 2025-05-05 PROCEDURE — G8417 CALC BMI ABV UP PARAM F/U: HCPCS | Performed by: NURSE PRACTITIONER

## 2025-05-05 RX ORDER — ALPRAZOLAM 1 MG/1
1 TABLET ORAL 2 TIMES DAILY PRN
COMMUNITY
Start: 2025-04-29 | End: 2025-05-05

## 2025-05-05 RX ORDER — DEXTROAMPHETAMINE SACCHARATE, AMPHETAMINE ASPARTATE, DEXTROAMPHETAMINE SULFATE AND AMPHETAMINE SULFATE 5; 5; 5; 5 MG/1; MG/1; MG/1; MG/1
20 TABLET ORAL 2 TIMES DAILY
Qty: 60 TABLET | Refills: 0 | Status: SHIPPED | OUTPATIENT
Start: 2025-06-23 | End: 2025-07-23

## 2025-05-05 RX ORDER — BUPRENORPHINE 20 UG/H
20 PATCH TRANSDERMAL
Qty: 4 PATCH | Refills: 2 | Status: SHIPPED | OUTPATIENT
Start: 2025-05-15 | End: 2025-05-05

## 2025-05-05 RX ORDER — ALPRAZOLAM 1 MG/1
1 TABLET ORAL 2 TIMES DAILY PRN
Qty: 60 TABLET | Refills: 2 | Status: SHIPPED | OUTPATIENT
Start: 2025-05-28 | End: 2025-06-27

## 2025-05-05 RX ORDER — GABAPENTIN 800 MG/1
800 TABLET ORAL 2 TIMES DAILY
Qty: 60 TABLET | Refills: 2 | Status: SHIPPED | OUTPATIENT
Start: 2025-05-28 | End: 2025-06-27

## 2025-05-05 RX ORDER — BUPRENORPHINE 20 UG/H
1 PATCH TRANSDERMAL
Qty: 4 PATCH | Refills: 2 | Status: SHIPPED | OUTPATIENT
Start: 2025-05-05 | End: 2025-06-04

## 2025-05-05 RX ORDER — DEXTROAMPHETAMINE SACCHARATE, AMPHETAMINE ASPARTATE, DEXTROAMPHETAMINE SULFATE AND AMPHETAMINE SULFATE 5; 5; 5; 5 MG/1; MG/1; MG/1; MG/1
20 TABLET ORAL 2 TIMES DAILY
Qty: 60 TABLET | Refills: 0 | Status: SHIPPED | OUTPATIENT
Start: 2025-07-22 | End: 2025-08-21

## 2025-05-05 RX ORDER — DEXTROAMPHETAMINE SACCHARATE, AMPHETAMINE ASPARTATE, DEXTROAMPHETAMINE SULFATE AND AMPHETAMINE SULFATE 5; 5; 5; 5 MG/1; MG/1; MG/1; MG/1
20 TABLET ORAL 2 TIMES DAILY
Qty: 60 TABLET | Refills: 0 | Status: SHIPPED | OUTPATIENT
Start: 2025-05-25 | End: 2025-06-24

## 2025-05-05 NOTE — TELEPHONE ENCOUNTER
Pharmacy called in to get clarity on a buprenorphine 20 MCG/HR PTWK  they state the instruction came over to place 20 patches on the skin every 7 day's.     They are asking for a new RX to be sent over.

## 2025-05-05 NOTE — PROGRESS NOTES
[facility]           Radiation Oncology      [id number] [place], [place] [id number]        O: [phone]        F: [phone]       [facility]                   Dr. Po Maynard MD PhD    FOLLOW-UP NOTE     Date of Service: 2025  Patient ID: Anita Armenta   : 1955  MRN: [id number]   Acct Number: [id number]       NAME:  Anita Armenta    :  1955 69 y.o. female     PCP: Yamini Buchanan APRN - CNP    REFERRING PROVIDER: Fahad    DIAGNOSIS:  1. Breast neoplasm, Tis (DCIS), left        STAGING: Cancer Staging   Breast neoplasm, Tis (DCIS), left  Staging form: Breast, AJCC 8th Edition  - Clinical: Stage 0 (cTis (DCIS), cN0, cM0, G2, ER+, MA+) - Signed by Eugenie Vásquez MD on 2023  - Pathologic: pT1a, pN0(sn), cM0, ER+, MA+, HER2- - Signed by Po Maynard MD on 10/10/2023      PRIOR TREATMENT: 2250 cGy in 3 fractions to the lumpectomy cavity on the left breast     TIME SINCE TREATMENT:  18 months     RECENT HISTORY: Anita Armenta returns for routine follow-up approximately 18 months status post completion of adjuvant CATIE-based APBI brachytherapy for the above diagnosis.  She tolerated therapy well and the incision from the prior CATIE catheter insertion site is well-healed. She denies any constitutional symptoms or fevers, chills, night with, or other complaint at this time.  She was on anastrozole but stopped it secondary to headaches and the headaches subsequently got better.  She was then switched to tamoxifen however the patient has also stopped this.  She denies any breast specific issues other than occasional left breast discomfort and also an area of induration and pain at the left lumpectomy cavity that is worsened since her mammogram.  On 2024 she had a bilateral mammogram which was read as BI-RADS 2, benign.  She does note baseline back pain and is going to see an orthopedic surgeon to have this further evaluated.  On 3/14/2025 x-ray of

## 2025-05-11 DIAGNOSIS — G57.93 NEUROPATHY INVOLVING BOTH LOWER EXTREMITIES: ICD-10-CM

## 2025-05-11 DIAGNOSIS — F33.1 MODERATE EPISODE OF RECURRENT MAJOR DEPRESSIVE DISORDER (HCC): ICD-10-CM

## 2025-05-11 DIAGNOSIS — M79.7 FIBROMYALGIA: ICD-10-CM

## 2025-05-11 DIAGNOSIS — F41.1 GENERALIZED ANXIETY DISORDER: ICD-10-CM

## 2025-05-12 RX ORDER — DULOXETIN HYDROCHLORIDE 60 MG/1
CAPSULE, DELAYED RELEASE ORAL DAILY
Qty: 90 CAPSULE | Refills: 1 | Status: SHIPPED | OUTPATIENT
Start: 2025-05-12

## 2025-05-12 NOTE — PROGRESS NOTES
Other Sister         \"breathing issues\"    Diabetes Brother     Breast Cancer Maternal Aunt 29    Crohn's Disease Son     Thyroid Disease Daughter      No Known Allergies  Current Outpatient Medications   Medication Sig Dispense Refill    [START ON 5/28/2025] ALPRAZolam (XANAX) 1 MG tablet Take 1 tablet by mouth 2 times daily as needed for Sleep or Anxiety for up to 30 days. Max Daily Amount: 2 mg 60 tablet 2    [START ON 5/25/2025] amphetamine-dextroamphetamine (ADDERALL) 20 MG tablet Take 1 tablet by mouth 2 times daily for 30 days. Max Daily Amount: 40 mg 60 tablet 0    [START ON 6/23/2025] amphetamine-dextroamphetamine (ADDERALL) 20 MG tablet Take 1 tablet by mouth 2 times daily for 30 days. Max Daily Amount: 40 mg 60 tablet 0    [START ON 7/22/2025] amphetamine-dextroamphetamine (ADDERALL) 20 MG tablet Take 1 tablet by mouth 2 times daily for 30 days. Max Daily Amount: 40 mg 60 tablet 0    [START ON 5/28/2025] gabapentin (NEURONTIN) 800 MG tablet Take 1 tablet by mouth 2 times daily for 30 days. 60 tablet 2    Tirzepatide (MOUNJARO) 5 MG/0.5ML SOAJ pen Inject 5 mg into the skin every 7 days 2 mL 5    letrozole (FEMARA) 2.5 MG tablet Take 1 tablet by mouth daily      ARIPiprazole (ABILIFY) 10 MG tablet Take 1 tablet by mouth every morning      buPROPion (WELLBUTRIN XL) 150 MG extended release tablet Take 1 tablet by mouth every morning      BREZTRI AEROSPHERE 160-9-4.8 MCG/ACT AERO Inhale 2 puffs into the lungs in the morning and at bedtime      apixaban (ELIQUIS) 5 MG TABS tablet Take 1 tablet by mouth 2 times daily 180 tablet 1    furosemide (LASIX) 20 MG tablet Take 1 tablet by mouth daily 60 tablet 5    albuterol sulfate HFA (PROVENTIL;VENTOLIN;PROAIR) 108 (90 Base) MCG/ACT inhaler Inhale 2 puffs into the lungs every 4 hours as needed      ipratropium 0.5 mg-albuterol 2.5 mg (DUONEB) 0.5-2.5 (3) MG/3ML SOLN nebulizer solution Inhale 3 mLs into the lungs every 4 hours as needed for Shortness of Breath 360

## 2025-05-26 DIAGNOSIS — E78.2 MIXED HYPERLIPIDEMIA: ICD-10-CM

## 2025-05-27 RX ORDER — ROSUVASTATIN CALCIUM 10 MG/1
10 TABLET, COATED ORAL DAILY
Qty: 30 TABLET | Refills: 0 | Status: SHIPPED | OUTPATIENT
Start: 2025-05-27

## 2025-06-23 DIAGNOSIS — D50.9 IRON DEFICIENCY ANEMIA, UNSPECIFIED IRON DEFICIENCY ANEMIA TYPE: Primary | ICD-10-CM

## 2025-06-30 ENCOUNTER — HOSPITAL ENCOUNTER (OUTPATIENT)
Dept: INFUSION THERAPY | Age: 70
Setting detail: INFUSION SERIES
Discharge: HOME | End: 2025-06-30
Payer: COMMERCIAL

## 2025-06-30 VITALS
RESPIRATION RATE: 20 BRPM | SYSTOLIC BLOOD PRESSURE: 135 MMHG | DIASTOLIC BLOOD PRESSURE: 54 MMHG | HEART RATE: 84 BPM | OXYGEN SATURATION: 94 % | TEMPERATURE: 97.3 F

## 2025-06-30 DIAGNOSIS — D50.9 IRON DEFICIENCY ANEMIA, UNSPECIFIED IRON DEFICIENCY ANEMIA TYPE: Primary | ICD-10-CM

## 2025-06-30 PROCEDURE — 2500000003 HC RX 250 WO HCPCS: Performed by: INTERNAL MEDICINE

## 2025-06-30 PROCEDURE — 2580000003 HC RX 258: Performed by: INTERNAL MEDICINE

## 2025-06-30 PROCEDURE — 6360000002 HC RX W HCPCS: Performed by: INTERNAL MEDICINE

## 2025-06-30 PROCEDURE — 96375 TX/PRO/DX INJ NEW DRUG ADDON: CPT

## 2025-06-30 PROCEDURE — 96365 THER/PROPH/DIAG IV INF INIT: CPT

## 2025-06-30 RX ADMIN — METHYLPREDNISOLONE SODIUM SUCCINATE 40 MG: 40 INJECTION, POWDER, LYOPHILIZED, FOR SOLUTION INTRAMUSCULAR; INTRAVENOUS at 13:34

## 2025-06-30 RX ADMIN — SODIUM CHLORIDE 125 MG: 9 INJECTION, SOLUTION INTRAVENOUS at 13:53

## 2025-06-30 NOTE — PROGRESS NOTES
Ferrlecit complete. Pt is in observation period now.    Electronically signed by Marizol Upton RN on 6/30/2025 at 3:08 PM

## 2025-06-30 NOTE — PROGRESS NOTES
[facility] OUTPATIENT INFUSION CENTER     PT arrived via:  [] Ambulatory         [x] Wheelchair  [x]With transport                [] Other:     [x]PT oriented to room and call light in reach.   [x] Vital signs obtained and are stable.   [x]Medication education provided and reviewed with patient.             .

## 2025-06-30 NOTE — PROGRESS NOTES
Pt educated on signs and symptoms of medication reactions. Patient verbalized understanding, no further questions.     Electronically signed by Marizol Upton RN on 6/30/2025 at 1:36 PM

## 2025-06-30 NOTE — PROGRESS NOTES
Observation complete. Denies any adverse reactions at this time. Patient left unit with transport. All equipment used in the care for this patient has been cleaned.

## 2025-07-02 DIAGNOSIS — J44.9 CHRONIC OBSTRUCTIVE PULMONARY DISEASE, UNSPECIFIED COPD TYPE (HCC): ICD-10-CM

## 2025-07-02 RX ORDER — PREDNISONE 5 MG/1
5 TABLET ORAL DAILY
Qty: 30 TABLET | Refills: 0 | Status: SHIPPED | OUTPATIENT
Start: 2025-07-02

## 2025-07-07 ENCOUNTER — HOSPITAL ENCOUNTER (OUTPATIENT)
Dept: INFUSION THERAPY | Age: 70
Setting detail: INFUSION SERIES
Discharge: HOME | End: 2025-07-07
Payer: COMMERCIAL

## 2025-07-07 VITALS
DIASTOLIC BLOOD PRESSURE: 65 MMHG | TEMPERATURE: 98.1 F | HEART RATE: 86 BPM | OXYGEN SATURATION: 100 % | SYSTOLIC BLOOD PRESSURE: 127 MMHG | RESPIRATION RATE: 18 BRPM

## 2025-07-07 DIAGNOSIS — D50.9 IRON DEFICIENCY ANEMIA, UNSPECIFIED IRON DEFICIENCY ANEMIA TYPE: Primary | ICD-10-CM

## 2025-07-07 LAB
ALBUMIN SERPL-MCNC: 4 G/DL (ref 3.5–4.6)
ALP SERPL-CCNC: 79 U/L (ref 40–130)
ALT SERPL-CCNC: 21 U/L (ref 0–33)
ANION GAP SERPL CALCULATED.3IONS-SCNC: 11 MEQ/L (ref 9–15)
AST SERPL-CCNC: 33 U/L (ref 0–35)
BILIRUB SERPL-MCNC: <0.2 MG/DL (ref 0.2–0.7)
BUN SERPL-MCNC: 9 MG/DL (ref 8–23)
CALCIUM SERPL-MCNC: 8.8 MG/DL (ref 8.5–9.9)
CHLORIDE SERPL-SCNC: 109 MEQ/L (ref 95–107)
CO2 SERPL-SCNC: 23 MEQ/L (ref 20–31)
CREAT SERPL-MCNC: 0.89 MG/DL (ref 0.5–0.9)
GLOBULIN SER CALC-MCNC: 2.5 G/DL (ref 2.3–3.5)
GLUCOSE SERPL-MCNC: 94 MG/DL (ref 70–99)
POTASSIUM SERPL-SCNC: 3.6 MEQ/L (ref 3.4–4.9)
PROT SERPL-MCNC: 6.5 G/DL (ref 6.3–8)
SODIUM SERPL-SCNC: 143 MEQ/L (ref 135–144)

## 2025-07-07 PROCEDURE — 96365 THER/PROPH/DIAG IV INF INIT: CPT

## 2025-07-07 PROCEDURE — 2500000003 HC RX 250 WO HCPCS: Performed by: INTERNAL MEDICINE

## 2025-07-07 PROCEDURE — 80053 COMPREHEN METABOLIC PANEL: CPT

## 2025-07-07 PROCEDURE — 6360000002 HC RX W HCPCS: Performed by: INTERNAL MEDICINE

## 2025-07-07 PROCEDURE — 96375 TX/PRO/DX INJ NEW DRUG ADDON: CPT

## 2025-07-07 PROCEDURE — 2580000003 HC RX 258: Performed by: INTERNAL MEDICINE

## 2025-07-07 RX ADMIN — METHYLPREDNISOLONE SODIUM SUCCINATE 40 MG: 40 INJECTION, POWDER, LYOPHILIZED, FOR SOLUTION INTRAMUSCULAR; INTRAVENOUS at 13:20

## 2025-07-07 RX ADMIN — SODIUM CHLORIDE 125 MG: 9 INJECTION, SOLUTION INTRAVENOUS at 13:33

## 2025-07-07 NOTE — PROGRESS NOTES
CMP collected and sent. IV started. Premeds given. Call light in reach.    Electronically signed by Marizol Upton RN on 7/7/2025 at 1:23 PM

## 2025-07-07 NOTE — PROGRESS NOTES
Infusion initiated.  Pt denies any needs or complaints. Call light in reach.     Electronically signed by Marizol Upton RN on 7/7/2025 at 1:33 PM

## 2025-07-07 NOTE — PROGRESS NOTES
Pt tolerated infusion. She left unit in wheelchair with friend. All equipment used in the care for this patient has been cleaned.    Electronically signed by Marizol Upton RN on 7/7/2025 at 2:52 PM

## 2025-07-07 NOTE — PROGRESS NOTES
[facility] OUTPATIENT INFUSION CENTER     PT arrived via:  [] Ambulatory         [x] Wheelchair  []With transport                [] Other:     [x]PT oriented to room and call light in reach.   [x] Vital signs obtained and are stable.   [x]Medication education provided and reviewed with patient.             .

## 2025-07-15 ENCOUNTER — HOSPITAL ENCOUNTER (OUTPATIENT)
Dept: INFUSION THERAPY | Age: 70
Setting detail: INFUSION SERIES
Discharge: HOME | End: 2025-07-15
Payer: COMMERCIAL

## 2025-07-15 VITALS
SYSTOLIC BLOOD PRESSURE: 131 MMHG | DIASTOLIC BLOOD PRESSURE: 56 MMHG | TEMPERATURE: 97 F | RESPIRATION RATE: 20 BRPM | OXYGEN SATURATION: 98 % | HEART RATE: 77 BPM

## 2025-07-15 DIAGNOSIS — D50.9 IRON DEFICIENCY ANEMIA, UNSPECIFIED IRON DEFICIENCY ANEMIA TYPE: Primary | ICD-10-CM

## 2025-07-15 PROCEDURE — 96375 TX/PRO/DX INJ NEW DRUG ADDON: CPT

## 2025-07-15 PROCEDURE — 2500000003 HC RX 250 WO HCPCS: Performed by: INTERNAL MEDICINE

## 2025-07-15 PROCEDURE — 96365 THER/PROPH/DIAG IV INF INIT: CPT

## 2025-07-15 PROCEDURE — 2580000003 HC RX 258: Performed by: INTERNAL MEDICINE

## 2025-07-15 PROCEDURE — 6360000002 HC RX W HCPCS: Performed by: INTERNAL MEDICINE

## 2025-07-15 RX ADMIN — METHYLPREDNISOLONE SODIUM SUCCINATE 40 MG: 40 INJECTION, POWDER, LYOPHILIZED, FOR SOLUTION INTRAMUSCULAR; INTRAVENOUS at 13:04

## 2025-07-15 RX ADMIN — SODIUM CHLORIDE 125 MG: 9 INJECTION, SOLUTION INTRAVENOUS at 13:20

## 2025-07-15 NOTE — PROGRESS NOTES
Solumedrol given. Call light in reach.    Electronically signed by Marizol Upton RN on 7/15/2025 at 1:07 PM

## 2025-07-15 NOTE — PROGRESS NOTES
[facility] OUTPATIENT INFUSION CENTER     PT arrived via:  [] Ambulatory         [x] Wheelchair  []With transport                [] Other:     [x]PT oriented to room and call light in reach.   [x] Vital signs obtained and are stable.   [x]Medication education provided and reviewed with patient.             .

## 2025-07-15 NOTE — PROGRESS NOTES
Pt tolerated infusion. She left unit in wheelchair with her friend. All equipment used in the care for this patient has been cleaned.

## 2025-07-17 ENCOUNTER — TRANSCRIBE ORDERS (OUTPATIENT)
Dept: ADMINISTRATIVE | Age: 70
End: 2025-07-17

## 2025-07-17 ENCOUNTER — HOSPITAL ENCOUNTER (OUTPATIENT)
Dept: CT IMAGING | Age: 70
Discharge: HOME | End: 2025-07-19
Payer: COMMERCIAL

## 2025-07-17 DIAGNOSIS — Z12.31 ENCOUNTER FOR SCREENING MAMMOGRAM FOR MALIGNANT NEOPLASM OF BREAST: Primary | ICD-10-CM

## 2025-07-17 DIAGNOSIS — I26.94 MULTIPLE SUBSEGMENTAL PULMONARY EMBOLI WITHOUT ACUTE COR PULMONALE (HCC): ICD-10-CM

## 2025-07-17 PROCEDURE — 71270 CT THORAX DX C-/C+: CPT

## 2025-07-17 PROCEDURE — 6360000004 HC RX CONTRAST MEDICATION: Performed by: RADIOLOGY

## 2025-07-17 RX ORDER — IOPAMIDOL 755 MG/ML
100 INJECTION, SOLUTION INTRAVASCULAR
Status: COMPLETED | OUTPATIENT
Start: 2025-07-17 | End: 2025-07-17

## 2025-07-17 RX ADMIN — IOPAMIDOL 66 ML: 755 INJECTION, SOLUTION INTRAVENOUS at 11:38

## 2025-07-21 ENCOUNTER — HOSPITAL ENCOUNTER (OUTPATIENT)
Dept: INFUSION THERAPY | Age: 70
Setting detail: INFUSION SERIES
Discharge: HOME | End: 2025-07-21
Payer: COMMERCIAL

## 2025-07-21 VITALS
HEART RATE: 78 BPM | DIASTOLIC BLOOD PRESSURE: 52 MMHG | RESPIRATION RATE: 16 BRPM | TEMPERATURE: 97 F | SYSTOLIC BLOOD PRESSURE: 117 MMHG | OXYGEN SATURATION: 94 %

## 2025-07-21 DIAGNOSIS — D50.9 IRON DEFICIENCY ANEMIA, UNSPECIFIED IRON DEFICIENCY ANEMIA TYPE: Primary | ICD-10-CM

## 2025-07-21 PROCEDURE — 2580000003 HC RX 258: Performed by: INTERNAL MEDICINE

## 2025-07-21 PROCEDURE — 2500000003 HC RX 250 WO HCPCS: Performed by: INTERNAL MEDICINE

## 2025-07-21 PROCEDURE — 96375 TX/PRO/DX INJ NEW DRUG ADDON: CPT

## 2025-07-21 PROCEDURE — 96365 THER/PROPH/DIAG IV INF INIT: CPT

## 2025-07-21 PROCEDURE — 6360000002 HC RX W HCPCS: Performed by: INTERNAL MEDICINE

## 2025-07-21 RX ADMIN — METHYLPREDNISOLONE SODIUM SUCCINATE 40 MG: 40 INJECTION, POWDER, LYOPHILIZED, FOR SOLUTION INTRAMUSCULAR; INTRAVENOUS at 13:56

## 2025-07-21 RX ADMIN — SODIUM CHLORIDE 125 MG: 9 INJECTION, SOLUTION INTRAVENOUS at 14:07

## 2025-07-21 NOTE — PROGRESS NOTES
[facility] OUTPATIENT INFUSION CENTER     PT arrived via:  [] Ambulatory         [x] Wheelchair  []With transport                [] Other:     [x]PT oriented to room and call light in reach.   [x] Vital signs obtained and are stable.   [x]Medication education provided and reviewed with patient.             .

## 2025-07-21 NOTE — PROGRESS NOTES
Patient tolerated infusion. She left unit in wheelchair with her friend. All equipment used in the care for this patient has been cleaned.

## 2025-07-24 DIAGNOSIS — J44.9 CHRONIC OBSTRUCTIVE PULMONARY DISEASE, UNSPECIFIED COPD TYPE (HCC): ICD-10-CM

## 2025-07-25 DIAGNOSIS — M54.42 CHRONIC MIDLINE LOW BACK PAIN WITH BILATERAL SCIATICA: ICD-10-CM

## 2025-07-25 DIAGNOSIS — F90.2 ATTENTION DEFICIT HYPERACTIVITY DISORDER (ADHD), COMBINED TYPE: ICD-10-CM

## 2025-07-25 DIAGNOSIS — M79.7 FIBROMYALGIA: ICD-10-CM

## 2025-07-25 DIAGNOSIS — G89.29 CHRONIC MIDLINE LOW BACK PAIN WITH BILATERAL SCIATICA: ICD-10-CM

## 2025-07-25 DIAGNOSIS — M54.41 CHRONIC MIDLINE LOW BACK PAIN WITH BILATERAL SCIATICA: ICD-10-CM

## 2025-07-25 DIAGNOSIS — M46.1 BILATERAL SACROILIITIS: ICD-10-CM

## 2025-07-25 DIAGNOSIS — F41.1 GENERALIZED ANXIETY DISORDER: ICD-10-CM

## 2025-07-25 DIAGNOSIS — M48.062 SPINAL STENOSIS OF LUMBAR REGION WITH NEUROGENIC CLAUDICATION: ICD-10-CM

## 2025-07-25 DIAGNOSIS — F51.01 PRIMARY INSOMNIA: ICD-10-CM

## 2025-07-25 RX ORDER — BUPRENORPHINE 20 UG/H
1 PATCH TRANSDERMAL
Qty: 4 PATCH | Refills: 2 | Status: SHIPPED | OUTPATIENT
Start: 2025-07-25 | End: 2025-08-24

## 2025-07-25 RX ORDER — DEXTROAMPHETAMINE SACCHARATE, AMPHETAMINE ASPARTATE, DEXTROAMPHETAMINE SULFATE AND AMPHETAMINE SULFATE 5; 5; 5; 5 MG/1; MG/1; MG/1; MG/1
20 TABLET ORAL 2 TIMES DAILY
Qty: 60 TABLET | Refills: 0 | Status: SHIPPED | OUTPATIENT
Start: 2025-07-25 | End: 2025-08-24

## 2025-07-25 RX ORDER — PREDNISONE 5 MG/1
5 TABLET ORAL DAILY
Qty: 30 TABLET | Refills: 5 | Status: SHIPPED | OUTPATIENT
Start: 2025-07-25

## 2025-07-25 RX ORDER — ALPRAZOLAM 1 MG/1
1 TABLET ORAL 2 TIMES DAILY PRN
Qty: 60 TABLET | Refills: 0 | Status: SHIPPED | OUTPATIENT
Start: 2025-07-25 | End: 2025-08-24

## 2025-07-28 ENCOUNTER — HOSPITAL ENCOUNTER (OUTPATIENT)
Dept: INFUSION THERAPY | Age: 70
Setting detail: INFUSION SERIES
Discharge: HOME | End: 2025-07-28
Payer: COMMERCIAL

## 2025-07-28 VITALS
OXYGEN SATURATION: 95 % | RESPIRATION RATE: 20 BRPM | DIASTOLIC BLOOD PRESSURE: 55 MMHG | HEART RATE: 78 BPM | TEMPERATURE: 97.5 F | SYSTOLIC BLOOD PRESSURE: 127 MMHG

## 2025-07-28 DIAGNOSIS — D50.9 IRON DEFICIENCY ANEMIA, UNSPECIFIED IRON DEFICIENCY ANEMIA TYPE: Primary | ICD-10-CM

## 2025-07-28 PROCEDURE — 6360000002 HC RX W HCPCS: Performed by: INTERNAL MEDICINE

## 2025-07-28 PROCEDURE — 2500000003 HC RX 250 WO HCPCS: Performed by: INTERNAL MEDICINE

## 2025-07-28 PROCEDURE — 2580000003 HC RX 258: Performed by: INTERNAL MEDICINE

## 2025-07-28 PROCEDURE — 96375 TX/PRO/DX INJ NEW DRUG ADDON: CPT

## 2025-07-28 PROCEDURE — 96365 THER/PROPH/DIAG IV INF INIT: CPT

## 2025-07-28 RX ADMIN — SODIUM CHLORIDE 125 MG: 9 INJECTION, SOLUTION INTRAVENOUS at 13:17

## 2025-07-28 RX ADMIN — METHYLPREDNISOLONE SODIUM SUCCINATE 40 MG: 40 INJECTION, POWDER, LYOPHILIZED, FOR SOLUTION INTRAMUSCULAR; INTRAVENOUS at 12:58

## 2025-07-28 NOTE — PROGRESS NOTES
[facility] OUTPATIENT INFUSION CENTER     PT arrived via:  [] Ambulatory         [x] Wheelchair  []With transport                [] Other:     [x]PT oriented to room and call light in reach.   [x] Vital signs obtained and are stable.   [x]Medication education provided and reviewed with patient.             .

## 2025-07-28 NOTE — PROGRESS NOTES
Pt tolerated infusion. IV removed. Pt left unit ambulatory. All equipment used in the care for this patient has been cleaned.

## 2025-08-04 ENCOUNTER — HOSPITAL ENCOUNTER (OUTPATIENT)
Dept: INFUSION THERAPY | Age: 70
Setting detail: INFUSION SERIES
Discharge: HOME | End: 2025-08-04
Payer: COMMERCIAL

## 2025-08-04 VITALS
SYSTOLIC BLOOD PRESSURE: 116 MMHG | TEMPERATURE: 97.9 F | RESPIRATION RATE: 18 BRPM | HEART RATE: 71 BPM | OXYGEN SATURATION: 97 % | DIASTOLIC BLOOD PRESSURE: 41 MMHG

## 2025-08-04 DIAGNOSIS — D50.9 IRON DEFICIENCY ANEMIA, UNSPECIFIED IRON DEFICIENCY ANEMIA TYPE: Primary | ICD-10-CM

## 2025-08-04 PROCEDURE — 2580000003 HC RX 258: Performed by: INTERNAL MEDICINE

## 2025-08-04 PROCEDURE — 96365 THER/PROPH/DIAG IV INF INIT: CPT

## 2025-08-04 PROCEDURE — 96374 THER/PROPH/DIAG INJ IV PUSH: CPT

## 2025-08-04 PROCEDURE — 2500000003 HC RX 250 WO HCPCS: Performed by: INTERNAL MEDICINE

## 2025-08-04 PROCEDURE — 6360000002 HC RX W HCPCS: Performed by: INTERNAL MEDICINE

## 2025-08-04 RX ADMIN — METHYLPREDNISOLONE SODIUM SUCCINATE 40 MG: 40 INJECTION, POWDER, LYOPHILIZED, FOR SOLUTION INTRAMUSCULAR; INTRAVENOUS at 13:32

## 2025-08-04 RX ADMIN — SODIUM CHLORIDE 125 MG: 9 INJECTION, SOLUTION INTRAVENOUS at 13:48

## 2025-08-04 ASSESSMENT — PAIN DESCRIPTION - ORIENTATION: ORIENTATION: LOWER;MID

## 2025-08-04 ASSESSMENT — PAIN SCALES - GENERAL: PAINLEVEL_OUTOF10: 6

## 2025-08-04 ASSESSMENT — PAIN DESCRIPTION - LOCATION: LOCATION: BACK

## 2025-08-04 ASSESSMENT — PAIN DESCRIPTION - DESCRIPTORS: DESCRIPTORS: STABBING

## 2025-08-05 ENCOUNTER — OFFICE VISIT (OUTPATIENT)
Age: 70
End: 2025-08-05
Payer: COMMERCIAL

## 2025-08-05 VITALS
HEIGHT: 65 IN | BODY MASS INDEX: 26.33 KG/M2 | DIASTOLIC BLOOD PRESSURE: 60 MMHG | SYSTOLIC BLOOD PRESSURE: 136 MMHG | WEIGHT: 158 LBS

## 2025-08-05 DIAGNOSIS — F41.1 GENERALIZED ANXIETY DISORDER: ICD-10-CM

## 2025-08-05 DIAGNOSIS — R73.03 PREDIABETES: ICD-10-CM

## 2025-08-05 DIAGNOSIS — F90.2 ATTENTION DEFICIT HYPERACTIVITY DISORDER (ADHD), COMBINED TYPE: ICD-10-CM

## 2025-08-05 DIAGNOSIS — I10 ESSENTIAL HYPERTENSION: ICD-10-CM

## 2025-08-05 DIAGNOSIS — J44.9 CHRONIC OBSTRUCTIVE PULMONARY DISEASE, UNSPECIFIED COPD TYPE (HCC): Primary | ICD-10-CM

## 2025-08-05 DIAGNOSIS — J44.9 CHRONIC OBSTRUCTIVE PULMONARY DISEASE, UNSPECIFIED COPD TYPE (HCC): ICD-10-CM

## 2025-08-05 LAB
ALBUMIN SERPL-MCNC: 4.1 G/DL (ref 3.5–4.6)
ALP SERPL-CCNC: 74 U/L (ref 40–130)
ALT SERPL-CCNC: 14 U/L (ref 0–33)
ANION GAP SERPL CALCULATED.3IONS-SCNC: 12 MEQ/L (ref 9–15)
AST SERPL-CCNC: 25 U/L (ref 0–35)
BASOPHILS # BLD: 0 K/UL (ref 0–0.2)
BASOPHILS NFR BLD: 0.4 %
BILIRUB SERPL-MCNC: <0.2 MG/DL (ref 0.2–0.7)
BUN SERPL-MCNC: 13 MG/DL (ref 8–23)
CALCIUM SERPL-MCNC: 9.4 MG/DL (ref 8.5–9.9)
CHLORIDE SERPL-SCNC: 105 MEQ/L (ref 95–107)
CHOLEST SERPL-MCNC: 117 MG/DL (ref 0–199)
CO2 SERPL-SCNC: 23 MEQ/L (ref 20–31)
CREAT SERPL-MCNC: 0.94 MG/DL (ref 0.5–0.9)
EOSINOPHIL # BLD: 0 K/UL (ref 0–0.7)
EOSINOPHIL NFR BLD: 0.1 %
ERYTHROCYTE [DISTWIDTH] IN BLOOD BY AUTOMATED COUNT: 16.2 % (ref 11.5–14.5)
GLOBULIN SER CALC-MCNC: 2.5 G/DL (ref 2.3–3.5)
GLUCOSE SERPL-MCNC: 77 MG/DL (ref 70–99)
HCT VFR BLD AUTO: 36.5 % (ref 37–47)
HDLC SERPL-MCNC: 72 MG/DL (ref 40–59)
HGB BLD-MCNC: 11.6 G/DL (ref 12–16)
LDLC SERPL CALC-MCNC: 22 MG/DL (ref 0–129)
LYMPHOCYTES # BLD: 1.9 K/UL (ref 1–4.8)
LYMPHOCYTES NFR BLD: 25.1 %
MCH RBC QN AUTO: 29.5 PG (ref 27–31.3)
MCHC RBC AUTO-ENTMCNC: 31.8 % (ref 33–37)
MCV RBC AUTO: 92.9 FL (ref 79.4–94.8)
MONOCYTES # BLD: 0.5 K/UL (ref 0.2–0.8)
MONOCYTES NFR BLD: 6.1 %
NEUTROPHILS # BLD: 5.1 K/UL (ref 1.4–6.5)
NEUTS SEG NFR BLD: 68.2 %
PLATELET # BLD AUTO: 265 K/UL (ref 130–400)
POTASSIUM SERPL-SCNC: 4.1 MEQ/L (ref 3.4–4.9)
PROT SERPL-MCNC: 6.6 G/DL (ref 6.3–8)
RBC # BLD AUTO: 3.93 M/UL (ref 4.2–5.4)
SODIUM SERPL-SCNC: 140 MEQ/L (ref 135–144)
TRIGL SERPL-MCNC: 116 MG/DL (ref 0–150)
TSH REFLEX: 0.38 UIU/ML (ref 0.44–3.86)
WBC # BLD AUTO: 7.5 K/UL (ref 4.8–10.8)

## 2025-08-05 PROCEDURE — 1090F PRES/ABSN URINE INCON ASSESS: CPT | Performed by: NURSE PRACTITIONER

## 2025-08-05 PROCEDURE — 99215 OFFICE O/P EST HI 40 MIN: CPT | Performed by: NURSE PRACTITIONER

## 2025-08-05 PROCEDURE — 1123F ACP DISCUSS/DSCN MKR DOCD: CPT | Performed by: NURSE PRACTITIONER

## 2025-08-05 PROCEDURE — 3075F SYST BP GE 130 - 139MM HG: CPT | Performed by: NURSE PRACTITIONER

## 2025-08-05 PROCEDURE — G8427 DOCREV CUR MEDS BY ELIG CLIN: HCPCS | Performed by: NURSE PRACTITIONER

## 2025-08-05 PROCEDURE — 3023F SPIROM DOC REV: CPT | Performed by: NURSE PRACTITIONER

## 2025-08-05 PROCEDURE — G8399 PT W/DXA RESULTS DOCUMENT: HCPCS | Performed by: NURSE PRACTITIONER

## 2025-08-05 PROCEDURE — 1036F TOBACCO NON-USER: CPT | Performed by: NURSE PRACTITIONER

## 2025-08-05 PROCEDURE — G8417 CALC BMI ABV UP PARAM F/U: HCPCS | Performed by: NURSE PRACTITIONER

## 2025-08-05 PROCEDURE — 3017F COLORECTAL CA SCREEN DOC REV: CPT | Performed by: NURSE PRACTITIONER

## 2025-08-05 PROCEDURE — 3078F DIAST BP <80 MM HG: CPT | Performed by: NURSE PRACTITIONER

## 2025-08-06 LAB
ESTIMATED AVERAGE GLUCOSE: 105 MG/DL
HBA1C MFR BLD: 5.3 % (ref 4–6)

## 2025-08-09 LAB
6MAM UR QL: NOT DETECTED
7-AMINOCLONAZEPAM: NOT DETECTED
ALPHA-OH-ALPRAZOLAM: PRESENT
ALPHA-OH-MIDAZOLAM, URINE: NOT DETECTED
ALPRAZOLAM: PRESENT
AMPHET UR QL SCN: PRESENT
BARBITURATES: NEGATIVE
BENZOYLECGONINE: NEGATIVE
BUPRENORPHINE: PRESENT
CARISOPRODOL UR QL: NEGATIVE
CLONAZEPAM UR QL: NOT DETECTED
CODEINE: NOT DETECTED
CREAT UR-MCNC: 205.1 MG/DL (ref 20–400)
DIAZEPAM: NOT DETECTED
ETHYL GLUCURONIDE: NEGATIVE
FENTANYL UR QL: NOT DETECTED
GABAPENTIN: PRESENT
HYDROCODONE UR QL: PRESENT
HYDROMORPHONE: NOT DETECTED
LORAZEPAM UR QL: NOT DETECTED
MARIJUANA METABOLITE: ABNORMAL
MDA: NOT DETECTED
MDEA: NOT DETECTED
MDMA UR QL: NOT DETECTED
MEPERIDINE: NOT DETECTED
METHADONE: NEGATIVE
METHAMPHETAMINE: NOT DETECTED
METHYLPHENIDATE: NOT DETECTED
MIDAZOLAM UR QL SCN: NOT DETECTED
MORPHINE: NOT DETECTED
NALOXONE: NOT DETECTED
NORBUPRENORPHINE, FREE: PRESENT
NORDIAZEPAM: NOT DETECTED
NORFENTANYL: NOT DETECTED
NORHYDROCODONE, URINE: PRESENT
NOROXYCODONE: NOT DETECTED
NOROXYMORPHONE, URINE: NOT DETECTED
OXAZEPAM UR QL: NOT DETECTED
OXYCODONE UR QL: NOT DETECTED
OXYMORPHONE UR QL: NOT DETECTED
PAIN MANAGEMENT DRUG PANEL: ABNORMAL
PATHOLOGY STUDY: ABNORMAL
PCP: NEGATIVE
PHENTERMINE: NOT DETECTED
PREGABALIN: NOT DETECTED
TAPENTADOL, URINE: NOT DETECTED
TAPENTADOL-O-SULFATE, URINE: NOT DETECTED
TEMAZEPAM: NOT DETECTED
TRAMADOL: NEGATIVE
ZOLPIDEM: NOT DETECTED

## 2025-08-11 ENCOUNTER — HOSPITAL ENCOUNTER (OUTPATIENT)
Dept: INFUSION THERAPY | Age: 70
Setting detail: INFUSION SERIES
Discharge: HOME | End: 2025-08-11
Payer: COMMERCIAL

## 2025-08-11 VITALS
HEART RATE: 74 BPM | TEMPERATURE: 98.9 F | OXYGEN SATURATION: 93 % | SYSTOLIC BLOOD PRESSURE: 114 MMHG | DIASTOLIC BLOOD PRESSURE: 49 MMHG | RESPIRATION RATE: 18 BRPM

## 2025-08-11 DIAGNOSIS — D50.9 IRON DEFICIENCY ANEMIA, UNSPECIFIED IRON DEFICIENCY ANEMIA TYPE: Primary | ICD-10-CM

## 2025-08-11 PROCEDURE — 96365 THER/PROPH/DIAG IV INF INIT: CPT

## 2025-08-11 PROCEDURE — 2500000003 HC RX 250 WO HCPCS: Performed by: INTERNAL MEDICINE

## 2025-08-11 PROCEDURE — 6360000002 HC RX W HCPCS: Performed by: INTERNAL MEDICINE

## 2025-08-11 PROCEDURE — 2580000003 HC RX 258: Performed by: INTERNAL MEDICINE

## 2025-08-11 PROCEDURE — 96376 TX/PRO/DX INJ SAME DRUG ADON: CPT

## 2025-08-11 PROCEDURE — 96375 TX/PRO/DX INJ NEW DRUG ADDON: CPT

## 2025-08-11 RX ADMIN — SODIUM CHLORIDE 125 MG: 9 INJECTION, SOLUTION INTRAVENOUS at 13:36

## 2025-08-11 RX ADMIN — METHYLPREDNISOLONE SODIUM SUCCINATE 40 MG: 40 INJECTION, POWDER, LYOPHILIZED, FOR SOLUTION INTRAMUSCULAR; INTRAVENOUS at 13:19

## 2025-08-11 ASSESSMENT — PAIN DESCRIPTION - DESCRIPTORS: DESCRIPTORS: STABBING

## 2025-08-11 ASSESSMENT — PAIN DESCRIPTION - LOCATION: LOCATION: BACK

## 2025-08-11 ASSESSMENT — PAIN DESCRIPTION - ORIENTATION: ORIENTATION: LOWER

## 2025-08-11 ASSESSMENT — PAIN SCALES - GENERAL: PAINLEVEL_OUTOF10: 8

## 2025-08-12 ENCOUNTER — APPOINTMENT (OUTPATIENT)
Dept: INFUSION THERAPY | Age: 70
End: 2025-08-12
Payer: COMMERCIAL

## 2025-08-15 ENCOUNTER — HOSPITAL ENCOUNTER (OUTPATIENT)
Dept: RADIATION ONCOLOGY | Age: 70
Discharge: HOME | End: 2025-08-15
Payer: COMMERCIAL

## 2025-08-15 VITALS
BODY MASS INDEX: 26.33 KG/M2 | OXYGEN SATURATION: 91 % | WEIGHT: 158.2 LBS | SYSTOLIC BLOOD PRESSURE: 121 MMHG | HEART RATE: 78 BPM | TEMPERATURE: 97.7 F | RESPIRATION RATE: 20 BRPM | DIASTOLIC BLOOD PRESSURE: 50 MMHG

## 2025-08-15 DIAGNOSIS — D05.12 BREAST NEOPLASM, TIS (DCIS), LEFT: Primary | ICD-10-CM

## 2025-08-15 PROCEDURE — 99212 OFFICE O/P EST SF 10 MIN: CPT | Performed by: RADIOLOGY

## 2025-08-18 ENCOUNTER — HOSPITAL ENCOUNTER (OUTPATIENT)
Dept: INFUSION THERAPY | Age: 70
Setting detail: INFUSION SERIES
Discharge: HOME | End: 2025-08-18

## 2025-08-19 ENCOUNTER — OFFICE VISIT (OUTPATIENT)
Age: 70
End: 2025-08-19
Payer: COMMERCIAL

## 2025-08-19 VITALS
DIASTOLIC BLOOD PRESSURE: 64 MMHG | WEIGHT: 156 LBS | RESPIRATION RATE: 16 BRPM | OXYGEN SATURATION: 100 % | TEMPERATURE: 97.5 F | SYSTOLIC BLOOD PRESSURE: 136 MMHG | HEART RATE: 76 BPM | BODY MASS INDEX: 25.99 KG/M2 | HEIGHT: 65 IN

## 2025-08-19 DIAGNOSIS — E66.3 OVERWEIGHT (BMI 25.0-29.9): ICD-10-CM

## 2025-08-19 DIAGNOSIS — N64.89 SEROMA OF BREAST: ICD-10-CM

## 2025-08-19 DIAGNOSIS — D05.12 BREAST NEOPLASM, TIS (DCIS), LEFT: Primary | ICD-10-CM

## 2025-08-19 PROCEDURE — 76942 ECHO GUIDE FOR BIOPSY: CPT | Performed by: SURGERY

## 2025-08-19 PROCEDURE — 1123F ACP DISCUSS/DSCN MKR DOCD: CPT | Performed by: SURGERY

## 2025-08-19 PROCEDURE — 99213 OFFICE O/P EST LOW 20 MIN: CPT | Performed by: SURGERY

## 2025-08-19 PROCEDURE — 1125F AMNT PAIN NOTED PAIN PRSNT: CPT | Performed by: SURGERY

## 2025-08-19 PROCEDURE — 1159F MED LIST DOCD IN RCRD: CPT | Performed by: SURGERY

## 2025-08-19 PROCEDURE — 10160 PNXR ASPIR ABSC HMTMA BULLA: CPT | Performed by: SURGERY

## 2025-08-19 RX ORDER — ARIPIPRAZOLE 5 MG/1
5 TABLET ORAL EVERY MORNING
COMMUNITY
Start: 2025-08-14

## 2025-08-19 RX ORDER — BUPROPION HYDROCHLORIDE 300 MG/1
300 TABLET ORAL EVERY MORNING
COMMUNITY
Start: 2025-08-14

## 2025-08-19 ASSESSMENT — ENCOUNTER SYMPTOMS
CHEST TIGHTNESS: 0
NAUSEA: 0
SHORTNESS OF BREATH: 1
SORE THROAT: 0
COLOR CHANGE: 0
VOMITING: 0
COUGH: 0
ABDOMINAL PAIN: 0

## 2025-08-20 ENCOUNTER — HOSPITAL ENCOUNTER (OUTPATIENT)
Dept: WOMENS IMAGING | Age: 70
Discharge: HOME | End: 2025-08-22
Payer: COMMERCIAL

## 2025-08-20 VITALS — HEIGHT: 62 IN | BODY MASS INDEX: 28.53 KG/M2

## 2025-08-20 DIAGNOSIS — Z12.31 ENCOUNTER FOR SCREENING MAMMOGRAM FOR BREAST CANCER: ICD-10-CM

## 2025-08-20 PROCEDURE — 77063 BREAST TOMOSYNTHESIS BI: CPT

## 2025-08-21 DIAGNOSIS — F90.2 ATTENTION DEFICIT HYPERACTIVITY DISORDER (ADHD), COMBINED TYPE: ICD-10-CM

## 2025-08-21 DIAGNOSIS — F51.01 PRIMARY INSOMNIA: ICD-10-CM

## 2025-08-21 DIAGNOSIS — F41.1 GENERALIZED ANXIETY DISORDER: ICD-10-CM

## 2025-08-22 RX ORDER — ALPRAZOLAM 1 MG/1
1 TABLET ORAL 2 TIMES DAILY PRN
Qty: 60 TABLET | Refills: 2 | Status: SHIPPED | OUTPATIENT
Start: 2025-08-22 | End: 2025-09-21

## 2025-08-22 RX ORDER — DEXTROAMPHETAMINE SACCHARATE, AMPHETAMINE ASPARTATE, DEXTROAMPHETAMINE SULFATE AND AMPHETAMINE SULFATE 5; 5; 5; 5 MG/1; MG/1; MG/1; MG/1
20 TABLET ORAL 2 TIMES DAILY
Qty: 60 TABLET | Refills: 0 | Status: SHIPPED | OUTPATIENT
Start: 2025-08-22 | End: 2025-09-21

## 2025-08-25 ENCOUNTER — APPOINTMENT (OUTPATIENT)
Dept: GENERAL RADIOLOGY | Age: 70
DRG: 208 | End: 2025-08-25
Payer: COMMERCIAL

## 2025-08-25 ENCOUNTER — HOSPITAL ENCOUNTER (OUTPATIENT)
Dept: INFUSION THERAPY | Age: 70
Setting detail: INFUSION SERIES
Discharge: HOME | End: 2025-08-25

## 2025-08-25 ENCOUNTER — HOSPITAL ENCOUNTER (INPATIENT)
Age: 70
LOS: 9 days | Discharge: HOME HEALTH | DRG: 208 | End: 2025-09-03
Attending: EMERGENCY MEDICINE | Admitting: INTERNAL MEDICINE
Payer: COMMERCIAL

## 2025-08-25 DIAGNOSIS — R06.00 DYSPNEA, UNSPECIFIED TYPE: ICD-10-CM

## 2025-08-25 DIAGNOSIS — R06.02 SHORTNESS OF BREATH: ICD-10-CM

## 2025-08-25 DIAGNOSIS — J18.9 PNEUMONIA DUE TO INFECTIOUS ORGANISM, UNSPECIFIED LATERALITY, UNSPECIFIED PART OF LUNG: ICD-10-CM

## 2025-08-25 DIAGNOSIS — J44.1 COPD EXACERBATION (HCC): ICD-10-CM

## 2025-08-25 DIAGNOSIS — J44.9 CHRONIC OBSTRUCTIVE PULMONARY DISEASE, UNSPECIFIED COPD TYPE (HCC): ICD-10-CM

## 2025-08-25 DIAGNOSIS — J96.01 ACUTE RESPIRATORY FAILURE WITH HYPOXIA AND HYPERCAPNIA (HCC): Primary | ICD-10-CM

## 2025-08-25 DIAGNOSIS — J96.02 ACUTE RESPIRATORY FAILURE WITH HYPOXIA AND HYPERCAPNIA (HCC): Primary | ICD-10-CM

## 2025-08-25 LAB
ALBUMIN SERPL-MCNC: 3.8 G/DL (ref 3.5–4.6)
ALP SERPL-CCNC: 75 U/L (ref 40–130)
ALT SERPL-CCNC: 15 U/L (ref 0–33)
ANION GAP SERPL CALCULATED.3IONS-SCNC: 11 MEQ/L (ref 9–15)
ANTI-XA UNFRAC HEPARIN: 0.86 IU/ML
APTT PPP: 29.1 SEC (ref 24.4–36.8)
AST SERPL-CCNC: 23 U/L (ref 0–35)
BACTERIA URNS QL MICRO: ABNORMAL /HPF
BASE EXCESS ARTERIAL: -4 (ref -3–3)
BASE EXCESS ARTERIAL: -5 (ref -3–3)
BASE EXCESS ARTERIAL: -6 (ref -3–3)
BASE EXCESS ARTERIAL: 0 (ref -3–3)
BASE EXCESS ARTERIAL: 1 (ref -3–3)
BASOPHILS # BLD: 0 K/UL (ref 0–0.2)
BASOPHILS NFR BLD: 0.4 %
BILIRUB SERPL-MCNC: 0.4 MG/DL (ref 0.2–0.7)
BILIRUB UR QL STRIP: NEGATIVE
BUN SERPL-MCNC: 12 MG/DL (ref 8–23)
CALCIUM IONIZED: 1.08 MMOL/L (ref 1.12–1.32)
CALCIUM IONIZED: 1.19 MMOL/L (ref 1.12–1.32)
CALCIUM IONIZED: 1.2 MMOL/L (ref 1.12–1.32)
CALCIUM IONIZED: 1.22 MMOL/L (ref 1.12–1.32)
CALCIUM IONIZED: 1.25 MMOL/L (ref 1.12–1.32)
CALCIUM SERPL-MCNC: 8.4 MG/DL (ref 8.5–9.9)
CHLORIDE SERPL-SCNC: 110 MEQ/L (ref 95–107)
CLARITY UR: CLEAR
CO2 SERPL-SCNC: 23 MEQ/L (ref 20–31)
COLOR UR: YELLOW
CREAT SERPL-MCNC: 0.92 MG/DL (ref 0.5–0.9)
EOSINOPHIL # BLD: 0.1 K/UL (ref 0–0.7)
EOSINOPHIL NFR BLD: 1 %
EPI CELLS #/AREA URNS AUTO: ABNORMAL /HPF (ref 0–5)
ERYTHROCYTE [DISTWIDTH] IN BLOOD BY AUTOMATED COUNT: 15.1 % (ref 11.5–14.5)
ERYTHROCYTE [DISTWIDTH] IN BLOOD BY AUTOMATED COUNT: 15.4 % (ref 11.5–14.5)
GLOBULIN SER CALC-MCNC: 2 G/DL (ref 2.3–3.5)
GLUCOSE BLD-MCNC: 107 MG/DL (ref 70–99)
GLUCOSE BLD-MCNC: 122 MG/DL (ref 70–99)
GLUCOSE BLD-MCNC: 146 MG/DL (ref 70–99)
GLUCOSE BLD-MCNC: 162 MG/DL (ref 70–99)
GLUCOSE BLD-MCNC: 177 MG/DL (ref 70–99)
GLUCOSE BLD-MCNC: 215 MG/DL (ref 70–99)
GLUCOSE BLD-MCNC: 221 MG/DL (ref 70–99)
GLUCOSE BLD-MCNC: 250 MG/DL (ref 70–99)
GLUCOSE SERPL-MCNC: 119 MG/DL (ref 70–99)
GLUCOSE UR STRIP-MCNC: NEGATIVE MG/DL
HCO3 ARTERIAL: 22.2 MMOL/L (ref 21–29)
HCO3 ARTERIAL: 23 MMOL/L (ref 21–29)
HCO3 ARTERIAL: 23.9 MMOL/L (ref 21–29)
HCO3 ARTERIAL: 25.2 MMOL/L (ref 21–29)
HCO3 ARTERIAL: 27.1 MMOL/L (ref 21–29)
HCT VFR BLD AUTO: 31 % (ref 36–48)
HCT VFR BLD AUTO: 31 % (ref 36–48)
HCT VFR BLD AUTO: 31.2 % (ref 37–47)
HCT VFR BLD AUTO: 37 % (ref 36–48)
HCT VFR BLD AUTO: 38 % (ref 36–48)
HCT VFR BLD AUTO: 40.6 % (ref 37–47)
HCT VFR BLD AUTO: 41 % (ref 36–48)
HGB BLD CALC-MCNC: 10.5 GM/DL (ref 12–16)
HGB BLD CALC-MCNC: 10.6 GM/DL (ref 12–16)
HGB BLD CALC-MCNC: 12.6 GM/DL (ref 12–16)
HGB BLD CALC-MCNC: 12.9 GM/DL (ref 12–16)
HGB BLD CALC-MCNC: 13.9 GM/DL (ref 12–16)
HGB BLD-MCNC: 10.2 G/DL (ref 12–16)
HGB BLD-MCNC: 13 G/DL (ref 12–16)
HGB UR QL STRIP: NEGATIVE
HYALINE CASTS #/AREA URNS AUTO: ABNORMAL /HPF (ref 0–5)
INR PPP: 1.2
KETONES UR STRIP-MCNC: NEGATIVE MG/DL
LACTATE: 2.19 MMOL/L (ref 0.4–2)
LACTATE: 2.34 MMOL/L (ref 0.4–2)
LACTATE: 2.93 MMOL/L (ref 0.4–2)
LACTATE: 3.74 MMOL/L (ref 0.4–2)
LACTATE: 4.3 MMOL/L (ref 0.4–2)
LACTIC ACID, SEPSIS: 3.4 MMOL/L (ref 0.5–1.9)
LACTIC ACID, SEPSIS: 4.1 MMOL/L (ref 0.5–1.9)
LEUKOCYTE ESTERASE UR QL STRIP: ABNORMAL
LYMPHOCYTES # BLD: 1.4 K/UL (ref 1–4.8)
LYMPHOCYTES NFR BLD: 28.3 %
MAGNESIUM SERPL-MCNC: 3.5 MG/DL (ref 1.7–2.4)
MCH RBC QN AUTO: 30 PG (ref 27–31.3)
MCH RBC QN AUTO: 30.6 PG (ref 27–31.3)
MCHC RBC AUTO-ENTMCNC: 32 % (ref 33–37)
MCHC RBC AUTO-ENTMCNC: 32.7 % (ref 33–37)
MCV RBC AUTO: 93.5 FL (ref 79.4–94.8)
MCV RBC AUTO: 93.7 FL (ref 79.4–94.8)
MONOCYTES # BLD: 0.3 K/UL (ref 0.2–0.8)
MONOCYTES NFR BLD: 5.7 %
NEUTROPHILS # BLD: 3.1 K/UL (ref 1.4–6.5)
NEUTS SEG NFR BLD: 64.4 %
NITRITE UR QL STRIP: NEGATIVE
O2 SAT, ARTERIAL: 100 % (ref 93–100)
O2 SAT, ARTERIAL: 75 % (ref 93–100)
O2 SAT, ARTERIAL: 97 % (ref 93–100)
O2 SAT, ARTERIAL: 98 % (ref 93–100)
O2 SAT, ARTERIAL: 99 % (ref 93–100)
PCO2 ARTERIAL: 41 MM HG (ref 35–45)
PCO2 ARTERIAL: 50 MM HG (ref 35–45)
PCO2 ARTERIAL: 54 MM HG (ref 35–45)
PCO2 ARTERIAL: 58 MM HG (ref 35–45)
PCO2 ARTERIAL: 65 MM HG (ref 35–45)
PERFORMED ON: ABNORMAL
PH ARTERIAL: 7.17 (ref 7.35–7.45)
PH ARTERIAL: 7.19 (ref 7.35–7.45)
PH ARTERIAL: 7.27 (ref 7.35–7.45)
PH ARTERIAL: 7.31 (ref 7.35–7.45)
PH ARTERIAL: 7.4 (ref 7.35–7.45)
PH UR STRIP: 7 [PH] (ref 5–9)
PLATELET # BLD AUTO: 186 K/UL (ref 130–400)
PLATELET # BLD AUTO: 281 K/UL (ref 130–400)
PO2 ARTERIAL: 116 MM HG (ref 75–108)
PO2 ARTERIAL: 118 MM HG (ref 75–108)
PO2 ARTERIAL: 195 MM HG (ref 75–108)
PO2 ARTERIAL: 249 MM HG (ref 75–108)
PO2 ARTERIAL: 40 MM HG (ref 75–108)
POC CHLORIDE: 111 MEQ/L (ref 99–110)
POC CHLORIDE: 111 MEQ/L (ref 99–110)
POC CHLORIDE: 112 MEQ/L (ref 99–110)
POC CHLORIDE: 119 MEQ/L (ref 99–110)
POC CHLORIDE: 120 MEQ/L (ref 99–110)
POC CREATININE: 0.8 MG/DL (ref 0.6–1.2)
POC CREATININE: 0.9 MG/DL (ref 0.6–1.2)
POC CREATININE: 1 MG/DL (ref 0.6–1.2)
POC FIO2: 100
POC FIO2: 100
POC FIO2: 80
POC FIO2: 90
POC SAMPLE TYPE: ABNORMAL
POTASSIUM SERPL-SCNC: 2.6 MEQ/L (ref 3.5–5.1)
POTASSIUM SERPL-SCNC: 3.1 MEQ/L (ref 3.5–5.1)
POTASSIUM SERPL-SCNC: 3.2 MEQ/L (ref 3.4–4.9)
POTASSIUM SERPL-SCNC: 3.3 MEQ/L (ref 3.5–5.1)
POTASSIUM SERPL-SCNC: 3.4 MEQ/L (ref 3.5–5.1)
POTASSIUM SERPL-SCNC: 4.1 MEQ/L (ref 3.5–5.1)
PROCALCITONIN SERPL IA-MCNC: 2.6 NG/ML (ref 0–0.15)
PROT SERPL-MCNC: 5.8 G/DL (ref 6.3–8)
PROT UR STRIP-MCNC: ABNORMAL MG/DL
PROTHROMBIN TIME: 15.7 SEC (ref 12.3–14.9)
RBC # BLD AUTO: 3.33 M/UL (ref 4.2–5.4)
RBC # BLD AUTO: 4.34 M/UL (ref 4.2–5.4)
RBC #/AREA URNS AUTO: ABNORMAL /HPF (ref 0–5)
REASON FOR REJECTION: NORMAL
REJECTED TEST: NORMAL
SODIUM BLD-SCNC: 149 MEQ/L (ref 136–145)
SODIUM BLD-SCNC: 149 MEQ/L (ref 136–145)
SODIUM BLD-SCNC: 151 MEQ/L (ref 136–145)
SODIUM BLD-SCNC: 152 MEQ/L (ref 136–145)
SODIUM BLD-SCNC: 153 MEQ/L (ref 136–145)
SODIUM SERPL-SCNC: 144 MEQ/L (ref 135–144)
SP GR UR STRIP: 1.01 (ref 1–1.03)
TCO2 ARTERIAL: 24 MMOL/L (ref 21–32)
TCO2 ARTERIAL: 25 MMOL/L (ref 21–32)
TCO2 ARTERIAL: 26 MMOL/L (ref 21–32)
TCO2 ARTERIAL: 27 MMOL/L (ref 21–32)
TCO2 ARTERIAL: 29 MMOL/L (ref 21–32)
URINE REFLEX TO CULTURE: YES
UROBILINOGEN UR STRIP-ACNC: 1 E.U./DL
WBC # BLD AUTO: 4.8 K/UL (ref 4.8–10.8)
WBC # BLD AUTO: 8.5 K/UL (ref 4.8–10.8)
WBC #/AREA URNS AUTO: >100 /HPF (ref 0–5)

## 2025-08-25 PROCEDURE — 84132 ASSAY OF SERUM POTASSIUM: CPT

## 2025-08-25 PROCEDURE — 2580000003 HC RX 258: Performed by: INTERNAL MEDICINE

## 2025-08-25 PROCEDURE — 6360000002 HC RX W HCPCS: Performed by: EMERGENCY MEDICINE

## 2025-08-25 PROCEDURE — 36556 INSERT NON-TUNNEL CV CATH: CPT | Performed by: INTERNAL MEDICINE

## 2025-08-25 PROCEDURE — 80053 COMPREHEN METABOLIC PANEL: CPT

## 2025-08-25 PROCEDURE — 6360000002 HC RX W HCPCS: Performed by: NURSE PRACTITIONER

## 2025-08-25 PROCEDURE — 85610 PROTHROMBIN TIME: CPT

## 2025-08-25 PROCEDURE — 2580000003 HC RX 258: Performed by: EMERGENCY MEDICINE

## 2025-08-25 PROCEDURE — 36415 COLL VENOUS BLD VENIPUNCTURE: CPT

## 2025-08-25 PROCEDURE — 82803 BLOOD GASES ANY COMBINATION: CPT

## 2025-08-25 PROCEDURE — 87077 CULTURE AEROBIC IDENTIFY: CPT

## 2025-08-25 PROCEDURE — 85025 COMPLETE CBC W/AUTO DIFF WBC: CPT

## 2025-08-25 PROCEDURE — 99285 EMERGENCY DEPT VISIT HI MDM: CPT

## 2025-08-25 PROCEDURE — 96365 THER/PROPH/DIAG IV INF INIT: CPT

## 2025-08-25 PROCEDURE — 85520 HEPARIN ASSAY: CPT

## 2025-08-25 PROCEDURE — 85027 COMPLETE CBC AUTOMATED: CPT

## 2025-08-25 PROCEDURE — 0BH17EZ INSERTION OF ENDOTRACHEAL AIRWAY INTO TRACHEA, VIA NATURAL OR ARTIFICIAL OPENING: ICD-10-PCS | Performed by: STUDENT IN AN ORGANIZED HEALTH CARE EDUCATION/TRAINING PROGRAM

## 2025-08-25 PROCEDURE — 83605 ASSAY OF LACTIC ACID: CPT

## 2025-08-25 PROCEDURE — 87186 SC STD MICRODIL/AGAR DIL: CPT

## 2025-08-25 PROCEDURE — 6370000000 HC RX 637 (ALT 250 FOR IP): Performed by: EMERGENCY MEDICINE

## 2025-08-25 PROCEDURE — 31500 INSERT EMERGENCY AIRWAY: CPT

## 2025-08-25 PROCEDURE — 2500000003 HC RX 250 WO HCPCS: Performed by: EMERGENCY MEDICINE

## 2025-08-25 PROCEDURE — 96375 TX/PRO/DX INJ NEW DRUG ADDON: CPT

## 2025-08-25 PROCEDURE — 2500000003 HC RX 250 WO HCPCS: Performed by: NURSE PRACTITIONER

## 2025-08-25 PROCEDURE — 5A1945Z RESPIRATORY VENTILATION, 24-96 CONSECUTIVE HOURS: ICD-10-PCS | Performed by: STUDENT IN AN ORGANIZED HEALTH CARE EDUCATION/TRAINING PROGRAM

## 2025-08-25 PROCEDURE — 85014 HEMATOCRIT: CPT

## 2025-08-25 PROCEDURE — 6370000000 HC RX 637 (ALT 250 FOR IP): Performed by: NURSE PRACTITIONER

## 2025-08-25 PROCEDURE — 36556 INSERT NON-TUNNEL CV CATH: CPT

## 2025-08-25 PROCEDURE — 94761 N-INVAS EAR/PLS OXIMETRY MLT: CPT

## 2025-08-25 PROCEDURE — 99291 CRITICAL CARE FIRST HOUR: CPT | Performed by: INTERNAL MEDICINE

## 2025-08-25 PROCEDURE — 2580000003 HC RX 258: Performed by: NURSE PRACTITIONER

## 2025-08-25 PROCEDURE — 71045 X-RAY EXAM CHEST 1 VIEW: CPT

## 2025-08-25 PROCEDURE — 82435 ASSAY OF BLOOD CHLORIDE: CPT

## 2025-08-25 PROCEDURE — 51702 INSERT TEMP BLADDER CATH: CPT

## 2025-08-25 PROCEDURE — 81001 URINALYSIS AUTO W/SCOPE: CPT

## 2025-08-25 PROCEDURE — 94003 VENT MGMT INPAT SUBQ DAY: CPT

## 2025-08-25 PROCEDURE — 94660 CPAP INITIATION&MGMT: CPT

## 2025-08-25 PROCEDURE — 82330 ASSAY OF CALCIUM: CPT

## 2025-08-25 PROCEDURE — 94640 AIRWAY INHALATION TREATMENT: CPT

## 2025-08-25 PROCEDURE — 2000000000 HC ICU R&B

## 2025-08-25 PROCEDURE — 84145 PROCALCITONIN (PCT): CPT

## 2025-08-25 PROCEDURE — 83735 ASSAY OF MAGNESIUM: CPT

## 2025-08-25 PROCEDURE — 84295 ASSAY OF SERUM SODIUM: CPT

## 2025-08-25 PROCEDURE — 6370000000 HC RX 637 (ALT 250 FOR IP): Performed by: INTERNAL MEDICINE

## 2025-08-25 PROCEDURE — 94002 VENT MGMT INPAT INIT DAY: CPT

## 2025-08-25 PROCEDURE — 82565 ASSAY OF CREATININE: CPT

## 2025-08-25 PROCEDURE — 87040 BLOOD CULTURE FOR BACTERIA: CPT

## 2025-08-25 PROCEDURE — 2700000000 HC OXYGEN THERAPY PER DAY

## 2025-08-25 PROCEDURE — 36620 INSERTION CATHETER ARTERY: CPT | Performed by: INTERNAL MEDICINE

## 2025-08-25 PROCEDURE — 82948 REAGENT STRIP/BLOOD GLUCOSE: CPT

## 2025-08-25 PROCEDURE — 93005 ELECTROCARDIOGRAM TRACING: CPT | Performed by: EMERGENCY MEDICINE

## 2025-08-25 PROCEDURE — 36600 WITHDRAWAL OF ARTERIAL BLOOD: CPT

## 2025-08-25 PROCEDURE — 36620 INSERTION CATHETER ARTERY: CPT

## 2025-08-25 PROCEDURE — 85730 THROMBOPLASTIN TIME PARTIAL: CPT

## 2025-08-25 PROCEDURE — 76937 US GUIDE VASCULAR ACCESS: CPT | Performed by: INTERNAL MEDICINE

## 2025-08-25 PROCEDURE — 87086 URINE CULTURE/COLONY COUNT: CPT

## 2025-08-25 PROCEDURE — 87641 MR-STAPH DNA AMP PROBE: CPT

## 2025-08-25 PROCEDURE — 2500000003 HC RX 250 WO HCPCS

## 2025-08-25 RX ORDER — BUPROPION HYDROCHLORIDE 100 MG/1
100 TABLET ORAL 3 TIMES DAILY
Status: DISCONTINUED | OUTPATIENT
Start: 2025-08-25 | End: 2025-09-03 | Stop reason: HOSPADM

## 2025-08-25 RX ORDER — NOREPINEPHRINE BITARTRATE 0.03 MG/ML
INJECTION, SOLUTION INTRAVENOUS
Status: COMPLETED
Start: 2025-08-25 | End: 2025-08-25

## 2025-08-25 RX ORDER — ETOMIDATE 2 MG/ML
10 INJECTION INTRAVENOUS ONCE
Status: COMPLETED | OUTPATIENT
Start: 2025-08-25 | End: 2025-08-25

## 2025-08-25 RX ORDER — SODIUM CHLORIDE, SODIUM LACTATE, POTASSIUM CHLORIDE, CALCIUM CHLORIDE 600; 310; 30; 20 MG/100ML; MG/100ML; MG/100ML; MG/100ML
INJECTION, SOLUTION INTRAVENOUS CONTINUOUS
Status: DISCONTINUED | OUTPATIENT
Start: 2025-08-25 | End: 2025-08-25

## 2025-08-25 RX ORDER — ENOXAPARIN SODIUM 100 MG/ML
30 INJECTION SUBCUTANEOUS DAILY
Status: DISCONTINUED | OUTPATIENT
Start: 2025-08-25 | End: 2025-08-25

## 2025-08-25 RX ORDER — GABAPENTIN 400 MG/1
800 CAPSULE ORAL 2 TIMES DAILY
Status: DISCONTINUED | OUTPATIENT
Start: 2025-08-25 | End: 2025-09-03 | Stop reason: HOSPADM

## 2025-08-25 RX ORDER — ACETAMINOPHEN 325 MG/1
650 TABLET ORAL EVERY 4 HOURS PRN
Status: DISCONTINUED | OUTPATIENT
Start: 2025-08-25 | End: 2025-09-03 | Stop reason: HOSPADM

## 2025-08-25 RX ORDER — PROPOFOL 10 MG/ML
5-50 INJECTION, EMULSION INTRAVENOUS ONCE
Status: COMPLETED | OUTPATIENT
Start: 2025-08-25 | End: 2025-08-25

## 2025-08-25 RX ORDER — POTASSIUM CHLORIDE 1500 MG/1
40 TABLET, EXTENDED RELEASE ORAL PRN
Status: DISCONTINUED | OUTPATIENT
Start: 2025-08-25 | End: 2025-08-25

## 2025-08-25 RX ORDER — POTASSIUM CHLORIDE 29.8 MG/ML
20 INJECTION INTRAVENOUS PRN
Status: DISCONTINUED | OUTPATIENT
Start: 2025-08-25 | End: 2025-09-03 | Stop reason: HOSPADM

## 2025-08-25 RX ORDER — SODIUM CHLORIDE 9 MG/ML
INJECTION, SOLUTION INTRAVENOUS
Status: DISPENSED
Start: 2025-08-25 | End: 2025-08-26

## 2025-08-25 RX ORDER — HEPARIN SODIUM 1000 [USP'U]/ML
80 INJECTION, SOLUTION INTRAVENOUS; SUBCUTANEOUS PRN
Status: DISCONTINUED | OUTPATIENT
Start: 2025-08-25 | End: 2025-08-27

## 2025-08-25 RX ORDER — HEPARIN SODIUM 10000 [USP'U]/100ML
5-30 INJECTION, SOLUTION INTRAVENOUS CONTINUOUS
Status: DISCONTINUED | OUTPATIENT
Start: 2025-08-25 | End: 2025-08-27

## 2025-08-25 RX ORDER — FENTANYL CITRATE-0.9 % NACL/PF 10 MCG/ML
25-200 PLASTIC BAG, INJECTION (ML) INTRAVENOUS CONTINUOUS
Refills: 0 | Status: DISCONTINUED | OUTPATIENT
Start: 2025-08-25 | End: 2025-08-27

## 2025-08-25 RX ORDER — MAGNESIUM SULFATE IN WATER 40 MG/ML
2000 INJECTION, SOLUTION INTRAVENOUS PRN
Status: DISCONTINUED | OUTPATIENT
Start: 2025-08-25 | End: 2025-09-03 | Stop reason: HOSPADM

## 2025-08-25 RX ORDER — LEVOFLOXACIN 5 MG/ML
750 INJECTION, SOLUTION INTRAVENOUS EVERY 24 HOURS
Status: DISCONTINUED | OUTPATIENT
Start: 2025-08-25 | End: 2025-08-25

## 2025-08-25 RX ORDER — POTASSIUM CHLORIDE 7.45 MG/ML
10 INJECTION INTRAVENOUS PRN
Status: DISCONTINUED | OUTPATIENT
Start: 2025-08-25 | End: 2025-08-25

## 2025-08-25 RX ORDER — MIRTAZAPINE 30 MG/1
60 TABLET, FILM COATED ORAL NIGHTLY
Status: DISCONTINUED | OUTPATIENT
Start: 2025-08-25 | End: 2025-09-03 | Stop reason: HOSPADM

## 2025-08-25 RX ORDER — LETROZOLE 2.5 MG/1
2.5 TABLET, FILM COATED ORAL DAILY
Status: DISCONTINUED | OUTPATIENT
Start: 2025-08-25 | End: 2025-09-03 | Stop reason: HOSPADM

## 2025-08-25 RX ORDER — PROPOFOL 10 MG/ML
5-50 INJECTION, EMULSION INTRAVENOUS CONTINUOUS
Status: DISCONTINUED | OUTPATIENT
Start: 2025-08-25 | End: 2025-08-25

## 2025-08-25 RX ORDER — INDOMETHACIN 25 MG/1
150 CAPSULE ORAL ONCE
Status: COMPLETED | OUTPATIENT
Start: 2025-08-25 | End: 2025-08-25

## 2025-08-25 RX ORDER — POTASSIUM CHLORIDE 7.45 MG/ML
10 INJECTION INTRAVENOUS PRN
Status: DISCONTINUED | OUTPATIENT
Start: 2025-08-25 | End: 2025-09-03 | Stop reason: HOSPADM

## 2025-08-25 RX ORDER — INSULIN LISPRO 100 [IU]/ML
0-8 INJECTION, SOLUTION INTRAVENOUS; SUBCUTANEOUS EVERY 6 HOURS
Status: DISCONTINUED | OUTPATIENT
Start: 2025-08-25 | End: 2025-08-29

## 2025-08-25 RX ORDER — HEPARIN SODIUM 1000 [USP'U]/ML
40 INJECTION, SOLUTION INTRAVENOUS; SUBCUTANEOUS PRN
Status: DISCONTINUED | OUTPATIENT
Start: 2025-08-25 | End: 2025-08-27

## 2025-08-25 RX ORDER — IPRATROPIUM BROMIDE AND ALBUTEROL SULFATE 2.5; .5 MG/3ML; MG/3ML
2 SOLUTION RESPIRATORY (INHALATION) PRN
Status: DISCONTINUED | OUTPATIENT
Start: 2025-08-25 | End: 2025-09-01

## 2025-08-25 RX ORDER — GLUCAGON 1 MG/ML
1 KIT INJECTION PRN
Status: DISCONTINUED | OUTPATIENT
Start: 2025-08-25 | End: 2025-09-03 | Stop reason: HOSPADM

## 2025-08-25 RX ORDER — ROSUVASTATIN CALCIUM 10 MG/1
10 TABLET, COATED ORAL DAILY
Status: DISCONTINUED | OUTPATIENT
Start: 2025-08-25 | End: 2025-09-03 | Stop reason: HOSPADM

## 2025-08-25 RX ORDER — DULOXETIN HYDROCHLORIDE 60 MG/1
60 CAPSULE, DELAYED RELEASE ORAL DAILY
Status: DISCONTINUED | OUTPATIENT
Start: 2025-08-25 | End: 2025-09-03 | Stop reason: HOSPADM

## 2025-08-25 RX ORDER — MAGNESIUM SULFATE IN WATER 40 MG/ML
2000 INJECTION, SOLUTION INTRAVENOUS ONCE
Status: COMPLETED | OUTPATIENT
Start: 2025-08-25 | End: 2025-08-25

## 2025-08-25 RX ORDER — BUPROPION HYDROCHLORIDE 300 MG/1
300 TABLET ORAL EVERY MORNING
Status: DISCONTINUED | OUTPATIENT
Start: 2025-08-25 | End: 2025-09-03 | Stop reason: HOSPADM

## 2025-08-25 RX ORDER — DEXTROAMPHETAMINE SACCHARATE, AMPHETAMINE ASPARTATE, DEXTROAMPHETAMINE SULFATE AND AMPHETAMINE SULFATE 2.5; 2.5; 2.5; 2.5 MG/1; MG/1; MG/1; MG/1
20 TABLET ORAL 2 TIMES DAILY
Refills: 0 | Status: DISCONTINUED | OUTPATIENT
Start: 2025-08-25 | End: 2025-09-03 | Stop reason: HOSPADM

## 2025-08-25 RX ORDER — PROPOFOL 10 MG/ML
5-50 INJECTION, EMULSION INTRAVENOUS CONTINUOUS
Status: DISCONTINUED | OUTPATIENT
Start: 2025-08-25 | End: 2025-08-27

## 2025-08-25 RX ORDER — DEXTROSE MONOHYDRATE 100 MG/ML
INJECTION, SOLUTION INTRAVENOUS CONTINUOUS PRN
Status: DISCONTINUED | OUTPATIENT
Start: 2025-08-25 | End: 2025-09-03 | Stop reason: HOSPADM

## 2025-08-25 RX ORDER — FUROSEMIDE 40 MG/1
20 TABLET ORAL DAILY
Status: DISCONTINUED | OUTPATIENT
Start: 2025-08-25 | End: 2025-08-25

## 2025-08-25 RX ORDER — ALPRAZOLAM 1 MG/1
1 TABLET ORAL 2 TIMES DAILY PRN
Status: DISCONTINUED | OUTPATIENT
Start: 2025-08-25 | End: 2025-09-03 | Stop reason: HOSPADM

## 2025-08-25 RX ORDER — 0.9 % SODIUM CHLORIDE 0.9 %
30 INTRAVENOUS SOLUTION INTRAVENOUS ONCE
Status: COMPLETED | OUTPATIENT
Start: 2025-08-25 | End: 2025-08-25

## 2025-08-25 RX ORDER — HYDROCORTISONE SODIUM SUCCINATE 100 MG/2ML
50 INJECTION INTRAMUSCULAR; INTRAVENOUS EVERY 6 HOURS
Status: DISCONTINUED | OUTPATIENT
Start: 2025-08-25 | End: 2025-08-29

## 2025-08-25 RX ORDER — NOREPINEPHRINE BITARTRATE 0.03 MG/ML
1-100 INJECTION, SOLUTION INTRAVENOUS CONTINUOUS
Status: DISCONTINUED | OUTPATIENT
Start: 2025-08-25 | End: 2025-08-27

## 2025-08-25 RX ORDER — ROCURONIUM BROMIDE 10 MG/ML
0.6 INJECTION, SOLUTION INTRAVENOUS ONCE
Status: COMPLETED | OUTPATIENT
Start: 2025-08-25 | End: 2025-08-25

## 2025-08-25 RX ORDER — 0.9 % SODIUM CHLORIDE 0.9 %
500 INTRAVENOUS SOLUTION INTRAVENOUS ONCE
Status: COMPLETED | OUTPATIENT
Start: 2025-08-25 | End: 2025-08-25

## 2025-08-25 RX ORDER — ARIPIPRAZOLE 5 MG/1
5 TABLET ORAL EVERY MORNING
Status: DISCONTINUED | OUTPATIENT
Start: 2025-08-25 | End: 2025-09-03 | Stop reason: HOSPADM

## 2025-08-25 RX ADMIN — NOREPINEPHRINE BITARTRATE 5 MCG/MIN: 0.03 INJECTION, SOLUTION INTRAVENOUS at 13:57

## 2025-08-25 RX ADMIN — DEXTROAMPHETAMINE SACCHARATE, AMPHETAMINE ASPARTATE, DEXTROAMPHETAMINE SULFATE, AMPHETAMINE SULFATE TABLETS, 10 MG,CLL 20 MG: 2.5; 2.5; 2.5; 2.5 TABLET ORAL at 14:01

## 2025-08-25 RX ADMIN — Medication 50 MCG/HR: at 11:12

## 2025-08-25 RX ADMIN — WATER 1000 MG: 1 INJECTION INTRAMUSCULAR; INTRAVENOUS; SUBCUTANEOUS at 10:07

## 2025-08-25 RX ADMIN — SODIUM BICARBONATE: 84 INJECTION, SOLUTION INTRAVENOUS at 17:09

## 2025-08-25 RX ADMIN — SODIUM BICARBONATE 150 MEQ: 84 INJECTION INTRAVENOUS at 16:28

## 2025-08-25 RX ADMIN — SODIUM CHLORIDE 2178 ML: 0.9 INJECTION, SOLUTION INTRAVENOUS at 09:57

## 2025-08-25 RX ADMIN — ARIPIPRAZOLE 5 MG: 5 TABLET ORAL at 13:27

## 2025-08-25 RX ADMIN — ROCURONIUM BROMIDE 44 MG: 10 INJECTION, SOLUTION INTRAVENOUS at 10:50

## 2025-08-25 RX ADMIN — IPRATROPIUM BROMIDE AND ALBUTEROL SULFATE 2 DOSE: .5; 2.5 SOLUTION RESPIRATORY (INHALATION) at 09:52

## 2025-08-25 RX ADMIN — SODIUM CHLORIDE, PRESERVATIVE FREE 20 MG: 5 INJECTION INTRAVENOUS at 21:24

## 2025-08-25 RX ADMIN — SODIUM CHLORIDE 500 ML: 0.9 INJECTION, SOLUTION INTRAVENOUS at 21:36

## 2025-08-25 RX ADMIN — VANCOMYCIN HYDROCHLORIDE 1750 MG: 1 INJECTION, POWDER, LYOPHILIZED, FOR SOLUTION INTRAVENOUS at 14:42

## 2025-08-25 RX ADMIN — AZITHROMYCIN DIHYDRATE 500 MG: 500 INJECTION, POWDER, LYOPHILIZED, FOR SOLUTION INTRAVENOUS at 10:14

## 2025-08-25 RX ADMIN — SODIUM CHLORIDE, SODIUM LACTATE, POTASSIUM CHLORIDE, AND CALCIUM CHLORIDE: .6; .31; .03; .02 INJECTION, SOLUTION INTRAVENOUS at 12:55

## 2025-08-25 RX ADMIN — LEVOFLOXACIN 750 MG: 5 INJECTION, SOLUTION INTRAVENOUS at 13:12

## 2025-08-25 RX ADMIN — METHYLPREDNISOLONE SODIUM SUCCINATE 125 MG: 125 INJECTION INTRAMUSCULAR; INTRAVENOUS at 10:00

## 2025-08-25 RX ADMIN — GABAPENTIN 800 MG: 400 CAPSULE ORAL at 13:27

## 2025-08-25 RX ADMIN — ACETAMINOPHEN 650 MG: 325 TABLET ORAL at 14:01

## 2025-08-25 RX ADMIN — PIPERACILLIN AND TAZOBACTAM 3375 MG: 3; .375 INJECTION, POWDER, LYOPHILIZED, FOR SOLUTION INTRAVENOUS at 17:27

## 2025-08-25 RX ADMIN — POTASSIUM CHLORIDE 20 MEQ: 29.8 INJECTION, SOLUTION INTRAVENOUS at 18:13

## 2025-08-25 RX ADMIN — POTASSIUM CHLORIDE 20 MEQ: 29.8 INJECTION, SOLUTION INTRAVENOUS at 17:14

## 2025-08-25 RX ADMIN — Medication 150 MCG/HR: at 20:27

## 2025-08-25 RX ADMIN — SODIUM CHLORIDE, PRESERVATIVE FREE 20 MG: 5 INJECTION INTRAVENOUS at 13:06

## 2025-08-25 RX ADMIN — HYDROCORTISONE SODIUM SUCCINATE 50 MG: 100 INJECTION INTRAMUSCULAR; INTRAVENOUS at 16:34

## 2025-08-25 RX ADMIN — PROPOFOL 40 MCG/KG/MIN: 10 INJECTION, EMULSION INTRAVENOUS at 19:01

## 2025-08-25 RX ADMIN — ETOMIDATE 10 MG: 20 INJECTION, SOLUTION INTRAVENOUS at 10:49

## 2025-08-25 RX ADMIN — HYDROCORTISONE SODIUM SUCCINATE 50 MG: 100 INJECTION INTRAMUSCULAR; INTRAVENOUS at 21:25

## 2025-08-25 RX ADMIN — INSULIN LISPRO 2 UNITS: 100 INJECTION, SOLUTION INTRAVENOUS; SUBCUTANEOUS at 18:19

## 2025-08-25 RX ADMIN — APIXABAN 2.5 MG: 2.5 TABLET, FILM COATED ORAL at 13:27

## 2025-08-25 RX ADMIN — NOREPINEPHRINE BITARTRATE 5 MCG/MIN: 32 SOLUTION INTRAVENOUS at 13:57

## 2025-08-25 RX ADMIN — HEPARIN SODIUM AND DEXTROSE 18 UNITS/KG/HR: 10000; 5 INJECTION INTRAVENOUS at 17:05

## 2025-08-25 RX ADMIN — MAGNESIUM SULFATE HEPTAHYDRATE 2000 MG: 40 INJECTION, SOLUTION INTRAVENOUS at 10:05

## 2025-08-25 RX ADMIN — LETROZOLE 2.5 MG: 2.5 TABLET ORAL at 14:03

## 2025-08-25 RX ADMIN — PROPOFOL 20 MCG/KG/MIN: 10 INJECTION, EMULSION INTRAVENOUS at 11:09

## 2025-08-25 ASSESSMENT — PAIN - FUNCTIONAL ASSESSMENT: PAIN_FUNCTIONAL_ASSESSMENT: 0-10

## 2025-08-25 ASSESSMENT — PULMONARY FUNCTION TESTS
PIF_VALUE: 21
PIF_VALUE: 22

## 2025-08-26 LAB
ALBUMIN SERPL-MCNC: 2.7 G/DL (ref 3.5–4.6)
ANION GAP SERPL CALCULATED.3IONS-SCNC: 5 MEQ/L (ref 9–15)
ANION GAP SERPL CALCULATED.3IONS-SCNC: 8 MEQ/L (ref 9–15)
ANISOCYTOSIS BLD QL SMEAR: ABNORMAL
ANISOCYTOSIS BLD QL SMEAR: ABNORMAL
APTT PPP: 36.1 SEC (ref 24.4–36.8)
APTT PPP: >150 SEC (ref 24.4–36.8)
APTT PPP: >150 SEC (ref 24.4–36.8)
B PARAP IS1001 DNA NPH QL NAA+NON-PROBE: NOT DETECTED
B PERT.PT PRMT NPH QL NAA+NON-PROBE: NOT DETECTED
BASE EXCESS ARTERIAL: 12 (ref -3–3)
BASE EXCESS ARTERIAL: 12 (ref -3–3)
BASOPHILS # BLD: 0 K/UL (ref 0–0.2)
BASOPHILS # BLD: 0 K/UL (ref 0–0.2)
BASOPHILS NFR BLD: 0 %
BASOPHILS NFR BLD: 0.2 %
BUN SERPL-MCNC: 13 MG/DL (ref 8–23)
BUN SERPL-MCNC: 14 MG/DL (ref 8–23)
C PNEUM DNA NPH QL NAA+NON-PROBE: NOT DETECTED
CALCIUM IONIZED: 0.95 MMOL/L (ref 1.12–1.32)
CALCIUM IONIZED: 0.98 MMOL/L (ref 1.12–1.32)
CALCIUM SERPL-MCNC: 6 MG/DL (ref 8.5–9.9)
CALCIUM SERPL-MCNC: 6.3 MG/DL (ref 8.5–9.9)
CHLORIDE SERPL-SCNC: 106 MEQ/L (ref 95–107)
CHLORIDE SERPL-SCNC: 107 MEQ/L (ref 95–107)
CO2 SERPL-SCNC: 31 MEQ/L (ref 20–31)
CO2 SERPL-SCNC: 32 MEQ/L (ref 20–31)
CREAT SERPL-MCNC: 0.83 MG/DL (ref 0.5–0.9)
CREAT SERPL-MCNC: 0.86 MG/DL (ref 0.5–0.9)
DOHLE BOD BLD QL SMEAR: ABNORMAL
EKG ATRIAL RATE: 105 BPM
EKG DIAGNOSIS: NORMAL
EKG P AXIS: 41 DEGREES
EKG P-R INTERVAL: 116 MS
EKG Q-T INTERVAL: 366 MS
EKG QRS DURATION: 80 MS
EKG QTC CALCULATION (BAZETT): 483 MS
EKG R AXIS: 59 DEGREES
EKG T AXIS: 57 DEGREES
EKG VENTRICULAR RATE: 105 BPM
EOSINOPHIL # BLD: 0 K/UL (ref 0–0.7)
EOSINOPHIL # BLD: 0 K/UL (ref 0–0.7)
EOSINOPHIL NFR BLD: 0 %
EOSINOPHIL NFR BLD: 0 %
ERYTHROCYTE [DISTWIDTH] IN BLOOD BY AUTOMATED COUNT: 15.6 % (ref 11.5–14.5)
ERYTHROCYTE [DISTWIDTH] IN BLOOD BY AUTOMATED COUNT: 15.7 % (ref 11.5–14.5)
FLUAV RNA NPH QL NAA+NON-PROBE: NOT DETECTED
FLUBV RNA NPH QL NAA+NON-PROBE: NOT DETECTED
GLUCOSE BLD-MCNC: 111 MG/DL (ref 70–99)
GLUCOSE BLD-MCNC: 120 MG/DL (ref 70–99)
GLUCOSE BLD-MCNC: 122 MG/DL (ref 70–99)
GLUCOSE BLD-MCNC: 140 MG/DL (ref 70–99)
GLUCOSE BLD-MCNC: 161 MG/DL (ref 70–99)
GLUCOSE BLD-MCNC: 169 MG/DL (ref 70–99)
GLUCOSE BLD-MCNC: 216 MG/DL (ref 70–99)
GLUCOSE SERPL-MCNC: 117 MG/DL (ref 70–99)
GLUCOSE SERPL-MCNC: 168 MG/DL (ref 70–99)
HADV DNA NPH QL NAA+NON-PROBE: NOT DETECTED
HCO3 ARTERIAL: 35.8 MMOL/L (ref 21–29)
HCO3 ARTERIAL: 35.9 MMOL/L (ref 21–29)
HCOV 229E RNA NPH QL NAA+NON-PROBE: NOT DETECTED
HCOV HKU1 RNA NPH QL NAA+NON-PROBE: NOT DETECTED
HCOV NL63 RNA NPH QL NAA+NON-PROBE: NOT DETECTED
HCOV OC43 RNA NPH QL NAA+NON-PROBE: NOT DETECTED
HCT VFR BLD AUTO: 30 % (ref 36–48)
HCT VFR BLD AUTO: 30.2 % (ref 37–47)
HCT VFR BLD AUTO: 31 % (ref 36–48)
HCT VFR BLD AUTO: 31.1 % (ref 37–47)
HGB BLD CALC-MCNC: 10.1 GM/DL (ref 12–16)
HGB BLD CALC-MCNC: 10.5 GM/DL (ref 12–16)
HGB BLD-MCNC: 9.7 G/DL (ref 12–16)
HGB BLD-MCNC: 9.8 G/DL (ref 12–16)
HMPV RNA NPH QL NAA+NON-PROBE: NOT DETECTED
HPIV1 RNA NPH QL NAA+NON-PROBE: NOT DETECTED
HPIV2 RNA NPH QL NAA+NON-PROBE: NOT DETECTED
HPIV3 RNA NPH QL NAA+NON-PROBE: NOT DETECTED
HPIV4 RNA NPH QL NAA+NON-PROBE: NOT DETECTED
HYPOCHROMIA BLD QL SMEAR: ABNORMAL
HYPOCHROMIA BLD QL SMEAR: ABNORMAL
LACTATE BLDV-SCNC: 1.9 MMOL/L (ref 0.5–2.2)
LACTATE BLDV-SCNC: 3.7 MMOL/L (ref 0.5–2.2)
LACTATE: 1.72 MMOL/L (ref 0.4–2)
LACTATE: 1.79 MMOL/L (ref 0.4–2)
LYMPHOCYTES # BLD: 0.6 K/UL (ref 1–4.8)
LYMPHOCYTES # BLD: 1 K/UL (ref 1–4.8)
LYMPHOCYTES NFR BLD: 4 %
LYMPHOCYTES NFR BLD: 8 %
M PNEUMO DNA NPH QL NAA+NON-PROBE: NOT DETECTED
MAGNESIUM SERPL-MCNC: 2.6 MG/DL (ref 1.7–2.4)
MCH RBC QN AUTO: 29.9 PG (ref 27–31.3)
MCH RBC QN AUTO: 30.2 PG (ref 27–31.3)
MCHC RBC AUTO-ENTMCNC: 31.5 % (ref 33–37)
MCHC RBC AUTO-ENTMCNC: 32.1 % (ref 33–37)
MCV RBC AUTO: 94.1 FL (ref 79.4–94.8)
MCV RBC AUTO: 94.8 FL (ref 79.4–94.8)
METAMYELOCYTES NFR BLD MANUAL: 9 %
MONOCYTES # BLD: 0 K/UL (ref 0.2–0.8)
MONOCYTES # BLD: 0.3 K/UL (ref 0.2–0.8)
MONOCYTES NFR BLD: 1.9 %
MONOCYTES NFR BLD: 3.1 %
MRSA, DNA, NASAL: NEGATIVE
MYELOCYTES NFR BLD MANUAL: 1 %
NEUTROPHILS # BLD: 11 K/UL (ref 1.4–6.5)
NEUTROPHILS # BLD: 11.7 K/UL (ref 1.4–6.5)
NEUTS BAND NFR BLD MANUAL: 22 % (ref 5–11)
NEUTS SEG NFR BLD: 64 %
NEUTS SEG NFR BLD: 90 %
O2 SAT, ARTERIAL: 97 % (ref 93–100)
O2 SAT, ARTERIAL: 98 % (ref 93–100)
OVALOCYTES BLD QL SMEAR: ABNORMAL
PATH INTERP BLD-IMP: NORMAL
PATH INTERP BLD-IMP: YES
PCO2 ARTERIAL: 49 MM HG (ref 35–45)
PCO2 ARTERIAL: 53 MM HG (ref 35–45)
PERFORMED ON: ABNORMAL
PH ARTERIAL: 7.44 (ref 7.35–7.45)
PH ARTERIAL: 7.47 (ref 7.35–7.45)
PHOSPHATE SERPL-MCNC: 1.1 MG/DL (ref 2.3–4.8)
PHOSPHATE SERPL-MCNC: 1.2 MG/DL (ref 2.3–4.8)
PLATELET # BLD AUTO: 181 K/UL (ref 130–400)
PLATELET # BLD AUTO: 182 K/UL (ref 130–400)
PLATELET BLD QL SMEAR: ADEQUATE
PLATELET BLD QL SMEAR: ADEQUATE
PO2 ARTERIAL: 86 MM HG (ref 75–108)
PO2 ARTERIAL: 96 MM HG (ref 75–108)
POC CHLORIDE: 103 MEQ/L (ref 99–110)
POC CHLORIDE: 104 MEQ/L (ref 99–110)
POC CREATININE: 0.8 MG/DL (ref 0.6–1.2)
POC CREATININE: 0.9 MG/DL (ref 0.6–1.2)
POC FIO2: 50
POC FIO2: 60
POC SAMPLE TYPE: ABNORMAL
POC SAMPLE TYPE: ABNORMAL
POIKILOCYTOSIS BLD QL SMEAR: ABNORMAL
POIKILOCYTOSIS BLD QL SMEAR: ABNORMAL
POTASSIUM SERPL-SCNC: 2.9 MEQ/L (ref 3.5–5.1)
POTASSIUM SERPL-SCNC: 3 MEQ/L (ref 3.5–5.1)
POTASSIUM SERPL-SCNC: 3.1 MEQ/L (ref 3.4–4.9)
POTASSIUM SERPL-SCNC: 3.3 MEQ/L (ref 3.4–4.9)
RBC # BLD AUTO: 3.21 M/UL (ref 4.2–5.4)
RBC # BLD AUTO: 3.28 M/UL (ref 4.2–5.4)
REASON FOR REJECTION: NORMAL
REJECTED TEST: NORMAL
RSV RNA NPH QL NAA+NON-PROBE: NOT DETECTED
RV+EV RNA NPH QL NAA+NON-PROBE: NOT DETECTED
SARS-COV-2 RNA NPH QL NAA+NON-PROBE: NOT DETECTED
SLIDE REVIEW: ABNORMAL
SLIDE REVIEW: ABNORMAL
SMUDGE CELLS BLD QL SMEAR: 5.7
SMUDGE CELLS BLD QL SMEAR: 9.6
SODIUM BLD-SCNC: 151 MEQ/L (ref 136–145)
SODIUM BLD-SCNC: 151 MEQ/L (ref 136–145)
SODIUM SERPL-SCNC: 143 MEQ/L (ref 135–144)
SODIUM SERPL-SCNC: 146 MEQ/L (ref 135–144)
SPECIMEN DESCRIPTION: NORMAL
STOMATOCYTES BLD QL SMEAR: ABNORMAL
TCO2 ARTERIAL: 37 MMOL/L (ref 21–32)
TCO2 ARTERIAL: 38 MMOL/L (ref 21–32)
VARIANT LYMPHS NFR BLD: 1 %
WBC # BLD AUTO: 11.5 K/UL (ref 4.8–10.8)
WBC # BLD AUTO: 13 K/UL (ref 4.8–10.8)

## 2025-08-26 PROCEDURE — 0202U NFCT DS 22 TRGT SARS-COV-2: CPT

## 2025-08-26 PROCEDURE — 2500000003 HC RX 250 WO HCPCS: Performed by: NURSE PRACTITIONER

## 2025-08-26 PROCEDURE — 82435 ASSAY OF BLOOD CHLORIDE: CPT

## 2025-08-26 PROCEDURE — 83735 ASSAY OF MAGNESIUM: CPT

## 2025-08-26 PROCEDURE — 84295 ASSAY OF SERUM SODIUM: CPT

## 2025-08-26 PROCEDURE — 6360000002 HC RX W HCPCS: Performed by: NURSE PRACTITIONER

## 2025-08-26 PROCEDURE — 99291 CRITICAL CARE FIRST HOUR: CPT | Performed by: INTERNAL MEDICINE

## 2025-08-26 PROCEDURE — 85730 THROMBOPLASTIN TIME PARTIAL: CPT

## 2025-08-26 PROCEDURE — 84132 ASSAY OF SERUM POTASSIUM: CPT

## 2025-08-26 PROCEDURE — 85025 COMPLETE CBC W/AUTO DIFF WBC: CPT

## 2025-08-26 PROCEDURE — 87070 CULTURE OTHR SPECIMN AEROBIC: CPT

## 2025-08-26 PROCEDURE — 6370000000 HC RX 637 (ALT 250 FOR IP): Performed by: INTERNAL MEDICINE

## 2025-08-26 PROCEDURE — 2580000003 HC RX 258: Performed by: NURSE PRACTITIONER

## 2025-08-26 PROCEDURE — 94660 CPAP INITIATION&MGMT: CPT

## 2025-08-26 PROCEDURE — 82565 ASSAY OF CREATININE: CPT

## 2025-08-26 PROCEDURE — 93010 ELECTROCARDIOGRAM REPORT: CPT | Performed by: INTERNAL MEDICINE

## 2025-08-26 PROCEDURE — 2580000003 HC RX 258: Performed by: INTERNAL MEDICINE

## 2025-08-26 PROCEDURE — 85014 HEMATOCRIT: CPT

## 2025-08-26 PROCEDURE — 89220 SPUTUM SPECIMEN COLLECTION: CPT

## 2025-08-26 PROCEDURE — 6360000002 HC RX W HCPCS

## 2025-08-26 PROCEDURE — 94003 VENT MGMT INPAT SUBQ DAY: CPT

## 2025-08-26 PROCEDURE — 82948 REAGENT STRIP/BLOOD GLUCOSE: CPT

## 2025-08-26 PROCEDURE — 2700000000 HC OXYGEN THERAPY PER DAY

## 2025-08-26 PROCEDURE — 84100 ASSAY OF PHOSPHORUS: CPT

## 2025-08-26 PROCEDURE — 6360000002 HC RX W HCPCS: Performed by: EMERGENCY MEDICINE

## 2025-08-26 PROCEDURE — 82330 ASSAY OF CALCIUM: CPT

## 2025-08-26 PROCEDURE — 2000000000 HC ICU R&B

## 2025-08-26 PROCEDURE — 36415 COLL VENOUS BLD VENIPUNCTURE: CPT

## 2025-08-26 PROCEDURE — 94761 N-INVAS EAR/PLS OXIMETRY MLT: CPT

## 2025-08-26 PROCEDURE — 80069 RENAL FUNCTION PANEL: CPT

## 2025-08-26 PROCEDURE — 83605 ASSAY OF LACTIC ACID: CPT

## 2025-08-26 PROCEDURE — 87077 CULTURE AEROBIC IDENTIFY: CPT

## 2025-08-26 PROCEDURE — 37799 UNLISTED PX VASCULAR SURGERY: CPT

## 2025-08-26 PROCEDURE — 6370000000 HC RX 637 (ALT 250 FOR IP): Performed by: NURSE PRACTITIONER

## 2025-08-26 PROCEDURE — 82803 BLOOD GASES ANY COMBINATION: CPT

## 2025-08-26 PROCEDURE — 2580000003 HC RX 258: Performed by: EMERGENCY MEDICINE

## 2025-08-26 PROCEDURE — 51702 INSERT TEMP BLADDER CATH: CPT

## 2025-08-26 RX ORDER — DOCUSATE SODIUM 50 MG/5ML
100 LIQUID ORAL 2 TIMES DAILY
Status: DISCONTINUED | OUTPATIENT
Start: 2025-08-26 | End: 2025-09-03 | Stop reason: HOSPADM

## 2025-08-26 RX ORDER — METOPROLOL TARTRATE 1 MG/ML
2.5 INJECTION, SOLUTION INTRAVENOUS ONCE
Status: COMPLETED | OUTPATIENT
Start: 2025-08-26 | End: 2025-08-26

## 2025-08-26 RX ORDER — METOPROLOL TARTRATE 1 MG/ML
INJECTION, SOLUTION INTRAVENOUS
Status: COMPLETED
Start: 2025-08-26 | End: 2025-08-26

## 2025-08-26 RX ORDER — 0.9 % SODIUM CHLORIDE 0.9 %
500 INTRAVENOUS SOLUTION INTRAVENOUS ONCE
Status: COMPLETED | OUTPATIENT
Start: 2025-08-26 | End: 2025-08-26

## 2025-08-26 RX ADMIN — POTASSIUM CHLORIDE 20 MEQ: 29.8 INJECTION, SOLUTION INTRAVENOUS at 14:06

## 2025-08-26 RX ADMIN — PIPERACILLIN AND TAZOBACTAM 3375 MG: 3; .375 INJECTION, POWDER, LYOPHILIZED, FOR SOLUTION INTRAVENOUS at 01:06

## 2025-08-26 RX ADMIN — BUPROPION HYDROCHLORIDE 100 MG: 100 TABLET, FILM COATED ORAL at 09:19

## 2025-08-26 RX ADMIN — MIRTAZAPINE 60 MG: 30 TABLET, FILM COATED ORAL at 20:50

## 2025-08-26 RX ADMIN — HYDROCORTISONE SODIUM SUCCINATE 50 MG: 100 INJECTION INTRAMUSCULAR; INTRAVENOUS at 20:49

## 2025-08-26 RX ADMIN — ARIPIPRAZOLE 5 MG: 5 TABLET ORAL at 09:19

## 2025-08-26 RX ADMIN — HYDROCORTISONE SODIUM SUCCINATE 50 MG: 100 INJECTION INTRAMUSCULAR; INTRAVENOUS at 18:44

## 2025-08-26 RX ADMIN — BUPROPION HYDROCHLORIDE 100 MG: 100 TABLET, FILM COATED ORAL at 14:07

## 2025-08-26 RX ADMIN — Medication 125 MCG/HR: at 21:43

## 2025-08-26 RX ADMIN — GABAPENTIN 800 MG: 400 CAPSULE ORAL at 20:49

## 2025-08-26 RX ADMIN — DOCUSATE SODIUM 100 MG: 50 LIQUID ORAL at 20:49

## 2025-08-26 RX ADMIN — SODIUM BICARBONATE: 84 INJECTION, SOLUTION INTRAVENOUS at 00:50

## 2025-08-26 RX ADMIN — POTASSIUM CHLORIDE 20 MEQ: 29.8 INJECTION, SOLUTION INTRAVENOUS at 16:29

## 2025-08-26 RX ADMIN — DULOXETINE 60 MG: 60 CAPSULE, DELAYED RELEASE ORAL at 09:20

## 2025-08-26 RX ADMIN — ROSUVASTATIN CALCIUM 10 MG: 10 TABLET, FILM COATED ORAL at 09:19

## 2025-08-26 RX ADMIN — METOPROLOL TARTRATE 2.5 MG: 1 INJECTION, SOLUTION INTRAVENOUS at 14:37

## 2025-08-26 RX ADMIN — DEXTROAMPHETAMINE SACCHARATE, AMPHETAMINE ASPARTATE, DEXTROAMPHETAMINE SULFATE, AMPHETAMINE SULFATE TABLETS, 10 MG,CLL 20 MG: 2.5; 2.5; 2.5; 2.5 TABLET ORAL at 09:12

## 2025-08-26 RX ADMIN — INSULIN LISPRO 2 UNITS: 100 INJECTION, SOLUTION INTRAVENOUS; SUBCUTANEOUS at 01:04

## 2025-08-26 RX ADMIN — PIPERACILLIN AND TAZOBACTAM 3375 MG: 3; .375 INJECTION, POWDER, LYOPHILIZED, FOR SOLUTION INTRAVENOUS at 09:44

## 2025-08-26 RX ADMIN — POTASSIUM PHOSPHATE, MONOBASIC POTASSIUM PHOSPHATE, DIBASIC 20 MMOL: 224; 236 INJECTION, SOLUTION, CONCENTRATE INTRAVENOUS at 16:37

## 2025-08-26 RX ADMIN — PROPOFOL 50 MCG/KG/MIN: 10 INJECTION, EMULSION INTRAVENOUS at 13:59

## 2025-08-26 RX ADMIN — BUPROPION HYDROCHLORIDE 100 MG: 100 TABLET, FILM COATED ORAL at 20:50

## 2025-08-26 RX ADMIN — PROPOFOL 45 MCG/KG/MIN: 10 INJECTION, EMULSION INTRAVENOUS at 22:09

## 2025-08-26 RX ADMIN — PIPERACILLIN AND TAZOBACTAM 3375 MG: 3; .375 INJECTION, POWDER, LYOPHILIZED, FOR SOLUTION INTRAVENOUS at 18:46

## 2025-08-26 RX ADMIN — SODIUM BICARBONATE: 84 INJECTION, SOLUTION INTRAVENOUS at 08:58

## 2025-08-26 RX ADMIN — PROPOFOL 50 MCG/KG/MIN: 10 INJECTION, EMULSION INTRAVENOUS at 18:12

## 2025-08-26 RX ADMIN — HYDROCORTISONE SODIUM SUCCINATE 50 MG: 100 INJECTION INTRAMUSCULAR; INTRAVENOUS at 06:12

## 2025-08-26 RX ADMIN — SODIUM CHLORIDE 500 ML: 0.9 INJECTION, SOLUTION INTRAVENOUS at 01:41

## 2025-08-26 RX ADMIN — GABAPENTIN 800 MG: 400 CAPSULE ORAL at 09:19

## 2025-08-26 RX ADMIN — HYDROCORTISONE SODIUM SUCCINATE 50 MG: 100 INJECTION INTRAMUSCULAR; INTRAVENOUS at 09:12

## 2025-08-26 RX ADMIN — Medication 150 MCG/HR: at 09:18

## 2025-08-26 RX ADMIN — SODIUM CHLORIDE, PRESERVATIVE FREE 20 MG: 5 INJECTION INTRAVENOUS at 09:12

## 2025-08-26 RX ADMIN — PROPOFOL 40 MCG/KG/MIN: 10 INJECTION, EMULSION INTRAVENOUS at 00:48

## 2025-08-26 RX ADMIN — PROPOFOL 40 MCG/KG/MIN: 10 INJECTION, EMULSION INTRAVENOUS at 05:24

## 2025-08-26 RX ADMIN — Medication 150 MCG/HR: at 02:37

## 2025-08-26 RX ADMIN — HEPARIN SODIUM AND DEXTROSE 18 UNITS/KG/HR: 10000; 5 INJECTION INTRAVENOUS at 18:47

## 2025-08-26 RX ADMIN — METOPROLOL TARTRATE 2.5 MG: 5 INJECTION INTRAVENOUS at 14:37

## 2025-08-26 RX ADMIN — SODIUM CHLORIDE 1250 MG: 0.9 INJECTION, SOLUTION INTRAVENOUS at 13:47

## 2025-08-26 RX ADMIN — DOCUSATE SODIUM 100 MG: 50 LIQUID ORAL at 11:49

## 2025-08-26 RX ADMIN — SODIUM CHLORIDE, PRESERVATIVE FREE 20 MG: 5 INJECTION INTRAVENOUS at 20:50

## 2025-08-26 ASSESSMENT — PULMONARY FUNCTION TESTS
PIF_VALUE: 18
PIF_VALUE: 19
PIF_VALUE: 19
PIF_VALUE: 22
PIF_VALUE: 20
PIF_VALUE: 18

## 2025-08-27 LAB
ALBUMIN SERPL-MCNC: 2.9 G/DL (ref 3.5–4.6)
ANION GAP SERPL CALCULATED.3IONS-SCNC: 8 MEQ/L (ref 9–15)
ANION GAP SERPL CALCULATED.3IONS-SCNC: 9 MEQ/L (ref 9–15)
APTT PPP: 120 SEC (ref 24.4–36.8)
APTT PPP: 30.9 SEC (ref 24.4–36.8)
APTT PPP: 33 SEC (ref 24.4–36.8)
BACTERIA UR CULT: ABNORMAL
BACTERIA UR CULT: ABNORMAL
BASE EXCESS ARTERIAL: 5 (ref -3–3)
BASOPHILS # BLD: 0 K/UL (ref 0–0.2)
BASOPHILS NFR BLD: 0.1 %
BUN SERPL-MCNC: 14 MG/DL (ref 8–23)
BUN SERPL-MCNC: 19 MG/DL (ref 8–23)
CALCIUM IONIZED: 0.97 MMOL/L (ref 1.12–1.32)
CALCIUM SERPL-MCNC: 6.1 MG/DL (ref 8.5–9.9)
CALCIUM SERPL-MCNC: 6.3 MG/DL (ref 8.5–9.9)
CHLORIDE SERPL-SCNC: 109 MEQ/L (ref 95–107)
CHLORIDE SERPL-SCNC: 111 MEQ/L (ref 95–107)
CO2 SERPL-SCNC: 27 MEQ/L (ref 20–31)
CO2 SERPL-SCNC: 27 MEQ/L (ref 20–31)
CREAT SERPL-MCNC: 0.82 MG/DL (ref 0.5–0.9)
CREAT SERPL-MCNC: 0.82 MG/DL (ref 0.5–0.9)
EOSINOPHIL # BLD: 0 K/UL (ref 0–0.7)
EOSINOPHIL NFR BLD: 0 %
ERYTHROCYTE [DISTWIDTH] IN BLOOD BY AUTOMATED COUNT: 15.7 % (ref 11.5–14.5)
GLUCOSE BLD-MCNC: 122 MG/DL (ref 70–99)
GLUCOSE BLD-MCNC: 126 MG/DL (ref 70–99)
GLUCOSE BLD-MCNC: 131 MG/DL (ref 70–99)
GLUCOSE BLD-MCNC: 132 MG/DL (ref 70–99)
GLUCOSE SERPL-MCNC: 116 MG/DL (ref 70–99)
GLUCOSE SERPL-MCNC: 125 MG/DL (ref 70–99)
HCO3 ARTERIAL: 28.9 MMOL/L (ref 21–29)
HCT VFR BLD AUTO: 28 % (ref 36–48)
HCT VFR BLD AUTO: 30.4 % (ref 37–47)
HGB BLD CALC-MCNC: 9.4 GM/DL (ref 12–16)
HGB BLD-MCNC: 10.1 G/DL (ref 12–16)
LACTATE: 1.35 MMOL/L (ref 0.4–2)
LYMPHOCYTES # BLD: 1.5 K/UL (ref 1–4.8)
LYMPHOCYTES NFR BLD: 10.1 %
MCH RBC QN AUTO: 30.5 PG (ref 27–31.3)
MCHC RBC AUTO-ENTMCNC: 33.2 % (ref 33–37)
MCV RBC AUTO: 91.8 FL (ref 79.4–94.8)
MONOCYTES # BLD: 0.5 K/UL (ref 0.2–0.8)
MONOCYTES NFR BLD: 3.5 %
NEUTROPHILS # BLD: 12.3 K/UL (ref 1.4–6.5)
NEUTS SEG NFR BLD: 83.5 %
O2 SAT, ARTERIAL: 95 % (ref 93–100)
ORGANISM: ABNORMAL
PCO2 ARTERIAL: 43 MM HG (ref 35–45)
PERFORMED ON: ABNORMAL
PH ARTERIAL: 7.43 (ref 7.35–7.45)
PHOSPHATE SERPL-MCNC: 2.1 MG/DL (ref 2.3–4.8)
PLATELET # BLD AUTO: 187 K/UL (ref 130–400)
PO2 ARTERIAL: 75 MM HG (ref 75–108)
POC CHLORIDE: 110 MEQ/L (ref 99–110)
POC CREATININE: 0.9 MG/DL (ref 0.6–1.2)
POC FIO2: 50
POC SAMPLE TYPE: ABNORMAL
POTASSIUM SERPL-SCNC: 2.7 MEQ/L (ref 3.4–4.9)
POTASSIUM SERPL-SCNC: 3.4 MEQ/L (ref 3.5–5.1)
POTASSIUM SERPL-SCNC: 3.6 MEQ/L (ref 3.4–4.9)
RBC # BLD AUTO: 3.31 M/UL (ref 4.2–5.4)
REASON FOR REJECTION: NORMAL
REJECTED TEST: NORMAL
SODIUM BLD-SCNC: 152 MEQ/L (ref 136–145)
SODIUM SERPL-SCNC: 145 MEQ/L (ref 135–144)
SODIUM SERPL-SCNC: 146 MEQ/L (ref 135–144)
TCO2 ARTERIAL: 30 MMOL/L (ref 21–32)
VANCOMYCIN SERPL-MCNC: 10.1 UG/ML (ref 10–40)
WBC # BLD AUTO: 14.8 K/UL (ref 4.8–10.8)

## 2025-08-27 PROCEDURE — 6360000002 HC RX W HCPCS: Performed by: INTERNAL MEDICINE

## 2025-08-27 PROCEDURE — 6360000002 HC RX W HCPCS: Performed by: NURSE PRACTITIONER

## 2025-08-27 PROCEDURE — 6370000000 HC RX 637 (ALT 250 FOR IP): Performed by: INTERNAL MEDICINE

## 2025-08-27 PROCEDURE — 82330 ASSAY OF CALCIUM: CPT

## 2025-08-27 PROCEDURE — 36592 COLLECT BLOOD FROM PICC: CPT

## 2025-08-27 PROCEDURE — 36415 COLL VENOUS BLD VENIPUNCTURE: CPT

## 2025-08-27 PROCEDURE — 2500000003 HC RX 250 WO HCPCS: Performed by: NURSE PRACTITIONER

## 2025-08-27 PROCEDURE — 2000000000 HC ICU R&B

## 2025-08-27 PROCEDURE — 94660 CPAP INITIATION&MGMT: CPT

## 2025-08-27 PROCEDURE — 82565 ASSAY OF CREATININE: CPT

## 2025-08-27 PROCEDURE — 94761 N-INVAS EAR/PLS OXIMETRY MLT: CPT

## 2025-08-27 PROCEDURE — 2500000003 HC RX 250 WO HCPCS: Performed by: INTERNAL MEDICINE

## 2025-08-27 PROCEDURE — 99291 CRITICAL CARE FIRST HOUR: CPT | Performed by: INTERNAL MEDICINE

## 2025-08-27 PROCEDURE — 6360000002 HC RX W HCPCS: Performed by: EMERGENCY MEDICINE

## 2025-08-27 PROCEDURE — 80069 RENAL FUNCTION PANEL: CPT

## 2025-08-27 PROCEDURE — 6370000000 HC RX 637 (ALT 250 FOR IP): Performed by: NURSE PRACTITIONER

## 2025-08-27 PROCEDURE — 2580000003 HC RX 258: Performed by: NURSE PRACTITIONER

## 2025-08-27 PROCEDURE — 82435 ASSAY OF BLOOD CHLORIDE: CPT

## 2025-08-27 PROCEDURE — 2700000000 HC OXYGEN THERAPY PER DAY

## 2025-08-27 PROCEDURE — 82948 REAGENT STRIP/BLOOD GLUCOSE: CPT

## 2025-08-27 PROCEDURE — 83735 ASSAY OF MAGNESIUM: CPT

## 2025-08-27 PROCEDURE — 37799 UNLISTED PX VASCULAR SURGERY: CPT

## 2025-08-27 PROCEDURE — 82803 BLOOD GASES ANY COMBINATION: CPT

## 2025-08-27 PROCEDURE — 94003 VENT MGMT INPAT SUBQ DAY: CPT

## 2025-08-27 PROCEDURE — 80202 ASSAY OF VANCOMYCIN: CPT

## 2025-08-27 PROCEDURE — 2580000003 HC RX 258: Performed by: INTERNAL MEDICINE

## 2025-08-27 PROCEDURE — 85730 THROMBOPLASTIN TIME PARTIAL: CPT

## 2025-08-27 PROCEDURE — 2500000003 HC RX 250 WO HCPCS

## 2025-08-27 PROCEDURE — 85025 COMPLETE CBC W/AUTO DIFF WBC: CPT

## 2025-08-27 PROCEDURE — 83605 ASSAY OF LACTIC ACID: CPT

## 2025-08-27 PROCEDURE — 85014 HEMATOCRIT: CPT

## 2025-08-27 PROCEDURE — 84295 ASSAY OF SERUM SODIUM: CPT

## 2025-08-27 PROCEDURE — 84132 ASSAY OF SERUM POTASSIUM: CPT

## 2025-08-27 RX ORDER — AMIODARONE HYDROCHLORIDE 50 MG/ML
INJECTION, SOLUTION INTRAVENOUS
Status: COMPLETED | OUTPATIENT
Start: 2025-08-27 | End: 2025-08-27

## 2025-08-27 RX ORDER — VANCOMYCIN 1.75 G/350ML
1250 INJECTION, SOLUTION INTRAVENOUS
Status: DISCONTINUED | OUTPATIENT
Start: 2025-08-27 | End: 2025-08-29

## 2025-08-27 RX ORDER — CALCIUM GLUCONATE 20 MG/ML
1000 INJECTION, SOLUTION INTRAVENOUS ONCE
Status: COMPLETED | OUTPATIENT
Start: 2025-08-27 | End: 2025-08-27

## 2025-08-27 RX ORDER — FUROSEMIDE 10 MG/ML
20 INJECTION INTRAMUSCULAR; INTRAVENOUS ONCE
Status: COMPLETED | OUTPATIENT
Start: 2025-08-27 | End: 2025-08-27

## 2025-08-27 RX ORDER — NOREPINEPHRINE BITARTRATE 0.03 MG/ML
INJECTION, SOLUTION INTRAVENOUS
Status: COMPLETED
Start: 2025-08-27 | End: 2025-08-27

## 2025-08-27 RX ORDER — DILTIAZEM HYDROCHLORIDE 5 MG/ML
10 INJECTION INTRAVENOUS ONCE
Status: COMPLETED | OUTPATIENT
Start: 2025-08-27 | End: 2025-08-27

## 2025-08-27 RX ORDER — NOREPINEPHRINE BITARTRATE 0.03 MG/ML
1-100 INJECTION, SOLUTION INTRAVENOUS CONTINUOUS
Status: DISCONTINUED | OUTPATIENT
Start: 2025-08-27 | End: 2025-09-03

## 2025-08-27 RX ADMIN — DILTIAZEM HYDROCHLORIDE 10 MG: 5 INJECTION, SOLUTION INTRAVENOUS at 21:43

## 2025-08-27 RX ADMIN — Medication 75 MCG/HR: at 06:12

## 2025-08-27 RX ADMIN — VANCOMYCIN 1250 MG: 1.75 INJECTION, SOLUTION INTRAVENOUS at 11:50

## 2025-08-27 RX ADMIN — BUPROPION HYDROCHLORIDE 100 MG: 100 TABLET, FILM COATED ORAL at 09:14

## 2025-08-27 RX ADMIN — NOREPINEPHRINE BITARTRATE 5 MCG/MIN: 0.03 INJECTION, SOLUTION INTRAVENOUS at 23:00

## 2025-08-27 RX ADMIN — PIPERACILLIN AND TAZOBACTAM 3375 MG: 3; .375 INJECTION, POWDER, LYOPHILIZED, FOR SOLUTION INTRAVENOUS at 23:52

## 2025-08-27 RX ADMIN — DOCUSATE SODIUM 100 MG: 50 LIQUID ORAL at 09:13

## 2025-08-27 RX ADMIN — POTASSIUM CHLORIDE 20 MEQ: 29.8 INJECTION, SOLUTION INTRAVENOUS at 10:20

## 2025-08-27 RX ADMIN — FUROSEMIDE 20 MG: 10 INJECTION, SOLUTION INTRAMUSCULAR; INTRAVENOUS at 21:37

## 2025-08-27 RX ADMIN — HYDROCORTISONE SODIUM SUCCINATE 50 MG: 100 INJECTION INTRAMUSCULAR; INTRAVENOUS at 16:41

## 2025-08-27 RX ADMIN — ARIPIPRAZOLE 5 MG: 5 TABLET ORAL at 09:13

## 2025-08-27 RX ADMIN — AMIODARONE HYDROCHLORIDE 0.5 MG/MIN: 50 INJECTION, SOLUTION INTRAVENOUS at 23:14

## 2025-08-27 RX ADMIN — APIXABAN 2.5 MG: 2.5 TABLET, FILM COATED ORAL at 23:31

## 2025-08-27 RX ADMIN — GABAPENTIN 800 MG: 400 CAPSULE ORAL at 20:52

## 2025-08-27 RX ADMIN — PIPERACILLIN AND TAZOBACTAM 3375 MG: 3; .375 INJECTION, POWDER, LYOPHILIZED, FOR SOLUTION INTRAVENOUS at 09:19

## 2025-08-27 RX ADMIN — AMIODARONE HYDROCHLORIDE 150 MG: 50 INJECTION, SOLUTION INTRAVENOUS at 23:39

## 2025-08-27 RX ADMIN — HYDROCORTISONE SODIUM SUCCINATE 50 MG: 100 INJECTION INTRAMUSCULAR; INTRAVENOUS at 06:15

## 2025-08-27 RX ADMIN — DEXTROAMPHETAMINE SACCHARATE, AMPHETAMINE ASPARTATE, DEXTROAMPHETAMINE SULFATE, AMPHETAMINE SULFATE TABLETS, 10 MG,CLL 20 MG: 2.5; 2.5; 2.5; 2.5 TABLET ORAL at 20:53

## 2025-08-27 RX ADMIN — POTASSIUM CHLORIDE 20 MEQ: 29.8 INJECTION, SOLUTION INTRAVENOUS at 06:15

## 2025-08-27 RX ADMIN — DILTIAZEM HYDROCHLORIDE 5 MG/HR: 5 INJECTION, SOLUTION INTRAVENOUS at 21:48

## 2025-08-27 RX ADMIN — GABAPENTIN 800 MG: 400 CAPSULE ORAL at 09:14

## 2025-08-27 RX ADMIN — APIXABAN 2.5 MG: 2.5 TABLET, FILM COATED ORAL at 11:22

## 2025-08-27 RX ADMIN — DULOXETINE 60 MG: 60 CAPSULE, DELAYED RELEASE ORAL at 09:15

## 2025-08-27 RX ADMIN — CALCIUM GLUCONATE 1000 MG: 20 INJECTION, SOLUTION INTRAVENOUS at 10:18

## 2025-08-27 RX ADMIN — BUPROPION HYDROCHLORIDE 100 MG: 100 TABLET, FILM COATED ORAL at 20:53

## 2025-08-27 RX ADMIN — ROSUVASTATIN CALCIUM 10 MG: 10 TABLET, FILM COATED ORAL at 09:14

## 2025-08-27 RX ADMIN — HYDROCORTISONE SODIUM SUCCINATE 50 MG: 100 INJECTION INTRAMUSCULAR; INTRAVENOUS at 22:47

## 2025-08-27 RX ADMIN — SODIUM CHLORIDE, PRESERVATIVE FREE 20 MG: 5 INJECTION INTRAVENOUS at 20:53

## 2025-08-27 RX ADMIN — PIPERACILLIN AND TAZOBACTAM 3375 MG: 3; .375 INJECTION, POWDER, LYOPHILIZED, FOR SOLUTION INTRAVENOUS at 01:43

## 2025-08-27 RX ADMIN — PIPERACILLIN AND TAZOBACTAM 3375 MG: 3; .375 INJECTION, POWDER, LYOPHILIZED, FOR SOLUTION INTRAVENOUS at 16:41

## 2025-08-27 RX ADMIN — POTASSIUM PHOSPHATE, MONOBASIC POTASSIUM PHOSPHATE, DIBASIC 20 MMOL: 224; 236 INJECTION, SOLUTION, CONCENTRATE INTRAVENOUS at 10:22

## 2025-08-27 RX ADMIN — DOCUSATE SODIUM 100 MG: 50 LIQUID ORAL at 20:53

## 2025-08-27 RX ADMIN — HYDROCORTISONE SODIUM SUCCINATE 50 MG: 100 INJECTION INTRAMUSCULAR; INTRAVENOUS at 09:13

## 2025-08-27 RX ADMIN — SODIUM CHLORIDE, PRESERVATIVE FREE 20 MG: 5 INJECTION INTRAVENOUS at 09:15

## 2025-08-27 RX ADMIN — APIXABAN 2.5 MG: 2.5 TABLET, FILM COATED ORAL at 20:53

## 2025-08-27 RX ADMIN — DEXTROAMPHETAMINE SACCHARATE, AMPHETAMINE ASPARTATE, DEXTROAMPHETAMINE SULFATE, AMPHETAMINE SULFATE TABLETS, 10 MG,CLL 20 MG: 2.5; 2.5; 2.5; 2.5 TABLET ORAL at 09:14

## 2025-08-27 RX ADMIN — NOREPINEPHRINE BITARTRATE 5 MCG/MIN: 32 SOLUTION INTRAVENOUS at 23:00

## 2025-08-27 RX ADMIN — BUPROPION HYDROCHLORIDE 100 MG: 100 TABLET, FILM COATED ORAL at 11:21

## 2025-08-27 RX ADMIN — MIRTAZAPINE 60 MG: 30 TABLET, FILM COATED ORAL at 20:53

## 2025-08-27 RX ADMIN — POTASSIUM CHLORIDE 20 MEQ: 29.8 INJECTION, SOLUTION INTRAVENOUS at 09:16

## 2025-08-27 RX ADMIN — PROPOFOL 40 MCG/KG/MIN: 10 INJECTION, EMULSION INTRAVENOUS at 01:44

## 2025-08-27 ASSESSMENT — PULMONARY FUNCTION TESTS
PIF_VALUE: 24
PIF_VALUE: 20
PIF_VALUE: 21

## 2025-08-27 ASSESSMENT — PAIN SCALES - GENERAL: PAINLEVEL_OUTOF10: 0

## 2025-08-28 ENCOUNTER — APPOINTMENT (OUTPATIENT)
Dept: GENERAL RADIOLOGY | Age: 70
DRG: 208 | End: 2025-08-28
Payer: COMMERCIAL

## 2025-08-28 LAB
ALBUMIN SERPL-MCNC: 3 G/DL (ref 3.5–4.6)
ANION GAP SERPL CALCULATED.3IONS-SCNC: 10 MEQ/L (ref 9–15)
ANION GAP SERPL CALCULATED.3IONS-SCNC: 14 MEQ/L (ref 9–15)
BASE EXCESS ARTERIAL: 7 (ref -3–3)
BASOPHILS # BLD: 0 K/UL (ref 0–0.2)
BASOPHILS NFR BLD: 0.1 %
BUN SERPL-MCNC: 21 MG/DL (ref 8–23)
BUN SERPL-MCNC: 24 MG/DL (ref 8–23)
CALCIUM IONIZED: 0.96 MMOL/L (ref 1.12–1.32)
CALCIUM SERPL-MCNC: 6.2 MG/DL (ref 8.5–9.9)
CALCIUM SERPL-MCNC: 6.9 MG/DL (ref 8.5–9.9)
CHLORIDE SERPL-SCNC: 106 MEQ/L (ref 95–107)
CHLORIDE SERPL-SCNC: 112 MEQ/L (ref 95–107)
CO2 SERPL-SCNC: 27 MEQ/L (ref 20–31)
CO2 SERPL-SCNC: 27 MEQ/L (ref 20–31)
CREAT SERPL-MCNC: 0.74 MG/DL (ref 0.5–0.9)
CREAT SERPL-MCNC: 0.79 MG/DL (ref 0.5–0.9)
EOSINOPHIL # BLD: 0 K/UL (ref 0–0.7)
EOSINOPHIL NFR BLD: 0 %
ERYTHROCYTE [DISTWIDTH] IN BLOOD BY AUTOMATED COUNT: 16.3 % (ref 11.5–14.5)
GLUCOSE BLD-MCNC: 124 MG/DL (ref 70–99)
GLUCOSE BLD-MCNC: 132 MG/DL (ref 70–99)
GLUCOSE BLD-MCNC: 151 MG/DL (ref 70–99)
GLUCOSE BLD-MCNC: 159 MG/DL (ref 70–99)
GLUCOSE BLD-MCNC: 160 MG/DL (ref 70–99)
GLUCOSE SERPL-MCNC: 129 MG/DL (ref 70–99)
GLUCOSE SERPL-MCNC: 146 MG/DL (ref 70–99)
HCO3 ARTERIAL: 30.8 MMOL/L (ref 21–29)
HCT VFR BLD AUTO: 28.4 % (ref 37–47)
HCT VFR BLD AUTO: 30 % (ref 36–48)
HGB BLD CALC-MCNC: 10.3 GM/DL (ref 12–16)
HGB BLD-MCNC: 9.2 G/DL (ref 12–16)
LACTATE: 1.48 MMOL/L (ref 0.4–2)
LYMPHOCYTES # BLD: 1.5 K/UL (ref 1–4.8)
LYMPHOCYTES NFR BLD: 13.1 %
MAGNESIUM SERPL-MCNC: 2.7 MG/DL (ref 1.7–2.4)
MCH RBC QN AUTO: 29.9 PG (ref 27–31.3)
MCHC RBC AUTO-ENTMCNC: 32.4 % (ref 33–37)
MCV RBC AUTO: 92.2 FL (ref 79.4–94.8)
MONOCYTES # BLD: 0.3 K/UL (ref 0.2–0.8)
MONOCYTES NFR BLD: 2.9 %
NEUTROPHILS # BLD: 9.4 K/UL (ref 1.4–6.5)
NEUTS SEG NFR BLD: 82.7 %
O2 SAT, ARTERIAL: 98 % (ref 93–100)
PCO2 ARTERIAL: 44 MM HG (ref 35–45)
PERFORMED ON: ABNORMAL
PH ARTERIAL: 7.46 (ref 7.35–7.45)
PHOSPHATE SERPL-MCNC: 2 MG/DL (ref 2.3–4.8)
PLATELET # BLD AUTO: 191 K/UL (ref 130–400)
PO2 ARTERIAL: 96 MM HG (ref 75–108)
POC CHLORIDE: 108 MEQ/L (ref 99–110)
POC CREATININE: 0.8 MG/DL (ref 0.6–1.2)
POC FIO2: 60
POC SAMPLE TYPE: ABNORMAL
POTASSIUM SERPL-SCNC: 2.5 MEQ/L (ref 3.5–5.1)
POTASSIUM SERPL-SCNC: 2.8 MEQ/L (ref 3.4–4.9)
POTASSIUM SERPL-SCNC: 3.6 MEQ/L (ref 3.4–4.9)
RBC # BLD AUTO: 3.08 M/UL (ref 4.2–5.4)
SODIUM BLD-SCNC: 152 MEQ/L (ref 136–145)
SODIUM SERPL-SCNC: 147 MEQ/L (ref 135–144)
SODIUM SERPL-SCNC: 149 MEQ/L (ref 135–144)
TCO2 ARTERIAL: 32 MMOL/L (ref 21–32)
WBC # BLD AUTO: 11.3 K/UL (ref 4.8–10.8)

## 2025-08-28 PROCEDURE — 36592 COLLECT BLOOD FROM PICC: CPT

## 2025-08-28 PROCEDURE — 2580000003 HC RX 258: Performed by: INTERNAL MEDICINE

## 2025-08-28 PROCEDURE — 6360000002 HC RX W HCPCS: Performed by: NURSE PRACTITIONER

## 2025-08-28 PROCEDURE — 84132 ASSAY OF SERUM POTASSIUM: CPT

## 2025-08-28 PROCEDURE — 6370000000 HC RX 637 (ALT 250 FOR IP): Performed by: NURSE PRACTITIONER

## 2025-08-28 PROCEDURE — 6370000000 HC RX 637 (ALT 250 FOR IP): Performed by: INTERNAL MEDICINE

## 2025-08-28 PROCEDURE — 2700000000 HC OXYGEN THERAPY PER DAY

## 2025-08-28 PROCEDURE — 83605 ASSAY OF LACTIC ACID: CPT

## 2025-08-28 PROCEDURE — 92610 EVALUATE SWALLOWING FUNCTION: CPT

## 2025-08-28 PROCEDURE — 82565 ASSAY OF CREATININE: CPT

## 2025-08-28 PROCEDURE — 2580000003 HC RX 258: Performed by: NURSE PRACTITIONER

## 2025-08-28 PROCEDURE — 82330 ASSAY OF CALCIUM: CPT

## 2025-08-28 PROCEDURE — 2500000003 HC RX 250 WO HCPCS: Performed by: NURSE PRACTITIONER

## 2025-08-28 PROCEDURE — 82948 REAGENT STRIP/BLOOD GLUCOSE: CPT

## 2025-08-28 PROCEDURE — 94760 N-INVAS EAR/PLS OXIMETRY 1: CPT

## 2025-08-28 PROCEDURE — 85025 COMPLETE CBC W/AUTO DIFF WBC: CPT

## 2025-08-28 PROCEDURE — 2000000000 HC ICU R&B

## 2025-08-28 PROCEDURE — 6360000002 HC RX W HCPCS: Performed by: INTERNAL MEDICINE

## 2025-08-28 PROCEDURE — 82435 ASSAY OF BLOOD CHLORIDE: CPT

## 2025-08-28 PROCEDURE — 99223 1ST HOSP IP/OBS HIGH 75: CPT | Performed by: INTERNAL MEDICINE

## 2025-08-28 PROCEDURE — 71045 X-RAY EXAM CHEST 1 VIEW: CPT

## 2025-08-28 PROCEDURE — 99291 CRITICAL CARE FIRST HOUR: CPT | Performed by: INTERNAL MEDICINE

## 2025-08-28 PROCEDURE — 37799 UNLISTED PX VASCULAR SURGERY: CPT

## 2025-08-28 PROCEDURE — 85014 HEMATOCRIT: CPT

## 2025-08-28 PROCEDURE — 94660 CPAP INITIATION&MGMT: CPT

## 2025-08-28 PROCEDURE — 84295 ASSAY OF SERUM SODIUM: CPT

## 2025-08-28 PROCEDURE — 82803 BLOOD GASES ANY COMBINATION: CPT

## 2025-08-28 PROCEDURE — 80069 RENAL FUNCTION PANEL: CPT

## 2025-08-28 RX ORDER — AMIODARONE HYDROCHLORIDE 200 MG/1
200 TABLET ORAL
Status: DISCONTINUED | OUTPATIENT
Start: 2025-08-29 | End: 2025-09-03 | Stop reason: HOSPADM

## 2025-08-28 RX ORDER — ONDANSETRON 2 MG/ML
4 INJECTION INTRAMUSCULAR; INTRAVENOUS EVERY 6 HOURS PRN
Status: DISCONTINUED | OUTPATIENT
Start: 2025-08-28 | End: 2025-09-03 | Stop reason: HOSPADM

## 2025-08-28 RX ORDER — DILTIAZEM HYDROCHLORIDE 30 MG/1
30 TABLET, FILM COATED ORAL EVERY 8 HOURS SCHEDULED
Status: DISCONTINUED | OUTPATIENT
Start: 2025-08-28 | End: 2025-08-30

## 2025-08-28 RX ORDER — FUROSEMIDE 10 MG/ML
20 INJECTION INTRAMUSCULAR; INTRAVENOUS ONCE
Status: COMPLETED | OUTPATIENT
Start: 2025-08-28 | End: 2025-08-28

## 2025-08-28 RX ADMIN — APIXABAN 5 MG: 5 TABLET, FILM COATED ORAL at 22:42

## 2025-08-28 RX ADMIN — BUPROPION HYDROCHLORIDE 100 MG: 100 TABLET, FILM COATED ORAL at 07:59

## 2025-08-28 RX ADMIN — AMIODARONE HYDROCHLORIDE 1 MG/MIN: 50 INJECTION, SOLUTION INTRAVENOUS at 08:59

## 2025-08-28 RX ADMIN — BUPROPION HYDROCHLORIDE 100 MG: 100 TABLET, FILM COATED ORAL at 13:28

## 2025-08-28 RX ADMIN — DILTIAZEM HYDROCHLORIDE 30 MG: 30 TABLET ORAL at 22:33

## 2025-08-28 RX ADMIN — SODIUM CHLORIDE, PRESERVATIVE FREE 20 MG: 5 INJECTION INTRAVENOUS at 22:29

## 2025-08-28 RX ADMIN — GABAPENTIN 800 MG: 400 CAPSULE ORAL at 22:42

## 2025-08-28 RX ADMIN — PIPERACILLIN AND TAZOBACTAM 3375 MG: 3; .375 INJECTION, POWDER, LYOPHILIZED, FOR SOLUTION INTRAVENOUS at 08:19

## 2025-08-28 RX ADMIN — FUROSEMIDE 20 MG: 10 INJECTION, SOLUTION INTRAMUSCULAR; INTRAVENOUS at 11:02

## 2025-08-28 RX ADMIN — CALCIUM GLUCONATE 1000 MG: 98 INJECTION INTRAVENOUS at 10:54

## 2025-08-28 RX ADMIN — PIPERACILLIN AND TAZOBACTAM 3375 MG: 3; .375 INJECTION, POWDER, LYOPHILIZED, FOR SOLUTION INTRAVENOUS at 17:16

## 2025-08-28 RX ADMIN — POTASSIUM CHLORIDE 20 MEQ: 29.8 INJECTION, SOLUTION INTRAVENOUS at 02:19

## 2025-08-28 RX ADMIN — HYDROCORTISONE SODIUM SUCCINATE 50 MG: 100 INJECTION INTRAMUSCULAR; INTRAVENOUS at 11:00

## 2025-08-28 RX ADMIN — BUPROPION HYDROCHLORIDE 100 MG: 100 TABLET, FILM COATED ORAL at 22:33

## 2025-08-28 RX ADMIN — POTASSIUM CHLORIDE 20 MEQ: 29.8 INJECTION, SOLUTION INTRAVENOUS at 00:35

## 2025-08-28 RX ADMIN — PIPERACILLIN AND TAZOBACTAM 3375 MG: 3; .375 INJECTION, POWDER, LYOPHILIZED, FOR SOLUTION INTRAVENOUS at 23:20

## 2025-08-28 RX ADMIN — GABAPENTIN 800 MG: 400 CAPSULE ORAL at 07:59

## 2025-08-28 RX ADMIN — POTASSIUM PHOSPHATE, MONOBASIC POTASSIUM PHOSPHATE, DIBASIC 20 MMOL: 224; 236 INJECTION, SOLUTION, CONCENTRATE INTRAVENOUS at 11:22

## 2025-08-28 RX ADMIN — ARIPIPRAZOLE 5 MG: 5 TABLET ORAL at 07:59

## 2025-08-28 RX ADMIN — VANCOMYCIN 1250 MG: 1.75 INJECTION, SOLUTION INTRAVENOUS at 06:14

## 2025-08-28 RX ADMIN — AMIODARONE HYDROCHLORIDE 1 MG/MIN: 50 INJECTION, SOLUTION INTRAVENOUS at 17:16

## 2025-08-28 RX ADMIN — APIXABAN 5 MG: 5 TABLET, FILM COATED ORAL at 07:58

## 2025-08-28 RX ADMIN — HYDROCORTISONE SODIUM SUCCINATE 50 MG: 100 INJECTION INTRAMUSCULAR; INTRAVENOUS at 17:13

## 2025-08-28 RX ADMIN — ROSUVASTATIN CALCIUM 10 MG: 10 TABLET, FILM COATED ORAL at 22:29

## 2025-08-28 RX ADMIN — HYDROCORTISONE SODIUM SUCCINATE 50 MG: 100 INJECTION INTRAMUSCULAR; INTRAVENOUS at 06:08

## 2025-08-28 RX ADMIN — DOCUSATE SODIUM 100 MG: 50 LIQUID ORAL at 11:26

## 2025-08-28 RX ADMIN — SODIUM CHLORIDE, PRESERVATIVE FREE 20 MG: 5 INJECTION INTRAVENOUS at 08:13

## 2025-08-28 RX ADMIN — HYDROCORTISONE SODIUM SUCCINATE 50 MG: 100 INJECTION INTRAMUSCULAR; INTRAVENOUS at 22:29

## 2025-08-28 RX ADMIN — MIRTAZAPINE 60 MG: 30 TABLET, FILM COATED ORAL at 22:29

## 2025-08-28 RX ADMIN — DOCUSATE SODIUM 100 MG: 50 LIQUID ORAL at 22:29

## 2025-08-28 RX ADMIN — ONDANSETRON 4 MG: 2 INJECTION, SOLUTION INTRAMUSCULAR; INTRAVENOUS at 12:48

## 2025-08-28 RX ADMIN — DEXTROAMPHETAMINE SACCHARATE, AMPHETAMINE ASPARTATE, DEXTROAMPHETAMINE SULFATE, AMPHETAMINE SULFATE TABLETS, 10 MG,CLL 20 MG: 2.5; 2.5; 2.5; 2.5 TABLET ORAL at 08:15

## 2025-08-28 RX ADMIN — POTASSIUM CHLORIDE 20 MEQ: 29.8 INJECTION, SOLUTION INTRAVENOUS at 01:21

## 2025-08-28 RX ADMIN — VANCOMYCIN 1250 MG: 1.75 INJECTION, SOLUTION INTRAVENOUS at 23:20

## 2025-08-28 ASSESSMENT — PAIN SCALES - GENERAL
PAINLEVEL_OUTOF10: 0

## 2025-08-29 LAB
ALBUMIN SERPL-MCNC: 3.1 G/DL (ref 3.5–4.6)
ANION GAP SERPL CALCULATED.3IONS-SCNC: 10 MEQ/L (ref 9–15)
ANION GAP SERPL CALCULATED.3IONS-SCNC: 9 MEQ/L (ref 9–15)
BACTERIA SPEC RESP CULT: ABNORMAL
BACTERIA SPEC RESP CULT: ABNORMAL
BASOPHILS # BLD: 0 K/UL (ref 0–0.2)
BASOPHILS NFR BLD: 0.2 %
BUN SERPL-MCNC: 25 MG/DL (ref 8–23)
BUN SERPL-MCNC: 26 MG/DL (ref 8–23)
CALCIUM SERPL-MCNC: 6.6 MG/DL (ref 8.5–9.9)
CALCIUM SERPL-MCNC: 7.1 MG/DL (ref 8.5–9.9)
CHLORIDE SERPL-SCNC: 108 MEQ/L (ref 95–107)
CHLORIDE SERPL-SCNC: 111 MEQ/L (ref 95–107)
CO2 SERPL-SCNC: 27 MEQ/L (ref 20–31)
CO2 SERPL-SCNC: 28 MEQ/L (ref 20–31)
CREAT SERPL-MCNC: 0.64 MG/DL (ref 0.5–0.9)
CREAT SERPL-MCNC: 0.65 MG/DL (ref 0.5–0.9)
EOSINOPHIL # BLD: 0 K/UL (ref 0–0.7)
EOSINOPHIL NFR BLD: 0 %
ERYTHROCYTE [DISTWIDTH] IN BLOOD BY AUTOMATED COUNT: 16.2 % (ref 11.5–14.5)
GLUCOSE BLD-MCNC: 73 MG/DL (ref 70–99)
GLUCOSE BLD-MCNC: 82 MG/DL (ref 70–99)
GLUCOSE BLD-MCNC: 99 MG/DL (ref 70–99)
GLUCOSE SERPL-MCNC: 118 MG/DL (ref 70–99)
GLUCOSE SERPL-MCNC: 97 MG/DL (ref 70–99)
HCT VFR BLD AUTO: 28.7 % (ref 37–47)
HGB BLD-MCNC: 9.1 G/DL (ref 12–16)
LYMPHOCYTES # BLD: 1.1 K/UL (ref 1–4.8)
LYMPHOCYTES NFR BLD: 9.5 %
MAGNESIUM SERPL-MCNC: 2.9 MG/DL (ref 1.7–2.4)
MCH RBC QN AUTO: 29.6 PG (ref 27–31.3)
MCHC RBC AUTO-ENTMCNC: 31.7 % (ref 33–37)
MCV RBC AUTO: 93.5 FL (ref 79.4–94.8)
MONOCYTES # BLD: 0.5 K/UL (ref 0.2–0.8)
MONOCYTES NFR BLD: 4.8 %
NEUTROPHILS # BLD: 9.3 K/UL (ref 1.4–6.5)
NEUTS SEG NFR BLD: 84.1 %
ORGANISM: ABNORMAL
PERFORMED ON: NORMAL
PHOSPHATE SERPL-MCNC: 1.5 MG/DL (ref 2.3–4.8)
PHOSPHATE SERPL-MCNC: 1.8 MG/DL (ref 2.3–4.8)
PLATELET # BLD AUTO: 191 K/UL (ref 130–400)
POTASSIUM SERPL-SCNC: 2.8 MEQ/L (ref 3.4–4.9)
POTASSIUM SERPL-SCNC: 3.2 MEQ/L (ref 3.4–4.9)
POTASSIUM SERPL-SCNC: 3.6 MEQ/L (ref 3.4–4.9)
PROCALCITONIN SERPL IA-MCNC: 3.67 NG/ML (ref 0–0.15)
RBC # BLD AUTO: 3.07 M/UL (ref 4.2–5.4)
SODIUM SERPL-SCNC: 146 MEQ/L (ref 135–144)
SODIUM SERPL-SCNC: 147 MEQ/L (ref 135–144)
VANCOMYCIN SERPL-MCNC: 27.7 UG/ML (ref 10–40)
WBC # BLD AUTO: 11 K/UL (ref 4.8–10.8)

## 2025-08-29 PROCEDURE — 6370000000 HC RX 637 (ALT 250 FOR IP): Performed by: INTERNAL MEDICINE

## 2025-08-29 PROCEDURE — 94660 CPAP INITIATION&MGMT: CPT

## 2025-08-29 PROCEDURE — 6360000002 HC RX W HCPCS: Performed by: INTERNAL MEDICINE

## 2025-08-29 PROCEDURE — 6370000000 HC RX 637 (ALT 250 FOR IP): Performed by: NURSE PRACTITIONER

## 2025-08-29 PROCEDURE — 2500000003 HC RX 250 WO HCPCS

## 2025-08-29 PROCEDURE — 2500000003 HC RX 250 WO HCPCS: Performed by: NURSE PRACTITIONER

## 2025-08-29 PROCEDURE — 99233 SBSQ HOSP IP/OBS HIGH 50: CPT | Performed by: INTERNAL MEDICINE

## 2025-08-29 PROCEDURE — 6360000002 HC RX W HCPCS: Performed by: NURSE PRACTITIONER

## 2025-08-29 PROCEDURE — 2580000003 HC RX 258: Performed by: NURSE PRACTITIONER

## 2025-08-29 PROCEDURE — 6360000002 HC RX W HCPCS

## 2025-08-29 PROCEDURE — 84100 ASSAY OF PHOSPHORUS: CPT

## 2025-08-29 PROCEDURE — 2700000000 HC OXYGEN THERAPY PER DAY

## 2025-08-29 PROCEDURE — 80202 ASSAY OF VANCOMYCIN: CPT

## 2025-08-29 PROCEDURE — 2580000003 HC RX 258

## 2025-08-29 PROCEDURE — 2000000000 HC ICU R&B

## 2025-08-29 PROCEDURE — 99291 CRITICAL CARE FIRST HOUR: CPT | Performed by: INTERNAL MEDICINE

## 2025-08-29 PROCEDURE — 2580000003 HC RX 258: Performed by: INTERNAL MEDICINE

## 2025-08-29 PROCEDURE — 84145 PROCALCITONIN (PCT): CPT

## 2025-08-29 PROCEDURE — 83735 ASSAY OF MAGNESIUM: CPT

## 2025-08-29 PROCEDURE — 97162 PT EVAL MOD COMPLEX 30 MIN: CPT

## 2025-08-29 PROCEDURE — 84132 ASSAY OF SERUM POTASSIUM: CPT

## 2025-08-29 PROCEDURE — 51798 US URINE CAPACITY MEASURE: CPT

## 2025-08-29 PROCEDURE — 51701 INSERT BLADDER CATHETER: CPT

## 2025-08-29 PROCEDURE — 80069 RENAL FUNCTION PANEL: CPT

## 2025-08-29 PROCEDURE — 94761 N-INVAS EAR/PLS OXIMETRY MLT: CPT

## 2025-08-29 PROCEDURE — 85025 COMPLETE CBC W/AUTO DIFF WBC: CPT

## 2025-08-29 PROCEDURE — 92526 ORAL FUNCTION THERAPY: CPT

## 2025-08-29 PROCEDURE — 2500000003 HC RX 250 WO HCPCS: Performed by: INTERNAL MEDICINE

## 2025-08-29 RX ORDER — INSULIN LISPRO 100 [IU]/ML
0-8 INJECTION, SOLUTION INTRAVENOUS; SUBCUTANEOUS
Status: DISCONTINUED | OUTPATIENT
Start: 2025-08-30 | End: 2025-09-03 | Stop reason: HOSPADM

## 2025-08-29 RX ORDER — HYDROCORTISONE SODIUM SUCCINATE 100 MG/2ML
50 INJECTION INTRAMUSCULAR; INTRAVENOUS 2 TIMES DAILY
Status: DISCONTINUED | OUTPATIENT
Start: 2025-08-29 | End: 2025-08-30

## 2025-08-29 RX ORDER — VANCOMYCIN 1.75 G/350ML
1250 INJECTION, SOLUTION INTRAVENOUS EVERY 12 HOURS
Status: DISCONTINUED | OUTPATIENT
Start: 2025-08-29 | End: 2025-08-30

## 2025-08-29 RX ADMIN — POTASSIUM CHLORIDE 20 MEQ: 29.8 INJECTION, SOLUTION INTRAVENOUS at 03:19

## 2025-08-29 RX ADMIN — POTASSIUM CHLORIDE 20 MEQ: 29.8 INJECTION, SOLUTION INTRAVENOUS at 10:15

## 2025-08-29 RX ADMIN — APIXABAN 5 MG: 5 TABLET, FILM COATED ORAL at 09:39

## 2025-08-29 RX ADMIN — ARIPIPRAZOLE 5 MG: 5 TABLET ORAL at 07:19

## 2025-08-29 RX ADMIN — VANCOMYCIN 1250 MG: 1.75 INJECTION, SOLUTION INTRAVENOUS at 10:48

## 2025-08-29 RX ADMIN — DILTIAZEM HYDROCHLORIDE 30 MG: 30 TABLET ORAL at 20:28

## 2025-08-29 RX ADMIN — GABAPENTIN 800 MG: 400 CAPSULE ORAL at 07:19

## 2025-08-29 RX ADMIN — MIRTAZAPINE 60 MG: 30 TABLET, FILM COATED ORAL at 20:28

## 2025-08-29 RX ADMIN — SODIUM CHLORIDE, PRESERVATIVE FREE 20 MG: 5 INJECTION INTRAVENOUS at 20:28

## 2025-08-29 RX ADMIN — POTASSIUM PHOSPHATE, MONOBASIC POTASSIUM PHOSPHATE, DIBASIC 30 MMOL: 224; 236 INJECTION, SOLUTION, CONCENTRATE INTRAVENOUS at 14:28

## 2025-08-29 RX ADMIN — AMIODARONE HYDROCHLORIDE 200 MG: 200 TABLET ORAL at 07:19

## 2025-08-29 RX ADMIN — DILTIAZEM HYDROCHLORIDE 30 MG: 30 TABLET ORAL at 14:23

## 2025-08-29 RX ADMIN — PIPERACILLIN AND TAZOBACTAM 3375 MG: 3; .375 INJECTION, POWDER, LYOPHILIZED, FOR SOLUTION INTRAVENOUS at 17:17

## 2025-08-29 RX ADMIN — BUPROPION HYDROCHLORIDE 100 MG: 100 TABLET, FILM COATED ORAL at 07:19

## 2025-08-29 RX ADMIN — POTASSIUM CHLORIDE 20 MEQ: 29.8 INJECTION, SOLUTION INTRAVENOUS at 05:46

## 2025-08-29 RX ADMIN — HYDROCORTISONE SODIUM SUCCINATE 50 MG: 100 INJECTION INTRAMUSCULAR; INTRAVENOUS at 20:28

## 2025-08-29 RX ADMIN — BUPROPION HYDROCHLORIDE 100 MG: 100 TABLET, FILM COATED ORAL at 14:25

## 2025-08-29 RX ADMIN — DILTIAZEM HYDROCHLORIDE 30 MG: 30 TABLET ORAL at 05:49

## 2025-08-29 RX ADMIN — HYDROCORTISONE SODIUM SUCCINATE 50 MG: 100 INJECTION INTRAMUSCULAR; INTRAVENOUS at 03:22

## 2025-08-29 RX ADMIN — POTASSIUM CHLORIDE 20 MEQ: 29.8 INJECTION, SOLUTION INTRAVENOUS at 04:24

## 2025-08-29 RX ADMIN — GABAPENTIN 800 MG: 400 CAPSULE ORAL at 20:28

## 2025-08-29 RX ADMIN — PIPERACILLIN AND TAZOBACTAM 3375 MG: 3; .375 INJECTION, POWDER, LYOPHILIZED, FOR SOLUTION INTRAVENOUS at 08:34

## 2025-08-29 RX ADMIN — POTASSIUM CHLORIDE 20 MEQ: 29.8 INJECTION, SOLUTION INTRAVENOUS at 09:19

## 2025-08-29 RX ADMIN — DEXTROAMPHETAMINE SACCHARATE, AMPHETAMINE ASPARTATE, DEXTROAMPHETAMINE SULFATE, AMPHETAMINE SULFATE TABLETS, 10 MG,CLL 20 MG: 2.5; 2.5; 2.5; 2.5 TABLET ORAL at 07:19

## 2025-08-29 RX ADMIN — SODIUM CHLORIDE, PRESERVATIVE FREE 20 MG: 5 INJECTION INTRAVENOUS at 07:27

## 2025-08-29 RX ADMIN — CALCIUM GLUCONATE 2000 MG: 98 INJECTION INTRAVENOUS at 06:46

## 2025-08-29 RX ADMIN — APIXABAN 5 MG: 5 TABLET, FILM COATED ORAL at 20:28

## 2025-08-29 RX ADMIN — BUPROPION HYDROCHLORIDE 100 MG: 100 TABLET, FILM COATED ORAL at 20:28

## 2025-08-29 RX ADMIN — DOCUSATE SODIUM 100 MG: 50 LIQUID ORAL at 07:25

## 2025-08-29 RX ADMIN — ACETAMINOPHEN 650 MG: 325 TABLET ORAL at 11:05

## 2025-08-29 RX ADMIN — SODIUM PHOSPHATE, MONOBASIC, MONOHYDRATE AND SODIUM PHOSPHATE, DIBASIC, ANHYDROUS 15 MMOL: 142; 276 INJECTION, SOLUTION INTRAVENOUS at 06:41

## 2025-08-29 RX ADMIN — ROSUVASTATIN CALCIUM 10 MG: 10 TABLET, FILM COATED ORAL at 20:31

## 2025-08-29 ASSESSMENT — PAIN SCALES - GENERAL
PAINLEVEL_OUTOF10: 0
PAINLEVEL_OUTOF10: 9
PAINLEVEL_OUTOF10: 0

## 2025-08-29 ASSESSMENT — PAIN DESCRIPTION - DESCRIPTORS: DESCRIPTORS: SHARP

## 2025-08-29 ASSESSMENT — PAIN - FUNCTIONAL ASSESSMENT: PAIN_FUNCTIONAL_ASSESSMENT: 0-10

## 2025-08-29 ASSESSMENT — PAIN DESCRIPTION - LOCATION: LOCATION: BACK

## 2025-08-29 ASSESSMENT — PAIN DESCRIPTION - ORIENTATION: ORIENTATION: POSTERIOR

## 2025-08-30 ENCOUNTER — APPOINTMENT (OUTPATIENT)
Age: 70
DRG: 208 | End: 2025-08-30
Attending: INTERNAL MEDICINE
Payer: COMMERCIAL

## 2025-08-30 PROBLEM — I48.0 PAROXYSMAL ATRIAL FIBRILLATION WITH RVR (HCC): Status: ACTIVE | Noted: 2025-08-30

## 2025-08-30 LAB
ALBUMIN SERPL-MCNC: 3 G/DL (ref 3.5–4.6)
ANION GAP SERPL CALCULATED.3IONS-SCNC: 10 MEQ/L (ref 9–15)
BACTERIA BLD CULT ORG #2: NORMAL
BACTERIA BLD CULT: NORMAL
BASOPHILS # BLD: 0 K/UL (ref 0–0.2)
BASOPHILS NFR BLD: 0.1 %
BUN SERPL-MCNC: 28 MG/DL (ref 8–23)
CALCIUM SERPL-MCNC: 7.2 MG/DL (ref 8.5–9.9)
CHLORIDE SERPL-SCNC: 112 MEQ/L (ref 95–107)
CO2 SERPL-SCNC: 25 MEQ/L (ref 20–31)
CREAT SERPL-MCNC: 0.62 MG/DL (ref 0.5–0.9)
ECHO AO ROOT DIAM: 3.2 CM
ECHO AO ROOT INDEX: 1.77 CM/M2
ECHO AR MAX VEL PISA: 4.1 M/S
ECHO AV AREA PEAK VELOCITY: 1.8 CM2
ECHO AV AREA VTI: 1.9 CM2
ECHO AV AREA/BSA PEAK VELOCITY: 1 CM2/M2
ECHO AV AREA/BSA VTI: 1 CM2/M2
ECHO AV CUSP MM: 1.9 CM
ECHO AV MEAN GRADIENT: 4 MMHG
ECHO AV MEAN VELOCITY: 1 M/S
ECHO AV PEAK GRADIENT: 8 MMHG
ECHO AV PEAK VELOCITY: 1.5 M/S
ECHO AV REGURGITANT PHT: 357.4 MS
ECHO AV VELOCITY RATIO: 0.73
ECHO AV VTI: 26.7 CM
ECHO BSA: 1.78 M2
ECHO EST RA PRESSURE: 8 MMHG
ECHO LA DIAMETER INDEX: 1.66 CM/M2
ECHO LA DIAMETER: 3 CM
ECHO LA TO AORTIC ROOT RATIO: 0.94
ECHO LA VOL A-L A2C: 63 ML (ref 22–52)
ECHO LA VOL A-L A4C: 61 ML (ref 22–52)
ECHO LA VOL MOD A2C: 61 ML (ref 22–52)
ECHO LA VOL MOD A4C: 58 ML (ref 22–52)
ECHO LA VOLUME AREA LENGTH: 65 ML
ECHO LA VOLUME INDEX A-L A2C: 35 ML/M2 (ref 16–34)
ECHO LA VOLUME INDEX A-L A4C: 34 ML/M2 (ref 16–34)
ECHO LA VOLUME INDEX AREA LENGTH: 36 ML/M2 (ref 16–34)
ECHO LA VOLUME INDEX MOD A2C: 34 ML/M2 (ref 16–34)
ECHO LA VOLUME INDEX MOD A4C: 32 ML/M2 (ref 16–34)
ECHO LV EDV A2C: 95 ML
ECHO LV EDV A4C: 82 ML
ECHO LV EDV BP: 90 ML (ref 56–104)
ECHO LV EDV INDEX A4C: 45 ML/M2
ECHO LV EDV INDEX BP: 50 ML/M2
ECHO LV EDV NDEX A2C: 52 ML/M2
ECHO LV EF PHYSICIAN: 55 %
ECHO LV EJECTION FRACTION A2C: 64 %
ECHO LV EJECTION FRACTION A4C: 63 %
ECHO LV EJECTION FRACTION BIPLANE: 62 % (ref 55–100)
ECHO LV ESV A2C: 34 ML
ECHO LV ESV A4C: 30 ML
ECHO LV ESV BP: 34 ML (ref 19–49)
ECHO LV ESV INDEX A2C: 19 ML/M2
ECHO LV ESV INDEX A4C: 17 ML/M2
ECHO LV ESV INDEX BP: 19 ML/M2
ECHO LV FRACTIONAL SHORTENING: 30 % (ref 28–44)
ECHO LV INTERNAL DIMENSION DIASTOLE INDEX: 2.76 CM/M2
ECHO LV INTERNAL DIMENSION DIASTOLIC: 5 CM (ref 3.9–5.3)
ECHO LV INTERNAL DIMENSION SYSTOLIC INDEX: 1.93 CM/M2
ECHO LV INTERNAL DIMENSION SYSTOLIC: 3.5 CM
ECHO LV IVSD: 1 CM (ref 0.6–0.9)
ECHO LV IVSS: 1.3 CM
ECHO LV MASS 2D: 169.9 G (ref 67–162)
ECHO LV MASS INDEX 2D: 93.9 G/M2 (ref 43–95)
ECHO LV POSTERIOR WALL DIASTOLIC: 0.9 CM (ref 0.6–0.9)
ECHO LV POSTERIOR WALL SYSTOLIC: 1 CM
ECHO LV RELATIVE WALL THICKNESS RATIO: 0.36
ECHO LVOT AREA: 2.5 CM2
ECHO LVOT AV VTI INDEX: 0.75
ECHO LVOT DIAM: 1.8 CM
ECHO LVOT MEAN GRADIENT: 2 MMHG
ECHO LVOT PEAK GRADIENT: 4 MMHG
ECHO LVOT PEAK VELOCITY: 1.1 M/S
ECHO LVOT STROKE VOLUME INDEX: 28.1 ML/M2
ECHO LVOT SV: 50.9 ML
ECHO LVOT VTI: 20 CM
ECHO MV E VELOCITY: 1.57 M/S
ECHO MV REGURGITANT PEAK GRADIENT: 117 MMHG
ECHO MV REGURGITANT PEAK VELOCITY: 5.4 M/S
ECHO PV MAX VELOCITY: 1.1 M/S
ECHO PV PEAK GRADIENT: 5 MMHG
ECHO RIGHT VENTRICULAR SYSTOLIC PRESSURE (RVSP): 36 MMHG
ECHO RV INTERNAL DIMENSION: 2 CM
ECHO RV TAPSE: 2 CM (ref 1.7–?)
ECHO TV REGURGITANT MAX VELOCITY: 2.63 M/S
ECHO TV REGURGITANT PEAK GRADIENT: 28 MMHG
EOSINOPHIL # BLD: 0 K/UL (ref 0–0.7)
EOSINOPHIL NFR BLD: 0.1 %
ERYTHROCYTE [DISTWIDTH] IN BLOOD BY AUTOMATED COUNT: 16.8 % (ref 11.5–14.5)
GLUCOSE BLD-MCNC: 103 MG/DL (ref 70–99)
GLUCOSE BLD-MCNC: 82 MG/DL (ref 70–99)
GLUCOSE BLD-MCNC: 98 MG/DL (ref 70–99)
GLUCOSE SERPL-MCNC: 107 MG/DL (ref 70–99)
HCT VFR BLD AUTO: 29.5 % (ref 37–47)
HGB BLD-MCNC: 9.3 G/DL (ref 12–16)
LYMPHOCYTES # BLD: 0.8 K/UL (ref 1–4.8)
LYMPHOCYTES NFR BLD: 10 %
MCH RBC QN AUTO: 30 PG (ref 27–31.3)
MCHC RBC AUTO-ENTMCNC: 31.5 % (ref 33–37)
MCV RBC AUTO: 95.2 FL (ref 79.4–94.8)
METAMYELOCYTES NFR BLD MANUAL: 1 %
MONOCYTES # BLD: 0.4 K/UL (ref 0.2–0.8)
MONOCYTES NFR BLD: 4.8 %
NEUTROPHILS # BLD: 6.5 K/UL (ref 1.4–6.5)
NEUTS SEG NFR BLD: 85 %
NRBC BLD-RTO: 2 /100 WBC
PERFORMED ON: ABNORMAL
PERFORMED ON: NORMAL
PERFORMED ON: NORMAL
PHOSPHATE SERPL-MCNC: 2.5 MG/DL (ref 2.3–4.8)
PLATELET # BLD AUTO: 200 K/UL (ref 130–400)
PLATELET BLD QL SMEAR: ADEQUATE
POTASSIUM SERPL-SCNC: 3.9 MEQ/L (ref 3.4–4.9)
RBC # BLD AUTO: 3.1 M/UL (ref 4.2–5.4)
SLIDE REVIEW: ABNORMAL
SMUDGE CELLS BLD QL SMEAR: 6.7
SODIUM SERPL-SCNC: 147 MEQ/L (ref 135–144)
WBC # BLD AUTO: 7.5 K/UL (ref 4.8–10.8)

## 2025-08-30 PROCEDURE — C8929 TTE W OR WO FOL WCON,DOPPLER: HCPCS

## 2025-08-30 PROCEDURE — 2580000003 HC RX 258: Performed by: NURSE PRACTITIONER

## 2025-08-30 PROCEDURE — 2580000003 HC RX 258: Performed by: INTERNAL MEDICINE

## 2025-08-30 PROCEDURE — 85025 COMPLETE CBC W/AUTO DIFF WBC: CPT

## 2025-08-30 PROCEDURE — 97535 SELF CARE MNGMENT TRAINING: CPT

## 2025-08-30 PROCEDURE — 6370000000 HC RX 637 (ALT 250 FOR IP): Performed by: INTERNAL MEDICINE

## 2025-08-30 PROCEDURE — 6360000002 HC RX W HCPCS: Performed by: NURSE PRACTITIONER

## 2025-08-30 PROCEDURE — 36592 COLLECT BLOOD FROM PICC: CPT

## 2025-08-30 PROCEDURE — 80069 RENAL FUNCTION PANEL: CPT

## 2025-08-30 PROCEDURE — 99291 CRITICAL CARE FIRST HOUR: CPT | Performed by: INTERNAL MEDICINE

## 2025-08-30 PROCEDURE — 6360000002 HC RX W HCPCS: Performed by: INTERNAL MEDICINE

## 2025-08-30 PROCEDURE — 2700000000 HC OXYGEN THERAPY PER DAY

## 2025-08-30 PROCEDURE — 93306 TTE W/DOPPLER COMPLETE: CPT | Performed by: INTERNAL MEDICINE

## 2025-08-30 PROCEDURE — 94660 CPAP INITIATION&MGMT: CPT

## 2025-08-30 PROCEDURE — 6370000000 HC RX 637 (ALT 250 FOR IP): Performed by: NURSE PRACTITIONER

## 2025-08-30 PROCEDURE — 2500000003 HC RX 250 WO HCPCS: Performed by: NURSE PRACTITIONER

## 2025-08-30 PROCEDURE — 6360000004 HC RX CONTRAST MEDICATION: Performed by: INTERNAL MEDICINE

## 2025-08-30 PROCEDURE — 2000000000 HC ICU R&B

## 2025-08-30 PROCEDURE — 99233 SBSQ HOSP IP/OBS HIGH 50: CPT | Performed by: INTERNAL MEDICINE

## 2025-08-30 PROCEDURE — 94761 N-INVAS EAR/PLS OXIMETRY MLT: CPT

## 2025-08-30 RX ORDER — LABETALOL HYDROCHLORIDE 5 MG/ML
10 INJECTION, SOLUTION INTRAVENOUS EVERY 4 HOURS PRN
Status: DISCONTINUED | OUTPATIENT
Start: 2025-08-30 | End: 2025-09-03 | Stop reason: HOSPADM

## 2025-08-30 RX ORDER — DILTIAZEM HCL 60 MG
60 TABLET ORAL EVERY 8 HOURS SCHEDULED
Status: DISCONTINUED | OUTPATIENT
Start: 2025-08-30 | End: 2025-09-03 | Stop reason: HOSPADM

## 2025-08-30 RX ORDER — DILTIAZEM HYDROCHLORIDE 5 MG/ML
10 INJECTION INTRAVENOUS ONCE
Status: COMPLETED | OUTPATIENT
Start: 2025-08-30 | End: 2025-08-30

## 2025-08-30 RX ADMIN — HYDROCORTISONE SODIUM SUCCINATE 50 MG: 100 INJECTION INTRAMUSCULAR; INTRAVENOUS at 09:31

## 2025-08-30 RX ADMIN — PIPERACILLIN AND TAZOBACTAM 3375 MG: 3; .375 INJECTION, POWDER, LYOPHILIZED, FOR SOLUTION INTRAVENOUS at 10:03

## 2025-08-30 RX ADMIN — DULOXETINE 60 MG: 60 CAPSULE, DELAYED RELEASE ORAL at 09:31

## 2025-08-30 RX ADMIN — GABAPENTIN 800 MG: 400 CAPSULE ORAL at 09:31

## 2025-08-30 RX ADMIN — SODIUM CHLORIDE, PRESERVATIVE FREE 20 MG: 5 INJECTION INTRAVENOUS at 10:14

## 2025-08-30 RX ADMIN — DILTIAZEM HYDROCHLORIDE 60 MG: 60 TABLET ORAL at 12:43

## 2025-08-30 RX ADMIN — VANCOMYCIN 1250 MG: 1.75 INJECTION, SOLUTION INTRAVENOUS at 00:43

## 2025-08-30 RX ADMIN — BUPROPION HYDROCHLORIDE 100 MG: 100 TABLET, FILM COATED ORAL at 20:09

## 2025-08-30 RX ADMIN — PIPERACILLIN AND TAZOBACTAM 3375 MG: 3; .375 INJECTION, POWDER, LYOPHILIZED, FOR SOLUTION INTRAVENOUS at 01:13

## 2025-08-30 RX ADMIN — BUPROPION HYDROCHLORIDE 100 MG: 100 TABLET, FILM COATED ORAL at 12:43

## 2025-08-30 RX ADMIN — ALPRAZOLAM 1 MG: 0.5 TABLET ORAL at 13:38

## 2025-08-30 RX ADMIN — ARIPIPRAZOLE 5 MG: 5 TABLET ORAL at 09:31

## 2025-08-30 RX ADMIN — DEXTROAMPHETAMINE SACCHARATE, AMPHETAMINE ASPARTATE, DEXTROAMPHETAMINE SULFATE, AMPHETAMINE SULFATE TABLETS, 10 MG,CLL 20 MG: 2.5; 2.5; 2.5; 2.5 TABLET ORAL at 10:14

## 2025-08-30 RX ADMIN — APIXABAN 5 MG: 5 TABLET, FILM COATED ORAL at 20:09

## 2025-08-30 RX ADMIN — DILTIAZEM HYDROCHLORIDE 5 MG/HR: 5 INJECTION, SOLUTION INTRAVENOUS at 17:11

## 2025-08-30 RX ADMIN — GABAPENTIN 800 MG: 400 CAPSULE ORAL at 20:09

## 2025-08-30 RX ADMIN — DILTIAZEM HYDROCHLORIDE 10 MG: 5 INJECTION, SOLUTION INTRAVENOUS at 17:11

## 2025-08-30 RX ADMIN — PIPERACILLIN AND TAZOBACTAM 3375 MG: 3; .375 INJECTION, POWDER, LYOPHILIZED, FOR SOLUTION INTRAVENOUS at 17:10

## 2025-08-30 RX ADMIN — DILTIAZEM HYDROCHLORIDE 30 MG: 30 TABLET ORAL at 10:33

## 2025-08-30 RX ADMIN — DOCUSATE SODIUM 100 MG: 50 LIQUID ORAL at 20:15

## 2025-08-30 RX ADMIN — APIXABAN 5 MG: 5 TABLET, FILM COATED ORAL at 09:31

## 2025-08-30 RX ADMIN — ROSUVASTATIN CALCIUM 10 MG: 10 TABLET, FILM COATED ORAL at 09:31

## 2025-08-30 RX ADMIN — BUPROPION HYDROCHLORIDE 100 MG: 100 TABLET, FILM COATED ORAL at 09:31

## 2025-08-30 RX ADMIN — SULFUR HEXAFLUORIDE 2 ML: KIT at 10:10

## 2025-08-30 RX ADMIN — SODIUM CHLORIDE, PRESERVATIVE FREE 20 MG: 5 INJECTION INTRAVENOUS at 20:09

## 2025-08-30 RX ADMIN — MIRTAZAPINE 60 MG: 30 TABLET, FILM COATED ORAL at 20:09

## 2025-08-30 ASSESSMENT — PAIN SCALES - GENERAL
PAINLEVEL_OUTOF10: 0

## 2025-08-31 LAB
ALBUMIN SERPL-MCNC: 2.9 G/DL (ref 3.5–4.6)
ANION GAP SERPL CALCULATED.3IONS-SCNC: 12 MEQ/L (ref 9–15)
ANION GAP SERPL CALCULATED.3IONS-SCNC: 8 MEQ/L (ref 9–15)
BASOPHILS # BLD: 0 K/UL (ref 0–0.2)
BASOPHILS NFR BLD: 0.2 %
BUN SERPL-MCNC: 21 MG/DL (ref 8–23)
BUN SERPL-MCNC: 23 MG/DL (ref 8–23)
CALCIUM SERPL-MCNC: 7.6 MG/DL (ref 8.5–9.9)
CALCIUM SERPL-MCNC: 8 MG/DL (ref 8.5–9.9)
CHLORIDE SERPL-SCNC: 108 MEQ/L (ref 95–107)
CHLORIDE SERPL-SCNC: 112 MEQ/L (ref 95–107)
CO2 SERPL-SCNC: 22 MEQ/L (ref 20–31)
CO2 SERPL-SCNC: 24 MEQ/L (ref 20–31)
CREAT SERPL-MCNC: 0.58 MG/DL (ref 0.5–0.9)
CREAT SERPL-MCNC: 0.59 MG/DL (ref 0.5–0.9)
EOSINOPHIL # BLD: 0.2 K/UL (ref 0–0.7)
EOSINOPHIL NFR BLD: 3.5 %
ERYTHROCYTE [DISTWIDTH] IN BLOOD BY AUTOMATED COUNT: 16.3 % (ref 11.5–14.5)
GLUCOSE BLD-MCNC: 73 MG/DL (ref 70–99)
GLUCOSE BLD-MCNC: 90 MG/DL (ref 70–99)
GLUCOSE BLD-MCNC: 91 MG/DL (ref 70–99)
GLUCOSE SERPL-MCNC: 88 MG/DL (ref 70–99)
GLUCOSE SERPL-MCNC: 94 MG/DL (ref 70–99)
HCT VFR BLD AUTO: 29 % (ref 37–47)
HGB BLD-MCNC: 9.1 G/DL (ref 12–16)
LYMPHOCYTES # BLD: 1.3 K/UL (ref 1–4.8)
LYMPHOCYTES NFR BLD: 26.5 %
MCH RBC QN AUTO: 29.7 PG (ref 27–31.3)
MCHC RBC AUTO-ENTMCNC: 31.4 % (ref 33–37)
MCV RBC AUTO: 94.8 FL (ref 79.4–94.8)
MONOCYTES # BLD: 0.4 K/UL (ref 0.2–0.8)
MONOCYTES NFR BLD: 8 %
NEUTROPHILS # BLD: 2.8 K/UL (ref 1.4–6.5)
NEUTS SEG NFR BLD: 57.9 %
PERFORMED ON: NORMAL
PHOSPHATE SERPL-MCNC: 1.3 MG/DL (ref 2.3–4.8)
PHOSPHATE SERPL-MCNC: 2.2 MG/DL (ref 2.3–4.8)
PLATELET # BLD AUTO: 199 K/UL (ref 130–400)
POTASSIUM SERPL-SCNC: 3.1 MEQ/L (ref 3.4–4.9)
POTASSIUM SERPL-SCNC: 3.4 MEQ/L (ref 3.4–4.9)
PROCALCITONIN SERPL IA-MCNC: 1.1 NG/ML (ref 0–0.15)
RBC # BLD AUTO: 3.06 M/UL (ref 4.2–5.4)
SODIUM SERPL-SCNC: 142 MEQ/L (ref 135–144)
SODIUM SERPL-SCNC: 144 MEQ/L (ref 135–144)
WBC # BLD AUTO: 4.9 K/UL (ref 4.8–10.8)

## 2025-08-31 PROCEDURE — 2580000003 HC RX 258: Performed by: INTERNAL MEDICINE

## 2025-08-31 PROCEDURE — 2500000003 HC RX 250 WO HCPCS: Performed by: NURSE PRACTITIONER

## 2025-08-31 PROCEDURE — 6370000000 HC RX 637 (ALT 250 FOR IP): Performed by: INTERNAL MEDICINE

## 2025-08-31 PROCEDURE — 6360000002 HC RX W HCPCS: Performed by: INTERNAL MEDICINE

## 2025-08-31 PROCEDURE — 84145 PROCALCITONIN (PCT): CPT

## 2025-08-31 PROCEDURE — 2700000000 HC OXYGEN THERAPY PER DAY

## 2025-08-31 PROCEDURE — 99291 CRITICAL CARE FIRST HOUR: CPT | Performed by: INTERNAL MEDICINE

## 2025-08-31 PROCEDURE — 84100 ASSAY OF PHOSPHORUS: CPT

## 2025-08-31 PROCEDURE — 2500000003 HC RX 250 WO HCPCS: Performed by: INTERNAL MEDICINE

## 2025-08-31 PROCEDURE — 92526 ORAL FUNCTION THERAPY: CPT

## 2025-08-31 PROCEDURE — 6360000002 HC RX W HCPCS: Performed by: NURSE PRACTITIONER

## 2025-08-31 PROCEDURE — 6360000002 HC RX W HCPCS

## 2025-08-31 PROCEDURE — 36415 COLL VENOUS BLD VENIPUNCTURE: CPT

## 2025-08-31 PROCEDURE — 2000000000 HC ICU R&B

## 2025-08-31 PROCEDURE — 99233 SBSQ HOSP IP/OBS HIGH 50: CPT | Performed by: INTERNAL MEDICINE

## 2025-08-31 PROCEDURE — 6370000000 HC RX 637 (ALT 250 FOR IP): Performed by: NURSE PRACTITIONER

## 2025-08-31 PROCEDURE — 2580000003 HC RX 258: Performed by: NURSE PRACTITIONER

## 2025-08-31 PROCEDURE — 94660 CPAP INITIATION&MGMT: CPT

## 2025-08-31 PROCEDURE — 94761 N-INVAS EAR/PLS OXIMETRY MLT: CPT

## 2025-08-31 PROCEDURE — 85025 COMPLETE CBC W/AUTO DIFF WBC: CPT

## 2025-08-31 PROCEDURE — 80069 RENAL FUNCTION PANEL: CPT

## 2025-08-31 RX ORDER — FUROSEMIDE 10 MG/ML
20 INJECTION INTRAMUSCULAR; INTRAVENOUS ONCE
Status: COMPLETED | OUTPATIENT
Start: 2025-08-31 | End: 2025-08-31

## 2025-08-31 RX ORDER — MORPHINE SULFATE 2 MG/ML
2 INJECTION, SOLUTION INTRAMUSCULAR; INTRAVENOUS ONCE
Status: COMPLETED | OUTPATIENT
Start: 2025-08-31 | End: 2025-08-31

## 2025-08-31 RX ADMIN — GABAPENTIN 800 MG: 400 CAPSULE ORAL at 09:15

## 2025-08-31 RX ADMIN — BUPROPION HYDROCHLORIDE 100 MG: 100 TABLET, FILM COATED ORAL at 21:38

## 2025-08-31 RX ADMIN — GABAPENTIN 800 MG: 400 CAPSULE ORAL at 21:39

## 2025-08-31 RX ADMIN — BUPROPION HYDROCHLORIDE 100 MG: 100 TABLET, FILM COATED ORAL at 17:02

## 2025-08-31 RX ADMIN — MIRTAZAPINE 60 MG: 30 TABLET, FILM COATED ORAL at 21:38

## 2025-08-31 RX ADMIN — DEXTROAMPHETAMINE SACCHARATE, AMPHETAMINE ASPARTATE, DEXTROAMPHETAMINE SULFATE, AMPHETAMINE SULFATE TABLETS, 10 MG,CLL 20 MG: 2.5; 2.5; 2.5; 2.5 TABLET ORAL at 09:15

## 2025-08-31 RX ADMIN — DOCUSATE SODIUM 100 MG: 50 LIQUID ORAL at 09:15

## 2025-08-31 RX ADMIN — SODIUM CHLORIDE, PRESERVATIVE FREE 20 MG: 5 INJECTION INTRAVENOUS at 09:15

## 2025-08-31 RX ADMIN — BUPROPION HYDROCHLORIDE 100 MG: 100 TABLET, FILM COATED ORAL at 09:15

## 2025-08-31 RX ADMIN — APIXABAN 5 MG: 5 TABLET, FILM COATED ORAL at 21:38

## 2025-08-31 RX ADMIN — SODIUM PHOSPHATE, MONOBASIC, MONOHYDRATE AND SODIUM PHOSPHATE, DIBASIC, ANHYDROUS 12.84 MMOL: 142; 276 INJECTION, SOLUTION INTRAVENOUS at 09:28

## 2025-08-31 RX ADMIN — PIPERACILLIN AND TAZOBACTAM 3375 MG: 3; .375 INJECTION, POWDER, LYOPHILIZED, FOR SOLUTION INTRAVENOUS at 00:48

## 2025-08-31 RX ADMIN — APIXABAN 5 MG: 5 TABLET, FILM COATED ORAL at 09:15

## 2025-08-31 RX ADMIN — POTASSIUM CHLORIDE 20 MEQ: 29.8 INJECTION, SOLUTION INTRAVENOUS at 23:28

## 2025-08-31 RX ADMIN — FUROSEMIDE 20 MG: 10 INJECTION, SOLUTION INTRAMUSCULAR; INTRAVENOUS at 12:31

## 2025-08-31 RX ADMIN — DULOXETINE 60 MG: 60 CAPSULE, DELAYED RELEASE ORAL at 09:15

## 2025-08-31 RX ADMIN — SODIUM CHLORIDE, PRESERVATIVE FREE 20 MG: 5 INJECTION INTRAVENOUS at 21:39

## 2025-08-31 RX ADMIN — DILTIAZEM HYDROCHLORIDE 12.5 MG/HR: 5 INJECTION, SOLUTION INTRAVENOUS at 03:52

## 2025-08-31 RX ADMIN — MORPHINE SULFATE 2 MG: 2 INJECTION, SOLUTION INTRAMUSCULAR; INTRAVENOUS at 05:46

## 2025-08-31 RX ADMIN — PIPERACILLIN AND TAZOBACTAM 3375 MG: 3; .375 INJECTION, POWDER, LYOPHILIZED, FOR SOLUTION INTRAVENOUS at 09:38

## 2025-08-31 RX ADMIN — DILTIAZEM HYDROCHLORIDE 12.5 MG/HR: 5 INJECTION, SOLUTION INTRAVENOUS at 15:11

## 2025-08-31 RX ADMIN — ROSUVASTATIN CALCIUM 10 MG: 10 TABLET, FILM COATED ORAL at 09:15

## 2025-08-31 RX ADMIN — PIPERACILLIN AND TAZOBACTAM 3375 MG: 3; .375 INJECTION, POWDER, LYOPHILIZED, FOR SOLUTION INTRAVENOUS at 17:05

## 2025-08-31 RX ADMIN — SODIUM PHOSPHATE, MONOBASIC, MONOHYDRATE AND SODIUM PHOSPHATE, DIBASIC, ANHYDROUS 12.84 MMOL: 142; 276 INJECTION, SOLUTION INTRAVENOUS at 23:25

## 2025-08-31 RX ADMIN — ARIPIPRAZOLE 5 MG: 5 TABLET ORAL at 09:15

## 2025-08-31 ASSESSMENT — PAIN SCALES - GENERAL
PAINLEVEL_OUTOF10: 0
PAINLEVEL_OUTOF10: 0
PAINLEVEL_OUTOF10: 7
PAINLEVEL_OUTOF10: 0
PAINLEVEL_OUTOF10: 0

## 2025-08-31 ASSESSMENT — PAIN - FUNCTIONAL ASSESSMENT: PAIN_FUNCTIONAL_ASSESSMENT: 0-10

## 2025-08-31 ASSESSMENT — PAIN DESCRIPTION - LOCATION: LOCATION: BACK

## 2025-09-01 LAB
ALBUMIN SERPL-MCNC: 3.3 G/DL (ref 3.5–4.6)
ANION GAP SERPL CALCULATED.3IONS-SCNC: 11 MEQ/L (ref 9–15)
BASOPHILS # BLD: 0 K/UL (ref 0–0.2)
BASOPHILS NFR BLD: 0.2 %
BUN SERPL-MCNC: 22 MG/DL (ref 8–23)
CALCIUM SERPL-MCNC: 7.8 MG/DL (ref 8.5–9.9)
CHLORIDE SERPL-SCNC: 111 MEQ/L (ref 95–107)
CO2 SERPL-SCNC: 22 MEQ/L (ref 20–31)
CREAT SERPL-MCNC: 0.64 MG/DL (ref 0.5–0.9)
EOSINOPHIL # BLD: 0.4 K/UL (ref 0–0.7)
EOSINOPHIL NFR BLD: 6.5 %
ERYTHROCYTE [DISTWIDTH] IN BLOOD BY AUTOMATED COUNT: 16.1 % (ref 11.5–14.5)
GLUCOSE BLD-MCNC: 102 MG/DL (ref 70–99)
GLUCOSE BLD-MCNC: 126 MG/DL (ref 70–99)
GLUCOSE BLD-MCNC: 93 MG/DL (ref 70–99)
GLUCOSE SERPL-MCNC: 101 MG/DL (ref 70–99)
HCT VFR BLD AUTO: 31.1 % (ref 37–47)
HGB BLD-MCNC: 9.9 G/DL (ref 12–16)
LYMPHOCYTES # BLD: 1.2 K/UL (ref 1–4.8)
LYMPHOCYTES NFR BLD: 22.8 %
MCH RBC QN AUTO: 29.8 PG (ref 27–31.3)
MCHC RBC AUTO-ENTMCNC: 31.8 % (ref 33–37)
MCV RBC AUTO: 93.7 FL (ref 79.4–94.8)
MONOCYTES # BLD: 0.3 K/UL (ref 0.2–0.8)
MONOCYTES NFR BLD: 6.2 %
NEUTROPHILS # BLD: 3.3 K/UL (ref 1.4–6.5)
NEUTS SEG NFR BLD: 61.5 %
PERFORMED ON: ABNORMAL
PERFORMED ON: ABNORMAL
PERFORMED ON: NORMAL
PHOSPHATE SERPL-MCNC: 1.6 MG/DL (ref 2.3–4.8)
PLATELET # BLD AUTO: 233 K/UL (ref 130–400)
POTASSIUM SERPL-SCNC: 3.5 MEQ/L (ref 3.4–4.9)
RBC # BLD AUTO: 3.32 M/UL (ref 4.2–5.4)
SODIUM SERPL-SCNC: 144 MEQ/L (ref 135–144)
WBC # BLD AUTO: 5.4 K/UL (ref 4.8–10.8)

## 2025-09-01 PROCEDURE — 85025 COMPLETE CBC W/AUTO DIFF WBC: CPT

## 2025-09-01 PROCEDURE — 97116 GAIT TRAINING THERAPY: CPT

## 2025-09-01 PROCEDURE — 6370000000 HC RX 637 (ALT 250 FOR IP): Performed by: INTERNAL MEDICINE

## 2025-09-01 PROCEDURE — 6370000000 HC RX 637 (ALT 250 FOR IP): Performed by: NURSE PRACTITIONER

## 2025-09-01 PROCEDURE — 99233 SBSQ HOSP IP/OBS HIGH 50: CPT | Performed by: INTERNAL MEDICINE

## 2025-09-01 PROCEDURE — 80069 RENAL FUNCTION PANEL: CPT

## 2025-09-01 PROCEDURE — 2500000003 HC RX 250 WO HCPCS: Performed by: INTERNAL MEDICINE

## 2025-09-01 PROCEDURE — 6360000002 HC RX W HCPCS: Performed by: NURSE PRACTITIONER

## 2025-09-01 PROCEDURE — 99291 CRITICAL CARE FIRST HOUR: CPT | Performed by: INTERNAL MEDICINE

## 2025-09-01 PROCEDURE — 6360000002 HC RX W HCPCS: Performed by: INTERNAL MEDICINE

## 2025-09-01 PROCEDURE — 2500000003 HC RX 250 WO HCPCS: Performed by: NURSE PRACTITIONER

## 2025-09-01 PROCEDURE — 2700000000 HC OXYGEN THERAPY PER DAY

## 2025-09-01 PROCEDURE — 94660 CPAP INITIATION&MGMT: CPT

## 2025-09-01 PROCEDURE — 2000000000 HC ICU R&B

## 2025-09-01 PROCEDURE — 2580000003 HC RX 258: Performed by: NURSE PRACTITIONER

## 2025-09-01 PROCEDURE — 2580000003 HC RX 258: Performed by: INTERNAL MEDICINE

## 2025-09-01 RX ORDER — DILTIAZEM HCL 60 MG
90 TABLET ORAL EVERY 6 HOURS SCHEDULED
Status: DISCONTINUED | OUTPATIENT
Start: 2025-09-01 | End: 2025-09-03 | Stop reason: HOSPADM

## 2025-09-01 RX ADMIN — SODIUM CHLORIDE, PRESERVATIVE FREE 20 MG: 5 INJECTION INTRAVENOUS at 08:15

## 2025-09-01 RX ADMIN — DILTIAZEM HYDROCHLORIDE 15 MG/HR: 5 INJECTION, SOLUTION INTRAVENOUS at 09:22

## 2025-09-01 RX ADMIN — ARIPIPRAZOLE 5 MG: 5 TABLET ORAL at 08:14

## 2025-09-01 RX ADMIN — POTASSIUM CHLORIDE 20 MEQ: 29.8 INJECTION, SOLUTION INTRAVENOUS at 09:03

## 2025-09-01 RX ADMIN — GABAPENTIN 800 MG: 400 CAPSULE ORAL at 20:14

## 2025-09-01 RX ADMIN — SODIUM CHLORIDE, PRESERVATIVE FREE 20 MG: 5 INJECTION INTRAVENOUS at 20:16

## 2025-09-01 RX ADMIN — DOCUSATE SODIUM 100 MG: 50 LIQUID ORAL at 08:14

## 2025-09-01 RX ADMIN — APIXABAN 5 MG: 5 TABLET, FILM COATED ORAL at 20:15

## 2025-09-01 RX ADMIN — SODIUM PHOSPHATE, MONOBASIC, MONOHYDRATE AND SODIUM PHOSPHATE, DIBASIC, ANHYDROUS 12.84 MMOL: 142; 276 INJECTION, SOLUTION INTRAVENOUS at 09:08

## 2025-09-01 RX ADMIN — DEXTROAMPHETAMINE SACCHARATE, AMPHETAMINE ASPARTATE, DEXTROAMPHETAMINE SULFATE, AMPHETAMINE SULFATE TABLETS, 10 MG,CLL 20 MG: 2.5; 2.5; 2.5; 2.5 TABLET ORAL at 08:14

## 2025-09-01 RX ADMIN — DILTIAZEM HYDROCHLORIDE 15 MG/HR: 5 INJECTION, SOLUTION INTRAVENOUS at 00:06

## 2025-09-01 RX ADMIN — DULOXETINE 60 MG: 60 CAPSULE, DELAYED RELEASE ORAL at 08:14

## 2025-09-01 RX ADMIN — BUPROPION HYDROCHLORIDE 100 MG: 100 TABLET, FILM COATED ORAL at 14:48

## 2025-09-01 RX ADMIN — DILTIAZEM HYDROCHLORIDE 90 MG: 60 TABLET ORAL at 11:25

## 2025-09-01 RX ADMIN — GABAPENTIN 800 MG: 400 CAPSULE ORAL at 08:15

## 2025-09-01 RX ADMIN — POTASSIUM CHLORIDE 20 MEQ: 29.8 INJECTION, SOLUTION INTRAVENOUS at 00:15

## 2025-09-01 RX ADMIN — AMIODARONE HYDROCHLORIDE 200 MG: 200 TABLET ORAL at 08:14

## 2025-09-01 RX ADMIN — DILTIAZEM HYDROCHLORIDE 90 MG: 60 TABLET ORAL at 18:20

## 2025-09-01 RX ADMIN — MIRTAZAPINE 60 MG: 30 TABLET, FILM COATED ORAL at 20:15

## 2025-09-01 RX ADMIN — APIXABAN 5 MG: 5 TABLET, FILM COATED ORAL at 08:19

## 2025-09-01 RX ADMIN — BUPROPION HYDROCHLORIDE 100 MG: 100 TABLET, FILM COATED ORAL at 08:14

## 2025-09-01 RX ADMIN — BUPROPION HYDROCHLORIDE 100 MG: 100 TABLET, FILM COATED ORAL at 20:15

## 2025-09-01 RX ADMIN — ROSUVASTATIN CALCIUM 10 MG: 10 TABLET, FILM COATED ORAL at 08:14

## 2025-09-01 RX ADMIN — PIPERACILLIN AND TAZOBACTAM 3375 MG: 3; .375 INJECTION, POWDER, LYOPHILIZED, FOR SOLUTION INTRAVENOUS at 01:59

## 2025-09-01 RX ADMIN — PIPERACILLIN AND TAZOBACTAM 3375 MG: 3; .375 INJECTION, POWDER, LYOPHILIZED, FOR SOLUTION INTRAVENOUS at 08:27

## 2025-09-01 RX ADMIN — POTASSIUM CHLORIDE 20 MEQ: 29.8 INJECTION, SOLUTION INTRAVENOUS at 08:24

## 2025-09-01 ASSESSMENT — PAIN SCALES - GENERAL
PAINLEVEL_OUTOF10: 0
PAINLEVEL_OUTOF10: 0

## 2025-09-02 LAB
ALBUMIN SERPL-MCNC: 3 G/DL (ref 3.5–4.6)
ANION GAP SERPL CALCULATED.3IONS-SCNC: 8 MEQ/L (ref 9–15)
BASOPHILS # BLD: 0 K/UL (ref 0–0.2)
BASOPHILS NFR BLD: 0.2 %
BUN SERPL-MCNC: 22 MG/DL (ref 8–23)
CALCIUM SERPL-MCNC: 7.8 MG/DL (ref 8.5–9.9)
CHLORIDE SERPL-SCNC: 113 MEQ/L (ref 95–107)
CO2 SERPL-SCNC: 22 MEQ/L (ref 20–31)
CREAT SERPL-MCNC: 0.63 MG/DL (ref 0.5–0.9)
EKG ATRIAL RATE: 166 BPM
EKG DIAGNOSIS: NORMAL
EKG Q-T INTERVAL: 310 MS
EKG QRS DURATION: 78 MS
EKG QTC CALCULATION (BAZETT): 461 MS
EKG R AXIS: 55 DEGREES
EKG T AXIS: 102 DEGREES
EKG VENTRICULAR RATE: 133 BPM
EOSINOPHIL # BLD: 0.3 K/UL (ref 0–0.7)
EOSINOPHIL NFR BLD: 5.3 %
ERYTHROCYTE [DISTWIDTH] IN BLOOD BY AUTOMATED COUNT: 16.5 % (ref 11.5–14.5)
GLUCOSE BLD-MCNC: 101 MG/DL (ref 70–99)
GLUCOSE BLD-MCNC: 85 MG/DL (ref 70–99)
GLUCOSE BLD-MCNC: 89 MG/DL (ref 70–99)
GLUCOSE BLD-MCNC: 93 MG/DL (ref 70–99)
GLUCOSE BLD-MCNC: 98 MG/DL (ref 70–99)
GLUCOSE SERPL-MCNC: 89 MG/DL (ref 70–99)
HCT VFR BLD AUTO: 27.3 % (ref 37–47)
HGB BLD-MCNC: 8.8 G/DL (ref 12–16)
LYMPHOCYTES # BLD: 1.1 K/UL (ref 1–4.8)
LYMPHOCYTES NFR BLD: 21.2 %
MCH RBC QN AUTO: 30.4 PG (ref 27–31.3)
MCHC RBC AUTO-ENTMCNC: 32.2 % (ref 33–37)
MCV RBC AUTO: 94.5 FL (ref 79.4–94.8)
MONOCYTES # BLD: 0.3 K/UL (ref 0.2–0.8)
MONOCYTES NFR BLD: 6.2 %
NEUTROPHILS # BLD: 3.4 K/UL (ref 1.4–6.5)
NEUTS SEG NFR BLD: 64.7 %
PERFORMED ON: ABNORMAL
PERFORMED ON: NORMAL
PHOSPHATE SERPL-MCNC: 2.1 MG/DL (ref 2.3–4.8)
PLATELET # BLD AUTO: 230 K/UL (ref 130–400)
POTASSIUM SERPL-SCNC: 4.1 MEQ/L (ref 3.4–4.9)
RBC # BLD AUTO: 2.89 M/UL (ref 4.2–5.4)
SODIUM SERPL-SCNC: 143 MEQ/L (ref 135–144)
WBC # BLD AUTO: 5.3 K/UL (ref 4.8–10.8)

## 2025-09-02 PROCEDURE — 6370000000 HC RX 637 (ALT 250 FOR IP): Performed by: INTERNAL MEDICINE

## 2025-09-02 PROCEDURE — 99232 SBSQ HOSP IP/OBS MODERATE 35: CPT | Performed by: INTERNAL MEDICINE

## 2025-09-02 PROCEDURE — 97116 GAIT TRAINING THERAPY: CPT

## 2025-09-02 PROCEDURE — 2060000000 HC ICU INTERMEDIATE R&B

## 2025-09-02 PROCEDURE — 94660 CPAP INITIATION&MGMT: CPT

## 2025-09-02 PROCEDURE — 2580000003 HC RX 258: Performed by: INTERNAL MEDICINE

## 2025-09-02 PROCEDURE — 2500000003 HC RX 250 WO HCPCS: Performed by: NURSE PRACTITIONER

## 2025-09-02 PROCEDURE — 6360000002 HC RX W HCPCS: Performed by: NURSE PRACTITIONER

## 2025-09-02 PROCEDURE — 92526 ORAL FUNCTION THERAPY: CPT

## 2025-09-02 PROCEDURE — 80069 RENAL FUNCTION PANEL: CPT

## 2025-09-02 PROCEDURE — 6370000000 HC RX 637 (ALT 250 FOR IP): Performed by: NURSE PRACTITIONER

## 2025-09-02 PROCEDURE — 2500000003 HC RX 250 WO HCPCS: Performed by: INTERNAL MEDICINE

## 2025-09-02 PROCEDURE — 94760 N-INVAS EAR/PLS OXIMETRY 1: CPT

## 2025-09-02 PROCEDURE — 99291 CRITICAL CARE FIRST HOUR: CPT | Performed by: INTERNAL MEDICINE

## 2025-09-02 PROCEDURE — 85025 COMPLETE CBC W/AUTO DIFF WBC: CPT

## 2025-09-02 PROCEDURE — 2700000000 HC OXYGEN THERAPY PER DAY

## 2025-09-02 RX ORDER — LIDOCAINE 4 G/G
1 PATCH TOPICAL DAILY
Status: DISCONTINUED | OUTPATIENT
Start: 2025-09-02 | End: 2025-09-03 | Stop reason: HOSPADM

## 2025-09-02 RX ADMIN — SODIUM CHLORIDE, PRESERVATIVE FREE 20 MG: 5 INJECTION INTRAVENOUS at 07:41

## 2025-09-02 RX ADMIN — DILTIAZEM HYDROCHLORIDE 90 MG: 60 TABLET ORAL at 05:43

## 2025-09-02 RX ADMIN — MIRTAZAPINE 60 MG: 30 TABLET, FILM COATED ORAL at 21:11

## 2025-09-02 RX ADMIN — BUPROPION HYDROCHLORIDE 100 MG: 100 TABLET, FILM COATED ORAL at 07:35

## 2025-09-02 RX ADMIN — APIXABAN 5 MG: 5 TABLET, FILM COATED ORAL at 21:06

## 2025-09-02 RX ADMIN — BUPROPION HYDROCHLORIDE 100 MG: 100 TABLET, FILM COATED ORAL at 21:06

## 2025-09-02 RX ADMIN — GABAPENTIN 800 MG: 400 CAPSULE ORAL at 07:35

## 2025-09-02 RX ADMIN — DULOXETINE 60 MG: 60 CAPSULE, DELAYED RELEASE ORAL at 07:35

## 2025-09-02 RX ADMIN — DEXTROAMPHETAMINE SACCHARATE, AMPHETAMINE ASPARTATE, DEXTROAMPHETAMINE SULFATE, AMPHETAMINE SULFATE TABLETS, 10 MG,CLL 20 MG: 2.5; 2.5; 2.5; 2.5 TABLET ORAL at 07:35

## 2025-09-02 RX ADMIN — SODIUM CHLORIDE, PRESERVATIVE FREE 20 MG: 5 INJECTION INTRAVENOUS at 21:06

## 2025-09-02 RX ADMIN — DILTIAZEM HYDROCHLORIDE 90 MG: 60 TABLET ORAL at 13:46

## 2025-09-02 RX ADMIN — ARIPIPRAZOLE 5 MG: 5 TABLET ORAL at 07:35

## 2025-09-02 RX ADMIN — DILTIAZEM HYDROCHLORIDE 90 MG: 60 TABLET ORAL at 21:25

## 2025-09-02 RX ADMIN — APIXABAN 5 MG: 5 TABLET, FILM COATED ORAL at 07:35

## 2025-09-02 RX ADMIN — GABAPENTIN 800 MG: 400 CAPSULE ORAL at 21:07

## 2025-09-02 RX ADMIN — ROSUVASTATIN CALCIUM 10 MG: 10 TABLET, FILM COATED ORAL at 21:11

## 2025-09-02 RX ADMIN — SODIUM PHOSPHATE, MONOBASIC, MONOHYDRATE AND SODIUM PHOSPHATE, DIBASIC, ANHYDROUS 12.84 MMOL: 142; 276 INJECTION, SOLUTION INTRAVENOUS at 07:48

## 2025-09-02 RX ADMIN — ACETAMINOPHEN 650 MG: 325 TABLET ORAL at 09:53

## 2025-09-02 ASSESSMENT — PAIN DESCRIPTION - ORIENTATION: ORIENTATION: POSTERIOR

## 2025-09-02 ASSESSMENT — PAIN - FUNCTIONAL ASSESSMENT: PAIN_FUNCTIONAL_ASSESSMENT: 0-10

## 2025-09-02 ASSESSMENT — PAIN SCALES - GENERAL
PAINLEVEL_OUTOF10: 8
PAINLEVEL_OUTOF10: 9
PAINLEVEL_OUTOF10: 0

## 2025-09-02 ASSESSMENT — PAIN DESCRIPTION - LOCATION
LOCATION: BACK
LOCATION: BACK

## 2025-09-02 ASSESSMENT — PAIN DESCRIPTION - DESCRIPTORS: DESCRIPTORS: ACHING

## 2025-09-03 ENCOUNTER — TELEPHONE (OUTPATIENT)
Age: 70
End: 2025-09-03

## 2025-09-03 VITALS
HEIGHT: 62 IN | WEIGHT: 176.81 LBS | DIASTOLIC BLOOD PRESSURE: 64 MMHG | TEMPERATURE: 98.6 F | HEART RATE: 78 BPM | RESPIRATION RATE: 18 BRPM | SYSTOLIC BLOOD PRESSURE: 143 MMHG | BODY MASS INDEX: 32.54 KG/M2 | OXYGEN SATURATION: 98 %

## 2025-09-03 LAB
ALBUMIN SERPL-MCNC: 3 G/DL (ref 3.5–4.6)
ANION GAP SERPL CALCULATED.3IONS-SCNC: 11 MEQ/L (ref 9–15)
BASOPHILS # BLD: 0 K/UL (ref 0–0.2)
BASOPHILS NFR BLD: 0.3 %
BUN SERPL-MCNC: 16 MG/DL (ref 8–23)
CALCIUM SERPL-MCNC: 8.6 MG/DL (ref 8.5–9.9)
CHLORIDE SERPL-SCNC: 111 MEQ/L (ref 95–107)
CO2 SERPL-SCNC: 19 MEQ/L (ref 20–31)
CREAT SERPL-MCNC: 0.59 MG/DL (ref 0.5–0.9)
EOSINOPHIL # BLD: 0.2 K/UL (ref 0–0.7)
EOSINOPHIL NFR BLD: 3.5 %
ERYTHROCYTE [DISTWIDTH] IN BLOOD BY AUTOMATED COUNT: 16.5 % (ref 11.5–14.5)
GLUCOSE BLD-MCNC: 90 MG/DL (ref 70–99)
GLUCOSE SERPL-MCNC: 83 MG/DL (ref 70–99)
HCT VFR BLD AUTO: 29.8 % (ref 37–47)
HGB BLD-MCNC: 9.7 G/DL (ref 12–16)
LYMPHOCYTES # BLD: 1.2 K/UL (ref 1–4.8)
LYMPHOCYTES NFR BLD: 20.3 %
MCH RBC QN AUTO: 30 PG (ref 27–31.3)
MCHC RBC AUTO-ENTMCNC: 32.6 % (ref 33–37)
MCV RBC AUTO: 92.3 FL (ref 79.4–94.8)
MONOCYTES # BLD: 0.4 K/UL (ref 0.2–0.8)
MONOCYTES NFR BLD: 6.9 %
NEUTROPHILS # BLD: 4.1 K/UL (ref 1.4–6.5)
NEUTS SEG NFR BLD: 67.2 %
PERFORMED ON: NORMAL
PHOSPHATE SERPL-MCNC: 2.6 MG/DL (ref 2.3–4.8)
PLATELET # BLD AUTO: 288 K/UL (ref 130–400)
POTASSIUM SERPL-SCNC: 4.3 MEQ/L (ref 3.4–4.9)
PROCALCITONIN SERPL IA-MCNC: 0.31 NG/ML (ref 0–0.15)
RBC # BLD AUTO: 3.23 M/UL (ref 4.2–5.4)
SODIUM SERPL-SCNC: 141 MEQ/L (ref 135–144)
WBC # BLD AUTO: 6.1 K/UL (ref 4.8–10.8)

## 2025-09-03 PROCEDURE — 85025 COMPLETE CBC W/AUTO DIFF WBC: CPT

## 2025-09-03 PROCEDURE — 99232 SBSQ HOSP IP/OBS MODERATE 35: CPT | Performed by: INTERNAL MEDICINE

## 2025-09-03 PROCEDURE — 2700000000 HC OXYGEN THERAPY PER DAY

## 2025-09-03 PROCEDURE — 97166 OT EVAL MOD COMPLEX 45 MIN: CPT

## 2025-09-03 PROCEDURE — 6370000000 HC RX 637 (ALT 250 FOR IP): Performed by: INTERNAL MEDICINE

## 2025-09-03 PROCEDURE — 36415 COLL VENOUS BLD VENIPUNCTURE: CPT

## 2025-09-03 PROCEDURE — 6370000000 HC RX 637 (ALT 250 FOR IP): Performed by: NURSE PRACTITIONER

## 2025-09-03 PROCEDURE — 80069 RENAL FUNCTION PANEL: CPT

## 2025-09-03 PROCEDURE — 94761 N-INVAS EAR/PLS OXIMETRY MLT: CPT

## 2025-09-03 PROCEDURE — 94660 CPAP INITIATION&MGMT: CPT

## 2025-09-03 PROCEDURE — 2500000003 HC RX 250 WO HCPCS: Performed by: NURSE PRACTITIONER

## 2025-09-03 PROCEDURE — 6360000002 HC RX W HCPCS: Performed by: NURSE PRACTITIONER

## 2025-09-03 PROCEDURE — 84145 PROCALCITONIN (PCT): CPT

## 2025-09-03 PROCEDURE — 94640 AIRWAY INHALATION TREATMENT: CPT

## 2025-09-03 RX ORDER — FLUTICASONE PROPIONATE 110 UG/1
1 AEROSOL, METERED RESPIRATORY (INHALATION)
Status: DISCONTINUED | OUTPATIENT
Start: 2025-09-03 | End: 2025-09-03 | Stop reason: HOSPADM

## 2025-09-03 RX ORDER — DILTIAZEM HYDROCHLORIDE 120 MG/1
240 CAPSULE, COATED, EXTENDED RELEASE ORAL DAILY
Qty: 60 CAPSULE | Refills: 3 | Status: SHIPPED | OUTPATIENT
Start: 2025-09-03

## 2025-09-03 RX ORDER — AMIODARONE HYDROCHLORIDE 200 MG/1
200 TABLET ORAL
Qty: 60 TABLET | Refills: 0 | Status: SHIPPED | OUTPATIENT
Start: 2025-09-05

## 2025-09-03 RX ADMIN — DILTIAZEM HYDROCHLORIDE 90 MG: 60 TABLET ORAL at 03:14

## 2025-09-03 RX ADMIN — GABAPENTIN 800 MG: 400 CAPSULE ORAL at 07:57

## 2025-09-03 RX ADMIN — DULOXETINE 60 MG: 60 CAPSULE, DELAYED RELEASE ORAL at 07:57

## 2025-09-03 RX ADMIN — DOCUSATE SODIUM 100 MG: 50 LIQUID ORAL at 07:56

## 2025-09-03 RX ADMIN — DILTIAZEM HYDROCHLORIDE 90 MG: 60 TABLET ORAL at 11:26

## 2025-09-03 RX ADMIN — DEXTROAMPHETAMINE SACCHARATE, AMPHETAMINE ASPARTATE, DEXTROAMPHETAMINE SULFATE, AMPHETAMINE SULFATE TABLETS, 10 MG,CLL 20 MG: 2.5; 2.5; 2.5; 2.5 TABLET ORAL at 07:56

## 2025-09-03 RX ADMIN — APIXABAN 5 MG: 5 TABLET, FILM COATED ORAL at 07:57

## 2025-09-03 RX ADMIN — ARIPIPRAZOLE 5 MG: 5 TABLET ORAL at 07:56

## 2025-09-03 RX ADMIN — FLUTICASONE PROPIONATE 1 PUFF: 110 AEROSOL, METERED RESPIRATORY (INHALATION) at 10:14

## 2025-09-03 RX ADMIN — ROSUVASTATIN CALCIUM 10 MG: 10 TABLET, FILM COATED ORAL at 07:56

## 2025-09-03 RX ADMIN — DILTIAZEM HYDROCHLORIDE 90 MG: 60 TABLET ORAL at 07:56

## 2025-09-03 RX ADMIN — TIOTROPIUM BROMIDE AND OLODATEROL 2 PUFF: 3.124; 2.736 SPRAY, METERED RESPIRATORY (INHALATION) at 10:14

## 2025-09-03 RX ADMIN — BUPROPION HYDROCHLORIDE 100 MG: 100 TABLET, FILM COATED ORAL at 07:56

## 2025-09-03 RX ADMIN — SODIUM CHLORIDE, PRESERVATIVE FREE 20 MG: 5 INJECTION INTRAVENOUS at 07:57

## 2025-09-03 RX ADMIN — AMIODARONE HYDROCHLORIDE 200 MG: 200 TABLET ORAL at 07:56

## 2025-09-04 ENCOUNTER — TELEPHONE (OUTPATIENT)
Age: 70
End: 2025-09-04

## 2025-09-05 ENCOUNTER — TELEPHONE (OUTPATIENT)
Age: 70
End: 2025-09-05

## (undated) DEVICE — PACK,LAPAROTOMY,NO GOWNS: Brand: MEDLINE

## (undated) DEVICE — INTENDED FOR TISSUE SEPARATION, AND OTHER PROCEDURES THAT REQUIRE A SHARP SURGICAL BLADE TO PUNCTURE OR CUT.: Brand: BARD-PARKER ® CARBON RIB-BACK BLADES

## (undated) DEVICE — KIT,ANTI FOG,W/SPONGE & FLUID,SOFT PACK: Brand: MEDLINE

## (undated) DEVICE — SPONGE,LAP,18"X18",DLX,XR,ST,5/PK,40/PK: Brand: MEDLINE

## (undated) DEVICE — TUBING, SUCTION, 9/32" X 12', STRAIGHT: Brand: MEDLINE INDUSTRIES, INC.

## (undated) DEVICE — GOWN,AURORA,NONREINFORCED,LARGE: Brand: MEDLINE

## (undated) DEVICE — GEL US 20GM NONIRRITATING OVERWRAPPED FILE PCH TRNSMIT

## (undated) DEVICE — TOTAL TRAY, DB, 100% SILI FOLEY, 16FR 10: Brand: MEDLINE

## (undated) DEVICE — YANKAUER,BULB TIP,W/O VENT,RIGID,STERILE: Brand: MEDLINE

## (undated) DEVICE — SUTURE PERMAHAND SZ 3-0 L30IN NONABSORBABLE BLK SH L26MM K832H

## (undated) DEVICE — SYRINGE IRRIG 60ML SFT PLIABLE BLB EZ TO GRP 1 HND USE W/

## (undated) DEVICE — DEVICE TRCR 12X9X3IN WHT CLSR DISP OMNICLOSE

## (undated) DEVICE — TUBING INSUF 03UM FLTR W LUERLOCK CPC CONN

## (undated) DEVICE — DEVICE TISS REM DIA3MM L25.25IN ENDOSCP F/ IU POLYPS

## (undated) DEVICE — COUNTER NDL 40 COUNT HLD 70 FOAM BLK ADH W/ MAG

## (undated) DEVICE — POUCH, INSTRUMENT, 3POCKET, INVISISHIELD: Brand: MEDLINE

## (undated) DEVICE — PAD N ADH W3XL4IN POLY COT SFT PERF FLM EASILY CUT ABSRB

## (undated) DEVICE — TOWEL,OR,DSP,ST,BLUE,STD,4/PK,20PK/CS: Brand: MEDLINE

## (undated) DEVICE — GAUZE,SPONGE,FLUFF,6"X6.75",STRL,10/TRAY: Brand: MEDLINE

## (undated) DEVICE — SET ENDOSCP SEAL HYSTEROSCOPE RIG OUTFLO CHN DISP MYOSURE

## (undated) DEVICE — PLASMABLADE X PS210-030S-LIGHT 3.0SL: Brand: PLASMABLADE™ X

## (undated) DEVICE — GLOVE SURG SZ 65 THK91MIL LTX FREE SYN POLYISOPRENE

## (undated) DEVICE — LINER PAD CONTOUR SUPER PEACH 7X14IN

## (undated) DEVICE — APPLICATOR MEDICATED 26 CC SOLUTION HI LT ORNG CHLORAPREP

## (undated) DEVICE — BRA SURGICALXL BGE MARENA FR SNAP CMFRT WR

## (undated) DEVICE — AGENT HEMSTAT 3GM OXIDIZED REGENERATED CELOS ABSRB FOR CONT (ORDER MULTIPLES OF 5EA)

## (undated) DEVICE — LABEL MED MINI W/ MARKER

## (undated) DEVICE — DRAPE, LAVH, STERILE: Brand: MEDLINE

## (undated) DEVICE — GLOVE SURG SZ 9 THK91MIL LTX FREE SYN POLYISOPRENE ANTI

## (undated) DEVICE — GOWN,SIRUS,POLYRNF,BRTHSLV,XLN/XL,20/CS: Brand: MEDLINE

## (undated) DEVICE — PROVE COVER: Brand: UNBRANDED

## (undated) DEVICE — NEEDLE HYPO 25GA L1.5IN BLU POLYPR HUB S STL REG BVL STR

## (undated) DEVICE — TROCAR: Brand: KII® SLEEVE

## (undated) DEVICE — APPLICATOR  COTTON-TIPPED 6 IN WOOD STRL

## (undated) DEVICE — SYRINGE MED 10ML LUERLOCK TIP W/O SFTY DISP

## (undated) DEVICE — MARKER SURG SKIN GENTIAN VLT REG TIP W/ 6IN RUL DYNJSM01

## (undated) DEVICE — COVER,TABLE,44X90,STERILE: Brand: MEDLINE

## (undated) DEVICE — NEEDLE INSUF L120MM ULT VERES ENDOPATH

## (undated) DEVICE — ELECTRODE PT RET AD L9FT HI MOIST COND ADH HYDRGEL CORDED

## (undated) DEVICE — Device

## (undated) DEVICE — COVER LT HNDL BLU PLAS

## (undated) DEVICE — PACK,SET UP,DRAPE: Brand: MEDLINE

## (undated) DEVICE — WARMER SCP 2 ANTIFOG LAP DISP

## (undated) DEVICE — SHEET,DRAPE,53X77,STERILE: Brand: MEDLINE

## (undated) DEVICE — GAUZE,SPONGE,4"X4",16PLY,XRAY,STRL,LF: Brand: MEDLINE

## (undated) DEVICE — SUTURE MCRYL SZ 4-0 L27IN ABSRB UD L19MM PS-2 1/2 CIR PRIM Y426H

## (undated) DEVICE — KIT CANSTR VAC TANTEM TB FOR AQUILEX FLD CTRL SYS

## (undated) DEVICE — TROCAR: Brand: KII FIOS FIRST ENTRY

## (undated) DEVICE — GLOVE SURG SZ 85 L12IN FNGR THK94MIL TRNSLUC YEL LTX

## (undated) DEVICE — SET FLD CTRL SYS INFLO AND OUTFLO TB AQUILEX

## (undated) DEVICE — HYPODERMIC SAFETY NEEDLE: Brand: MAGELLAN

## (undated) DEVICE — 4-PORT MANIFOLD: Brand: NEPTUNE 2

## (undated) DEVICE — SYRINGE MED 10ML TRNSLUC BRL PLUNG BLK MRK POLYPR CTRL

## (undated) DEVICE — LIQUIBAND RAPID ADHESIVE 36/CS 0.8ML: Brand: MEDLINE

## (undated) DEVICE — SUTURE VCRL SZ 3-0 L27IN ABSRB UD L26MM SH 1/2 CIR J416H

## (undated) DEVICE — DRAPE EQUIP TRNSPRT CONTAINMENT FOR BK TAB

## (undated) DEVICE — GLOVE ORANGE PI 7   MSG9070

## (undated) DEVICE — SHEARS ENDOSCP L36CM DIA5MM ULTRASONIC CRV TIP HARM

## (undated) DEVICE — GOWN,AURORA,NONRNF,XL,30/CS: Brand: MEDLINE

## (undated) DEVICE — TRAY PREP DRY W/ PREM GLV 2 APPL 6 SPNG 2 UNDPD 1 OVERWRAP

## (undated) DEVICE — MANIPULATOR UTER INSTRUMENT ZUMI

## (undated) DEVICE — APPLICATOR MEDICATED 10.5 CC SOLUTION HI LT ORNG CHLORAPREP

## (undated) DEVICE — PLUMEPORT LAPAROSCOPIC SMOKE FILTRATION DEVICE: Brand: PLUMEPORT ACTIV

## (undated) DEVICE — CHLORAPREP 26ML ORANGE